# Patient Record
Sex: FEMALE | Race: WHITE | NOT HISPANIC OR LATINO | Employment: FULL TIME | URBAN - METROPOLITAN AREA
[De-identification: names, ages, dates, MRNs, and addresses within clinical notes are randomized per-mention and may not be internally consistent; named-entity substitution may affect disease eponyms.]

---

## 2017-01-18 ENCOUNTER — PATIENT MESSAGE (OUTPATIENT)
Dept: ADMINISTRATIVE | Facility: OTHER | Age: 57
End: 2017-01-18

## 2017-01-18 ENCOUNTER — LAB VISIT (OUTPATIENT)
Dept: LAB | Facility: HOSPITAL | Age: 57
End: 2017-01-18
Attending: INTERNAL MEDICINE
Payer: COMMERCIAL

## 2017-01-18 ENCOUNTER — OFFICE VISIT (OUTPATIENT)
Dept: INTERNAL MEDICINE | Facility: CLINIC | Age: 57
End: 2017-01-18
Payer: COMMERCIAL

## 2017-01-18 VITALS
BODY MASS INDEX: 31.71 KG/M2 | HEART RATE: 86 BPM | HEIGHT: 63 IN | DIASTOLIC BLOOD PRESSURE: 84 MMHG | SYSTOLIC BLOOD PRESSURE: 128 MMHG | WEIGHT: 179 LBS

## 2017-01-18 DIAGNOSIS — I83.11 VARICOSE VEINS OF RIGHT LOWER EXTREMITY WITH INFLAMMATION: ICD-10-CM

## 2017-01-18 DIAGNOSIS — Z98.84 GASTRIC BYPASS STATUS FOR OBESITY: ICD-10-CM

## 2017-01-18 DIAGNOSIS — I10 ESSENTIAL HYPERTENSION: ICD-10-CM

## 2017-01-18 DIAGNOSIS — F41.9 ANXIETY: ICD-10-CM

## 2017-01-18 DIAGNOSIS — Z00.00 ANNUAL PHYSICAL EXAM: Primary | ICD-10-CM

## 2017-01-18 DIAGNOSIS — K63.5 POLYP OF COLON, UNSPECIFIED PART OF COLON, UNSPECIFIED TYPE: ICD-10-CM

## 2017-01-18 DIAGNOSIS — Z00.00 ANNUAL PHYSICAL EXAM: ICD-10-CM

## 2017-01-18 DIAGNOSIS — G47.30 SLEEP APNEA, UNSPECIFIED TYPE: ICD-10-CM

## 2017-01-18 LAB
25(OH)D3+25(OH)D2 SERPL-MCNC: 22 NG/ML
ALBUMIN SERPL BCP-MCNC: 3.6 G/DL
ALP SERPL-CCNC: 139 U/L
ALT SERPL W/O P-5'-P-CCNC: 37 U/L
ANION GAP SERPL CALC-SCNC: 8 MMOL/L
AST SERPL-CCNC: 33 U/L
BASOPHILS # BLD AUTO: 0.04 K/UL
BASOPHILS NFR BLD: 0.8 %
BILIRUB SERPL-MCNC: 0.4 MG/DL
BUN SERPL-MCNC: 14 MG/DL
CALCIUM SERPL-MCNC: 9.2 MG/DL
CHLORIDE SERPL-SCNC: 108 MMOL/L
CHOLEST/HDLC SERPL: 3.9 {RATIO}
CO2 SERPL-SCNC: 24 MMOL/L
CREAT SERPL-MCNC: 0.8 MG/DL
DIFFERENTIAL METHOD: ABNORMAL
EOSINOPHIL # BLD AUTO: 0.2 K/UL
EOSINOPHIL NFR BLD: 3.3 %
ERYTHROCYTE [DISTWIDTH] IN BLOOD BY AUTOMATED COUNT: 14.9 %
EST. GFR  (AFRICAN AMERICAN): >60 ML/MIN/1.73 M^2
EST. GFR  (NON AFRICAN AMERICAN): >60 ML/MIN/1.73 M^2
FERRITIN SERPL-MCNC: 60 NG/ML
GLUCOSE SERPL-MCNC: 97 MG/DL
HCT VFR BLD AUTO: 41.8 %
HDL/CHOLESTEROL RATIO: 25.8 %
HDLC SERPL-MCNC: 225 MG/DL
HDLC SERPL-MCNC: 58 MG/DL
HGB BLD-MCNC: 13.7 G/DL
LDLC SERPL CALC-MCNC: 145.2 MG/DL
LYMPHOCYTES # BLD AUTO: 1.8 K/UL
LYMPHOCYTES NFR BLD: 35.3 %
MCH RBC QN AUTO: 28.8 PG
MCHC RBC AUTO-ENTMCNC: 32.8 %
MCV RBC AUTO: 88 FL
MONOCYTES # BLD AUTO: 0.5 K/UL
MONOCYTES NFR BLD: 10 %
NEUTROPHILS # BLD AUTO: 2.6 K/UL
NEUTROPHILS NFR BLD: 50.4 %
NONHDLC SERPL-MCNC: 167 MG/DL
PLATELET # BLD AUTO: 245 K/UL
PMV BLD AUTO: 10.2 FL
POTASSIUM SERPL-SCNC: 4.3 MMOL/L
PROT SERPL-MCNC: 7.1 G/DL
RBC # BLD AUTO: 4.75 M/UL
SODIUM SERPL-SCNC: 140 MMOL/L
TRIGL SERPL-MCNC: 109 MG/DL
TSH SERPL DL<=0.005 MIU/L-ACNC: 1.84 UIU/ML
URATE SERPL-MCNC: 4.4 MG/DL
VIT B12 SERPL-MCNC: 1600 PG/ML
WBC # BLD AUTO: 5.19 K/UL

## 2017-01-18 PROCEDURE — 36415 COLL VENOUS BLD VENIPUNCTURE: CPT

## 2017-01-18 PROCEDURE — 83036 HEMOGLOBIN GLYCOSYLATED A1C: CPT

## 2017-01-18 PROCEDURE — 84550 ASSAY OF BLOOD/URIC ACID: CPT

## 2017-01-18 PROCEDURE — 82607 VITAMIN B-12: CPT

## 2017-01-18 PROCEDURE — 99999 PR PBB SHADOW E&M-EST. PATIENT-LVL III: CPT | Mod: PBBFAC,,, | Performed by: INTERNAL MEDICINE

## 2017-01-18 PROCEDURE — 85025 COMPLETE CBC W/AUTO DIFF WBC: CPT

## 2017-01-18 PROCEDURE — 84443 ASSAY THYROID STIM HORMONE: CPT

## 2017-01-18 PROCEDURE — 80053 COMPREHEN METABOLIC PANEL: CPT

## 2017-01-18 PROCEDURE — 3079F DIAST BP 80-89 MM HG: CPT | Mod: S$GLB,,, | Performed by: INTERNAL MEDICINE

## 2017-01-18 PROCEDURE — 82728 ASSAY OF FERRITIN: CPT

## 2017-01-18 PROCEDURE — 84425 ASSAY OF VITAMIN B-1: CPT

## 2017-01-18 PROCEDURE — 80061 LIPID PANEL: CPT

## 2017-01-18 PROCEDURE — 3074F SYST BP LT 130 MM HG: CPT | Mod: S$GLB,,, | Performed by: INTERNAL MEDICINE

## 2017-01-18 PROCEDURE — 99396 PREV VISIT EST AGE 40-64: CPT | Mod: S$GLB,,, | Performed by: INTERNAL MEDICINE

## 2017-01-18 PROCEDURE — 82306 VITAMIN D 25 HYDROXY: CPT

## 2017-01-18 RX ORDER — ALPRAZOLAM 0.5 MG/1
0.5 TABLET ORAL NIGHTLY PRN
Qty: 30 TABLET | Refills: 2 | Status: SHIPPED | OUTPATIENT
Start: 2017-01-18 | End: 2018-11-20 | Stop reason: SDUPTHER

## 2017-01-18 RX ORDER — TRETINOIN 1 MG/G
CREAM TOPICAL
Qty: 45 G | Refills: 11 | Status: SHIPPED | OUTPATIENT
Start: 2017-01-18 | End: 2019-06-27 | Stop reason: SDUPTHER

## 2017-01-18 RX ORDER — BENZONATATE 100 MG/1
100 CAPSULE ORAL 3 TIMES DAILY PRN
Qty: 30 CAPSULE | Refills: 3 | Status: SHIPPED | OUTPATIENT
Start: 2017-01-18 | End: 2017-02-17

## 2017-01-18 RX ORDER — HYDROCHLOROTHIAZIDE 12.5 MG/1
12.5 CAPSULE ORAL DAILY
Qty: 90 CAPSULE | Refills: 3 | Status: SHIPPED | OUTPATIENT
Start: 2017-01-18 | End: 2017-11-12

## 2017-01-18 RX ORDER — ONDANSETRON 4 MG/1
4 TABLET, FILM COATED ORAL EVERY 8 HOURS PRN
Qty: 20 TABLET | Refills: 0 | Status: SHIPPED | OUTPATIENT
Start: 2017-01-18 | End: 2019-11-25 | Stop reason: SDUPTHER

## 2017-01-18 RX ORDER — LOSARTAN POTASSIUM 100 MG/1
100 TABLET ORAL EVERY MORNING
Qty: 90 TABLET | Refills: 3 | Status: SHIPPED | OUTPATIENT
Start: 2017-01-18 | End: 2018-06-26 | Stop reason: SDUPTHER

## 2017-01-18 NOTE — PATIENT INSTRUCTIONS
[email or web address] PRAVIN for your cell phone. [employer]    [place]reser[place]inder most recent, most hopeful information about dementia prevention to come along in years. It's a lot of things. Diet, supplements, exercise and preliminary data looks good.
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
## 2017-01-19 LAB
ESTIMATED AVG GLUCOSE: 111 MG/DL
HBA1C MFR BLD HPLC: 5.5 %

## 2017-01-20 ENCOUNTER — PATIENT MESSAGE (OUTPATIENT)
Dept: ADMINISTRATIVE | Facility: OTHER | Age: 57
End: 2017-01-20

## 2017-01-21 LAB — VIT B1 SERPL-MCNC: 48 UG/L (ref 38–122)

## 2017-01-23 ENCOUNTER — PATIENT OUTREACH (OUTPATIENT)
Dept: OTHER | Facility: OTHER | Age: 57
End: 2017-01-23
Payer: COMMERCIAL
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
## 2017-02-07 ENCOUNTER — PATIENT OUTREACH (OUTPATIENT)
Dept: OTHER | Facility: OTHER | Age: 57
End: 2017-02-07
Payer: COMMERCIAL

## 2017-02-07 NOTE — PROGRESS NOTES
Last 5 Patient Entered Readings                                                               Current 30 Day Average: 121/82     Recent Readings 2/7/2017 2/6/2017 2/6/2017 2/5/2017 2/5/2017    Systolic BP (mmHg) 142 135 124 104 96    Diastolic BP (mmHg) 96 83 87 78 81    Pulse 75 79 102 86 78        Hypertension Medications             hydrochlorothiazide (MICROZIDE) 12.5 mg capsule Take 1 capsule (12.5 mg total) by mouth once daily.- qam    losartan (COZAAR) 100 MG tablet Take 1 tablet (100 mg total) by mouth every morning.        Called patient to introduce her into the HDM (hypertension digital medicine) clinic.  Patient requests I call back tomorrow morning.     Called back, verified the following information with the patient:  Drug allergies  Medication adherence- Patient states she is adherent.      Screening questionnaires:  Depression- indicated      Sleep apnea- diagnosed, on CPAP- patient states she cannot wear a CPAP, claustrophobic. Patient states she was not able to complete the sleep study.       Explained that we expect her to obtain several blood pressures/week at random times of day.       Explained that our goal is to get her BP to consistently below 140/90mmHg.       Patient and I agreed that the patient will take her BP daily to every other day at varying times of the day.       I will plan to follow-up with the patient in 4 weeks.      Emailed patient link to [facility] HTN webpage as well as my direct phone number in case she has in questions.

## 2017-02-07 NOTE — LETTER
"   Mecca Vela, PharmD  [address] [place] [address]     Dear Cherie Richards,    Welcome to the [facility] Hypertension Digital Medicine Program!         My name is Mecca Vela and I am your dedicated clinical pharmacist.  As an expert in medication management, I will help ensure that the medications you are taking continue to provide you with the intended benefits.      This is Meche Collier and she will be your health  for the duration of the program.  Her job is to help you identify lifestyle changes to improve your blood pressure control.  You will talk about nutrition, exercise, and other ways that you may be able to adjust your current habits to better your health. Together, we will work to improve your overall health and encourage you to meet your goals for a healthier lifestyle.    What we expect from YOU:    You will need to take blood pressure readings multiple times a week and no less than one reading per week.   It is important that you take your measurements at different times during the day, when possible.     What you should expect from your Digital Medicine Care Team:   We will provide you with education about high blood pressure, including lifestyle changes that could help you to control your blood pressure.   We will review your weekly readings and provide you with monthly blood pressure progress reports after you have been in the program for more than 30 days.   We will send monthly progress reports on your blood pressure control to your physician so they can follow along with your progress as well.    You will be able to reach me by phone at [phone] or through your [facility] account by clicking "Send a message to your doctor's office" on the home screen then selecting my name in the "To the office of:" field.     I look forward to working with you to achieve your blood pressure goals!    Sincerely,    Mecca Vela, Kenn  Your personal Clinical " Pharmacist    Please visit [email or web address] to learn more about high blood pressure and what you can do lower your blood pressure.                                                                                         Cherie Richards  [address]  [place] [address]

## 2017-02-17 ENCOUNTER — PATIENT OUTREACH (OUTPATIENT)
Dept: OTHER | Facility: OTHER | Age: 57
End: 2017-02-17
Payer: COMMERCIAL

## 2017-02-17 NOTE — PROGRESS NOTES
Last 5 Patient Entered Readings                                                               Current 30 Day Average: 122/82     Recent Readings 2/15/2017 2/14/2017 2/14/2017 2/12/2017 2/11/2017    Systolic BP (mmHg) 126 132 147 125 109    Diastolic BP (mmHg) 76 81 99 81 81    Pulse 78 77 88 98 106        LVM to follow up with Mrs. Cherie Richards. Inquired about how her low sodium diet and exercise is going. Advised pt to call or message back if she has any questions or concerns.

## 2017-03-08 ENCOUNTER — PATIENT OUTREACH (OUTPATIENT)
Dept: OTHER | Facility: OTHER | Age: 57
End: 2017-03-08
Payer: COMMERCIAL

## 2017-03-08 NOTE — PROGRESS NOTES
Last 5 Patient Entered Readings                                                               Current 30 Day Average: 126/83     Recent Readings 3/4/2017 3/1/2017 3/1/2017 3/1/2017 3/1/2017    Systolic BP (mmHg) 124 120 122 127 132    Diastolic BP (mmHg) 82 79 81 82 84    Pulse 91 93 94 95 97        Hypertension Medications             hydrochlorothiazide (MICROZIDE) 12.5 mg capsule Take 1 capsule (12.5 mg total) by mouth once daily.    losartan (COZAAR) 100 MG tablet Take 1 tablet (100 mg total) by mouth every morning.        Plan:   Called patient to follow up. Per current 30 day average, BP is well controlled. LVM.   Will continue to monitor. WCB in 3 months, sooner if BP begins to trend up or down.

## 2017-03-20 ENCOUNTER — PATIENT OUTREACH (OUTPATIENT)
Dept: OTHER | Facility: OTHER | Age: 57
End: 2017-03-20
Payer: COMMERCIAL

## 2017-03-20 NOTE — PROGRESS NOTES
"Last 5 Patient Entered Readings                                                               Current 30 Day Average: 121/82     Recent Readings 3/18/2017 3/12/2017 3/12/2017 3/10/2017 3/4/2017    Systolic BP (mmHg) 104 111 138 108 124    Diastolic BP (mmHg) 81 69 92 74 82    Pulse 95 93 87 97 91        Follow up with Ms. Cherie Richards completed. Ms. Richards is doing well. Patient reports that "poor connection" message sometimes appears when she is trying to take a blood pressure reading. She will check Bluetooth and internet connection next time she takes a reading. Ms. Richards reports that she is staying active and maintaining her low sodium diet.Patient did not have any further questions or concerns. I will follow up in a few weeks to see how she is doing and progressing.        "

## 2017-04-10 ENCOUNTER — PATIENT OUTREACH (OUTPATIENT)
Dept: OTHER | Facility: OTHER | Age: 57
End: 2017-04-10
Payer: COMMERCIAL

## 2017-04-10 NOTE — PROGRESS NOTES
Last 5 Patient Entered Readings                                                               Current 30 Day Average: 121/84     Recent Readings 4/9/2017 4/7/2017 4/6/2017 3/27/2017 3/27/2017    Systolic BP (mmHg) 133 115 152 103 104    Diastolic BP (mmHg) 91 77 96 82 81    Pulse 80 99 87 87 95      LVM to follow up with Ms. Cherie BUCHANAN Maurice. Inquired about how her low sodium diet and exercise is going. Advised pt to call or message back if she has any questions or concerns.

## 2017-05-10 ENCOUNTER — PATIENT OUTREACH (OUTPATIENT)
Dept: OTHER | Facility: OTHER | Age: 57
End: 2017-05-10
Payer: COMMERCIAL

## 2017-06-07 ENCOUNTER — TELEPHONE (OUTPATIENT)
Dept: BARIATRICS | Facility: CLINIC | Age: 57
End: 2017-06-07
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
## 2017-06-08 ENCOUNTER — PATIENT OUTREACH (OUTPATIENT)
Dept: OTHER | Facility: OTHER | Age: 57
End: 2017-06-08
Payer: COMMERCIAL

## 2017-06-08 NOTE — PROGRESS NOTES
Last 5 Patient Entered Readings                                                               Current 30 Day Average: 120/82     Recent Readings 5/20/2017 5/19/2017 4/22/2017 4/22/2017 4/22/2017    Systolic BP (mmHg) 115 125 120 132 138    Diastolic BP (mmHg) 84 81 85 85 90    Pulse 86 78 103 89 91        Hypertension Medications             hydrochlorothiazide (MICROZIDE) 12.5 mg capsule Take 1 capsule (12.5 mg total) by mouth once daily.    losartan (COZAAR) 100 MG tablet Take 1 tablet (100 mg total) by mouth every morning.        Plan:   Called patient to follow up. Per current 30 day average, BP is well controlled. Patient states she will take a reading soon. Patient denies having questions or concerns. Will continue to monitor. WCB in 4 months, sooner if BP begins to trend up or down.

## 2017-06-21 ENCOUNTER — PATIENT OUTREACH (OUTPATIENT)
Dept: OTHER | Facility: OTHER | Age: 57
End: 2017-06-21

## 2017-06-21 NOTE — PROGRESS NOTES
Last 5 Patient Entered Readings                                                               Current 30 Day Average: 129/91     Recent Readings 6/13/2017 6/9/2017 5/20/2017 5/19/2017 4/22/2017    Systolic BP (mmHg) 119 139 115 125 120    Diastolic BP (mmHg) 92 90 84 81 85    Pulse 88 76 86 78 103        Follow up with Ms. Cherie Richards completed. Patient attributes lack of BP readings to being out of town and being busy at work. She will try to take a BP reading soon. Ms. Richards is doing well. Patient did not have any further questions or concerns. I will follow up in a few weeks to see how she is doing and progressing.

## 2017-08-02 ENCOUNTER — PATIENT OUTREACH (OUTPATIENT)
Dept: OTHER | Facility: OTHER | Age: 57
End: 2017-08-02

## 2017-08-02 NOTE — PROGRESS NOTES
"Last 5 Patient Entered Redings Current 30 Day Average: 136/91     Recent Readings 8/4/2017 7/18/2017 7/17/2017 7/6/2017 6/13/2017    Systolic BP (mmHg) 131 131 146 113 119    Diastolic BP (mmHg) 93 88 91 75 92    Pulse 94 83 81 93 88        Follow up with Ms. Cherie Richards completed. Patient attributes elevated BP readings to "nerves". Ms. Richards reports that she has elderly parents that she cares for which keeps her stress levels high. Patient reports that she monitors her sodium intake by not adding any salt to food. Patient did not have any further questions or concerns. I will follow up in a few weeks to see how she is doing and progressing.          "

## 2017-08-25 ENCOUNTER — PATIENT OUTREACH (OUTPATIENT)
Dept: OTHER | Facility: OTHER | Age: 57
End: 2017-08-25

## 2017-08-25 NOTE — PROGRESS NOTES
Last 5 Patient Entered Redings Current 30 Day Average: 131/93     Recent Readings 8/4/2017 7/18/2017 7/17/2017 7/6/2017 6/13/2017    Systolic BP (mmHg) 131 131 146 113 119    Diastolic BP (mmHg) 93 88 91 75 92    Pulse 94 83 81 93 88        Messaging patient regarding lack of readings.

## 2017-09-06 ENCOUNTER — PATIENT OUTREACH (OUTPATIENT)
Dept: OTHER | Facility: OTHER | Age: 57
End: 2017-09-06
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
## 2017-09-08 NOTE — PROGRESS NOTES
Last 5 Patient Entered Redings Current 30 Day Average: 124/82     Recent Readings 9/6/2017 9/6/2017 9/5/2017 9/4/2017 9/4/2017    Systolic BP (mmHg) 121 112 146 111 114    Diastolic BP (mmHg) 72 76 97 79 79    Pulse 78 69 76 90 73        Hypertension Medications             hydrochlorothiazide (MICROZIDE) 12.5 mg capsule Take 1 capsule (12.5 mg total) by mouth once daily.    losartan (COZAAR) 100 MG tablet Take 1 tablet (100 mg total) by mouth every morning.        Plan:   Called patient to follow up. Per current 30 day average, BP is well controlled. Requested patient continue to take at least weekly readings, patient confirms understanding. Patient denies having questions or concerns. Will continue to monitor. WCB in 4 months, sooner if BP begins to trend up or down.

## 2017-10-03 ENCOUNTER — PATIENT OUTREACH (OUTPATIENT)
Dept: OTHER | Facility: OTHER | Age: 57
End: 2017-10-03

## 2017-10-03 NOTE — PROGRESS NOTES
Last 5 Patient Entered Redings Current 30 Day Average: 129/83     Recent Readings 9/12/2017 9/11/2017 9/11/2017 9/11/2017 9/11/2017    Systolic BP (mmHg) 133 - 134 145 135    Diastolic BP (mmHg) 86 - 79 101 106    Pulse 76 89 80 71 74        10/3- Messaging patient regarding lack of readings.     10/17-  Called patient to follow up, still no readings since 9/12.  LVM, requested patient call back.   Will continue to monitor. WCB in 2 weeks.

## 2017-10-04 ENCOUNTER — PATIENT OUTREACH (OUTPATIENT)
Dept: OTHER | Facility: OTHER | Age: 57
End: 2017-10-04

## 2017-10-04 NOTE — PROGRESS NOTES
Last 5 Patient Entered Redings Current 30 Day Average: 129/83     Recent Readings 9/12/2017 9/11/2017 9/11/2017 9/11/2017 9/11/2017    Systolic BP (mmHg) 133 - 134 145 135    Diastolic BP (mmHg) 86 - 79 101 106    Pulse 76 89 80 71 74        Hypertension Digital Medicine Program (HDMP): Health  Follow Up    Lifestyle Modifications:    1.Low sodium diet: Deferred    2.Physical activity: Deferred    3.Hypotension/Hypertension symptoms: no   Frequency/Alleviating factors/Precipitating factors, etc.     4.Patient has been compliant with the medication regimen.     Follow up with Mrs. Cherie Richards completed. Mrs. Richards is doing okay. Patient denies any technical issues with her digital cuff. She will submit a BP reading today. No further questions or concerns. I will follow up in a few weeks to assess progress.

## 2017-10-30 NOTE — PROGRESS NOTES
Last 5 Patient Entered Readings Current 30 Day Average: 110/79     Recent Readings 10/28/2017 9/12/2017 9/11/2017 9/11/2017 9/11/2017    Systolic BP (mmHg) 110 133 - 134 145    Diastolic BP (mmHg) 79 86 - 79 101    Pulse 106 76 89 80 71        Hypertension Medications             hydrochlorothiazide (MICROZIDE) 12.5 mg capsule Take 1 capsule (12.5 mg total) by mouth once daily.    losartan (COZAAR) 100 MG tablet Take 1 tablet (100 mg total) by mouth every morning.        Assessment/ Plan:   Called patient to follow up. Per current 30 day average, BP is well controlled.  Patient denies having questions or concerns. Instructed patient to call if she has any questions or concerns, patient confirms understanding.   Will continue to monitor. WCB in 4 months, sooner if BP begins to trend up or down.

## 2017-11-12 ENCOUNTER — OFFICE VISIT (OUTPATIENT)
Dept: URGENT CARE | Facility: CLINIC | Age: 57
End: 2017-11-12
Payer: COMMERCIAL

## 2017-11-12 VITALS
SYSTOLIC BLOOD PRESSURE: 120 MMHG | BODY MASS INDEX: 31.89 KG/M2 | WEIGHT: 180 LBS | TEMPERATURE: 99 F | DIASTOLIC BLOOD PRESSURE: 77 MMHG | HEART RATE: 118 BPM | OXYGEN SATURATION: 96 %

## 2017-11-12 DIAGNOSIS — J00 ACUTE NASOPHARYNGITIS: Primary | ICD-10-CM

## 2017-11-12 DIAGNOSIS — J00 NASOPHARYNGITIS: ICD-10-CM

## 2017-11-12 DIAGNOSIS — I10 ESSENTIAL HYPERTENSION: ICD-10-CM

## 2017-11-12 PROCEDURE — 99212 OFFICE O/P EST SF 10 MIN: CPT | Mod: 25,S$GLB,, | Performed by: INTERNAL MEDICINE

## 2017-11-12 PROCEDURE — 96372 THER/PROPH/DIAG INJ SC/IM: CPT | Mod: S$GLB,,, | Performed by: INTERNAL MEDICINE

## 2017-11-12 RX ORDER — BETAMETHASONE SODIUM PHOSPHATE AND BETAMETHASONE ACETATE 3; 3 MG/ML; MG/ML
9 INJECTION, SUSPENSION INTRA-ARTICULAR; INTRALESIONAL; INTRAMUSCULAR; SOFT TISSUE
Status: COMPLETED | OUTPATIENT
Start: 2017-11-12 | End: 2017-11-12

## 2017-11-12 RX ORDER — GUAIFENESIN 600 MG/1
600 TABLET, EXTENDED RELEASE ORAL 2 TIMES DAILY
Qty: 60 TABLET | Refills: 2 | Status: SHIPPED | OUTPATIENT
Start: 2017-11-12 | End: 2018-06-01

## 2017-11-12 RX ORDER — PREDNISONE 20 MG/1
20 TABLET ORAL DAILY
Qty: 3 TABLET | Refills: 0 | Status: SHIPPED | OUTPATIENT
Start: 2017-11-12 | End: 2017-11-15

## 2017-11-12 RX ORDER — BENZONATATE 100 MG/1
100 CAPSULE ORAL EVERY 6 HOURS PRN
Qty: 30 CAPSULE | Refills: 1 | Status: SHIPPED | OUTPATIENT
Start: 2017-11-12 | End: 2018-11-12

## 2017-11-12 RX ADMIN — BETAMETHASONE SODIUM PHOSPHATE AND BETAMETHASONE ACETATE 9 MG: 3; 3 INJECTION, SUSPENSION INTRA-ARTICULAR; INTRALESIONAL; INTRAMUSCULAR; SOFT TISSUE at 09:11

## 2017-11-12 NOTE — PATIENT INSTRUCTIONS

## 2017-11-12 NOTE — PROGRESS NOTES
Subjective:       Patient ID: Cherie Richards is a 57 y.o. female.    Vitals:  weight is 81.6 kg (180 lb). Her temperature is 98.9 °F (37.2 °C). Her blood pressure is 120/77 and her pulse is 118 (abnormal). Her oxygen saturation is 96%.     Chief Complaint: URI    1 wk h/o rhinorea, sinus congestion, head pressure.  Denies cough.  Vague chills, occasional unmeasured fever .       URI    This is a new problem. The current episode started in the past 7 days. The problem has been gradually worsening. The maximum temperature recorded prior to her arrival was 100.4 - 100.9 F. The fever has been present for less than 1 day. Associated symptoms include chest pain, congestion, coughing, ear pain, headaches, a sore throat and wheezing. Pertinent negatives include no abdominal pain or nausea. She has tried acetaminophen, decongestant and increased fluids for the symptoms. The treatment provided no relief.     Review of Systems   Constitution: Positive for chills, fever and malaise/fatigue.   HENT: Positive for congestion, ear pain, hoarse voice and sore throat.    Eyes: Negative for discharge and redness.   Cardiovascular: Positive for chest pain and dyspnea on exertion. Negative for leg swelling.   Respiratory: Positive for cough, shortness of breath and wheezing. Negative for sputum production.    Musculoskeletal: Positive for myalgias.   Gastrointestinal: Negative for abdominal pain and nausea.   Neurological: Positive for headaches.       Objective:      Physical Exam   Constitutional: She is oriented to person, place, and time. She appears well-developed and well-nourished. She is cooperative.  Non-toxic appearance. She does not appear ill. No distress.   HENT:   Head: Normocephalic and atraumatic.   Right Ear: Hearing, external ear and ear canal normal. There is swelling.   Left Ear: Hearing, tympanic membrane, external ear and ear canal normal.   Nose: Nose normal. No mucosal edema, rhinorrhea or nasal deformity. No  epistaxis. Right sinus exhibits no maxillary sinus tenderness and no frontal sinus tenderness. Left sinus exhibits no maxillary sinus tenderness and no frontal sinus tenderness.   Mouth/Throat: Uvula is midline and mucous membranes are normal. No trismus in the jaw. Normal dentition. No uvula swelling. Posterior oropharyngeal erythema present.   Max sinus pressure   Eyes: Conjunctivae and lids are normal. Right eye exhibits no discharge. Left eye exhibits no discharge. No scleral icterus.   Sclera clear bilat   Neck: Trachea normal, normal range of motion, full passive range of motion without pain and phonation normal. Neck supple.   Cardiovascular: Normal rate, regular rhythm, normal heart sounds, intact distal pulses and normal pulses.    Pulmonary/Chest: Effort normal and breath sounds normal. No respiratory distress.   Abdominal: Soft. Normal appearance and bowel sounds are normal. She exhibits no distension, no pulsatile midline mass and no mass. There is no tenderness.   Musculoskeletal: Normal range of motion. She exhibits no edema or deformity.   Neurological: She is alert and oriented to person, place, and time. She exhibits normal muscle tone. Coordination normal.   Skin: Skin is warm, dry and intact. She is not diaphoretic. No pallor.   Psychiatric: She has a normal mood and affect. Her speech is normal and behavior is normal. Judgment and thought content normal. Cognition and memory are normal.   Nursing note and vitals reviewed.      Assessment:       1. Acute nasopharyngitis    2. Nasopharyngitis    3. Essential hypertension, started in her mid 30's        Plan:         Acute nasopharyngitis    Nasopharyngitis    Essential hypertension, started in her mid 30's    Other orders  -     betamethasone acetate-betamethasone sodium phosphate injection 9 mg; Inject 1.5 mLs (9 mg total) into the muscle one time.  -     predniSONE (DELTASONE) 20 MG tablet; Take 1 tablet (20 mg total) by mouth once daily.   Dispense: 3 tablet; Refill: 0  -     guaiFENesin (MUCINEX) 600 mg 12 hr tablet; Take 1 tablet (600 mg total) by mouth 2 (two) times daily.  Dispense: 60 tablet; Refill: 2  -     benzonatate (TESSALON PERLES) 100 MG capsule; Take 1 capsule (100 mg total) by mouth every 6 (six) hours as needed for Cough.  Dispense: 30 capsule; Refill: 1      Please drink plenty of fluids.  Please get plenty of rest.  Please return here or go to the Emergency Department for any concerns or worsening of condition.  If you were prescribed antibiotics, please take them to completion.  If you were given wait & see antibiotics, please wait 5-7 days before taking them, and only take them if your symptoms have worsened or not improved.  If you do begin taking the antibiotics, please take them to completion.  If you were prescribed a narcotic medication, do not drive or operate heavy equipment or machinery while taking these medications.  If you were given a steroid shot in the clinic and have also been given a prescription for a steroid such as Prednisone or a Medrol Dose Pack, please begin taking them tomorrow.  If you do not have Hypertension or any history of palpitations, it is ok to take over the counter Sudafed or Mucinex D or Allegra-D or Claritin-D or Zyrtec-D.  If you do take one of the above, it is ok to combine that with plain over the counter Mucinex or Allegra or Claritin or Zyrtec.  If for example you are taking Zyrtec -D, you can combine that with Mucinex, but not Mucinex-D.  If you are taking Mucinex-D, you can combine that with plain Allegra or Claritin or Zyrtec.   If you do have Hypertension or palpitations, it is safe to take Coricidin HBP for relief of sinus symptoms.  If not allergic, please take over the counter Tylenol (Acetaminophen) and/or Motrin (Ibuprofen) as directed for control of pain and/or fever.  Please follow up with your primary care doctor or specialist as needed.    If you  smoke, please stop smoking.

## 2017-11-14 ENCOUNTER — PATIENT OUTREACH (OUTPATIENT)
Dept: OTHER | Facility: OTHER | Age: 57
End: 2017-11-14

## 2017-11-14 NOTE — PROGRESS NOTES
"Last 5 Patient Entered Readings Current 30 Day Average: 128/84     Recent Readings 11/28/2017 11/28/2017 11/27/2017 11/27/2017 11/24/2017    Systolic BP (mmHg) 121 140 120 111 144    Diastolic BP (mmHg) 79 91 82 79 74    Pulse 89 86 90 96 108        Hypertension Digital Medicine Program (HDMP): Health  Follow Up    Lifestyle Modifications:    1.Low sodium diet: yes Ms. Richards does not add salt to her foods.    2.Physical activity: yes Patient reports that she is "always busy" and active throughout the day.    3.Hypotension/Hypertension symptoms: no   Frequency/Alleviating factors/Precipitating factors, etc.     4.Patient has been compliant with the medication regimen. Patient reports that she has been taking OTC claritin D. Advised patient to avoid use of claritin D, as it may be causing elevated BP readings.     Follow up with Ms. Cherie Richards completed. No further questions or concerns. I will follow up in a few weeks to assess progress.         "

## 2017-12-11 RX ORDER — CYANOCOBALAMIN 1000 UG/ML
INJECTION, SOLUTION INTRAMUSCULAR; SUBCUTANEOUS
Qty: 1 ML | Refills: 12 | Status: SHIPPED | OUTPATIENT
Start: 2017-12-11 | End: 2019-06-19 | Stop reason: SDUPTHER

## 2017-12-18 ENCOUNTER — PATIENT OUTREACH (OUTPATIENT)
Dept: OTHER | Facility: OTHER | Age: 57
End: 2017-12-18

## 2017-12-18 NOTE — PROGRESS NOTES
Last 5 Patient Entered Readings Current 30 Day Average: 121/80       Units 12/1/2017 12/1/2017 12/1/2017 11/30/2017 11/30/2017    Time -  5:45 PM  1:12 PM  7:06 AM  5:58 PM  9:18 AM    Systolic Blood Pressure - 120 124 135 99 109    Diastolic Blood Pressure - 85 90 83 81 69    Pulse bpm 92 99 87 101 93        Hypertension Medications             hydrochlorothiazide (MICROZIDE) 12.5 mg capsule Take 1 capsule (12.5 mg total) by mouth once daily.    losartan (COZAAR) 100 MG tablet Take 1 tablet (100 mg total) by mouth every morning.        Assessment/ Plan:   Called patient to follow up. Per newly released 2017 ACC/ AHA HTN guidelines (goal of BP < 130/80), current 30-day average is controlled.  Discussed new goals with patient.   Patient states she is currently out of town, will be back in several days.   Patient denies having questions or concerns. Instructed patient to call if she has any questions or concerns, patient confirms understanding.   Will continue to monitor. WCB in 3 months, sooner if BP begins to trend up or down.

## 2018-01-12 ENCOUNTER — PATIENT OUTREACH (OUTPATIENT)
Dept: OTHER | Facility: OTHER | Age: 58
End: 2018-01-12

## 2018-01-12 NOTE — PROGRESS NOTES
"Last 5 Patient Entered Readings                                      Current 30 Day Average: 113/77     Recent Readings 12/29/2017 12/1/2017 12/1/2017 12/1/2017 11/30/2017    SBP (mmHg) 113 120 124 135 99    DBP (mmHg) 77 85 90 83 81    Pulse 93 92 99 87 101        Hypertension Digital Medicine Program (HDMP): Health  Follow Up    Lifestyle Modifications:    1.Low sodium diet: Deferred    2.Physical activity: Deferred     3.Hypotension/Hypertension symptoms: no   Frequency/Alleviating factors/Precipitating factors, etc.     4.Patient has been compliant with the medication regimen.     Follow up with Ms. Cherie Richards completed. Ms. Richards is doing well. Patient denies any changes to diet and physical activity. Ms. Richards reports using a personal BP device because the [employer] cuff often gives her "trouble". Offered technical support or information to enter readings manually. Patient declined. She will submit a reading using her digital cuff soon. No further questions or concerns. I will follow up in a few weeks to assess progress.         "

## 2018-02-05 ENCOUNTER — PATIENT OUTREACH (OUTPATIENT)
Dept: OTHER | Facility: OTHER | Age: 58
End: 2018-02-05

## 2018-02-05 NOTE — PROGRESS NOTES
Last 5 Patient Entered Readings                                      Current 30 Day Average: 124/78     Recent Readings 1/14/2018 12/29/2017 12/1/2017 12/1/2017 12/1/2017    SBP (mmHg) 124 113 120 124 135    DBP (mmHg) 78 77 85 90 83    Pulse 101 93 92 99 87        Hypertension Medications             hydrochlorothiazide (MICROZIDE) 12.5 mg capsule Take 1 capsule (12.5 mg total) by mouth once daily.    losartan (COZAAR) 100 MG tablet Take 1 tablet (100 mg total) by mouth every morning.        Messaging patient regarding lack of readings. Will follow up in 2 weeks.

## 2018-02-16 ENCOUNTER — PATIENT OUTREACH (OUTPATIENT)
Dept: OTHER | Facility: OTHER | Age: 58
End: 2018-02-16

## 2018-02-16 NOTE — PROGRESS NOTES
Last 5 Patient Entered Readings                                      Current 30 Day Average: 120/76     Recent Readings 2/21/2018 1/14/2018 12/29/2017 12/1/2017 12/1/2017    SBP (mmHg) 120 124 113 120 124    DBP (mmHg) 76 78 77 85 90    Pulse 97 101 93 92 99        Digital Medicine: Health  Follow Up    Follow up with Ms. Cherie Richards completed. Ms. Richards is doing well. Encouraged patient to increase frequency of BP monitoring, as it is important for [facility] care team to be able to review readings. Patient verbalized understanding. No further questions or concerns regarding BP, diet, or physical activity. I will follow up in a few weeks to assess progress.

## 2018-02-19 NOTE — PROGRESS NOTES
Last 5 Patient Entered Readings                                      Current 30 Day Average:      Recent Readings 1/14/2018 12/29/2017 12/1/2017 12/1/2017 12/1/2017    SBP (mmHg) 124 113 120 124 135    DBP (mmHg) 78 77 85 90 83    Pulse 101 93 92 99 87        Hypertension Medications             hydrochlorothiazide (MICROZIDE) 12.5 mg capsule Take 1 capsule (12.5 mg total) by mouth once daily.    losartan (COZAAR) 100 MG tablet Take 1 tablet (100 mg total) by mouth every morning.        Assessment/ Plan:   Called patient to follow up; patient is still away from home, needs to call IT support once she returns home to troubleshoot.   Patient denies having questions or concerns. Instructed patient to call if she has any questions or concerns, patient confirms understanding.   Will continue to monitor. WCB in 1 month.

## 2018-03-19 ENCOUNTER — PATIENT OUTREACH (OUTPATIENT)
Dept: OTHER | Facility: OTHER | Age: 58
End: 2018-03-19

## 2018-03-19 NOTE — PROGRESS NOTES
Last 5 Patient Entered Readings                                      Current 30 Day Average: 114/75     Recent Readings 3/8/2018 3/3/2018 3/3/2018 3/3/2018 2/21/2018    SBP (mmHg) 110 112 111 106 120    DBP (mmHg) 75 75 71 75 76    Pulse 83 88 84 89 97        Hypertension Medications             hydrochlorothiazide (MICROZIDE) 12.5 mg capsule Take 1 capsule (12.5 mg total) by mouth once daily.    losartan (COZAAR) 100 MG tablet Take 1 tablet (100 mg total) by mouth every morning.        Assessment/ Plan:   Called patient to follow up.   Per newly released 2017 ACC/ AHA HTN guidelines (goal of BP < 130/80), current 30-day average is well controlled.    Patient denies hypotensive s/sx (lightheadedness, dizziness, nausea, fatigue) associated with low readings.  Instructed patient to inform me if she does develop hypotensive s/sx associated with low readings, patient confirms understanding.    Patient denies having questions or concerns. Instructed patient to call if she has any questions or concerns, patient confirms understanding.   Will continue to monitor. WCB in 3 months, sooner if BP begins to trend up or down.

## 2018-04-09 ENCOUNTER — PATIENT OUTREACH (OUTPATIENT)
Dept: OTHER | Facility: OTHER | Age: 58
End: 2018-04-09

## 2018-04-09 NOTE — PROGRESS NOTES
Last 5 Patient Entered Readings                                      Current 30 Day Average: 105/78     Recent Readings 3/26/2018 3/26/2018 3/8/2018 3/3/2018 3/3/2018    SBP (mmHg) 105 108 110 112 111    DBP (mmHg) 78 79 75 75 71    Pulse 87 90 83 88 84        Ms. Richards is doing okay. Patient reports that she has been able to check her BP since 3/26/18. She will restart her phone today. Will send contact information for IT assistance if needed. No questions or concerns.

## 2018-04-30 ENCOUNTER — PATIENT OUTREACH (OUTPATIENT)
Dept: OTHER | Facility: OTHER | Age: 58
End: 2018-04-30

## 2018-04-30 NOTE — PROGRESS NOTES
Last 5 Patient Entered Readings                                      Current 30 Day Average: 116/74     Recent Readings 4/13/2018 4/13/2018 4/10/2018 3/26/2018 3/26/2018    SBP (mmHg) 106 108 126 105 108    DBP (mmHg) 68 72 79 78 79    Pulse 84 88 82 87 90        Mrs. Richards attributes lack of participation to the loss of her father. Patient requests three weeks hiatus. Expressed deepest condolences. No questions or concerns.

## 2018-05-29 NOTE — PROGRESS NOTES
Last 5 Patient Entered Readings                                      Current 30 Day Average: 98/74     Recent Readings 5/14/2018 4/13/2018 4/13/2018 4/10/2018 3/26/2018    SBP (mmHg) 98 106 108 126 105    DBP (mmHg) 74 68 72 79 78    Pulse 90 84 88 82 87        Patient requests 3 more weeks hiatus. Will follow up then. No questions or concerns.

## 2018-06-01 ENCOUNTER — LAB VISIT (OUTPATIENT)
Dept: LAB | Facility: HOSPITAL | Age: 58
End: 2018-06-01
Attending: INTERNAL MEDICINE
Payer: COMMERCIAL

## 2018-06-01 ENCOUNTER — OFFICE VISIT (OUTPATIENT)
Dept: FAMILY MEDICINE | Facility: CLINIC | Age: 58
End: 2018-06-01
Payer: COMMERCIAL

## 2018-06-01 VITALS
SYSTOLIC BLOOD PRESSURE: 122 MMHG | OXYGEN SATURATION: 97 % | TEMPERATURE: 98 F | WEIGHT: 169.31 LBS | BODY MASS INDEX: 30 KG/M2 | HEART RATE: 84 BPM | HEIGHT: 63 IN | DIASTOLIC BLOOD PRESSURE: 70 MMHG

## 2018-06-01 DIAGNOSIS — Z80.0 FAMILY HISTORY OF MALIGNANT NEOPLASM OF PANCREAS: ICD-10-CM

## 2018-06-01 DIAGNOSIS — E78.5 HYPERLIPIDEMIA, UNSPECIFIED HYPERLIPIDEMIA TYPE: ICD-10-CM

## 2018-06-01 DIAGNOSIS — Z12.31 ENCOUNTER FOR SCREENING MAMMOGRAM FOR BREAST CANCER: ICD-10-CM

## 2018-06-01 DIAGNOSIS — Z86.010 HISTORY OF COLONIC POLYPS: ICD-10-CM

## 2018-06-01 DIAGNOSIS — Z98.84 GASTRIC BYPASS STATUS FOR OBESITY: ICD-10-CM

## 2018-06-01 DIAGNOSIS — Z00.00 ROUTINE MEDICAL EXAM: ICD-10-CM

## 2018-06-01 DIAGNOSIS — M54.31 RIGHT SIDED SCIATICA: ICD-10-CM

## 2018-06-01 DIAGNOSIS — F39 MOOD DISORDER: ICD-10-CM

## 2018-06-01 DIAGNOSIS — Z00.00 ROUTINE MEDICAL EXAM: Primary | ICD-10-CM

## 2018-06-01 DIAGNOSIS — I10 ESSENTIAL HYPERTENSION: ICD-10-CM

## 2018-06-01 DIAGNOSIS — E55.9 VITAMIN D DEFICIENCY DISEASE: ICD-10-CM

## 2018-06-01 LAB
25(OH)D3+25(OH)D2 SERPL-MCNC: 20 NG/ML
ALBUMIN SERPL BCP-MCNC: 4 G/DL
ALP SERPL-CCNC: 136 U/L
ALT SERPL W/O P-5'-P-CCNC: 45 U/L
AMYLASE SERPL-CCNC: 23 U/L
ANION GAP SERPL CALC-SCNC: 11 MMOL/L
AST SERPL-CCNC: 31 U/L
BASOPHILS # BLD AUTO: 0.05 K/UL
BASOPHILS NFR BLD: 0.9 %
BILIRUB SERPL-MCNC: 0.5 MG/DL
BUN SERPL-MCNC: 18 MG/DL
CALCIUM SERPL-MCNC: 9.8 MG/DL
CHLORIDE SERPL-SCNC: 100 MMOL/L
CHOLEST SERPL-MCNC: 229 MG/DL
CHOLEST/HDLC SERPL: 3.1 {RATIO}
CO2 SERPL-SCNC: 24 MMOL/L
CREAT SERPL-MCNC: 0.9 MG/DL
DIFFERENTIAL METHOD: NORMAL
EOSINOPHIL # BLD AUTO: 0.1 K/UL
EOSINOPHIL NFR BLD: 1.2 %
ERYTHROCYTE [DISTWIDTH] IN BLOOD BY AUTOMATED COUNT: 13.7 %
EST. GFR  (AFRICAN AMERICAN): >60 ML/MIN/1.73 M^2
EST. GFR  (NON AFRICAN AMERICAN): >60 ML/MIN/1.73 M^2
GLUCOSE SERPL-MCNC: 87 MG/DL
HCT VFR BLD AUTO: 41.9 %
HDLC SERPL-MCNC: 75 MG/DL
HDLC SERPL: 32.8 %
HGB BLD-MCNC: 13.8 G/DL
IMM GRANULOCYTES # BLD AUTO: 0.01 K/UL
IMM GRANULOCYTES NFR BLD AUTO: 0.2 %
LDLC SERPL CALC-MCNC: 139.6 MG/DL
LIPASE SERPL-CCNC: 13 U/L
LYMPHOCYTES # BLD AUTO: 2.1 K/UL
LYMPHOCYTES NFR BLD: 37.4 %
MCH RBC QN AUTO: 28.9 PG
MCHC RBC AUTO-ENTMCNC: 32.9 G/DL
MCV RBC AUTO: 88 FL
MONOCYTES # BLD AUTO: 0.5 K/UL
MONOCYTES NFR BLD: 9.3 %
NEUTROPHILS # BLD AUTO: 2.9 K/UL
NEUTROPHILS NFR BLD: 51 %
NONHDLC SERPL-MCNC: 154 MG/DL
NRBC BLD-RTO: 0 /100 WBC
PLATELET # BLD AUTO: 283 K/UL
PMV BLD AUTO: 10.7 FL
POTASSIUM SERPL-SCNC: 3.8 MMOL/L
PROT SERPL-MCNC: 7.2 G/DL
RBC # BLD AUTO: 4.78 M/UL
SODIUM SERPL-SCNC: 135 MMOL/L
TRIGL SERPL-MCNC: 72 MG/DL
TSH SERPL DL<=0.005 MIU/L-ACNC: 1.29 UIU/ML
WBC # BLD AUTO: 5.69 K/UL

## 2018-06-01 PROCEDURE — 82306 VITAMIN D 25 HYDROXY: CPT

## 2018-06-01 PROCEDURE — 85025 COMPLETE CBC W/AUTO DIFF WBC: CPT

## 2018-06-01 PROCEDURE — 80061 LIPID PANEL: CPT

## 2018-06-01 PROCEDURE — 3078F DIAST BP <80 MM HG: CPT | Mod: CPTII,S$GLB,, | Performed by: INTERNAL MEDICINE

## 2018-06-01 PROCEDURE — 84443 ASSAY THYROID STIM HORMONE: CPT

## 2018-06-01 PROCEDURE — 83690 ASSAY OF LIPASE: CPT

## 2018-06-01 PROCEDURE — 99999 PR PBB SHADOW E&M-EST. PATIENT-LVL III: CPT | Mod: PBBFAC,,, | Performed by: INTERNAL MEDICINE

## 2018-06-01 PROCEDURE — 36415 COLL VENOUS BLD VENIPUNCTURE: CPT | Mod: PO

## 2018-06-01 PROCEDURE — 99396 PREV VISIT EST AGE 40-64: CPT | Mod: S$GLB,,, | Performed by: INTERNAL MEDICINE

## 2018-06-01 PROCEDURE — 82150 ASSAY OF AMYLASE: CPT

## 2018-06-01 PROCEDURE — 3074F SYST BP LT 130 MM HG: CPT | Mod: CPTII,S$GLB,, | Performed by: INTERNAL MEDICINE

## 2018-06-01 PROCEDURE — 80053 COMPREHEN METABOLIC PANEL: CPT

## 2018-06-01 RX ORDER — NITROFURANTOIN 25; 75 MG/1; MG/1
CAPSULE ORAL
COMMUNITY
Start: 2018-04-30 | End: 2019-04-15

## 2018-06-01 RX ORDER — METOPROLOL SUCCINATE 25 MG/1
TABLET, EXTENDED RELEASE ORAL
COMMUNITY
Start: 2018-05-10 | End: 2019-06-27 | Stop reason: SDUPTHER

## 2018-06-01 RX ORDER — HYDROCHLOROTHIAZIDE 25 MG/1
TABLET ORAL
COMMUNITY
Start: 2018-04-09 | End: 2019-06-27 | Stop reason: SDUPTHER

## 2018-06-01 RX ORDER — ROSUVASTATIN CALCIUM 20 MG/1
TABLET, COATED ORAL
COMMUNITY
Start: 2018-03-05 | End: 2018-11-20

## 2018-06-01 RX ORDER — FLUCONAZOLE 150 MG/1
TABLET ORAL
COMMUNITY
Start: 2018-04-30 | End: 2019-04-04
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
## 2018-06-11 ENCOUNTER — HOSPITAL ENCOUNTER (OUTPATIENT)
Dept: RADIOLOGY | Facility: HOSPITAL | Age: 58
Discharge: HOME | End: 2018-06-11
Attending: INTERNAL MEDICINE
Payer: COMMERCIAL

## 2018-06-11 DIAGNOSIS — M54.31 RIGHT SIDED SCIATICA: ICD-10-CM

## 2018-06-11 PROCEDURE — 72148 MRI LUMBAR SPINE W/O DYE: CPT | Mod: TC

## 2018-06-11 PROCEDURE — 72148 MRI LUMBAR SPINE W/O DYE: CPT | Mod: 26,,, | Performed by: RADIOLOGY

## 2018-06-19 ENCOUNTER — PATIENT OUTREACH (OUTPATIENT)
Dept: OTHER | Facility: OTHER | Age: 58
End: 2018-06-19

## 2018-06-19 NOTE — PROGRESS NOTES
Last 5 Patient Entered Readings                                      Current 30 Day Average:      Recent Readings 5/14/2018 4/13/2018 4/13/2018 4/10/2018 3/26/2018    SBP (mmHg) 98 106 108 126 105    DBP (mmHg) 74 68 72 79 78    Pulse 90 84 88 82 87        Patient reports that she took a reading this week. However, last BP reading was 5/14. Patient will check proper applications to make sure readings can transmit. Will follow up in 1 week.

## 2018-06-19 NOTE — PROGRESS NOTES
Last 5 Patient Entered Readings                                      Current 30 Day Average:      Recent Readings 5/14/2018 4/13/2018 4/13/2018 4/10/2018 3/26/2018    SBP (mmHg) 98 106 108 126 105    DBP (mmHg) 74 68 72 79 78    Pulse 90 84 88 82 87        No readings since 5/14. Will request HC to reach out regarding lack of participation/ readings. Will continue to monitor, will follow up in 1 month.

## 2018-06-26 RX ORDER — LOSARTAN POTASSIUM 100 MG/1
100 TABLET ORAL EVERY MORNING
Qty: 90 TABLET | Refills: 3 | Status: SHIPPED | OUTPATIENT
Start: 2018-06-26 | End: 2019-06-27 | Stop reason: SDUPTHER

## 2018-06-26 NOTE — PROGRESS NOTES
Last 5 Patient Entered Readings                                      Current 30 Day Average:      Recent Readings 5/14/2018 4/13/2018 4/13/2018 4/10/2018 3/26/2018    SBP (mmHg) 98 106 108 126 105    DBP (mmHg) 74 68 72 79 78    Pulse 90 84 88 82 87        Patient reports that she is out of town, but will return tomorrow. She will submit a reading then.  No questions. Will follow up if IT assistance is necessary.

## 2018-07-11 NOTE — PROGRESS NOTES
Last 5 Patient Entered Readings                                      Current 30 Day Average: 106/72     Recent Readings 6/26/2018 6/26/2018 6/26/2018 6/26/2018 6/26/2018    SBP (mmHg) 106 120 119 120 133    DBP (mmHg) 72 85 87 89 96    Pulse 81 81 78 77 75        Patient submitted readings on 6/26. No readings since.  Sending [employer] message today.  Will follow up in 2 weeks.

## 2018-07-17 ENCOUNTER — PATIENT OUTREACH (OUTPATIENT)
Dept: OTHER | Facility: OTHER | Age: 58
End: 2018-07-17

## 2018-07-17 NOTE — PROGRESS NOTES
Last 5 Patient Entered Readings                                      Current 30 Day Average: 120/82     Recent Readings 7/14/2018 7/14/2018 7/13/2018 7/13/2018 7/13/2018    SBP (mmHg) 122 135 135 136 140    DBP (mmHg) 80 87 98 96 104    Pulse 84 85 88 86 92        Hypertension Medications             hydroCHLOROthiazide (HYDRODIURIL) 25 MG tablet     losartan (COZAAR) 100 MG tablet Take 1 tablet (100 mg total) by mouth every morning.    metoprolol succinate (TOPROL-XL) 25 MG 24 hr tablet         Assessment/ Plan:   Called patient to follow up.   Per newly released 2017 ACC/ AHA HTN guidelines (goal of BP < 130/80), current 30-day average is slightly uncontrolled. Patient attributes stress and lack of sleep to higher readings.   Requested patient take more frequent readings so I can better determine whether or not we need to adjust she HTN medication regimen, patient confirms understanding.   Patient denies having questions or concerns. Instructed patient to call if she has any questions or concerns, patient confirms understanding.   Will continue to monitor. WCB in 6 weeks.

## 2018-07-31 NOTE — PROGRESS NOTES
"Last 5 Patient Entered Readings                                      Current 30 Day Average: 125/85     Recent Readings 7/14/2018 7/14/2018 7/13/2018 7/13/2018 7/13/2018    SBP (mmHg) 122 135 135 136 140    DBP (mmHg) 80 87 98 96 104    Pulse 84 85 88 86 92        Patient continues to monitor BP at home, but readings are not transmitting. Patient reports receiving error message stating "lost connection".  Will request IT reach out for troubleshooting assistance on Monday per patient's request.   Will follow up in 3 weeks.      "

## 2018-08-24 ENCOUNTER — PATIENT MESSAGE (OUTPATIENT)
Dept: ADMINISTRATIVE | Facility: OTHER | Age: 58
End: 2018-08-24

## 2018-08-24 NOTE — PROGRESS NOTES
Last 5 Patient Entered Readings                                      Current 30 Day Average: 122/80     Recent Readings 8/23/2018 8/23/2018 8/19/2018 8/19/2018 8/19/2018    SBP (mmHg) 118 130 123 131 133    DBP (mmHg) 73 88 94 87 95    Pulse 91 96 75 79 80        Digital Medicine: Health  Follow Up    Unable to leave a voicemail to follow up with Ms. Cherie [employer].  Current BP average 122/80 mmHg is at goal, <130/80 mmHg.  Will continue to follow up.

## 2018-08-27 ENCOUNTER — PATIENT OUTREACH (OUTPATIENT)
Dept: OTHER | Facility: OTHER | Age: 58
End: 2018-08-27

## 2018-08-27 NOTE — PROGRESS NOTES
Last 5 Patient Entered Readings                                      Current 30 Day Average: 119/79     Recent Readings 8/25/2018 8/25/2018 8/25/2018 8/23/2018 8/23/2018    SBP (mmHg) 102 114 123 118 130    DBP (mmHg) 73 71 71 73 88    Pulse 77 79 79 91 96        Hypertension Medications             hydroCHLOROthiazide (HYDRODIURIL) 25 MG tablet     losartan (COZAAR) 100 MG tablet Take 1 tablet (100 mg total) by mouth every morning.    metoprolol succinate (TOPROL-XL) 25 MG 24 hr tablet         Assessment/ Plan:   Called patient to follow up, reviewed recent readings.   Per 2017 ACC/ AHA HTN guidelines (goal of BP < 130/80), current 30-day average is controlled.   Patient denies having questions or concerns. Instructed patient to call if she has any questions or concerns, patient confirms understanding.   Will continue to monitor. WCB in 3 months, sooner if BP begins to trend up or down.

## 2018-09-20 ENCOUNTER — PATIENT MESSAGE (OUTPATIENT)
Dept: FAMILY MEDICINE | Facility: CLINIC | Age: 58
End: 2018-09-20

## 2018-09-21 NOTE — PROGRESS NOTES
Last 5 Patient Entered Readings                                      Current 30 Day Average: 120/79     Recent Readings 9/20/2018 9/16/2018 9/16/2018 9/15/2018 9/15/2018    SBP (mmHg) 128 125 129 127 139    DBP (mmHg) 87 81 79 83 83    Pulse 79 94 74 87 88      Digital Medicine: Health  Follow Up    Lifestyle Modifications:    1.Dietary Modifications (Sodium intake <2,000mg/day, food labels, dining out): Patient continues to limit salt intake.     2.Physical Activity: Patient does not exercise regularly. However, she states that she is active throughout the day.    3.Medication Therapy: Patient has been compliant with the medication regimen.    4.Patient has the following medication side effects/concerns: None  (Frequency/Alleviating factors/Precipitating factors, etc.)     Follow up with Mrs. Cherie Richards completed. Mrs. Richards is doing okay. Her 30 day BP average 120/79 mmHg is at goal, <130/80 mmHg. She has no questions for improving dietary habits or physical activity currently. Will continue to follow up to achieve health goals.

## 2018-11-02 ENCOUNTER — PATIENT OUTREACH (OUTPATIENT)
Dept: OTHER | Facility: OTHER | Age: 58
End: 2018-11-02

## 2018-11-02 NOTE — PROGRESS NOTES
"Last 5 Patient Entered Readings                                      Current 30 Day Average: 127/90     Recent Readings 11/2/2018 11/2/2018 10/20/2018 10/13/2018 10/8/2018    SBP (mmHg) 146 151 114 120 126    DBP (mmHg) 105 79 78 78 92    Pulse 90 85 87 95 78        Digital Medicine: Health  Follow Up    Lifestyle Modifications:    1.Dietary Modifications (Sodium intake <2,000mg/day, food labels, dining out): Deferred    2.Physical Activity: Deferred    3.Medication Therapy: Patient has not been compliant with the medication regimen. Patient attributes elevated BP this morning to checking her BP before taking medications. Mrs. Richards reports taking two BP medications this morning, and well as "the extra one that was stopped" but states that she still has some "left over". She believes the extra medication was hydrochlorothiazide 12.5mg. She will submit a new BP reading later this morning.    4.Patient has the following medication side effects/concerns: None  (Frequency/Alleviating factors/Precipitating factors, etc.)     Follow up with Mrs. Cherie Richards completed. No further questions or concerns. Will continue to follow up to achieve health goals.      "

## 2018-11-19 ENCOUNTER — PATIENT OUTREACH (OUTPATIENT)
Dept: OTHER | Facility: OTHER | Age: 58
End: 2018-11-19

## 2018-11-19 NOTE — PROGRESS NOTES
HPI:  Called patient to follow up. Patient endorses adherence to medication regimen daily and denies missed doses. Patient denies hypotensive s/sx (lightheadedness, dizziness, nausea, fatigue) associated with low readings. Patient denies hypertensive s/sx (SOB, CP, severe headaches, changes in vision, dizziness, fatigue, confusion, anxiety, nosebleeds) associated with high readings. Patient attributes anxiety, depression, and lack of sleep to higher readings; patient states she plans to discuss with PCP.  (Father passed 5 months ago, mother is sick,  has suffered from lung cancer in the past.)    Last 5 Patient Entered Readings                                      Current 30 Day Average: 121/88     Recent Readings 11/12/2018 11/12/2018 11/11/2018 11/11/2018 11/11/2018    SBP (mmHg) 124 129 110 123 125    DBP (mmHg) 89 92 79 85 91    Pulse 86 86 99 96 102     Assessment:  Reviewed recent readings. Per 2017 ACC/ AHA HTN guidelines (goal of BP < 130/80), current 30-day average is uncontrolled.     Plan:   Offered to increase metoprolol to 50mg; however, patient declines this change at this time. Patient expresses she would like to see PCP first to discuss lack of sleep.  (Patient has had hyponatremia in the past, not an ideal candidate for increased doses of hctz or chlorthalidone.)   Continue current medication regimen. Patients health , Meche Collier, will be following up every 3-4 weeks. I will continue to monitor regularly and will follow-up in 6 weeks, sooner if blood pressure begins to trend upward or downward.     Current medication regimen:  Hypertension Medications             hydroCHLOROthiazide (HYDRODIURIL) 25 MG tablet     losartan (COZAAR) 100 MG tablet Take 1 tablet (100 mg total) by mouth every morning.    metoprolol succinate (TOPROL-XL) 25 MG 24 hr tablet         Patient denies having questions or concerns. Patient has my contact information and knows to call with any concerns or clinical  changes.

## 2018-11-20 ENCOUNTER — LAB VISIT (OUTPATIENT)
Dept: LAB | Facility: HOSPITAL | Age: 58
End: 2018-11-20
Attending: INTERNAL MEDICINE
Payer: COMMERCIAL

## 2018-11-20 ENCOUNTER — OFFICE VISIT (OUTPATIENT)
Dept: FAMILY MEDICINE | Facility: CLINIC | Age: 58
End: 2018-11-20
Payer: COMMERCIAL

## 2018-11-20 VITALS
HEIGHT: 63 IN | DIASTOLIC BLOOD PRESSURE: 76 MMHG | WEIGHT: 175.69 LBS | SYSTOLIC BLOOD PRESSURE: 120 MMHG | BODY MASS INDEX: 31.13 KG/M2 | TEMPERATURE: 98 F | OXYGEN SATURATION: 94 % | HEART RATE: 89 BPM

## 2018-11-20 DIAGNOSIS — I10 ESSENTIAL HYPERTENSION: Primary | ICD-10-CM

## 2018-11-20 DIAGNOSIS — Z98.84 GASTRIC BYPASS STATUS FOR OBESITY: ICD-10-CM

## 2018-11-20 DIAGNOSIS — F39 MOOD DISORDER: ICD-10-CM

## 2018-11-20 DIAGNOSIS — R20.2 NUMBNESS AND TINGLING OF BOTH FEET: ICD-10-CM

## 2018-11-20 DIAGNOSIS — R20.2 NUMBNESS AND TINGLING IN BOTH HANDS: ICD-10-CM

## 2018-11-20 DIAGNOSIS — R20.0 NUMBNESS AND TINGLING IN BOTH HANDS: ICD-10-CM

## 2018-11-20 DIAGNOSIS — E55.9 VITAMIN D DEFICIENCY DISEASE: ICD-10-CM

## 2018-11-20 DIAGNOSIS — R20.0 NUMBNESS AND TINGLING OF BOTH FEET: ICD-10-CM

## 2018-11-20 DIAGNOSIS — E78.5 HYPERLIPIDEMIA, UNSPECIFIED HYPERLIPIDEMIA TYPE: ICD-10-CM

## 2018-11-20 LAB
25(OH)D3+25(OH)D2 SERPL-MCNC: 23 NG/ML
ALBUMIN SERPL BCP-MCNC: 4.2 G/DL
ALP SERPL-CCNC: 114 U/L
ALT SERPL W/O P-5'-P-CCNC: 19 U/L
ANION GAP SERPL CALC-SCNC: 9 MMOL/L
AST SERPL-CCNC: 18 U/L
BILIRUB SERPL-MCNC: 0.4 MG/DL
BUN SERPL-MCNC: 23 MG/DL
CALCIUM SERPL-MCNC: 10 MG/DL
CHLORIDE SERPL-SCNC: 99 MMOL/L
CO2 SERPL-SCNC: 25 MMOL/L
CREAT SERPL-MCNC: 1.1 MG/DL
ERYTHROCYTE [SEDIMENTATION RATE] IN BLOOD BY WESTERGREN METHOD: 14 MM/HR
EST. GFR  (AFRICAN AMERICAN): >60 ML/MIN/1.73 M^2
EST. GFR  (NON AFRICAN AMERICAN): 54.7 ML/MIN/1.73 M^2
GLUCOSE SERPL-MCNC: 98 MG/DL
POTASSIUM SERPL-SCNC: 3.7 MMOL/L
PROT SERPL-MCNC: 7.7 G/DL
SODIUM SERPL-SCNC: 133 MMOL/L
TSH SERPL DL<=0.005 MIU/L-ACNC: 1.41 UIU/ML
VIT B12 SERPL-MCNC: 1537 PG/ML

## 2018-11-20 PROCEDURE — 82306 VITAMIN D 25 HYDROXY: CPT

## 2018-11-20 PROCEDURE — 99999 PR PBB SHADOW E&M-EST. PATIENT-LVL III: CPT | Mod: PBBFAC,,, | Performed by: INTERNAL MEDICINE

## 2018-11-20 PROCEDURE — 3078F DIAST BP <80 MM HG: CPT | Mod: CPTII,S$GLB,, | Performed by: INTERNAL MEDICINE

## 2018-11-20 PROCEDURE — 80053 COMPREHEN METABOLIC PANEL: CPT

## 2018-11-20 PROCEDURE — 3008F BODY MASS INDEX DOCD: CPT | Mod: CPTII,S$GLB,, | Performed by: INTERNAL MEDICINE

## 2018-11-20 PROCEDURE — 82607 VITAMIN B-12: CPT

## 2018-11-20 PROCEDURE — 3074F SYST BP LT 130 MM HG: CPT | Mod: CPTII,S$GLB,, | Performed by: INTERNAL MEDICINE

## 2018-11-20 PROCEDURE — 99215 OFFICE O/P EST HI 40 MIN: CPT | Mod: S$GLB,,, | Performed by: INTERNAL MEDICINE

## 2018-11-20 PROCEDURE — 85652 RBC SED RATE AUTOMATED: CPT

## 2018-11-20 PROCEDURE — 36415 COLL VENOUS BLD VENIPUNCTURE: CPT | Mod: PO

## 2018-11-20 PROCEDURE — 84443 ASSAY THYROID STIM HORMONE: CPT

## 2018-11-20 RX ORDER — ESCITALOPRAM OXALATE 5 MG/1
5 TABLET ORAL DAILY
Qty: 30 TABLET | Refills: 11 | Status: SHIPPED | OUTPATIENT
Start: 2018-11-20 | End: 2019-02-14

## 2018-11-20 RX ORDER — ALPRAZOLAM 0.5 MG/1
0.5 TABLET ORAL NIGHTLY PRN
Qty: 30 TABLET | Refills: 2 | Status: SHIPPED | OUTPATIENT
Start: 2018-11-20 | End: 2019-02-14 | Stop reason: SDUPTHER

## 2018-11-20 RX ORDER — CYCLOSPORINE 0.5 MG/ML
1 EMULSION OPHTHALMIC 2 TIMES DAILY
Qty: 2 EACH | Refills: 6 | Status: SHIPPED | OUTPATIENT
Start: 2018-11-20 | End: 2020-06-24

## 2018-11-20 NOTE — PROGRESS NOTES
Chief complaint:  Follow-up on hypertension, anxiety numbness in her fingers and toes    58-year-old white female essentially new to me .  She is on the digital hypertension program.  She says her blood pressures been up lately but when I review the digital hypertension recording sits fairly normal as it is today.  Sometimes her diastolics are upper normal.  Her pulse tends to be on the upper and.  I suggested to patient that we could increase the metoprolol from 25-50 and it appears that the digital hypertension recommended the same thing.  She wanted to wait until her evaluation with me to decide.  She has been occasionally taking an additional 12.5 HCTZ on top of her 25 mg HCTZ off and on when her blood pressure is up.  She has noted some numbness to the tips of her fingers and tips of her toes for the past 2 months.  It appears to involve all of her fingers and toes.    Apparently she had some chest pain and had an angiogram at [facility].  Apparently at some point she passed out and imaging apparently showed 2 small brain bleeds.  Her history and all these details is not quite clear and so I had her sign a release and will try to obtain those records from [facility].  Since that time she was put on Crestor and appears we had on record 20 mg but she has a bottle of 10 mg.  It did repeatedly cause significant pains in her legs that were somewhat incapacitating.  It resolved off the medicine.  We discussed reassessment and possibly trying a different statin in the future.  Since we are starting other medications like Lexapro I would hate to add and complicate things right now.    The other primary purpose of her visit is that she is more anxious and depressed.  She can't stop crying which is not normal for her.  She used to have some Xanax to take on occasion and she would like to get that again to help her sleep.  Her father  6 months ago when she becomes tearful about that.  She is now  caring for her mom.  She is running a company.  She is having anxiety attacks.  She has been on various medications in the past which we reviewed.  All probably avoid Wellbutrin given her anxiety.  All started on very low-dose Lexapro 5 mg discussing that in 4-6 weeks she may not notice any difference because of the low-dose and she should not quit and we should rather increase the dose.    Will send her for some lab work today to assess for electrolyte issues especially with the additional HCTZ uses a cannot provider any reason for the diffuse numbness.  She has had bariatric surgery but continues on her vitamins.  Her B12 level a year ago was normal but will reassess.  Patient counseled at great length today and reassured about all these issues we had a long conversation regarding her psychiatric issues.Total time over 45 minutes with over 50% counseling.    ROS:   CONST: weight stable. EYES: no vision change. ENT: no sore throat. CV: no chest pain w/ exertion. RESP: no shortness of breath. GI: no nausea, vomiting, diarrhea. No dysphagia. : no urinary issues. MUSCULOSKELETAL: no new myalgias or arthralgias. SKIN: no new changes. NEURO: no other focal deficits. PSYCH: no other new issues. ENDOCRINE: no polyuria. HEME: no lymph nodes. ALLERGY: no general pruritis.          Past Medical History:   Diagnosis Date    Abnormal Pap smear     Anemia     history    Anxiety     Cancer     uterine-cancer cells     Colon polyps, next C-scope 2019. 4/22/2014    Dry eyes     Essential hypertension, started in her mid 30's 1/18/2017    Family history of malignant neoplasm of pancreas 6/1/2018    Gastric bypass status for obesity 8/21/2012    GERD (gastroesophageal reflux disease)     Helicobacter pylori gastritis, 2008. 8/20/2014    Hemangioma of liver, stable 2010, 2012, right lobe 8/20/2014    Hematuria     History of cosmetic surgeries, tummy tuck 2011. 4/5/2013    HTN (hypertension) 8/21/2012     120s-130s/80s    Kidney stone     Right sided sciatica 2018    Sleep apnea     patient does not use the C-PAP mask-cannot tolerate    Urinary incontinence     Urinary retention     Varicose veins of lower extremity with inflammation 2015    Vertigo     mild    Vitamin D deficiency disease 2018       Past Surgical History:   Procedure Laterality Date    Abdominoplasty with Liposcuction      APPENDECTOMY      CERVICAL BIOPSY  W/ LOOP ELECTRODE EXCISION      Prior to hysterectomy    CHOLECYSTECTOMY      Laparoscopic    COLONOSCOPY N/A 2014    [facility]formed [facility] (4TH FLR)    CYSTOSCOPY N/A 2014    [facility] at [facility] OR 1ST FLR    EGD N/A 2014    Performed by Lincoln Schumacher [facility] at [facility] END[facility] (4TH FLR)    GASTRIC BYPASS      HERNIA REPAIR      HYSTERECTOMY       TVH  both ovaries remain    PYELOGRAM, RETROGRADE Bilateral 2014    [facility] OR 1ST FLR    URETEROSCOPY Left 2014    [facility] at [facility] OR 1ST FLR     Social History     Socioeconomic History    Marital status:      Spouse name: Not on file    Number of children: Not on file    Years of education: Not on file    Highest education level: Not on file   Social Needs    Financial resource strain: Not on file    Food insecurity - worry: Not on file    Food insecurity - inability: Not on file    Transportation needs - medical: Not on file    Transportation needs - non-medical: Not on file   Occupational History    Not on file   Tobacco Use    Smoking status: Never Smoker    Smokeless tobacco: Never Used    Tobacco comment: .  .   Occup:  .     Substance and Sexual Activity    Alcohol use: No    Drug use: No    Sexual activity: Yes     Partners: Male     Birth control/protection: Surgical   Other Topics Concern    Not on file   Social History Narrative    Not on file      family history includes Breast cancer in her maternal aunt; Colon cancer in her maternal uncle; Coronary artery disease in her mother; Diabetes in her father,  of pancreatic cancer; Hearing loss in her maternal grandmother; Heart disease in her mother; Heart failure in her mother; No Known Problems in her brother, maternal grandfather, paternal aunt, paternal grandfather, paternal grandmother, paternal uncle, and sister; Stroke in her mother.    Gen: no distress  Neurologic:  Subjective numbness in the tips of her fingers today.  No atrophy of the muscles.  Does not appear to involve the hands are suggestive of carpal tunnel syndrome.  Her gait is normal.          Cherie was seen today for blood pressure check.    Diagnoses and all orders for this visit:    Essential hypertension, started in her mid 30's, appears to be under good control although diastolics upper normal.  Consider increase in her metoprolol to 50 mg we will continue to monitor blood pressure and pulse especially with treatment of her anxiety.  Last thing we want to do is over treat especially given her anxiety regarding medications side effects    Mood disorder, uncontrolled, explained to patient her condition needs to be treated with a daily medication such as Lexapro.  Will give her the Xanax for acute symptom control    Vitamin D deficiency disease, reassess  -     Vitamin D; Future    Gastric bypass status for obesity, 2008., reassess for nutritional deficiencies    Hyperlipidemia, unspecified hyperlipidemia type, lipid profile not too bad and will obtain records regarding her prior cardiac testing and possible stroke issues.  If indeed she has some underlying history of stroke and or any coronary disease at all, statin will be indicated and likely try different 1    Numbness and tingling in both hands, appears to be constant and worsening so doubt this is anxiety related.  Assess for electrolyte issues especially with use of  "additional HCTZ, consider nerve compression but cannot relate any specific neurological pattern for this.  Check TSH  -     TSH; Future  -     Comprehensive metabolic panel; Future  -     Vitamin D; Future  -     Vitamin B12; Future  -     Sedimentation rate; Future    Numbness and tingling of both feet  -     TSH; Future  -     Comprehensive metabolic panel; Future  -     Vitamin D; Future  -     Vitamin B12; Future  -     Sedimentation rate; Future    Other orders  -     ALPRAZolam (XANAX) 0.5 MG tablet; Take 1 tablet (0.5 mg total) by mouth nightly as needed for Insomnia.  -     escitalopram oxalate (LEXAPRO) 5 MG Tab; Take 1 tablet (5 mg total) by mouth once daily.  -     cycloSPORINE (RESTASIS) 0.05 % ophthalmic emulsion; Place 0.4 mLs (1 drop total) into both eyes 2 (two) times daily.          Based on patient's anxiety Referable to the above symptoms and the end clear diagnosis at this point, access would not be therapeutic"This note will not be shared with the patient."  "

## 2018-11-23 ENCOUNTER — PATIENT MESSAGE (OUTPATIENT)
Dept: FAMILY MEDICINE | Facility: CLINIC | Age: 58
End: 2018-11-23

## 2018-12-04 ENCOUNTER — PATIENT OUTREACH (OUTPATIENT)
Dept: OTHER | Facility: OTHER | Age: 58
End: 2018-12-04

## 2018-12-04 NOTE — PROGRESS NOTES
Last 5 Patient Entered Readings                                      Current 30 Day Average: 109/86     Recent Readings 11/20/2018 11/12/2018 11/12/2018 11/11/2018 11/11/2018    SBP (mmHg) 94 124 129 110 123    DBP (mmHg) 78 89 92 79 85    Pulse 100 86 86 99 96        Sending patient message regarding lack of readings.

## 2018-12-11 NOTE — PROGRESS NOTES
"Last 5 Patient Entered Readings                                      Current 30 Day Average: 109/86     Recent Readings 11/20/2018 11/12/2018 11/12/2018 11/11/2018 11/11/2018    SBP (mmHg) 94 124 129 110 123    DBP (mmHg) 78 89 92 79 85    Pulse 100 86 86 99 96        Digital Medicine: Health  Follow Up    Lifestyle Modifications:    1.Dietary Modifications (Sodium intake <2,000mg/day, food labels, dining out): Deferred    2.Physical Activity: Deferred    3.Medication Therapy: Patient has been compliant with the medication regimen.    4.Patient has the following medication side effects/concerns: None   (Frequency/Alleviating factors/Precipitating factors, etc.)     Follow up with Mrs. Cherie Richards completed. Mrs. Richards is doing okay. Patient states that she has been [place] of town "at the camp", which she attributes to lack of BP monitoring. She is home today, and will resume. She thanks me for calling, and has no questions or concerns currently. Will continue to follow up to achieve health goals.        "

## 2018-12-17 ENCOUNTER — PATIENT MESSAGE (OUTPATIENT)
Dept: FAMILY MEDICINE | Facility: CLINIC | Age: 58
End: 2018-12-17

## 2018-12-17 RX ORDER — NITROFURANTOIN 25; 75 MG/1; MG/1
100 CAPSULE ORAL 2 TIMES DAILY
Qty: 10 CAPSULE | Refills: 0 | Status: SHIPPED | OUTPATIENT
Start: 2018-12-17 | End: 2018-12-22

## 2018-12-20 ENCOUNTER — TELEPHONE (OUTPATIENT)
Dept: INTERNAL MEDICINE | Facility: CLINIC | Age: 58
End: 2018-12-20

## 2018-12-20 NOTE — PROGRESS NOTES
Last 5 Patient Entered Readings                                      Current 30 Day Average: 94/78     Recent Readings 11/20/2018 11/12/2018 11/12/2018 11/11/2018 11/11/2018    SBP (mmHg) 94 124 129 110 123    DBP (mmHg) 78 89 92 79 85    Pulse 100 86 86 99 96        Hypertension Medications             hydroCHLOROthiazide (HYDRODIURIL) 25 MG tablet     losartan (COZAAR) 100 MG tablet Take 1 tablet (100 mg total) by mouth every morning.    metoprolol succinate (TOPROL-XL) 25 MG 24 hr tablet         No readings since 11/20. Will request for HC to discuss lack of readings during next encounter. Will continue to monitor, will follow up in 6 weeks.

## 2018-12-27 ENCOUNTER — PATIENT OUTREACH (OUTPATIENT)
Dept: OTHER | Facility: OTHER | Age: 58
End: 2018-12-27

## 2018-12-27 NOTE — PROGRESS NOTES
Last 5 Patient Entered Readings                                      Current 30 Day Average:      Recent Readings 11/20/2018 11/12/2018 11/12/2018 11/11/2018 11/11/2018    SBP (mmHg) 94 124 129 110 123    DBP (mmHg) 78 89 92 79 85    Pulse 100 86 86 99 96        Messaging patient regarding lack of readings.

## 2019-01-17 NOTE — PROGRESS NOTES
"Last 5 Patient Entered Readings                                      Current 30 Day Average: 128/85     Recent Readings 1/2/2019 11/20/2018 11/12/2018 11/12/2018 11/11/2018    SBP (mmHg) 128 94 124 129 110    DBP (mmHg) 85 78 89 92 79    Pulse 91 100 86 86 99        Patient request tech support via text.    "1/14/19 instructed pt to [employer] on phone. 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 Will call us back if that doesn't work. Those instructions came from a follow up with Germaine.   1/16 - Pt will not be in until noon.  1/18 - Patient will take cuff to the [facility]"    Await new readings.     "

## 2019-01-31 ENCOUNTER — PATIENT OUTREACH (OUTPATIENT)
Dept: OTHER | Facility: OTHER | Age: 59
End: 2019-01-31

## 2019-01-31 NOTE — PROGRESS NOTES
Last 5 Patient Entered Readings                                      Current 30 Day Average: 128/85     Recent Readings 1/2/2019 11/20/2018 11/12/2018 11/12/2018 11/11/2018    SBP (mmHg) 128 94 124 129 110    DBP (mmHg) 85 78 89 92 79    Pulse 91 100 86 86 99        Ms. Richards was able to go to the [facility], but was unsuccessful with pairing her device once she arrived home. She states that the device doesn't seem to be working anymore, and unsure of when she will be able to get back to replace it. Will place on hiatus X2 months. Will follow up then to determine participation status. She thanks me for calling.

## 2019-01-31 NOTE — PROGRESS NOTES
"HPI:  Called patient to follow up. Patient endorses adherence to medication regimen daily and denies missed doses. Patient denies hypotensive s/sx (lightheadedness, dizziness, nausea, fatigue); patient denies hypertensive s/sx (SOB, CP, severe headaches, changes in vision, dizziness, fatigue, confusion, anxiety, nosebleeds).     Patient reports- "Ms. Richards was able to go to the [facility], but was unsuccessful with pairing her device once she arrived home. She states that the device doesn't seem to be working anymore, and unsure of when she will be able to get back to replace it. Will place on hiatus X2 months."    Last 5 Patient Entered Readings                                      Current 30 Day Average: 128/85     Recent Readings 1/2/2019 11/20/2018 11/12/2018 11/12/2018 11/11/2018    SBP (mmHg) 128 94 124 129 110    DBP (mmHg) 85 78 89 92 79    Pulse 91 100 86 86 99        Assessment:  Reviewed recent readings. Per 2017 ACC/ AHA HTN guidelines (goal of BP < 130/80), current 30-day average is slightly uncontrolled; however, patient was only able to take 1 reading within the past 30 days.    Plan:  Continue current medication regimen.   Patients health , Meche Collier, will be following up as scheduled.   I will continue to monitor regularly and will follow-up in 12 weeks.    Current medication regimen:  Hypertension Medications             hydroCHLOROthiazide (HYDRODIURIL) 25 MG tablet     losartan (COZAAR) 100 MG tablet Take 1 tablet (100 mg total) by mouth every morning.    metoprolol succinate (TOPROL-XL) 25 MG 24 hr tablet          Patient denies having questions or concerns. Patient has my contact information and knows to call with any concerns or clinical changes.                       "

## 2019-02-14 ENCOUNTER — OFFICE VISIT (OUTPATIENT)
Dept: FAMILY MEDICINE | Facility: CLINIC | Age: 59
End: 2019-02-14
Payer: COMMERCIAL

## 2019-02-14 VITALS
DIASTOLIC BLOOD PRESSURE: 82 MMHG | WEIGHT: 176.56 LBS | HEIGHT: 63 IN | SYSTOLIC BLOOD PRESSURE: 100 MMHG | TEMPERATURE: 98 F | BODY MASS INDEX: 31.29 KG/M2 | HEART RATE: 93 BPM | OXYGEN SATURATION: 96 %

## 2019-02-14 DIAGNOSIS — F39 MOOD DISORDER: Primary | ICD-10-CM

## 2019-02-14 DIAGNOSIS — Z86.010 HISTORY OF COLONIC POLYPS: ICD-10-CM

## 2019-02-14 DIAGNOSIS — I10 ESSENTIAL HYPERTENSION: ICD-10-CM

## 2019-02-14 DIAGNOSIS — R68.82 DECREASED LIBIDO: ICD-10-CM

## 2019-02-14 DIAGNOSIS — R20.0 NUMBNESS OF FINGERS OF BOTH HANDS: ICD-10-CM

## 2019-02-14 DIAGNOSIS — E55.9 VITAMIN D DEFICIENCY DISEASE: ICD-10-CM

## 2019-02-14 DIAGNOSIS — Z98.84 GASTRIC BYPASS STATUS FOR OBESITY: ICD-10-CM

## 2019-02-14 DIAGNOSIS — E78.5 HYPERLIPIDEMIA, UNSPECIFIED HYPERLIPIDEMIA TYPE: ICD-10-CM

## 2019-02-14 DIAGNOSIS — N64.4 BREAST PAIN: ICD-10-CM

## 2019-02-14 DIAGNOSIS — Z12.31 ENCOUNTER FOR SCREENING MAMMOGRAM FOR BREAST CANCER: ICD-10-CM

## 2019-02-14 DIAGNOSIS — R29.898 BILATERAL ARM WEAKNESS: ICD-10-CM

## 2019-02-14 DIAGNOSIS — R20.0 NUMBNESS OF TOES: ICD-10-CM

## 2019-02-14 PROCEDURE — 3074F SYST BP LT 130 MM HG: CPT | Mod: CPTII,S$GLB,, | Performed by: INTERNAL MEDICINE

## 2019-02-14 PROCEDURE — 3079F DIAST BP 80-89 MM HG: CPT | Mod: CPTII,S$GLB,, | Performed by: INTERNAL MEDICINE

## 2019-02-14 PROCEDURE — 99999 PR PBB SHADOW E&M-EST. PATIENT-LVL V: ICD-10-PCS | Mod: PBBFAC,,, | Performed by: INTERNAL MEDICINE

## 2019-02-14 PROCEDURE — 99215 PR OFFICE/OUTPT VISIT, EST, LEVL V, 40-54 MIN: ICD-10-PCS | Mod: S$GLB,,, | Performed by: INTERNAL MEDICINE

## 2019-02-14 PROCEDURE — 3008F PR BODY MASS INDEX (BMI) DOCUMENTED: ICD-10-PCS | Mod: CPTII,S$GLB,, | Performed by: INTERNAL MEDICINE

## 2019-02-14 PROCEDURE — 3079F PR MOST RECENT DIASTOLIC BLOOD PRESSURE 80-89 MM HG: ICD-10-PCS | Mod: CPTII,S$GLB,, | Performed by: INTERNAL MEDICINE

## 2019-02-14 PROCEDURE — 99215 OFFICE O/P EST HI 40 MIN: CPT | Mod: S$GLB,,, | Performed by: INTERNAL MEDICINE

## 2019-02-14 PROCEDURE — 3008F BODY MASS INDEX DOCD: CPT | Mod: CPTII,S$GLB,, | Performed by: INTERNAL MEDICINE

## 2019-02-14 PROCEDURE — 3074F PR MOST RECENT SYSTOLIC BLOOD PRESSURE < 130 MM HG: ICD-10-PCS | Mod: CPTII,S$GLB,, | Performed by: INTERNAL MEDICINE

## 2019-02-14 PROCEDURE — 99999 PR PBB SHADOW E&M-EST. PATIENT-LVL V: CPT | Mod: PBBFAC,,, | Performed by: INTERNAL MEDICINE

## 2019-02-14 RX ORDER — BUPROPION HYDROCHLORIDE 150 MG/1
150 TABLET ORAL DAILY
Qty: 30 TABLET | Refills: 11 | Status: SHIPPED | OUTPATIENT
Start: 2019-02-14 | End: 2020-01-23 | Stop reason: SDUPTHER

## 2019-02-14 RX ORDER — ALPRAZOLAM 0.5 MG/1
0.5 TABLET ORAL NIGHTLY PRN
Qty: 30 TABLET | Refills: 2 | Status: SHIPPED | OUTPATIENT
Start: 2019-02-14 | End: 2019-08-23 | Stop reason: SDUPTHER

## 2019-02-14 NOTE — PROGRESS NOTES
Chief complaint:  Follow-up on hypertension, anxiety numbness in her fingers and toes, physical not due until May of 2019, patient checking in 23 min late as she thought her appointment was later today    58-year-old white female essentially new to me 6/18.  She did take the Lexapro 5 mg and did well and she was crying last but now she has absolutely no libido even though she says her libido is not great before and but definitely has no orgasm.  She would like to see a gyn.  She apparently had some vaginal dryness issue and was seeing a urologist most likely at [facility].  She was also having recurrent UTIs and I did call in some Macrobid for her thinking she had a UTI but actually she was told to take the Macrobid just once after she has sex.    Did well on the Lexapro but does not want to continue with the inability to have an orgasm.  We will switch her to Wellbutrin in the morning.  She will wean off the Lexapro for a couple of days 1st.  Discussed potential side effects    1/2 weeks of some burning underneath the left breast.  Fairly diffuse.  She notes no masses.  She is way overdue for mammogram.  Will set up her regular mammograms is is no obvious true breast abnormalities and she will continue to assess if this is a muscular issue were not    The other issue is she continues since around September with the tingling in all of her fingers and her toes.  It has not progressed but now she feels heaviness in the arm with lifting.  We did do blood work with sed rate and so forth back in November which was negative.  She has had no problems in the lower extremities as far as heaviness or weakness.  On exam today she does not appear to be weak.  We discussed a referral to Neurology considering imaging of the cervical spine with MRI.  I did review a x-ray of the cervical spine which did show some mid cervical arthritis as would be expected.    She has had some headaches but the most posterior suggestive  of tension-type headaches.  Occasionally frontal that come and go which could be sinus.  If indeed she has progressive headaches there could be consideration for brain MRI.  He does apparently have a history of an syncope where she hit her head and had some small bleeds in the brain but the history was not clear regarding all that    She does need a refill of her Xanax.  She takes once every 3 nights to help her sleep.  She has never been a big sleep for an hour since she was a kid she only slept about 3 hr per night    Labs reviewed and fairly unremarkable.  She is taking vitamin-D.    .  Patient counseled at great length today and reassured about all these issues we had a long conversation regarding her psychiatric issues.Total time over 45 minutes with over 50% counseling.    ROS:   CONST: weight stable. EYES: no vision change. ENT: no sore throat. CV: no chest pain w/ exertion. RESP: no shortness of breath. GI: no nausea, vomiting, diarrhea. No dysphagia. : no urinary issues. MUSCULOSKELETAL: no new myalgias or arthralgias. SKIN: no new changes. NEURO: no other focal deficits. PSYCH: no other new issues. ENDOCRINE: no polyuria. HEME: no lymph nodes. ALLERGY: no general pruritis.          Past Medical History:   Diagnosis Date    Abnormal Pap smear     Anemia     history    Anxiety     Cancer     uterine-cancer cells     Colon polyps, next C-scope 2019. 4/22/2014    Dry eyes     Essential hypertension, started in her mid 30's 1/18/2017    Family history of malignant neoplasm of pancreas 6/1/2018    Gastric bypass status for obesity 8/21/2012    GERD (gastroesophageal reflux disease)     Helicobacter pylori gastritis, 2008. 8/20/2014    Hemangioma of liver, stable 2010, 2012, right lobe 8/20/2014    Hematuria     History of cosmetic surgeries, tummy tuck 2011. 4/5/2013    HTN (hypertension) 8/21/2012    120s-130s/80s    Kidney stone     Right sided sciatica 6/1/2018    Sleep apnea     patient  does not use the C-PAP mask-cannot tolerate    Urinary incontinence     Urinary retention     Varicose veins of lower extremity with inflammation 2015    Vertigo     mild    Vitamin D deficiency disease 2018       Past Surgical History:   Procedure Laterality Date    Abdominoplasty with Liposcuction      APPENDECTOMY      CERVICAL BIOPSY  W/ LOOP ELECTRODE EXCISION      Prior to hysterectomy    CHOLECYSTECTOMY      Laparoscopic    COLONOSCOPY N/A 2014    Performed by [facility] (4TH FLR)    CYSTOSCOPY N/A 2014    Performed by [facility] at [facility] OR 1ST FLR    EGD N/A 2014    Performed by Lincoln Schumacher MD at [facility] ENDO (4TH FLR)    GASTRIC BYPASS      HERNIA REPAIR      HYSTERECTOMY       TVH  both ovaries remain    PYELOGRAM, RETROGRADE Bilateral 2014    [facility]ST FLR    URETEROSCOPY Left 2014    [facility]formed by [facility] at [facility]ST FLR     Social History     Socioeconomic History    Marital status:      Spouse name: Not on file    Number of children: Not on file    Years of education: Not on file    Highest education level: Not on file   Social Needs    Financial resource strain: Not on file    Food insecurity - worry: Not on file    Food insecurity - inability: Not on file    Transportation needs - medical: Not on file    Transportation needs - non-medical: Not on file   Occupational History    Not on file   Tobacco Use    Smoking status: Never Smoker    Smokeless tobacco: Never Used    Tobacco comment: .  .   Occup:  .     Substance and Sexual Activity    Alcohol use: No    Drug use: No    Sexual activity: Yes     Partners: Male     Birth control/protection: Surgical   Other Topics Concern    Not on file   Social History Narrative    Not on file     family history includes Breast cancer in her maternal aunt; Colon cancer in her maternal  uncle; Coronary artery disease in her mother; Diabetes in her father,  of pancreatic cancer; Hearing loss in her maternal grandmother; Heart disease in her mother; Heart failure in her mother; No Known Problems in her brother, maternal grandfather, paternal aunt, paternal grandfather, paternal grandmother, paternal uncle, and sister; Stroke in her mother.    Gen: no distress  EYES: conjunctiva clear, non-icteric, PERRL  ENT: nose clear, nasal mucosa normal, oropharynx clear and moist, teeth good  NECK:supple, thyroid non-palpable  RESP: effort is good, lungs clear  CV: heart RRR w/o murmur, gallops or rubs; no carotid bruits, no edema  GI: abdomen soft, non-distended, non-tender, no hepatosplenomegaly  MS: gait normal, no clubbing or cyanosis of the digits, strength in the upper lower extremities is 5/5.  Her deep tendon reflexes are +2 biceps triceps and patella.  No abnormal ankle reflexes or clonus.  Her gait is normal.  SKIN: no rashes, warm to touch she does have what is likely a sebaceous cyst on the right frontal scalp above the hairline, nontender and not infected    Cherie was seen today for medication refill.    Diagnoses and all orders for this visit:    Mood disorder, side effects with Lexapro although Lexapro was effective in treating her mood symptoms.  Will switch to Wellbutrin    Essential hypertension, started in her mid 30's, chronic and stable    Vitamin D deficiency disease, continue supplement    Gastric bypass status for obesity, 2008., no obvious nutritional deficiencies    Hyperlipidemia, unspecified hyperlipidemia type    History of colonic polyps    Decreased libido, multifactorial but definitely worsened by the lack of orgasm, probably has vaginal dryness issues as well and she requests referral to gyn, will remove the Lexapro  -     Ambulatory referral to Obstetrics / Gynecology    Breast pain, discussed that if it persists or progresses we might refer to the breast surgeon,  "mammogram obviously    Encounter for screening mammogram for breast cancer  -     Mammo Digital Screening Bilat; Future    Numbness of fingers of both hands , referred to neurology as it may be time to image the cervical spine, image the brain, nerve conduction studies and so forth.  We may need to follow this clinically over time to see if she is developing some neurological disorder  -     Ambulatory referral to Neurology    Numbness of toes  -     Ambulatory referral to Neurology    Bilateral arm weakness, consideration for other neurological disorders done.  We did sed rate earlier to assess for potential for polymyalgia but atypical for that at this point  -     Ambulatory referral to Neurology    Other orders  -     buPROPion (WELLBUTRIN XL) 150 MG TB24 tablet; Take 1 tablet (150 mg total) by mouth once daily.  -     ALPRAZolam (XANAX) 0.5 MG tablet; Take 1 tablet (0.5 mg total) by mouth nightly as needed for Insomnia.              Based on patient's anxiety Referable to the above symptoms and the end clear diagnosis at this point, access would not be therapeutic"This note will not be shared with the patient."  "

## 2019-02-15 ENCOUNTER — HOSPITAL ENCOUNTER (OUTPATIENT)
Dept: RADIOLOGY | Facility: HOSPITAL | Age: 59
Discharge: HOME | End: 2019-02-15
Attending: INTERNAL MEDICINE
Payer: COMMERCIAL

## 2019-02-15 ENCOUNTER — TELEPHONE (OUTPATIENT)
Dept: FAMILY MEDICINE | Facility: CLINIC | Age: 59
End: 2019-02-15

## 2019-02-15 DIAGNOSIS — N64.4 BREAST PAIN: Primary | ICD-10-CM

## 2019-02-15 DIAGNOSIS — Z12.31 ENCOUNTER FOR SCREENING MAMMOGRAM FOR BREAST CANCER: ICD-10-CM

## 2019-02-15 NOTE — TELEPHONE ENCOUNTER
Pt scheduled 2/15 for mammogram; tech called to let us know that patient needs diagnostic mammogram for burning in left breast. She is requesting it be done at [facility]. Please advise.

## 2019-02-21 ENCOUNTER — HOSPITAL ENCOUNTER (OUTPATIENT)
Dept: RADIOLOGY | Facility: HOSPITAL | Age: 59
Discharge: HOME | End: 2019-02-21
Attending: INTERNAL MEDICINE
Payer: COMMERCIAL

## 2019-02-21 DIAGNOSIS — N64.4 BREAST PAIN: ICD-10-CM

## 2019-02-21 PROCEDURE — 77062 BREAST TOMOSYNTHESIS BI: CPT | Mod: 26,,, | Performed by: RADIOLOGY

## 2019-02-21 PROCEDURE — 77066 DX MAMMO INCL CAD BI: CPT | Mod: 26,,, | Performed by: RADIOLOGY

## 2019-02-21 PROCEDURE — 77062 MAMMO DIGITAL DIAGNOSTIC BILAT WITH TOMOSYNTHESIS_CAD: ICD-10-PCS | Mod: 26,,, | Performed by: RADIOLOGY

## 2019-02-21 PROCEDURE — 77066 MAMMO DIGITAL DIAGNOSTIC BILAT WITH TOMOSYNTHESIS_CAD: ICD-10-PCS | Mod: 26,,, | Performed by: RADIOLOGY

## 2019-02-21 PROCEDURE — 77066 DX MAMMO INCL CAD BI: CPT | Mod: TC,PO

## 2019-02-25 ENCOUNTER — PATIENT MESSAGE (OUTPATIENT)
Dept: ADMINISTRATIVE | Facility: OTHER | Age: 59
End: 2019-02-25

## 2019-02-26 ENCOUNTER — OFFICE VISIT (OUTPATIENT)
Dept: OBSTETRICS AND GYNECOLOGY | Facility: CLINIC | Age: 59
End: 2019-02-26
Payer: COMMERCIAL

## 2019-02-26 VITALS
SYSTOLIC BLOOD PRESSURE: 110 MMHG | HEIGHT: 63 IN | DIASTOLIC BLOOD PRESSURE: 70 MMHG | RESPIRATION RATE: 15 BRPM | BODY MASS INDEX: 31.02 KG/M2 | WEIGHT: 175.06 LBS | HEART RATE: 88 BPM

## 2019-02-26 DIAGNOSIS — Z12.72 SCREENING FOR VAGINAL CANCER: ICD-10-CM

## 2019-02-26 DIAGNOSIS — R68.82 DECREASED LIBIDO: ICD-10-CM

## 2019-02-26 DIAGNOSIS — N95.2 VAGINAL ATROPHY: ICD-10-CM

## 2019-02-26 DIAGNOSIS — Z01.419 WELL WOMAN EXAM WITH ROUTINE GYNECOLOGICAL EXAM: Primary | ICD-10-CM

## 2019-02-26 PROCEDURE — 3074F SYST BP LT 130 MM HG: CPT | Mod: CPTII,S$GLB,, | Performed by: OBSTETRICS & GYNECOLOGY

## 2019-02-26 PROCEDURE — 3078F PR MOST RECENT DIASTOLIC BLOOD PRESSURE < 80 MM HG: ICD-10-PCS | Mod: CPTII,S$GLB,, | Performed by: OBSTETRICS & GYNECOLOGY

## 2019-02-26 PROCEDURE — 99999 PR PBB SHADOW E&M-EST. PATIENT-LVL IV: ICD-10-PCS | Mod: PBBFAC,,, | Performed by: OBSTETRICS & GYNECOLOGY

## 2019-02-26 PROCEDURE — 99386 PREV VISIT NEW AGE 40-64: CPT | Mod: S$GLB,,, | Performed by: OBSTETRICS & GYNECOLOGY

## 2019-02-26 PROCEDURE — 87624 HPV HI-RISK TYP POOLED RSLT: CPT

## 2019-02-26 PROCEDURE — 3078F DIAST BP <80 MM HG: CPT | Mod: CPTII,S$GLB,, | Performed by: OBSTETRICS & GYNECOLOGY

## 2019-02-26 PROCEDURE — 99386 PR PREVENTIVE VISIT,NEW,40-64: ICD-10-PCS | Mod: S$GLB,,, | Performed by: OBSTETRICS & GYNECOLOGY

## 2019-02-26 PROCEDURE — 88175 CYTOPATH C/V AUTO FLUID REDO: CPT

## 2019-02-26 PROCEDURE — 3074F PR MOST RECENT SYSTOLIC BLOOD PRESSURE < 130 MM HG: ICD-10-PCS | Mod: CPTII,S$GLB,, | Performed by: OBSTETRICS & GYNECOLOGY

## 2019-02-26 PROCEDURE — 99999 PR PBB SHADOW E&M-EST. PATIENT-LVL IV: CPT | Mod: PBBFAC,,, | Performed by: OBSTETRICS & GYNECOLOGY

## 2019-02-26 NOTE — PROGRESS NOTES
"SUBJECTIVE:   Cherie Richards is a 58 y.o. female   for annual well woman exam. No LMP recorded. Patient has had a hysterectomy..      She has a few concerns:  Bladder issue, was seeing urogyn at  before, pt c/o ALETHA - intermittent with cough or sneeze, improved compare to before  Frequent bladder infection, often get after sex. She is taking macrobid postcoital to prevent infection  C/o vaginal dryness with intercourse.  She tried lubrication with no relief.  She has tried prempro before but has swelling, also use estrogen cream before but has lot of itching  She is c/o of low sex drive 6 months.  Denies marital discord.  Stated she always had a low drive but lately worse      S/p TVH in  for "uterine cancer cells" denies chemoradiation    Past Medical History:   Diagnosis Date    Abnormal Pap smear     Anemia     history    Anxiety     Cancer     uterine-cancer cells     Colon polyps, next C-scope . 2014    Dry eyes     Essential hypertension, started in her mid 30's 2017    Family history of malignant neoplasm of pancreas 2018    Gastric bypass status for obesity 2012    GERD (gastroesophageal reflux disease)     Helicobacter pylori gastritis, . 2014    Hemangioma of liver, stable 2012, right lobe 2014    Hematuria     History of cosmetic surgeries, tummy tuck . 2013    HTN (hypertension) 2012    120s-130s/80s    Kidney stone     Right sided sciatica 2018    Sleep apnea     patient does not use the C-PAP mask-cannot tolerate    Urinary incontinence     Urinary retention     Varicose veins of lower extremity with inflammation 2015    Vertigo     mild    Vitamin D deficiency disease 2018     Past Surgical History:   Procedure Laterality Date    Abdominoplasty with Liposcuction      APPENDECTOMY      CERVICAL BIOPSY  W/ LOOP ELECTRODE EXCISION      Prior to hysterectomy    CHOLECYSTECTOMY      Laparoscopic    " COLONOSCOPY N/A 2014    [facility]formed by [facility] ENDO (4TH FLR)    CYSTOSCOPY N/A 2014    [facility]formed by [facility] at [facility] OR 1ST FLR    EGD N/A 2014    Performed by Lincoln Schumacher MD at [facility] ENDO (4TH FLR)    GASTRIC BYPASS      HERNIA REPAIR      HYSTERECTOMY       TVH  both ovaries remain    PYELOGRAM, RETROGRADE Bilateral 2014    [facility]ST FLR    URETEROSCOPY Left 2014    Performed by [facility] at [facility] 1ST FLR     Social History     Socioeconomic History    Marital status:      Spouse name: Not on file    Number of children: Not on file    Years of education: Not on file    Highest education level: Not on file   Social Needs    Financial resource strain: Not on file    Food insecurity - worry: Not on file    Food insecurity - inability: Not on file    Transportation needs - medical: Not on file    Transportation needs - non-medical: Not on file   Occupational History    Not on file   Tobacco Use    Smoking status: Never Smoker    Smokeless tobacco: Never Used    Tobacco comment: .  .   Occup:  .     Substance and Sexual Activity    Alcohol use: No    Drug use: No    Sexual activity: Yes     Partners: Male     Birth control/protection: Surgical   Other Topics Concern    Not on file   Social History Narrative    Not on file     Family History   Problem Relation Age of Onset    Heart disease Mother     Stroke Mother     Coronary artery disease Mother     Heart failure Mother     Diabetes Father     Breast cancer Maternal Aunt     Colon cancer Maternal Uncle     Hearing loss Maternal Grandmother     No Known Problems Sister     No Known Problems Brother     No Known Problems Paternal Aunt     No Known Problems Paternal Uncle     No Known Problems Maternal Grandfather     No Known Problems Paternal Grandmother     No Known Problems Paternal  "Grandfather     Ovarian cancer Neg Hx     Anesthesia problems Neg Hx     Amblyopia Neg Hx     Blindness Neg Hx     Cancer Neg Hx     Cataracts Neg Hx     Glaucoma Neg Hx     Hypertension Neg Hx     Macular degeneration Neg Hx     Retinal detachment Neg Hx     Strabismus Neg Hx     Thyroid disease Neg Hx      OB History    Para Term  AB Living   3 2 2   1 2   SAB TAB Ectopic Multiple Live Births   1       2      # Outcome Date GA Lbr Sunny/2nd Weight Sex Delivery Anes PTL Lv   3 SAB            2 Term      Vag-Spont   MAURIZIO   1 Term      Vag-Spont   MAURIZIO      Obstetric Comments   S/p TVH in  for uterine cancer ?   Denies abnl pap, pt was still getting pap smear after   c-scope in , repeat in 5 years   MMG in 2019         Current Outpatient Medications   Medication Sig Dispense Refill    ALPRAZolam (XANAX) 0.5 MG tablet Take 1 tablet (0.5 mg total) by mouth nightly as needed for Insomnia. 30 tablet 2    buPROPion (WELLBUTRIN XL) 150 MG TB24 tablet Take 1 tablet (150 mg total) by mouth once daily. 30 tablet 11    cholecalciferol, vitamin D3, 5,000 unit Tab Take by mouth. 1 Tablet Oral Every day      cyanocobalamin 1,000 mcg/mL injection INJECT 1 ML (CC) INTRAMUSCULARLY EVERY 2 WEEKS (14 DAYS) 1 mL 12    fluconazole (DIFLUCAN) 150 MG Tab       hydroCHLOROthiazide (HYDRODIURIL) 25 MG tablet       losartan (COZAAR) 100 MG tablet Take 1 tablet (100 mg total) by mouth every morning. 90 tablet 3    metoprolol succinate (TOPROL-XL) 25 MG 24 hr tablet       nitrofurantoin, macrocrystal-monohydrate, (MACROBID) 100 MG capsule       ondansetron (ZOFRAN) 4 MG tablet Take 1 tablet (4 mg total) by mouth every 8 (eight) hours as needed for Nausea. 20 tablet 0    syringe with needle, disp, (BD LUER-CLARIBEL SYRINGE) 3 mL 25 x 1 1/2 " Syrg Use as directed with Cyanocobalamin 10 Syringe 3    tretinoin (RETIN-A) 0.1 % cream 1 Cream Topical Every day 45 g 11    cycloSPORINE (RESTASIS) 0.05 % ophthalmic " "emulsion Place 0.4 mLs (1 drop total) into both eyes 2 (two) times daily. 2 each 6     No current facility-administered medications for this visit.      Allergies: Adhesive tape-silicones; Penicillins; Sulfamethoxazole-trimethoprim; Prosed ec [meth-hyos-atrp-m blue-ba-phsal]; Pyridium [phenazopyridine]; and Ciprofloxacin       ROS:  GENERAL: Denies weight gain or weight loss. Feeling well overall.   SKIN: Denies rash or lesions.   HEAD: Denies head injury or headache.   NODES: Denies enlarged lymph nodes.   CHEST: Denies chest pain or shortness of breath.   CARDIOVASCULAR: Denies palpitations or left sided chest pain.   ABDOMEN: No abdominal pain, constipation, diarrhea, nausea, vomiting or rectal bleeding.   URINARY: see hpi  REPRODUCTIVE: vaginal itching,dryness, decreased sex drive  BREASTS: The patient performs breast self-examination and denies pain, lumps, or nipple discharge.   HEMATOLOGIC: No easy bruisability or excessive bleeding.  MUSCULOSKELETAL: Denies joint pain or swelling.   NEUROLOGIC: Denies syncope or weakness.   PSYCHIATRIC: Denies depression, anxiety or mood swings.      OBJECTIVE:   /70   Pulse 88   Resp 15   Ht 5' 3" (1.6 m)   Wt 79.4 kg (175 lb 0.7 oz)   BMI 31.01 kg/m²   The patient appears well, alert, oriented x 3, in no distress.  PSYCH:  Normal, full range of affect  NECK: negative, no thyromegaly, trachea midline  SKIN: normal, good color, good turgor and no acne, striae, hirsutism  BREAST EXAM: breasts appear normal, no suspicious masses, no skin or nipple changes or axillary nodes  ABDOMEN: soft, non-tender; bowel sounds normal; no masses,  no organomegaly and no hernias, masses, or hepatosplenomegaly  GENITALIA: normal external genitalia, no erythema, no discharge  BLADDER: soft  URETHRA: normal appearing urethra with no masses, tenderness or lesions and normal urethra, normal urethral meatus  VAGINA: mucosal atrophy, vaginal cuff without lesion,  Pap done  CERVIX: " absent  UTERUS: uterus absent  ADNEXA: no mass, fullness, tenderness    ASSESSMENT:   1. Health maintenance  -pap done, if normal will discontinue pap   -screening MMG ordered  -colonoscopy per pcp  -counseled on exercise and healthy diet, weight loss  -bone health:  Discussed Vitamin D and Calcium supplementation, weight bearing exercises  2.  Discussed safety at home/school/work, healthy and balanced diet, sleep hygiene, as well as violence/weapons exposure.   3.  Vaginal atrophy:  Will try estring, lubrication prn with intercourse  4. Decreased libido: discussed more couple time, foreplay, declined testosterone

## 2019-02-26 NOTE — LETTER
February 26, 2019      Luan Cueva MD  [address] [facility] [place] [address]           [facility] OB[facility] GYN  [address] [facility] [address]  [facility] [address]  Phone: [phone]          Patient: Cherie Richards   MR Number: [id number]   YOB: 1960   Date of Visit: 2/26/2019       Dear Dr. Luan Cueva:    Thank you for referring Cherie Richards to me for evaluation. Attached you will find relevant portions of my assessment and plan of care.    If you have questions, please do not hesitate to call me. I look forward to following Cherie Richards along with you.    Sincerely,    David Mary Mai, MD    Enclosure  CC:  No Recipients    If you would like to receive this communication electronically, please contact [email or web address] or [phone] to request more information on [facility] Link access.    For providers and/or their staff who would like to refer a patient to [facility], please contact us through our one-stop-shop provider referral line, [facility], at [phone].    If you feel you have received this communication in error or would no longer like to receive these types of communications, please e-mail [email or web address]

## 2019-03-01 LAB
HPV HR 12 DNA CVX QL NAA+PROBE: NEGATIVE
HPV16 AG SPEC QL: NEGATIVE
HPV18 DNA SPEC QL NAA+PROBE: NEGATIVE

## 2019-04-04 ENCOUNTER — PATIENT OUTREACH (OUTPATIENT)
Dept: OTHER | Facility: OTHER | Age: 59
End: 2019-04-04

## 2019-04-04 ENCOUNTER — OFFICE VISIT (OUTPATIENT)
Dept: NEUROLOGY | Facility: CLINIC | Age: 59
End: 2019-04-04
Payer: COMMERCIAL

## 2019-04-04 VITALS
BODY MASS INDEX: 31.41 KG/M2 | DIASTOLIC BLOOD PRESSURE: 66 MMHG | HEART RATE: 84 BPM | HEIGHT: 63 IN | SYSTOLIC BLOOD PRESSURE: 131 MMHG | WEIGHT: 177.25 LBS

## 2019-04-04 DIAGNOSIS — G43.009 MIGRAINE WITHOUT AURA AND WITHOUT STATUS MIGRAINOSUS, NOT INTRACTABLE: Primary | ICD-10-CM

## 2019-04-04 DIAGNOSIS — R20.0 NUMBNESS: ICD-10-CM

## 2019-04-04 PROCEDURE — 3078F DIAST BP <80 MM HG: CPT | Mod: CPTII,S$GLB,, | Performed by: NEUROLOGICAL SURGERY

## 2019-04-04 PROCEDURE — 3008F BODY MASS INDEX DOCD: CPT | Mod: CPTII,S$GLB,, | Performed by: NEUROLOGICAL SURGERY

## 2019-04-04 PROCEDURE — 99204 PR OFFICE/OUTPT VISIT, NEW, LEVL IV, 45-59 MIN: ICD-10-PCS | Mod: S$GLB,,, | Performed by: NEUROLOGICAL SURGERY

## 2019-04-04 PROCEDURE — 3078F PR MOST RECENT DIASTOLIC BLOOD PRESSURE < 80 MM HG: ICD-10-PCS | Mod: CPTII,S$GLB,, | Performed by: NEUROLOGICAL SURGERY

## 2019-04-04 PROCEDURE — 3075F PR MOST RECENT SYSTOLIC BLOOD PRESS GE 130-139MM HG: ICD-10-PCS | Mod: CPTII,S$GLB,, | Performed by: NEUROLOGICAL SURGERY

## 2019-04-04 PROCEDURE — 99999 PR PBB SHADOW E&M-EST. PATIENT-LVL III: ICD-10-PCS | Mod: PBBFAC,,, | Performed by: NEUROLOGICAL SURGERY

## 2019-04-04 PROCEDURE — 99204 OFFICE O/P NEW MOD 45 MIN: CPT | Mod: S$GLB,,, | Performed by: NEUROLOGICAL SURGERY

## 2019-04-04 PROCEDURE — 99999 PR PBB SHADOW E&M-EST. PATIENT-LVL III: CPT | Mod: PBBFAC,,, | Performed by: NEUROLOGICAL SURGERY

## 2019-04-04 PROCEDURE — 3008F PR BODY MASS INDEX (BMI) DOCUMENTED: ICD-10-PCS | Mod: CPTII,S$GLB,, | Performed by: NEUROLOGICAL SURGERY

## 2019-04-04 PROCEDURE — 3075F SYST BP GE 130 - 139MM HG: CPT | Mod: CPTII,S$GLB,, | Performed by: NEUROLOGICAL SURGERY

## 2019-04-04 RX ORDER — TOPIRAMATE 50 MG/1
50 CAPSULE, EXTENDED RELEASE ORAL DAILY
Qty: 30 CAPSULE | Refills: 0 | Status: SHIPPED | OUTPATIENT
Start: 2019-04-04 | End: 2019-04-18

## 2019-04-04 RX ORDER — TOPIRAMATE 25 MG/1
25 CAPSULE, EXTENDED RELEASE ORAL DAILY
Qty: 30 CAPSULE | Refills: 0 | Status: SHIPPED | OUTPATIENT
Start: 2019-04-04 | End: 2020-01-23 | Stop reason: CLARIF

## 2019-04-04 NOTE — LETTER
April 9, 2019      Luan Cueva MD  [address] [facility]  [place] [address]           [facility] [address] [facility] [address]  [facility] [address]  Phone: [phone]  Fax: [phone]          Patient: Cherie Richards   MR Number: [id number]   YOB: 1960   Date of Visit: 4/4/2019       Dear Dr. Luan Cueva:    Thank you for referring Cheire Richards to me for evaluation. Attached you will find relevant portions of my assessment and plan of care.    If you have questions, please do not hesitate to call me. I look forward to following Cherie Richards along with you.    Sincerely,    Noelle Jaquez  CC:  No Recipients    If you would like to receive this communication electronically, please contact [email or web address] or [phone] to request more information on [facility] Link access.    For providers and/or their staff who would like to refer a patient to [facility], please contact us through our one-stop-shop provider referral line, [facility], at [phone].    If you feel you have received this communication in error or would no longer like to receive these types of communications, please e-mail [email or web address]

## 2019-04-04 NOTE — PROGRESS NOTES
Chief Complaint   Patient presents with    Numbness        Cherie Richards is a 58 y.o. female with a history of multiple medical diagnoses as listed below that presents for evaluation of numbness in the hands and also headaches.  The patient has been having pounding in pressure-like headaches that occur along 1 side of the head.  Pains have been worse with lights and noise and have been intense enough at times to cause her to feel nauseated although she is barely vomited.  She has had no response of her headaches on many occasions to over-the-counter medications and find headaches him last for hours at a time before they will go away.  Numbness has been present in both hands and feels like pins and needles are insert into her fingers.    PAST MEDICAL HISTORY:  Past Medical History:   Diagnosis Date    Abnormal Pap smear     Anemia     history    Anxiety     Cancer     uterine-cancer cells     Colon polyps, next C-scope 2019. 4/22/2014    Dry eyes     Essential hypertension, started in her mid 30's 1/18/2017    Family history of malignant neoplasm of pancreas 6/1/2018    Gastric bypass status for obesity 8/21/2012    GERD (gastroesophageal reflux disease)     Helicobacter pylori gastritis, 2008. 8/20/2014    Hemangioma of liver, stable 2010, 2012, right lobe 8/20/2014    Hematuria     History of cosmetic surgeries, tummy tuck 2011. 4/5/2013    HTN (hypertension) 8/21/2012    120s-130s/80s    Kidney stone     Right sided sciatica 6/1/2018    Sleep apnea     patient does not use the C-PAP mask-cannot tolerate    Urinary incontinence     Urinary retention     Varicose veins of lower extremity with inflammation 4/8/2015    Vertigo     mild    Vitamin D deficiency disease 6/1/2018       PAST SURGICAL HISTORY:  Past Surgical History:   Procedure Laterality Date    Abdominoplasty with Liposcuction      APPENDECTOMY      CERVICAL BIOPSY  W/ LOOP ELECTRODE EXCISION      Prior to hysterectomy     CHOLECYSTECTOMY      Laparoscopic    COLONOSCOPY N/A 2014    [facility] (4[facility]LR)    CYSTOSCOPY N/A 2014    [facility] at [facility] OR [facility]ST FLR    EGD N/A 2014    Performed by Lincoln Schumacher MD at [facility] ENDO (4TH FLR)    GASTRIC BYPASS      HERNIA REPAIR      HYSTERECTOMY       TVH  both ovaries remain    PYELOGRAM, RETROGRADE Bilateral 2014    [facility]LR    URETEROSCOPY Left 2014    [facility] at [facility]LR       SOCIAL HISTORY:  Social History     Socioeconomic History    Marital status:      Spouse name: Not on file    Number of children: Not on file    Years of education: Not on file    Highest education level: Not on file   Occupational History    Not on file   Social Needs    Financial resource strain: Not on file    Food insecurity:     Worry: Not on file     Inability: Not on file    Transportation needs:     Medical: Not on file     Non-medical: Not on file   Tobacco Use    Smoking status: Never Smoker    Smokeless tobacco: Never Used    Tobacco comment: .  .   Occup:  .     Substance and Sexual Activity    Alcohol use: No    Drug use: No    Sexual activity: Yes     Partners: Male     Birth control/protection: Surgical   Lifestyle    Physical activity:     Days per week: Not on file     Minutes per session: Not on file    Stress: Not on file   Relationships    Social connections:     Talks on phone: Not on file     Gets together: Not on file     Attends Jainism service: Not on file     Active member of club or organization: Not on file     Attends meetings of clubs or organizations: Not on file     Relationship status: Not on file   Other Topics Concern    Not on file   Social History Narrative    Not on file       FAMILY HISTORY:  Family History   Problem Relation Age of Onset    Heart disease Mother      Stroke Mother     Coronary artery disease Mother     Heart failure Mother     Diabetes Father     Breast cancer Maternal Aunt     Colon cancer Maternal Uncle     Hearing loss Maternal Grandmother     No Known Problems Sister     No Known Problems Brother     No Known Problems Paternal Aunt     No Known Problems Paternal Uncle     No Known Problems Maternal Grandfather     No Known Problems Paternal Grandmother     No Known Problems Paternal Grandfather     Ovarian cancer Neg Hx     Anesthesia problems Neg Hx     Amblyopia Neg Hx     Blindness Neg Hx     Cancer Neg Hx     Cataracts Neg Hx     Glaucoma Neg Hx     Hypertension Neg Hx     Macular degeneration Neg Hx     Retinal detachment Neg Hx     Strabismus Neg Hx     Thyroid disease Neg Hx        ALLERGIES AND MEDICATIONS: updated and reviewed.  Review of patient's allergies indicates:   Allergen Reactions    Adhesive tape-silicones Other (See Comments)     Other reaction(s): BLISTERS    Penicillins Hives and Itching    Sulfamethoxazole-trimethoprim Hives and Itching    Prosed ec [meth-hyos-atrp-m blue-ba-phsal] Hives, Itching and Swelling    Pyridium [phenazopyridine]      Current Outpatient Medications   Medication Sig Dispense Refill    ALPRAZolam (XANAX) 0.5 MG tablet Take 1 tablet (0.5 mg total) by mouth nightly as needed for Insomnia. 30 tablet 2    buPROPion (WELLBUTRIN XL) 150 MG TB24 tablet Take 1 tablet (150 mg total) by mouth once daily. 30 tablet 11    cholecalciferol, vitamin D3, 5,000 unit Tab Take by mouth. 1 Tablet Oral Every day      cyanocobalamin 1,000 mcg/mL injection INJECT 1 ML (CC) INTRAMUSCULARLY EVERY 2 WEEKS (14 DAYS) 1 mL 12    cycloSPORINE (RESTASIS) 0.05 % ophthalmic emulsion Place 0.4 mLs (1 drop total) into both eyes 2 (two) times daily. 2 each 6    estradiol (ESTRING) 2 mg (7.5 mcg /24 hour) vaginal ring Place one ring per vagina every 3 months 1 each 3    hydroCHLOROthiazide (HYDRODIURIL) 25 MG  "tablet       losartan (COZAAR) 100 MG tablet Take 1 tablet (100 mg total) by mouth every morning. 90 tablet 3    metoprolol succinate (TOPROL-XL) 25 MG 24 hr tablet       nitrofurantoin, macrocrystal-monohydrate, (MACROBID) 100 MG capsule       ondansetron (ZOFRAN) 4 MG tablet Take 1 tablet (4 mg total) by mouth every 8 (eight) hours as needed for Nausea. 20 tablet 0    syringe with needle, disp, (BD LUER-CLARIBEL SYRINGE) 3 mL 25 x 1 1/2 " Syrg Use as directed with Cyanocobalamin 10 Syringe 3    topiramate (TROKENDI XR) 25 mg Cp24 Take 25 mg by mouth once daily. 30 capsule 0    topiramate (TROKENDI XR) 50 mg Cp24 Take 50 mg by mouth once daily. for 14 days 30 capsule 0    tretinoin (RETIN-A) 0.1 % cream 1 Cream Topical Every day 45 g 11     No current facility-administered medications for this visit.        Review of Systems   Constitutional: Negative for activity change, appetite change, fever and unexpected weight change.   HENT: Negative for trouble swallowing and voice change.    Eyes: Positive for photophobia and visual disturbance.   Respiratory: Negative for apnea and shortness of breath.    Cardiovascular: Negative for chest pain and leg swelling.   Gastrointestinal: Positive for nausea and vomiting. Negative for constipation.   Genitourinary: Negative for difficulty urinating.   Musculoskeletal: Negative for back pain, gait problem and neck pain.   Skin: Negative for color change and pallor.   Neurological: Positive for headaches. Negative for dizziness, seizures, syncope, weakness and numbness.   Hematological: Negative for adenopathy.   Psychiatric/Behavioral: Negative for agitation, confusion and decreased concentration.       Neurologic Exam     Mental Status   Oriented to person, place, and time.   Registration: recalls 3 of 3 objects.   Attention: normal. Concentration: normal.   Speech: speech is normal   Level of consciousness: alert  Knowledge: good.     Cranial Nerves     CN II   Visual fields " full to confrontation.   Right visual field deficit: none  Left visual field deficit: none     CN III, IV, VI   Pupils are equal, round, and reactive to light.  Extraocular motions are normal.   Right pupil: Size: 3 mm. Shape: regular. Accommodation: intact.   Left pupil: Size: 3 mm. Shape: regular. Accommodation: intact.   CN III: no CN III palsy  CN VI: no CN VI palsy  Nystagmus: none   Diplopia: none  Ophthalmoparesis: none  Upgaze: normal  Downgaze: normal  Conjugate gaze: present    CN V   Facial sensation intact.   Right facial sensation deficit: none  Left facial sensation deficit: none    CN VII   Facial expression full, symmetric.   Right facial weakness: none  Left facial weakness: none    CN VIII   CN VIII normal.     CN IX, X   CN IX normal.   CN X normal.   Palate: symmetric    CN XI   CN XI normal.   Right sternocleidomastoid strength: normal  Left sternocleidomastoid strength: normal  Right trapezius strength: normal  Left trapezius strength: normal    CN XII   CN XII normal.   Tongue deviation: none    Motor Exam   Muscle bulk: normal  Overall muscle tone: normal  Right arm tone: normal  Left arm tone: normal  Right leg tone: normal  Left leg tone: normal    Strength   Strength 5/5 throughout.   Right interossei: 4/5  Left interossei: 4/5    Sensory Exam   Right arm light touch: decreased from fingers  Left arm light touch: decreased from fingers  Right leg light touch: normal  Left leg light touch: normal  Right arm vibration: decreased from fingers  Left arm vibration: decreased from fingers  Right leg vibration: normal  Left leg vibration: normal  Right arm proprioception: normal  Left arm proprioception: normal  Right leg proprioception: normal  Left leg proprioception: normal  Right arm pinprick: decreased from fingers  Left arm pinprick: decreased from fingers  Right leg pinprick: normal  Left leg pinprick: normal  Sensory deficit distribution on right: median  Sensory deficit distribution on  "left: median    Gait, Coordination, and Reflexes     Gait  Gait: normal    Coordination   Romberg: negative  Finger to nose coordination: normal  Heel to shin coordination: normal  Tandem walking coordination: normal    Tremor   Resting tremor: absent    Reflexes   Right brachioradialis: 2+  Left brachioradialis: 2+  Right biceps: 2+  Left biceps: 2+  Right triceps: 2+  Left triceps: 2+  Right patellar: 2+  Left patellar: 2+  Right achilles: 2+  Left achilles: 2+  Right plantar: normal  Left plantar: normal      Physical Exam   Constitutional: She is oriented to person, place, and time. She appears well-developed and well-nourished.   HENT:   Head: Normocephalic and atraumatic.   Eyes: Pupils are equal, round, and reactive to light. EOM are normal.   Neck: Normal range of motion.   Cardiovascular: Normal rate and intact distal pulses.   Pulmonary/Chest: Effort normal. No apnea. No respiratory distress.   Musculoskeletal: Normal range of motion.   Neurological: She is alert and oriented to person, place, and time. She has normal strength. She has a normal Finger-Nose-Finger Test, a normal Heel to Shin Test, a normal Romberg Test and a normal Tandem Gait Test. Gait normal.   Reflex Scores:       Tricep reflexes are 2+ on the right side and 2+ on the left side.       Bicep reflexes are 2+ on the right side and 2+ on the left side.       Brachioradialis reflexes are 2+ on the right side and 2+ on the left side.       Patellar reflexes are 2+ on the right side and 2+ on the left side.       Achilles reflexes are 2+ on the right side and 2+ on the left side.  Skin: Skin is warm and dry.   Psychiatric: She has a normal mood and affect. Her speech is normal and behavior is normal. Thought content normal.   Vitals reviewed.      Vitals:    04/04/19 1037   BP: 131/66   BP Location: Left arm   Patient Position: Sitting   BP Method: Large (Automatic)   Pulse: 84   Weight: 80.4 kg (177 lb 4 oz)   Height: 5' 3" (1.6 m) "       Assessment & Plan:    Problem List Items Addressed This Visit     None      Visit Diagnoses     Migraine without aura and without status migrainosus, not intractable    -  Primary    Relevant Medications    topiramate (TROKENDI XR) 25 mg Cp24    topiramate (TROKENDI XR) 50 mg Cp24    Numbness        Relevant Orders    EMG W/ ULTRASOUND AND NERVE CONDUCTION TEST 2 Extremities          Follow-up: Follow up for EMG/NCS.   More than 50% of this 45 minute encounter was spent in counseling and coordinating care of migraine and numbness.

## 2019-04-04 NOTE — PROGRESS NOTES
Last 5 Patient Entered Readings                                      Current 30 Day Average: 103/92     Recent Readings 3/30/2019 3/30/2019 3/30/2019 3/4/2019 2/24/2019    SBP (mmHg) 103 126 134 123 106    DBP (mmHg) 70 102 103 79 76    Pulse 103 76 79 74 93        Digital Medicine: Health  Follow Up    Lifestyle Modifications:    1.Dietary Modifications (Sodium intake <2,000mg/day, food labels, dining out): Deferred    2.Physical Activity: Deferred    3.Medication Therapy: Patient has been compliant with the medication regimen.    4.Patient has the following medication side effects/concerns: None  (Frequency/Alleviating factors/Precipitating factors, etc.)     Follow up with Ms. Cherie Richards completed. Mrs. Richards is doing okay. She has resumed BP monitoring, and will continue to submit frequent readings in order to establish an accurate BP average. No further questions or concerns. Will continue to follow up to achieve health goals.

## 2019-04-05 ENCOUNTER — TELEPHONE (OUTPATIENT)
Dept: NEUROLOGY | Facility: CLINIC | Age: 59
End: 2019-04-05

## 2019-04-15 ENCOUNTER — OFFICE VISIT (OUTPATIENT)
Dept: FAMILY MEDICINE | Facility: CLINIC | Age: 59
End: 2019-04-15
Payer: COMMERCIAL

## 2019-04-15 VITALS
OXYGEN SATURATION: 92 % | BODY MASS INDEX: 31.45 KG/M2 | HEIGHT: 63 IN | WEIGHT: 177.5 LBS | SYSTOLIC BLOOD PRESSURE: 128 MMHG | DIASTOLIC BLOOD PRESSURE: 80 MMHG | HEART RATE: 94 BPM | TEMPERATURE: 98 F

## 2019-04-15 DIAGNOSIS — J06.9 URI, ACUTE: Primary | ICD-10-CM

## 2019-04-15 DIAGNOSIS — F39 MOOD DISORDER: ICD-10-CM

## 2019-04-15 DIAGNOSIS — J30.2 SEASONAL ALLERGIC RHINITIS, UNSPECIFIED TRIGGER: ICD-10-CM

## 2019-04-15 DIAGNOSIS — I10 ESSENTIAL HYPERTENSION: ICD-10-CM

## 2019-04-15 PROCEDURE — 99999 PR PBB SHADOW E&M-EST. PATIENT-LVL III: ICD-10-PCS | Mod: PBBFAC,,, | Performed by: INTERNAL MEDICINE

## 2019-04-15 PROCEDURE — 3079F DIAST BP 80-89 MM HG: CPT | Mod: CPTII,S$GLB,, | Performed by: INTERNAL MEDICINE

## 2019-04-15 PROCEDURE — 3008F PR BODY MASS INDEX (BMI) DOCUMENTED: ICD-10-PCS | Mod: CPTII,S$GLB,, | Performed by: INTERNAL MEDICINE

## 2019-04-15 PROCEDURE — 99214 OFFICE O/P EST MOD 30 MIN: CPT | Mod: S$GLB,,, | Performed by: INTERNAL MEDICINE

## 2019-04-15 PROCEDURE — 3074F SYST BP LT 130 MM HG: CPT | Mod: CPTII,S$GLB,, | Performed by: INTERNAL MEDICINE

## 2019-04-15 PROCEDURE — 3079F PR MOST RECENT DIASTOLIC BLOOD PRESSURE 80-89 MM HG: ICD-10-PCS | Mod: CPTII,S$GLB,, | Performed by: INTERNAL MEDICINE

## 2019-04-15 PROCEDURE — 3074F PR MOST RECENT SYSTOLIC BLOOD PRESSURE < 130 MM HG: ICD-10-PCS | Mod: CPTII,S$GLB,, | Performed by: INTERNAL MEDICINE

## 2019-04-15 PROCEDURE — 3008F BODY MASS INDEX DOCD: CPT | Mod: CPTII,S$GLB,, | Performed by: INTERNAL MEDICINE

## 2019-04-15 PROCEDURE — 99214 PR OFFICE/OUTPT VISIT, EST, LEVL IV, 30-39 MIN: ICD-10-PCS | Mod: S$GLB,,, | Performed by: INTERNAL MEDICINE

## 2019-04-15 PROCEDURE — 99999 PR PBB SHADOW E&M-EST. PATIENT-LVL III: CPT | Mod: PBBFAC,,, | Performed by: INTERNAL MEDICINE

## 2019-04-15 RX ORDER — BENZONATATE 100 MG/1
100 CAPSULE ORAL 3 TIMES DAILY PRN
Qty: 45 CAPSULE | Refills: 1 | Status: SHIPPED | OUTPATIENT
Start: 2019-04-15 | End: 2019-04-25

## 2019-04-15 NOTE — PROGRESS NOTES
Chief complaint:  Not feeling good, fever    58-year-old white female essentially new to me 6/18.  Patient called and made her appointment today and her  did the same.  Patient reports that Saturday 2 days ago she awoke with URI symptoms of nasal congestion, cough, sneezing and a temperature up to 100 with some mild achiness.  She took Tylenol and felt somewhat better.  She does continue on her allergy meds all year round.  Her  similar illness and they are caring for 2 young children who previously have had some URI type symptoms recently.      ROS:   CONST: weight stable. EYES: no vision change. ENT: no sore throat. CV: no chest pain w/ exertion. RESP: no shortness of breath. GI: no nausea, vomiting, diarrhea. No dysphagia. : no urinary issues. MUSCULOSKELETAL: no new myalgias or arthralgias. SKIN: no new changes. NEURO: no other focal deficits. PSYCH: no other new issues. ENDOCRINE: no polyuria. HEME: no lymph nodes. ALLERGY: no general pruritis.          Past Medical History:   Diagnosis Date    Abnormal Pap smear     Anemia     history    Anxiety     Cancer     uterine-cancer cells     Colon polyps, next C-scope 2019. 4/22/2014    Dry eyes     Essential hypertension, started in her mid 30's 1/18/2017    Family history of malignant neoplasm of pancreas 6/1/2018    Gastric bypass status for obesity 8/21/2012    GERD (gastroesophageal reflux disease)     Helicobacter pylori gastritis, 2008. 8/20/2014    Hemangioma of liver, stable 2010, 2012, right lobe 8/20/2014    Hematuria     History of cosmetic surgeries, tummy tuck 2011. 4/5/2013    HTN (hypertension) 8/21/2012    120s-130s/80s    Kidney stone     Right sided sciatica 6/1/2018    Sleep apnea     patient does not use the C-PAP mask-cannot tolerate    Urinary incontinence     Urinary retention     Varicose veins of lower extremity with inflammation 4/8/2015    Vertigo     mild    Vitamin D deficiency disease  2018       Past Surgical History:   Procedure Laterality Date    Abdominoplasty with Liposcuction      APPENDECTOMY      CERVICAL BIOPSY  W/ LOOP ELECTRODE EXCISION      Prior to hysterectomy    CHOLECYSTECTOMY      Laparoscopic    COLONOSCOPY N/A 2014    [facility]formed [facility] END[facility] (4TH FLR)    CYSTOSCOPY N/A 2014    [facility]formed [facility] at [facility] OR 1ST FLR    EGD N/A 2014    Performed by Lincoln Schumacher MD at [facility] ENDO (4TH FLR)    GASTRIC BYPASS      HERNIA REPAIR      HYSTERECTOMY  1980     TVH  both ovaries remain    PYELOGRAM, RETROGRADE Bilateral 2014    [facility] OR 1ST FLR    URETEROSCOPY Left 2014    Performed by [facility] at [facility] 1ST FLR     Social History     Socioeconomic History    Marital status:      Spouse name: Not on file    Number of children: Not on file    Years of education: Not on file    Highest education level: Not on file   Occupational History    Not on file   Social Needs    Financial resource strain: Not on file    Food insecurity:     Worry: Not on file     Inability: Not on file    Transportation needs:     Medical: Not on file     Non-medical: Not on file   Tobacco Use    Smoking status: Never Smoker    Smokeless tobacco: Never Used    Tobacco comment: .  .   Occup:  .     Substance and Sexual Activity    Alcohol use: No    Drug use: No    Sexual activity: Yes     Partners: Male     Birth control/protection: Surgical   Lifestyle    Physical activity:     Days per week: Not on file     Minutes per session: Not on file    Stress: Not on file   Relationships    Social connections:     Talks on phone: Not on file     Gets together: Not on file     Attends [employer]an service: Not on file     Active member of club or organization: Not on file     Attends meetings of clubs or organizations: Not on file     Relationship status: Not on  file   Other Topics Concern    Not on file   Social History Narrative    Not on file     family history includes Breast cancer in her maternal aunt; Colon cancer in her maternal uncle; Coronary artery disease in her mother; Diabetes in her father,  of pancreatic cancer; Hearing loss in her maternal grandmother; Heart disease in her mother; Heart failure in her mother; No Known Problems in her brother, maternal grandfather, paternal aunt, paternal grandfather, paternal grandmother, paternal uncle, and sister; Stroke in her mother.    Gen: no distress  EYES: conjunctiva clear, non-icteric, PERRL  ENT: nose congested, nasal mucosa red, oropharynx slightly red and moist, teeth good  NECK:supple, thyroid non-palpable, no cervical lymph nodes  RESP: effort is good, lungs clear  CV: heart RRR w/o murmur, gallops or rubs; no carotid bruits, no edema  GI: abdomen soft, non-distended, non-tender  MS: gait normal, no clubbing or cyanosis of the digits  SKIN: no rashes, warm to touch, no sinus pain to touch        Cherie was seen today for sore throat, fever and mucus.    Diagnoses and all orders for this visit:    URI, acute, reassured patient that this could is likely a viral URI under the circumstances superimposed on her allergic rhinitis which is chronic.  Could also represent a flare of allergies but given that her  has a concomitant illnesses is likely infectious, viral.  Her chest is clear and she was reassured.  She did well with Tessalon Perles which we discussed may not be that effective for URI but it is okay to suppress with the Perles as well as Delsym and I gave him written instructions to use Claritin in the morning and Chlor-Trimeton at night.    Seasonal allergic rhinitis, unspecified trigger    Essential hypertension, started in her mid 30's, chronic and stable    Mood disorder, chronic and stable    Other orders  -     benzonatate (TESSALON) 100 MG capsule; Take 1 capsule (100 mg total) by  "mouth 3 (three) times daily as needed for Cough.           Based on patient's anxiety Referable to the above symptoms and the end clear diagnosis at this point, access would not be therapeutic"This note will not be shared with the patient."  "

## 2019-04-25 ENCOUNTER — PATIENT OUTREACH (OUTPATIENT)
Dept: OTHER | Facility: OTHER | Age: 59
End: 2019-04-25

## 2019-04-25 NOTE — PROGRESS NOTES
HPI:  Called patient to follow up. Patient reports adherence to medication regimen daily and denies missed doses. Patient denies hypotensive s/sx (lightheadedness, dizziness, nausea, fatigue); patient denies hypertensive s/sx (SOB, CP, severe headaches, changes in vision, dizziness, fatigue, confusion, anxiety, nosebleeds).     Last 5 Patient Entered Readings                                      Current 30 Day Average: 107/88     Recent Readings 4/12/2019 3/30/2019 3/30/2019 3/30/2019 3/4/2019    SBP (mmHg) 111 103 126 134 123    DBP (mmHg) 75 70 102 103 79    Pulse 94 103 76 79 74        Assessment:  Reviewed recent readings. Per 2017 ACC/ AHA HTN guidelines (goal of BP < 130/80), current 30-day average is slightly uncontrolled; however, average is likely skewed due to 2 readings on 3/30, 134/103 and 126/102. Patient quickly retook her BP a 3rd time and BP was 103/70.      Plan:  Continue current medication regimen.   Instructed patient to increase frequency of monitoring to 1-2x per week.   Patients health , Meche Collier, will be following up as scheduled.   I will continue to monitor regularly and will follow-up in 12 weeks, sooner if blood pressure begins to trend upward or downward.     Current medication regimen:  Hypertension Medications             hydroCHLOROthiazide (HYDRODIURIL) 25 MG tablet     losartan (COZAAR) 100 MG tablet Take 1 tablet (100 mg total) by mouth every morning.    metoprolol succinate (TOPROL-XL) 25 MG 24 hr tablet          Patient denies having questions or concerns. Patient has my contact information and knows to call with any concerns or clinical changes. Instructed patient to call if BP remains > 130/80.

## 2019-05-07 ENCOUNTER — PROCEDURE VISIT (OUTPATIENT)
Dept: NEUROLOGY | Facility: CLINIC | Age: 59
End: 2019-05-07
Payer: COMMERCIAL

## 2019-05-07 DIAGNOSIS — R20.0 NUMBNESS: ICD-10-CM

## 2019-05-07 DIAGNOSIS — R42 DIZZINESS: ICD-10-CM

## 2019-05-07 DIAGNOSIS — G44.89 CHRONIC MIXED HEADACHE SYNDROME: ICD-10-CM

## 2019-05-07 PROCEDURE — 95885 PR MUSC TST DONE W/NERV TST LIM: ICD-10-PCS | Mod: S$GLB,,, | Performed by: NEUROLOGICAL SURGERY

## 2019-05-07 PROCEDURE — 95913 NRV CNDJ TEST 13/> STUDIES: CPT | Mod: S$GLB,,, | Performed by: NEUROLOGICAL SURGERY

## 2019-05-07 PROCEDURE — 95885 MUSC TST DONE W/NERV TST LIM: CPT | Mod: S$GLB,,, | Performed by: NEUROLOGICAL SURGERY

## 2019-05-07 PROCEDURE — 99214 OFFICE O/P EST MOD 30 MIN: CPT | Mod: 25,S$GLB,, | Performed by: NEUROLOGICAL SURGERY

## 2019-05-07 PROCEDURE — 99214 PR OFFICE/OUTPT VISIT, EST, LEVL IV, 30-39 MIN: ICD-10-PCS | Mod: 25,S$GLB,, | Performed by: NEUROLOGICAL SURGERY

## 2019-05-07 PROCEDURE — 95913 PR NERVE CONDUCTION STUDY; 13 OR MORE STUDIES: ICD-10-PCS | Mod: S$GLB,,, | Performed by: NEUROLOGICAL SURGERY

## 2019-05-13 DIAGNOSIS — G43.009 MIGRAINE WITHOUT AURA AND WITHOUT STATUS MIGRAINOSUS, NOT INTRACTABLE: ICD-10-CM

## 2019-05-13 RX ORDER — DIAZEPAM 10 MG/1
10 TABLET ORAL DAILY PRN
Qty: 2 TABLET | Refills: 0 | Status: SHIPPED | OUTPATIENT
Start: 2019-05-13 | End: 2019-11-20

## 2019-05-16 RX ORDER — TOPIRAMATE 100 MG/1
100 CAPSULE, EXTENDED RELEASE ORAL DAILY
Qty: 30 CAPSULE | Refills: 5 | Status: SHIPPED | OUTPATIENT
Start: 2019-05-16 | End: 2019-11-12

## 2019-05-16 RX ORDER — TOPIRAMATE 25 MG/1
25 CAPSULE, EXTENDED RELEASE ORAL DAILY
Qty: 30 CAPSULE | Refills: 0 | OUTPATIENT
Start: 2019-05-16

## 2019-05-17 ENCOUNTER — HOSPITAL ENCOUNTER (OUTPATIENT)
Dept: RADIOLOGY | Facility: HOSPITAL | Age: 59
Discharge: HOME | End: 2019-05-17
Attending: NEUROLOGICAL SURGERY
Payer: COMMERCIAL

## 2019-05-17 DIAGNOSIS — G44.89 CHRONIC MIXED HEADACHE SYNDROME: ICD-10-CM

## 2019-05-17 DIAGNOSIS — R42 DIZZINESS: ICD-10-CM

## 2019-05-17 PROCEDURE — 70553 MRI BRAIN W WO CONTRAST: ICD-10-PCS | Mod: 26,,, | Performed by: RADIOLOGY

## 2019-05-17 PROCEDURE — 70544 MR ANGIOGRAPHY HEAD W/O DYE: CPT | Mod: 26,59,, | Performed by: RADIOLOGY

## 2019-05-17 PROCEDURE — 70544 MRA BRAIN WITHOUT CONTRAST: ICD-10-PCS | Mod: 26,59,, | Performed by: RADIOLOGY

## 2019-05-17 PROCEDURE — 70553 MRI BRAIN STEM W/O & W/DYE: CPT | Mod: TC

## 2019-05-17 PROCEDURE — 70544 MR ANGIOGRAPHY HEAD W/O DYE: CPT | Mod: TC

## 2019-05-17 PROCEDURE — 70553 MRI BRAIN STEM W/O & W/DYE: CPT | Mod: 26,,, | Performed by: RADIOLOGY

## 2019-05-17 PROCEDURE — A9585 GADOBUTROL INJECTION: HCPCS | Performed by: NEUROLOGICAL SURGERY

## 2019-05-17 PROCEDURE — 25500020 PHARM REV CODE 255: Performed by: NEUROLOGICAL SURGERY

## 2019-05-17 RX ORDER — GADOBUTROL 604.72 MG/ML
8 INJECTION INTRAVENOUS
Status: COMPLETED | OUTPATIENT
Start: 2019-05-17 | End: 2019-05-17

## 2019-05-17 RX ADMIN — GADOBUTROL 8 ML: 604.72 INJECTION INTRAVENOUS at 02:05

## 2019-05-20 ENCOUNTER — OFFICE VISIT (OUTPATIENT)
Dept: NEUROLOGY | Facility: CLINIC | Age: 59
End: 2019-05-20
Payer: COMMERCIAL

## 2019-05-20 ENCOUNTER — LAB VISIT (OUTPATIENT)
Dept: LAB | Facility: HOSPITAL | Age: 59
End: 2019-05-20
Attending: NEUROLOGICAL SURGERY
Payer: COMMERCIAL

## 2019-05-20 VITALS
DIASTOLIC BLOOD PRESSURE: 89 MMHG | BODY MASS INDEX: 31.45 KG/M2 | HEART RATE: 89 BPM | WEIGHT: 177.5 LBS | SYSTOLIC BLOOD PRESSURE: 132 MMHG | HEIGHT: 63 IN

## 2019-05-20 DIAGNOSIS — E55.9 VITAMIN D DEFICIENCY DISEASE: ICD-10-CM

## 2019-05-20 DIAGNOSIS — R42 DIZZINESS: ICD-10-CM

## 2019-05-20 DIAGNOSIS — R20.0 NUMBNESS: ICD-10-CM

## 2019-05-20 DIAGNOSIS — R20.0 NUMBNESS: Primary | ICD-10-CM

## 2019-05-20 LAB — 25(OH)D3+25(OH)D2 SERPL-MCNC: 24 NG/ML (ref 30–96)

## 2019-05-20 PROCEDURE — 3075F PR MOST RECENT SYSTOLIC BLOOD PRESS GE 130-139MM HG: ICD-10-PCS | Mod: CPTII,S$GLB,, | Performed by: NEUROLOGICAL SURGERY

## 2019-05-20 PROCEDURE — 3075F SYST BP GE 130 - 139MM HG: CPT | Mod: CPTII,S$GLB,, | Performed by: NEUROLOGICAL SURGERY

## 2019-05-20 PROCEDURE — 99214 OFFICE O/P EST MOD 30 MIN: CPT | Mod: S$GLB,,, | Performed by: NEUROLOGICAL SURGERY

## 2019-05-20 PROCEDURE — 3079F PR MOST RECENT DIASTOLIC BLOOD PRESSURE 80-89 MM HG: ICD-10-PCS | Mod: CPTII,S$GLB,, | Performed by: NEUROLOGICAL SURGERY

## 2019-05-20 PROCEDURE — 84207 ASSAY OF VITAMIN B-6: CPT

## 2019-05-20 PROCEDURE — 3008F BODY MASS INDEX DOCD: CPT | Mod: CPTII,S$GLB,, | Performed by: NEUROLOGICAL SURGERY

## 2019-05-20 PROCEDURE — 3008F PR BODY MASS INDEX (BMI) DOCUMENTED: ICD-10-PCS | Mod: CPTII,S$GLB,, | Performed by: NEUROLOGICAL SURGERY

## 2019-05-20 PROCEDURE — 3079F DIAST BP 80-89 MM HG: CPT | Mod: CPTII,S$GLB,, | Performed by: NEUROLOGICAL SURGERY

## 2019-05-20 PROCEDURE — 82306 VITAMIN D 25 HYDROXY: CPT

## 2019-05-20 PROCEDURE — 99999 PR PBB SHADOW E&M-EST. PATIENT-LVL II: CPT | Mod: PBBFAC,,, | Performed by: NEUROLOGICAL SURGERY

## 2019-05-20 PROCEDURE — 84425 ASSAY OF VITAMIN B-1: CPT

## 2019-05-20 PROCEDURE — 99999 PR PBB SHADOW E&M-EST. PATIENT-LVL II: ICD-10-PCS | Mod: PBBFAC,,, | Performed by: NEUROLOGICAL SURGERY

## 2019-05-20 PROCEDURE — 99214 PR OFFICE/OUTPT VISIT, EST, LEVL IV, 30-39 MIN: ICD-10-PCS | Mod: S$GLB,,, | Performed by: NEUROLOGICAL SURGERY

## 2019-05-21 NOTE — PROGRESS NOTES
Chief Complaint   Patient presents with    Follow-up     MRI results         Cherie Richards is a 59 y.o. female with a history of multiple medical diagnoses as listed below that presents for evaluation of numbness in the hands and also headaches.  The patient has been having pounding in pressure-like headaches that occur along 1 side of the head.  Pains have been worse with lights and noise and have been intense enough at times to cause her to feel nauseated although she is barely vomited.  She has had no response of her headaches on many occasions to over-the-counter medications and find headaches him last for hours at a time before they will go away.  Numbness has been present in both hands and feels like pins and needles are insert into her fingers.    Interval History  05/20/2019  She presents for follow-up.  She continues to have dizziness as well as numbness in hands and legs. She has not been able to find relief of her symptoms.  They have also been no identifiable triggers that have made her symptoms worse..    PAST MEDICAL HISTORY:  Past Medical History:   Diagnosis Date    Abnormal Pap smear     Anemia     history    Anxiety     Cancer     uterine-cancer cells     Colon polyps, next C-scope 2019. 4/22/2014    Dry eyes     Essential hypertension, started in her mid 30's 1/18/2017    Family history of malignant neoplasm of pancreas 6/1/2018    Gastric bypass status for obesity 8/21/2012    GERD (gastroesophageal reflux disease)     Helicobacter pylori gastritis, 2008. 8/20/2014    Hemangioma of liver, stable 2010, 2012, right lobe 8/20/2014    Hematuria     History of cosmetic surgeries, tummy tuck 2011. 4/5/2013    HTN (hypertension) 8/21/2012    120s-130s/80s    Kidney stone     Right sided sciatica 6/1/2018    Sleep apnea     patient does not use the C-PAP mask-cannot tolerate    Urinary incontinence     Urinary retention     Varicose veins of lower extremity with inflammation  2015    Vertigo     mild    Vitamin D deficiency disease 2018       PAST SURGICAL HISTORY:  Past Surgical History:   Procedure Laterality Date    Abdominoplasty with Liposcuction      APPENDECTOMY      CERVICAL BIOPSY  W/ LOOP ELECTRODE EXCISION      Prior to hysterectomy    CHOLECYSTECTOMY      Laparoscopic    COLONOSCOPY N/A 2014    Performed by [facility]urali Kerr [facility] at [facility] ([facility])    CYSTOSCOPY N/A 2014    [facility] at [facility]    EGD N/A 2014    Performed by Lincoln Schumacher MD at [facility] (4TH [facility]LR)    GASTRIC BYPASS      HERNIA REPAIR      HYSTERECTOMY       TVH  both ovaries remain    PYELOGRAM, RETROGRADE Bilateral 2014    [facility] at [facility]    URETEROSCOPY Left 2014    Per[facility] by [facility] at [facility]       SOCIAL HISTORY:  Social History     Socioeconomic History    Marital status:      Spouse name: Not on file    Number of children: Not on file    Years of education: Not on file    Highest education level: Not on file   Occupational History    Not on file   Social Needs    Financial resource strain: Not on file    Food insecurity:     Worry: Not on file     Inability: Not on file    Transportation needs:     Medical: Not on file     Non-medical: Not on file   Tobacco Use    Smoking status: Never Smoker    Smokeless tobacco: Never Used    Tobacco comment: .  .   Occup:  .     Substance and Sexual Activity    Alcohol use: No    Drug use: No    Sexual activity: Yes     Partners: Male     Birth control/protection: Surgical   Lifestyle    Physical activity:     Days per week: Not on file     Minutes per session: Not on file    Stress: Not on file   Relationships    Social connections:     Talks on phone: Not on file     Gets together: Not on file     Attends [employer] service: Not on file     Active member of club or organization:  Not on file     Attends meetings of clubs or organizations: Not on file     Relationship status: Not on file   Other Topics Concern    Not on file   Social History Narrative    Not on file       FAMILY HISTORY:  Family History   Problem Relation Age of Onset    Heart disease Mother     Stroke Mother     Coronary artery disease Mother     Heart failure Mother     Diabetes Father     Breast cancer Maternal Aunt     Colon cancer Maternal Uncle     Hearing loss Maternal Grandmother     No Known Problems Sister     No Known Problems Brother     No Known Problems Paternal Aunt     No Known Problems Paternal Uncle     No Known Problems Maternal Grandfather     No Known Problems Paternal Grandmother     No Known Problems Paternal Grandfather     Ovarian cancer Neg Hx     Anesthesia problems Neg Hx     Amblyopia Neg Hx     Blindness Neg Hx     Cancer Neg Hx     Cataracts Neg Hx     Glaucoma Neg Hx     Hypertension Neg Hx     Macular degeneration Neg Hx     Retinal detachment Neg Hx     Strabismus Neg Hx     Thyroid disease Neg Hx        ALLERGIES AND MEDICATIONS: updated and reviewed.  Review of patient's allergies indicates:   Allergen Reactions    Adhesive tape-silicones Other (See Comments)     Other reaction(s): BLISTERS    Penicillins Hives and Itching    Sulfamethoxazole-trimethoprim Hives and Itching    Prosed ec [meth-hyos-atrp-m blue-ba-phsal] Hives, Itching and Swelling    Pyridium [phenazopyridine]      Current Outpatient Medications   Medication Sig Dispense Refill    ALPRAZolam (XANAX) 0.5 MG tablet Take 1 tablet (0.5 mg total) by mouth nightly as needed for Insomnia. 30 tablet 2    buPROPion (WELLBUTRIN XL) 150 MG TB24 tablet Take 1 tablet (150 mg total) by mouth once daily. 30 tablet 11    cholecalciferol, vitamin D3, 5,000 unit Tab Take by mouth. 1 Tablet Oral Every day      cyanocobalamin 1,000 mcg/mL injection INJECT 1 ML (CC) INTRAMUSCULARLY EVERY 2 WEEKS (14 DAYS) 1  "mL 12    cycloSPORINE (RESTASIS) 0.05 % ophthalmic emulsion Place 0.4 mLs (1 drop total) into both eyes 2 (two) times daily. 2 each 6    diazePAM (VALIUM) 10 MG Tab Take 1 tablet (10 mg total) by mouth daily as needed (Take 1 tablet 30 mins before MRI and 1 right before going in machine If needed). 2 tablet 0    estradiol (ESTRING) 2 mg (7.5 mcg /24 hour) vaginal ring Place one ring per vagina every 3 months 1 each 3    hydroCHLOROthiazide (HYDRODIURIL) 25 MG tablet       losartan (COZAAR) 100 MG tablet Take 1 tablet (100 mg total) by mouth every morning. 90 tablet 3    metoprolol succinate (TOPROL-XL) 25 MG 24 hr tablet       ondansetron (ZOFRAN) 4 MG tablet Take 1 tablet (4 mg total) by mouth every 8 (eight) hours as needed for Nausea. 20 tablet 0    syringe with needle, disp, (BD LUER-CLARIBEL SYRINGE) 3 mL 25 x 1 1/2 " Syrg Use as directed with Cyanocobalamin 10 Syringe 3    topiramate (TROKENDI XR) 100 mg Cp24 Take 1 capsule (100 mg total) by mouth once daily. 30 capsule 5    topiramate (TROKENDI XR) 25 mg Cp24 Take 25 mg by mouth once daily. 30 capsule 0    tretinoin (RETIN-A) 0.1 % cream 1 Cream Topical Every day 45 g 11     No current facility-administered medications for this visit.        Review of Systems   Constitutional: Negative for activity change, appetite change, fever and unexpected weight change.   HENT: Negative for trouble swallowing and voice change.    Eyes: Positive for photophobia and visual disturbance.   Respiratory: Negative for apnea and shortness of breath.    Cardiovascular: Negative for chest pain and leg swelling.   Gastrointestinal: Positive for nausea and vomiting. Negative for constipation.   Genitourinary: Negative for difficulty urinating.   Musculoskeletal: Negative for back pain, gait problem and neck pain.   Skin: Negative for color change and pallor.   Neurological: Positive for headaches. Negative for dizziness, seizures, syncope, weakness and numbness. "   Hematological: Negative for adenopathy.   Psychiatric/Behavioral: Negative for agitation, confusion and decreased concentration.       Neurologic Exam     Mental Status   Oriented to person, place, and time.   Registration: recalls 3 of 3 objects.   Attention: normal. Concentration: normal.   Speech: speech is normal   Level of consciousness: alert  Knowledge: good.     Cranial Nerves     CN II   Visual fields full to confrontation.   Right visual field deficit: none  Left visual field deficit: none     CN III, IV, VI   Pupils are equal, round, and reactive to light.  Extraocular motions are normal.   Right pupil: Size: 3 mm. Shape: regular. Accommodation: intact.   Left pupil: Size: 3 mm. Shape: regular. Accommodation: intact.   CN III: no CN III palsy  CN VI: no CN VI palsy  Nystagmus: none   Diplopia: none  Ophthalmoparesis: none  Upgaze: normal  Downgaze: normal  Conjugate gaze: present    CN V   Facial sensation intact.   Right facial sensation deficit: none  Left facial sensation deficit: none    CN VII   Facial expression full, symmetric.   Right facial weakness: none  Left facial weakness: none    CN VIII   CN VIII normal.     CN IX, X   CN IX normal.   CN X normal.   Palate: symmetric    CN XI   CN XI normal.   Right sternocleidomastoid strength: normal  Left sternocleidomastoid strength: normal  Right trapezius strength: normal  Left trapezius strength: normal    CN XII   CN XII normal.   Tongue deviation: none    Motor Exam   Muscle bulk: normal  Overall muscle tone: normal  Right arm tone: normal  Left arm tone: normal  Right leg tone: normal  Left leg tone: normal    Strength   Strength 5/5 throughout.   Right interossei: 4/5  Left interossei: 4/5    Sensory Exam   Right arm light touch: decreased from fingers  Left arm light touch: decreased from fingers  Right leg light touch: normal  Left leg light touch: normal  Right arm vibration: decreased from fingers  Left arm vibration: decreased from  fingers  Right leg vibration: normal  Left leg vibration: normal  Right arm proprioception: normal  Left arm proprioception: normal  Right leg proprioception: normal  Left leg proprioception: normal  Right arm pinprick: decreased from fingers  Left arm pinprick: decreased from fingers  Right leg pinprick: normal  Left leg pinprick: normal  Sensory deficit distribution on right: median  Sensory deficit distribution on left: median    Gait, Coordination, and Reflexes     Gait  Gait: normal    Coordination   Romberg: negative  Finger to nose coordination: normal  Heel to shin coordination: normal  Tandem walking coordination: normal    Tremor   Resting tremor: absent    Reflexes   Right brachioradialis: 2+  Left brachioradialis: 2+  Right biceps: 2+  Left biceps: 2+  Right triceps: 2+  Left triceps: 2+  Right patellar: 2+  Left patellar: 2+  Right achilles: 2+  Left achilles: 2+  Right plantar: normal  Left plantar: normal      Physical Exam   Constitutional: She is oriented to person, place, and time. She appears well-developed and well-nourished.   HENT:   Head: Normocephalic and atraumatic.   Eyes: Pupils are equal, round, and reactive to light. EOM are normal.   Neck: Normal range of motion.   Cardiovascular: Normal rate and intact distal pulses.   Pulmonary/Chest: Effort normal. No apnea. No respiratory distress.   Musculoskeletal: Normal range of motion.   Neurological: She is alert and oriented to person, place, and time. She has normal strength. She has a normal Finger-Nose-Finger Test, a normal Heel to Shin Test, a normal Romberg Test and a normal Tandem Gait Test. Gait normal.   Reflex Scores:       Tricep reflexes are 2+ on the right side and 2+ on the left side.       Bicep reflexes are 2+ on the right side and 2+ on the left side.       Brachioradialis reflexes are 2+ on the right side and 2+ on the left side.       Patellar reflexes are 2+ on the right side and 2+ on the left side.       Achilles reflexes  "are 2+ on the right side and 2+ on the left side.  Skin: Skin is warm and dry.   Psychiatric: She has a normal mood and affect. Her speech is normal and behavior is normal. Thought content normal.   Vitals reviewed.      Vitals:    05/20/19 0858   BP: 132/89   BP Location: Right arm   Patient Position: Sitting   BP Method: Large (Automatic)   Pulse: 89   Weight: 80.5 kg (177 lb 7.5 oz)   Height: 5' 3" (1.6 m)       Assessment & Plan:    Problem List Items Addressed This Visit     Vitamin D deficiency disease    Relevant Orders    Vitamin D (Completed)    Dizziness    Numbness - Primary    Relevant Orders    Vitamin B1    Vitamin B6          Follow-up: Follow up in about 6 months (around 11/20/2019).   More than 50% of this 25 minute encounter was spent in counseling and coordinating care of migraine and numbness.      "

## 2019-05-22 LAB — PYRIDOXAL SERPL-MCNC: 6 UG/L (ref 5–50)

## 2019-05-23 ENCOUNTER — PATIENT OUTREACH (OUTPATIENT)
Dept: OTHER | Facility: OTHER | Age: 59
End: 2019-05-23

## 2019-05-23 NOTE — PROGRESS NOTES
Last 5 Patient Entered Readings                                      Current 30 Day Average: 112/78     Recent Readings 5/5/2019 5/5/2019 5/5/2019 4/28/2019 4/25/2019    SBP (mmHg) 102 108 120 127 108    DBP (mmHg) 68 78 71 87 71    Pulse 80 68 78 85 100        Digital Medicine: Health  Follow Up    Lifestyle Modifications:    1.Dietary Modifications (Sodium intake <2,000mg/day, food labels, dining out): Deferred    2.Physical Activity: Deferred    3.Medication Therapy: Patient has been compliant with the medication regimen.    4.Patient has the following medication side effects/concerns: None  (Frequency/Alleviating factors/Precipitating factors, etc.)     Follow up with Ms. Cherie Richards completed. Ms. Richards is doing okay. Patient states that she has been sending in more readings, but no recent readings have populated. Advised patient to check her [employer] serafin so readings can transmit. Currently, she is at her grandson's  graduation but will troubleshoot when she gets home. No further questions or concerns. Will continue to follow up to achieve health goals.

## 2019-05-25 LAB — VIT B1 BLD-MCNC: 54 UG/L (ref 38–122)

## 2019-06-04 VITALS — HEIGHT: 63 IN | TEMPERATURE: 91 F | WEIGHT: 177.5 LBS | BODY MASS INDEX: 31.45 KG/M2

## 2019-06-04 NOTE — PROCEDURES
Chief Complaint   Patient presents with    Other Misc     EMG        Cherie Richards is a 59 y.o. female with a history of multiple medical diagnoses as listed below that presents for evaluation of numbness in the hands and also headaches.  The patient has been having pounding in pressure-like headaches that occur along 1 side of the head.  Pains have been worse with lights and noise and have been intense enough at times to cause her to feel nauseated although she is barely vomited.  She has had no response of her headaches on many occasions to over-the-counter medications and find headaches him last for hours at a time before they will go away.  Numbness has been present in both hands and feels like pins and needles are insert into her fingers.    Interval History  05/07/2019  She presents to clinic today to discuss numbness in the hands and also in the legs.  The patient has been arranged to have EMG/nerve conduction studies to further evaluate her symptoms.  She has not been able to find anything that causes more anything that relieves her symptoms in the time since she was last seen in clinic.  Pain continues to be progressively worse in the hands and in the legs. She seems to be awakened from her sleep more frequently due to the numbness and tingling she has been experiencing in both hands..    PAST MEDICAL HISTORY:  Past Medical History:   Diagnosis Date    Abnormal Pap smear     Anemia     history    Anxiety     Cancer     uterine-cancer cells     Colon polyps, next C-scope 2019. 4/22/2014    Dry eyes     Essential hypertension, started in her mid 30's 1/18/2017    Family history of malignant neoplasm of pancreas 6/1/2018    Gastric bypass status for obesity 8/21/2012    GERD (gastroesophageal reflux disease)     Helicobacter pylori gastritis, 2008. 8/20/2014    Hemangioma of liver, stable 2010, 2012, right lobe 8/20/2014    Hematuria     History of cosmetic surgeries, tummy tuck 2011.  2013    HTN (hypertension) 2012    120s-130s/80s    Kidney stone     Right sided sciatica 2018    Sleep apnea     patient does not use the C-PAP mask-cannot tolerate    Urinary incontinence     Urinary retention     Varicose veins of lower extremity with inflammation 2015    Vertigo     mild    Vitamin D deficiency disease 2018       PAST SURGICAL HISTORY:  Past Surgical History:   Procedure Laterality Date    Abdominoplasty with Liposcuction      APPENDECTOMY      CERVICAL BIOPSY  W/ LOOP ELECTRODE EXCISION      Prior to hysterectomy    CHOLECYSTECTOMY      Laparoscopic    COLONOSCOPY N/A 2014    Performed [facility] ([facility])    CYSTOSCOPY N/A 2014    [facility]formed [facility] at [facility] OR [facility]ST FLR    EGD N/A 2014    Performed by Lincoln Schumacher MD at [facility] (4TH FLR)    GASTRIC BYPASS      HERNIA REPAIR      HYSTERECTOMY       TVH  both ovaries remain    PYELOGRAM, RETROGRADE Bilateral 2014    [facility] at [facility]LR    URETEROSCOPY Left 2014    Performed by [facility] at [facility]LR       SOCIAL HISTORY:  Social History     Socioeconomic History    Marital status:      Spouse name: Not on file    Number of children: Not on file    Years of education: Not on file    Highest education level: Not on file   Occupational History    Not on file   Social Needs    Financial resource strain: Not on file    Food insecurity:     Worry: Not on file     Inability: Not on file    Transportation needs:     Medical: Not on file     Non-medical: Not on file   Tobacco Use    Smoking status: Never Smoker    Smokeless tobacco: Never Used    Tobacco comment: .  .   Occup:  .     Substance and Sexual Activity    Alcohol use: No    Drug use: No    Sexual activity: Yes     Partners: Male     Birth control/protection: Surgical   Lifestyle    Physical  activity:     Days per week: Not on file     Minutes per session: Not on file    Stress: Not on file   Relationships    Social connections:     Talks on phone: Not on file     Gets together: Not on file     Attends L[employer]-day service: Not on file     Active member of club or organization: Not on file     Attends meetings of clubs or organizations: Not on file     Relationship status: Not on file   Other Topics Concern    Not on file   Social History Narrative    Not on file       FAMILY HISTORY:  Family History   Problem Relation Age of Onset    Heart disease Mother     Stroke Mother     Coronary artery disease Mother     Heart failure Mother     Diabetes Father     Breast cancer Maternal Aunt     Colon cancer Maternal Uncle     Hearing loss Maternal Grandmother     No Known Problems Sister     No Known Problems Brother     No Known Problems Paternal Aunt     No Known Problems Paternal Uncle     No Known Problems Maternal Grandfather     No Known Problems Paternal Grandmother     No Known Problems Paternal Grandfather     Ovarian cancer Neg Hx     Anesthesia problems Neg Hx     Amblyopia Neg Hx     Blindness Neg Hx     Cancer Neg Hx     Cataracts Neg Hx     Glaucoma Neg Hx     Hypertension Neg Hx     Macular degeneration Neg Hx     Retinal detachment Neg Hx     Strabismus Neg Hx     Thyroid disease Neg Hx        ALLERGIES AND MEDICATIONS: updated and reviewed.  Review of patient's allergies indicates:   Allergen Reactions    Adhesive tape-silicones Other (See Comments)     Other reaction(s): BLISTERS    Penicillins Hives and Itching    Sulfamethoxazole-trimethoprim Hives and Itching    Prosed ec [meth-hyos-atrp-m blue-ba-phsal] Hives, Itching and Swelling    Pyridium [phenazopyridine]      Current Outpatient Medications   Medication Sig Dispense Refill    ALPRAZolam (XANAX) 0.5 MG tablet Take 1 tablet (0.5 mg total) by mouth nightly as needed for Insomnia. 30 tablet 2     "buPROPion (WELLBUTRIN XL) 150 MG TB24 tablet Take 1 tablet (150 mg total) by mouth once daily. 30 tablet 11    cholecalciferol, vitamin D3, 5,000 unit Tab Take by mouth. 1 Tablet Oral Every day      cyanocobalamin 1,000 mcg/mL injection INJECT 1 ML (CC) INTRAMUSCULARLY EVERY 2 WEEKS (14 DAYS) 1 mL 12    cycloSPORINE (RESTASIS) 0.05 % ophthalmic emulsion Place 0.4 mLs (1 drop total) into both eyes 2 (two) times daily. 2 each 6    diazePAM (VALIUM) 10 MG Tab Take 1 tablet (10 mg total) by mouth daily as needed (Take 1 tablet 30 mins before MRI and 1 right before going in machine If needed). 2 tablet 0    estradiol (ESTRING) 2 mg (7.5 mcg /24 hour) vaginal ring Place one ring per vagina every 3 months 1 each 3    hydroCHLOROthiazide (HYDRODIURIL) 25 MG tablet       losartan (COZAAR) 100 MG tablet Take 1 tablet (100 mg total) by mouth every morning. 90 tablet 3    metoprolol succinate (TOPROL-XL) 25 MG 24 hr tablet       ondansetron (ZOFRAN) 4 MG tablet Take 1 tablet (4 mg total) by mouth every 8 (eight) hours as needed for Nausea. 20 tablet 0    syringe with needle, disp, (BD LUER-CLARIBEL SYRINGE) 3 mL 25 x 1 1/2 " Syrg Use as directed with Cyanocobalamin 10 Syringe 3    topiramate (TROKENDI XR) 100 mg Cp24 Take 1 capsule (100 mg total) by mouth once daily. 30 capsule 5    topiramate (TROKENDI XR) 25 mg Cp24 Take 25 mg by mouth once daily. 30 capsule 0    tretinoin (RETIN-A) 0.1 % cream 1 Cream Topical Every day 45 g 11     No current facility-administered medications for this visit.        Review of Systems   Constitutional: Negative for activity change, appetite change, fever and unexpected weight change.   HENT: Negative for trouble swallowing and voice change.    Eyes: Positive for photophobia and visual disturbance.   Respiratory: Negative for apnea and shortness of breath.    Cardiovascular: Negative for chest pain and leg swelling.   Gastrointestinal: Positive for nausea and vomiting. Negative for " constipation.   Genitourinary: Negative for difficulty urinating.   Musculoskeletal: Negative for back pain, gait problem and neck pain.   Skin: Negative for color change and pallor.   Neurological: Positive for headaches. Negative for dizziness, seizures, syncope, weakness and numbness.   Hematological: Negative for adenopathy.   Psychiatric/Behavioral: Negative for agitation, confusion and decreased concentration.       Neurologic Exam     Mental Status   Oriented to person, place, and time.   Registration: recalls 3 of 3 objects.   Attention: normal. Concentration: normal.   Speech: speech is normal   Level of consciousness: alert  Knowledge: good.     Cranial Nerves     CN II   Visual fields full to confrontation.   Right visual field deficit: none  Left visual field deficit: none     CN III, IV, VI   Pupils are equal, round, and reactive to light.  Extraocular motions are normal.   Right pupil: Size: 3 mm. Shape: regular. Accommodation: intact.   Left pupil: Size: 3 mm. Shape: regular. Accommodation: intact.   CN III: no CN III palsy  CN VI: no CN VI palsy  Nystagmus: none   Diplopia: none  Ophthalmoparesis: none  Upgaze: normal  Downgaze: normal  Conjugate gaze: present    CN V   Facial sensation intact.   Right facial sensation deficit: none  Left facial sensation deficit: none    CN VII   Facial expression full, symmetric.   Right facial weakness: none  Left facial weakness: none    CN VIII   CN VIII normal.     CN IX, X   CN IX normal.   CN X normal.   Palate: symmetric    CN XI   CN XI normal.   Right sternocleidomastoid strength: normal  Left sternocleidomastoid strength: normal  Right trapezius strength: normal  Left trapezius strength: normal    CN XII   CN XII normal.   Tongue deviation: none    Motor Exam   Muscle bulk: normal  Overall muscle tone: normal  Right arm tone: normal  Left arm tone: normal  Right leg tone: normal  Left leg tone: normal    Strength   Strength 5/5 throughout.   Right  interossei: 4/5  Left interossei: 4/5    Sensory Exam   Right arm light touch: decreased from fingers  Left arm light touch: decreased from fingers  Right leg light touch: normal  Left leg light touch: normal  Right arm vibration: decreased from fingers  Left arm vibration: decreased from fingers  Right leg vibration: normal  Left leg vibration: normal  Right arm proprioception: normal  Left arm proprioception: normal  Right leg proprioception: normal  Left leg proprioception: normal  Right arm pinprick: decreased from fingers  Left arm pinprick: decreased from fingers  Right leg pinprick: normal  Left leg pinprick: normal  Sensory deficit distribution on right: median  Sensory deficit distribution on left: median    Gait, Coordination, and Reflexes     Gait  Gait: normal    Coordination   Romberg: negative  Finger to nose coordination: normal  Heel to shin coordination: normal  Tandem walking coordination: normal    Tremor   Resting tremor: absent    Reflexes   Right brachioradialis: 2+  Left brachioradialis: 2+  Right biceps: 2+  Left biceps: 2+  Right triceps: 2+  Left triceps: 2+  Right patellar: 2+  Left patellar: 2+  Right achilles: 2+  Left achilles: 2+  Right plantar: normal  Left plantar: normal      Physical Exam   Constitutional: She is oriented to person, place, and time. She appears well-developed and well-nourished.   HENT:   Head: Normocephalic and atraumatic.   Eyes: Pupils are equal, round, and reactive to light. EOM are normal.   Neck: Normal range of motion.   Cardiovascular: Normal rate and intact distal pulses.   Pulmonary/Chest: Effort normal. No apnea. No respiratory distress.   Musculoskeletal: Normal range of motion.   Neurological: She is alert and oriented to person, place, and time. She has normal strength. She has a normal Finger-Nose-Finger Test, a normal Heel to Shin Test, a normal Romberg Test and a normal Tandem Gait Test. Gait normal.   Reflex Scores:       Tricep reflexes are 2+ on  "the right side and 2+ on the left side.       Bicep reflexes are 2+ on the right side and 2+ on the left side.       Brachioradialis reflexes are 2+ on the right side and 2+ on the left side.       Patellar reflexes are 2+ on the right side and 2+ on the left side.       Achilles reflexes are 2+ on the right side and 2+ on the left side.  Skin: Skin is warm and dry.   Psychiatric: She has a normal mood and affect. Her speech is normal and behavior is normal. Thought content normal.   Vitals reviewed.      Vitals:    05/07/19 1044   Temp: (!) 91.4 °F (33 °C)   Weight: 80.5 kg (177 lb 7.5 oz)   Height: 5' 3" (1.6 m)       Assessment & Plan:    Problem List Items Addressed This Visit     Dizziness    Relevant Orders    MRA Brain without contrast (Completed)    Creatinine, serum    Numbness      Other Visit Diagnoses     Chronic mixed headache syndrome        Relevant Orders    MRI Brain W WO Contrast (Completed)          Follow-up: Follow up in about 2 months (around 7/7/2019).       "

## 2019-06-17 ENCOUNTER — PATIENT MESSAGE (OUTPATIENT)
Dept: FAMILY MEDICINE | Facility: CLINIC | Age: 59
End: 2019-06-17

## 2019-06-17 RX ORDER — CYANOCOBALAMIN 1000 UG/ML
INJECTION, SOLUTION INTRAMUSCULAR; SUBCUTANEOUS
Qty: 1 ML | Refills: 12 | Status: CANCELLED | OUTPATIENT
Start: 2019-06-17

## 2019-06-18 NOTE — TELEPHONE ENCOUNTER
Looks like we just need to process a refill of her B12 injections?  Review allergies and double check pharmacy    12 refills

## 2019-06-19 RX ORDER — CYANOCOBALAMIN 1000 UG/ML
1000 INJECTION, SOLUTION INTRAMUSCULAR; SUBCUTANEOUS
Qty: 1 ML | Refills: 12 | Status: SHIPPED | OUTPATIENT
Start: 2019-06-19 | End: 2020-08-18 | Stop reason: SDUPTHER

## 2019-06-20 RX ORDER — CYANOCOBALAMIN 1000 UG/ML
INJECTION, SOLUTION INTRAMUSCULAR; SUBCUTANEOUS
Qty: 1 ML | Refills: 12 | Status: SHIPPED | OUTPATIENT
Start: 2019-06-20 | End: 2020-08-18 | Stop reason: SDUPTHER

## 2019-06-27 ENCOUNTER — PATIENT MESSAGE (OUTPATIENT)
Dept: FAMILY MEDICINE | Facility: CLINIC | Age: 59
End: 2019-06-27

## 2019-06-27 RX ORDER — METOPROLOL SUCCINATE 25 MG/1
25 TABLET, EXTENDED RELEASE ORAL DAILY
Qty: 90 TABLET | Refills: 3 | Status: SHIPPED | OUTPATIENT
Start: 2019-06-27 | End: 2019-11-05 | Stop reason: SDUPTHER

## 2019-06-27 RX ORDER — LOSARTAN POTASSIUM 100 MG/1
100 TABLET ORAL EVERY MORNING
Qty: 90 TABLET | Refills: 3 | Status: SHIPPED | OUTPATIENT
Start: 2019-06-27 | End: 2019-11-05 | Stop reason: SDUPTHER

## 2019-06-27 RX ORDER — HYDROCHLOROTHIAZIDE 25 MG/1
25 TABLET ORAL DAILY
Qty: 90 TABLET | Refills: 3 | Status: SHIPPED | OUTPATIENT
Start: 2019-06-27 | End: 2019-11-05 | Stop reason: SDUPTHER

## 2019-06-27 RX ORDER — TRETINOIN 1 MG/G
CREAM TOPICAL
Qty: 45 G | Refills: 11 | Status: SHIPPED | OUTPATIENT
Start: 2019-06-27 | End: 2021-07-09 | Stop reason: SDUPTHER

## 2019-07-02 ENCOUNTER — PATIENT OUTREACH (OUTPATIENT)
Dept: OTHER | Facility: OTHER | Age: 59
End: 2019-07-02

## 2019-07-02 NOTE — PROGRESS NOTES
Last 5 Patient Entered Readings                                      Current 30 Day Average:      Recent Readings 5/5/2019 5/5/2019 5/5/2019 4/28/2019 4/25/2019    SBP (mmHg) 102 108 120 127 108    DBP (mmHg) 68 78 71 87 71    Pulse 80 68 78 85 100        Ms. Richards is doing okay. She attributes lack of monitoring to a busy schedule. Encouraged patient to resume monitoring, and to submit at least one BP reading per week. Patient will submit a new reading tonight. She has no questions or concerns today. Await new readings.

## 2019-07-16 ENCOUNTER — TELEPHONE (OUTPATIENT)
Dept: FAMILY MEDICINE | Facility: CLINIC | Age: 59
End: 2019-07-16

## 2019-07-16 NOTE — TELEPHONE ENCOUNTER
Received a prior auth form for pt tretinoin 0.1% cream, per Dr. SALGADO pt will to have her dermatologist handle form.

## 2019-07-18 ENCOUNTER — PATIENT OUTREACH (OUTPATIENT)
Dept: OTHER | Facility: OTHER | Age: 59
End: 2019-07-18

## 2019-07-18 NOTE — PROGRESS NOTES
HPI:  Called patient to follow up, patient states she is out of town, requests I call back next week.  Patient admits to occasional hypotensive s/sx (lightheadedness, dizziness, nausea, fatigue).    Last 5 Patient Entered Readings                                      Current 30 Day Average: 109/74     Recent Readings 7/15/2019 7/15/2019 7/15/2019 5/5/2019 5/5/2019    SBP (mmHg) 109 107 110 102 108    DBP (mmHg) 80 67 75 68 78    Pulse 94 95 98 80 68        Assessment:  Reviewed recent readings. Per 2017 ACC/ AHA HTN guidelines (goal of BP < 130/80), current 30-day average is well controlled.     Plan:  Continue current medication regimen.   Patients health , Meche Collier, will be following up as scheduled.   I will continue to monitor regularly and will follow-up in 1 week. Will discuss decreasing losartan to 50mg or hctz to 12.5mg.     Current medication regimen:  Hypertension Medications             hydroCHLOROthiazide (HYDRODIURIL) 25 MG tablet Take 1 tablet (25 mg total) by mouth once daily.    losartan (COZAAR) 100 MG tablet Take 1 tablet (100 mg total) by mouth every morning.    metoprolol succinate (TOPROL-XL) 25 MG 24 hr tablet Take 1 tablet (25 mg total) by mouth once daily.         Patient denies having questions or concerns. Patient has my contact information and knows to call with any concerns or clinical changes.

## 2019-07-25 NOTE — PROGRESS NOTES
HPI:  Called patient to follow up. Patient reports adherence to medication regimen daily and denies missed doses. Today, patient denies hypotensive s/sx (lightheadedness, dizziness, nausea, fatigue); patient denies hypertensive s/sx (SOB, CP, severe headaches, changes in vision, dizziness, fatigue, confusion, anxiety, nosebleeds).     Last 5 Patient Entered Readings                                      Current 30 Day Average: 109/74     Recent Readings 7/15/2019 7/15/2019 7/15/2019 5/5/2019 5/5/2019    SBP (mmHg) 109 107 110 102 108    DBP (mmHg) 80 67 75 68 78    Pulse 94 95 98 80 68        Assessment:  Reviewed recent readings. Per 2017 ACC/ AHA HTN guidelines (goal of BP < 130/80), current 30-day average is well controlled; however, patient has only taken readings on 1 day within the past month.     Plan:  Offered to decrease medication dosages due to low BP/ occasional lightheadedness, patient declines changes today, states she would prefer to discuss with PCP and Cardiologist first; therefore, continue current HTN medication regimen.   Instructed patient to increase frequency of monitoring to 1x per week.   Patients health , Meche Collier, will be following up as scheduled.   I will continue to monitor regularly and will follow-up in 6 months, sooner if blood pressure begins to trend upward or downward.     Current medication regimen:  Hypertension Medications             hydroCHLOROthiazide (HYDRODIURIL) 25 MG tablet Take 1 tablet (25 mg total) by mouth once daily.    losartan (COZAAR) 100 MG tablet Take 1 tablet (100 mg total) by mouth every morning.    metoprolol succinate (TOPROL-XL) 25 MG 24 hr tablet Take 1 tablet (25 mg total) by mouth once daily.         Patient denies having questions or concerns. Patient has my contact information and knows to call with any concerns or clinical changes. Instructed patient to call if BP trends greater than 130/80.

## 2019-08-15 ENCOUNTER — PATIENT OUTREACH (OUTPATIENT)
Dept: OTHER | Facility: OTHER | Age: 59
End: 2019-08-15

## 2019-08-15 NOTE — PROGRESS NOTES
Last 5 Patient Entered Readings                                      Current 30 Day Average:      Recent Readings 7/15/2019 7/15/2019 7/15/2019 5/5/2019 5/5/2019    SBP (mmHg) 109 107 110 102 108    DBP (mmHg) 80 67 75 68 78    Pulse 94 95 98 80 68        Messaging patient regarding lack of readings.

## 2019-08-24 RX ORDER — ALPRAZOLAM 0.5 MG/1
TABLET ORAL
Qty: 20 TABLET | Refills: 0 | Status: SHIPPED | OUTPATIENT
Start: 2019-08-24 | End: 2019-11-05 | Stop reason: SDUPTHER

## 2019-09-03 NOTE — PROGRESS NOTES
Last 5 Patient Entered Readings                                      Current 30 Day Average:      Recent Readings 7/15/2019 7/15/2019 7/15/2019 5/5/2019 5/5/2019    SBP (mmHg) 109 107 110 102 108    DBP (mmHg) 80 67 75 68 78    Pulse 94 95 98 80 68        Patient states that her BP device is not working. Asked patient if she'd like to visit an [facility] location or have tech support reach out for assistance, but patient declines assistance. She states that she will try to figure it out on her own and requests a call back in a few weeks. Will follow up in 3 weeks for an update.

## 2019-10-14 ENCOUNTER — IMMUNIZATION (OUTPATIENT)
Dept: FAMILY MEDICINE | Facility: CLINIC | Age: 59
End: 2019-10-14
Payer: COMMERCIAL

## 2019-10-14 DIAGNOSIS — Z23 NEED FOR INFLUENZA VACCINATION: Primary | ICD-10-CM

## 2019-10-14 PROCEDURE — 90686 FLU VACCINE (QUAD) GREATER THAN OR EQUAL TO 3YO PRESERVATIVE FREE IM: ICD-10-PCS | Mod: S$GLB,,, | Performed by: INTERNAL MEDICINE

## 2019-10-14 PROCEDURE — 90471 IMMUNIZATION ADMIN: CPT | Mod: S$GLB,,, | Performed by: INTERNAL MEDICINE

## 2019-10-14 PROCEDURE — 90471 PR IMMUNIZ ADMIN,1 SINGLE/COMB VAC/TOXOID: ICD-10-PCS | Mod: S$GLB,,, | Performed by: INTERNAL MEDICINE

## 2019-10-14 PROCEDURE — 99499 NO LOS: ICD-10-PCS | Mod: S$GLB,,, | Performed by: INTERNAL MEDICINE

## 2019-10-14 PROCEDURE — 90686 IIV4 VACC NO PRSV 0.5 ML IM: CPT | Mod: S$GLB,,, | Performed by: INTERNAL MEDICINE

## 2019-10-14 PROCEDURE — 99499 UNLISTED E&M SERVICE: CPT | Mod: S$GLB,,, | Performed by: INTERNAL MEDICINE

## 2019-10-16 NOTE — PROGRESS NOTES
Ms. Richards has not been able to get her BP monitoring device to work. She does not want to purchase a new device at this time, and is requesting discharge. Will notify enrolling provider and [facility] care team of patient's decision to drop.

## 2019-11-05 RX ORDER — ALPRAZOLAM 0.5 MG/1
0.5 TABLET ORAL NIGHTLY
Qty: 20 TABLET | Refills: 0 | Status: SHIPPED | OUTPATIENT
Start: 2019-11-05 | End: 2019-11-20 | Stop reason: SDUPTHER

## 2019-11-05 RX ORDER — HYDROCHLOROTHIAZIDE 25 MG/1
25 TABLET ORAL DAILY
Qty: 90 TABLET | Refills: 3 | Status: SHIPPED | OUTPATIENT
Start: 2019-11-05 | End: 2020-01-23 | Stop reason: SDUPTHER

## 2019-11-05 RX ORDER — METOPROLOL SUCCINATE 25 MG/1
25 TABLET, EXTENDED RELEASE ORAL DAILY
Qty: 90 TABLET | Refills: 3 | Status: SHIPPED | OUTPATIENT
Start: 2019-11-05 | End: 2020-01-23 | Stop reason: SDUPTHER

## 2019-11-05 RX ORDER — LOSARTAN POTASSIUM 100 MG/1
100 TABLET ORAL EVERY MORNING
Qty: 90 TABLET | Refills: 3 | Status: SHIPPED | OUTPATIENT
Start: 2019-11-05 | End: 2020-01-23 | Stop reason: SDUPTHER

## 2019-11-20 ENCOUNTER — LAB VISIT (OUTPATIENT)
Dept: LAB | Facility: HOSPITAL | Age: 59
End: 2019-11-20
Attending: INTERNAL MEDICINE
Payer: COMMERCIAL

## 2019-11-20 ENCOUNTER — OFFICE VISIT (OUTPATIENT)
Dept: FAMILY MEDICINE | Facility: CLINIC | Age: 59
End: 2019-11-20
Payer: COMMERCIAL

## 2019-11-20 VITALS
HEART RATE: 90 BPM | TEMPERATURE: 99 F | OXYGEN SATURATION: 96 % | SYSTOLIC BLOOD PRESSURE: 130 MMHG | BODY MASS INDEX: 31.29 KG/M2 | WEIGHT: 176.56 LBS | HEIGHT: 63 IN | DIASTOLIC BLOOD PRESSURE: 74 MMHG

## 2019-11-20 DIAGNOSIS — F39 MOOD DISORDER: ICD-10-CM

## 2019-11-20 DIAGNOSIS — N39.0 URINARY TRACT INFECTION WITHOUT HEMATURIA, SITE UNSPECIFIED: ICD-10-CM

## 2019-11-20 DIAGNOSIS — I10 ESSENTIAL HYPERTENSION: ICD-10-CM

## 2019-11-20 DIAGNOSIS — Z98.84 GASTRIC BYPASS STATUS FOR OBESITY: ICD-10-CM

## 2019-11-20 DIAGNOSIS — Z86.010 HISTORY OF COLONIC POLYPS: ICD-10-CM

## 2019-11-20 DIAGNOSIS — N39.0 URINARY TRACT INFECTION WITHOUT HEMATURIA, SITE UNSPECIFIED: Primary | ICD-10-CM

## 2019-11-20 LAB
BACTERIA #/AREA URNS AUTO: ABNORMAL /HPF
BILIRUB UR QL STRIP: NEGATIVE
CLARITY UR REFRACT.AUTO: ABNORMAL
COLOR UR AUTO: YELLOW
GLUCOSE UR QL STRIP: NEGATIVE
HGB UR QL STRIP: NEGATIVE
KETONES UR QL STRIP: NEGATIVE
LEUKOCYTE ESTERASE UR QL STRIP: ABNORMAL
MICROSCOPIC COMMENT: ABNORMAL
NITRITE UR QL STRIP: NEGATIVE
PH UR STRIP: 5 [PH] (ref 5–8)
PROT UR QL STRIP: NEGATIVE
RBC #/AREA URNS AUTO: 0 /HPF (ref 0–4)
SP GR UR STRIP: 1.02 (ref 1–1.03)
SQUAMOUS #/AREA URNS AUTO: 8 /HPF
URN SPEC COLLECT METH UR: ABNORMAL
WBC #/AREA URNS AUTO: 61 /HPF (ref 0–5)

## 2019-11-20 PROCEDURE — 81001 URINALYSIS AUTO W/SCOPE: CPT

## 2019-11-20 PROCEDURE — 87086 URINE CULTURE/COLONY COUNT: CPT

## 2019-11-20 PROCEDURE — 3075F SYST BP GE 130 - 139MM HG: CPT | Mod: CPTII,S$GLB,, | Performed by: INTERNAL MEDICINE

## 2019-11-20 PROCEDURE — 99999 PR PBB SHADOW E&M-EST. PATIENT-LVL III: CPT | Mod: PBBFAC,,, | Performed by: INTERNAL MEDICINE

## 2019-11-20 PROCEDURE — 3008F PR BODY MASS INDEX (BMI) DOCUMENTED: ICD-10-PCS | Mod: CPTII,S$GLB,, | Performed by: INTERNAL MEDICINE

## 2019-11-20 PROCEDURE — 3078F DIAST BP <80 MM HG: CPT | Mod: CPTII,S$GLB,, | Performed by: INTERNAL MEDICINE

## 2019-11-20 PROCEDURE — 3078F PR MOST RECENT DIASTOLIC BLOOD PRESSURE < 80 MM HG: ICD-10-PCS | Mod: CPTII,S$GLB,, | Performed by: INTERNAL MEDICINE

## 2019-11-20 PROCEDURE — 87077 CULTURE AEROBIC IDENTIFY: CPT

## 2019-11-20 PROCEDURE — 3008F BODY MASS INDEX DOCD: CPT | Mod: CPTII,S$GLB,, | Performed by: INTERNAL MEDICINE

## 2019-11-20 PROCEDURE — 99999 PR PBB SHADOW E&M-EST. PATIENT-LVL III: ICD-10-PCS | Mod: PBBFAC,,, | Performed by: INTERNAL MEDICINE

## 2019-11-20 PROCEDURE — 87088 URINE BACTERIA CULTURE: CPT

## 2019-11-20 PROCEDURE — 93010 EKG 12-LEAD: ICD-10-PCS | Mod: S$GLB,,, | Performed by: INTERNAL MEDICINE

## 2019-11-20 PROCEDURE — 93005 EKG 12-LEAD: ICD-10-PCS | Mod: S$GLB,,, | Performed by: INTERNAL MEDICINE

## 2019-11-20 PROCEDURE — 3075F PR MOST RECENT SYSTOLIC BLOOD PRESS GE 130-139MM HG: ICD-10-PCS | Mod: CPTII,S$GLB,, | Performed by: INTERNAL MEDICINE

## 2019-11-20 PROCEDURE — 93010 ELECTROCARDIOGRAM REPORT: CPT | Mod: S$GLB,,, | Performed by: INTERNAL MEDICINE

## 2019-11-20 PROCEDURE — 99215 OFFICE O/P EST HI 40 MIN: CPT | Mod: S$GLB,,, | Performed by: INTERNAL MEDICINE

## 2019-11-20 PROCEDURE — 99215 PR OFFICE/OUTPT VISIT, EST, LEVL V, 40-54 MIN: ICD-10-PCS | Mod: S$GLB,,, | Performed by: INTERNAL MEDICINE

## 2019-11-20 PROCEDURE — 93005 ELECTROCARDIOGRAM TRACING: CPT | Mod: S$GLB,,, | Performed by: INTERNAL MEDICINE

## 2019-11-20 PROCEDURE — 87186 SC STD MICRODIL/AGAR DIL: CPT

## 2019-11-20 RX ORDER — ALPRAZOLAM 0.5 MG/1
0.5 TABLET ORAL NIGHTLY
Qty: 30 TABLET | Refills: 3 | Status: SHIPPED | OUTPATIENT
Start: 2019-11-20 | End: 2020-08-18 | Stop reason: SDUPTHER

## 2019-11-20 RX ORDER — NITROFURANTOIN 25; 75 MG/1; MG/1
100 CAPSULE ORAL 2 TIMES DAILY
Qty: 10 CAPSULE | Refills: 0 | Status: SHIPPED | OUTPATIENT
Start: 2019-11-20 | End: 2019-11-25

## 2019-11-20 NOTE — PROGRESS NOTES
Chief complaint:  Possible urinary infection and discuss EKG, refills    59-year-old white female essentially new to me 6/18.  Patient apparently has taken a Macrobid after sex.  She has not taken any in quite sometime.  Apparently we do not have that medication on the medication list and she did see a urologist at [facility] we discussed that is likely who prescribed it.  She would sometimes take a five day course when she would get a UTI.  For about one month she has had off and on symptoms occurring every couple of days or so.  She gets a burning in the lower abdomen with urination as well as dysuria.  She has an increased urine odor and cloudiness.  She does have some flank pain bilaterally but today it is reproducible over the lower paraspinals up to the CVA area and I do not think this represents any pyelonephritis.  She has had no nausea vomiting or any other systemic symptoms.    She is going to a weight lost surgeon who apparently is giving her some form of stimuli appetite suppressant.  Apparently used to do EKGs but now she was told she needs another EKG to get further prescriptions and that she was told it needs to be done by primary care.  Patient does have hypertension and monitors at home and is typically under excellent control.  She has had prior EKGs and so forth will update that for her.  We did discuss all the negative issues with stimulant therapy and the fact that if she does not make lifestyle modifications when she stops the appetite will return.  EKG reviewed by me today was normal    At the in the visit she also requests a refill of her Xanax which she takes to help her sleep.  Other sleeping pills unspecified she said gave her sedation the next day as would be expected.  We discussed the issues related to her Xanax.  We only gave her 20 last time and explained that was probably due to the fact that it is recommended that people have more frequent follow-up to get this refilled.   I will change it back to 30 pills with three refills with notation on there that she will need to follow up for an appointment after these refills.  Patient counseled at length regarding the multitude of these various and sundry issues.  I gave her a urine cup to go downstairs myself and reviewed her EKG and gave her a copy and then after the visit was closed then to the visit again to refill her Xanax since that was requested essentially after the interview was over.Total time over 45 minutes with over 50% counseling.    ROS:   CONST: weight stable. EYES: no vision change. ENT: no sore throat. CV: no chest pain w/ exertion. RESP: no shortness of breath. GI: no nausea, vomiting, diarrhea. No dysphagia. : no other urinary issues. MUSCULOSKELETAL: no new myalgias or arthralgias. SKIN: no new changes. NEURO: no other focal deficits. PSYCH: no other new issues. ENDOCRINE: no polyuria. HEME: no lymph nodes. ALLERGY: no general pruritis.          Past Medical History:   Diagnosis Date    Abnormal Pap smear     Anemia     history    Anxiety     Cancer     uterine-cancer cells     Colon polyps, next C-scope 2019. 4/22/2014    Dry eyes     Essential hypertension, started in her mid 30's 1/18/2017    Family history of malignant neoplasm of pancreas 6/1/2018    Gastric bypass status for obesity 8/21/2012    GERD (gastroesophageal reflux disease)     Helicobacter pylori gastritis, 2008. 8/20/2014    Hemangioma of liver, stable 2010, 2012, right lobe 8/20/2014    Hematuria     History of cosmetic surgeries, tummy tuck 2011. 4/5/2013    HTN (hypertension) 8/21/2012    120s-130s/80s    Kidney stone     Right sided sciatica 6/1/2018    Sleep apnea     patient does not use the C-PAP mask-cannot tolerate    Urinary incontinence     Urinary retention     Varicose veins of lower extremity with inflammation 4/8/2015    Vertigo     mild    Vitamin D deficiency disease 6/1/2018       Past Surgical History:    Procedure Laterality Date    Abdominoplasty with Liposcuction      APPENDECTOMY      CERVICAL BIOPSY  W/ LOOP ELECTRODE EXCISION      Prior to hysterectomy    CHOLECYSTECTOMY      Laparoscopic    GASTRIC BYPASS      HERNIA REPAIR      HYSTERECTOMY       TVH  both ovaries remain     Social History     Socioeconomic History    Marital status:      Spouse name: Not on file    Number of children: Not on file    Years of education: Not on file    Highest education level: Not on file   Occupational History    Not on file   Social Needs    Financial resource strain: Not on file    Food insecurity:     Worry: Not on file     Inability: Not on file    Transportation needs:     Medical: Not on file     Non-medical: Not on file   Tobacco Use    Smoking status: Never Smoker    Smokeless tobacco: Never Used    Tobacco comment: .  .   Occup:  .     Substance and Sexual Activity    Alcohol use: No     Frequency: Never     Binge frequency: Never    Drug use: No    Sexual activity: Yes     Partners: Male     Birth control/protection: Surgical   Lifestyle    Physical activity:     Days per week: 4 days     Minutes per session: 40 min    Stress: Very much   Relationships    Social connections:     Talks on phone: More than three times a week     Gets together: More than three times a week     Attends [employer] service: Not on file     Active member of club or organization: Yes     Attends meetings of clubs or organizations: 1 to 4 times per year     Relationship status:    Other Topics Concern    Not on file   Social History Narrative    Not on file     family history includes Breast cancer in her maternal aunt; Colon cancer in her maternal uncle; Coronary artery disease in her mother; Diabetes in her father,  of pancreatic cancer; Hearing loss in her maternal grandmother; Heart disease in her mother; Heart failure in her mother; No Known Problems in her  brother, maternal grandfather, paternal aunt, paternal grandfather, paternal grandmother, paternal uncle, and sister; Stroke in her mother.    Gen: no distress  BACK: spine with no defect, good ROM near to floor w/ discomfort, good extension w/ min discomfort, stable. Both legs full ROM, no defects, joints stable, strength 5/5. Negative straight leg testing bilaterally. Skin no rashes. Paraspinal muscles tender lower bilaterally up to the lower ribs    Prior labs an EKG reviewed          Cherie was seen today for urinary tract infection.    Diagnoses and all orders for this visit:    Urinary tract infection without hematuria, site unspecified, send urinalysis culture and treat with five days of Macrobid with the discussion that if indeed she worsens with nausea vomiting and fever she could have pyelonephritis and to go to the emergency room and if she fails to respond with her symptoms that could indicate resistance and she will call or message and I will switch antibiotics.  -     Urinalysis; Future  -     Urine culture; Future    Essential hypertension, started in her mid 30's, continue to monitor at home    Mood disorder, using Xanax to assist with sleep related to anxiety    Gastric bypass status for obesity, 08-.  Apparently seeing weight loss surgeon and doing some form of weight loss therapy    History of colonic polyps, she is overdue and we reviewed the reports  -     Case request GI: COLONOSCOPY    Other orders  -     Cancel: Pneumococcal Polysaccharide Vaccine (23 Valent) (SQ/IM) no clinical indication for pneumonia vaccine and that was deferred until she turned 63  -     nitrofurantoin, macrocrystal-monohydrate, (MACROBID) 100 MG capsule; Take 1 capsule (100 mg total) by mouth 2 (two) times daily. for 5 days              Based on patient's anxiety Referable to the above symptoms and the end clear diagnosis at this point, access would not be therapeutic"This note will not be shared with the  "patient."  Controlled substance management done as well      Answers for HPI/ROS submitted by the patient on 11/14/2019   Dysuria  Chronicity: new  Onset: more than 1 month ago  Frequency: intermittently  Progression since onset: gradually worsening  Pain quality: aching, burning, shooting, stabbing  Pain - numeric: 6/10  Fever: no fever  Sexually active?: Yes  History of pyelonephritis?: Yes  chills: Yes  discharge: Yes  flank pain: Yes  frequency: Yes  hematuria: No  hesitancy: No  possible pregnancy: No  sweats: No  urgency: Yes  vomiting: No  constipation: Yes  rash: No  weight loss: No  withholding: No  behavior changes: No  Treatments tried: acetaminophen, antibiotics, home medications, increased fluids, sitz baths  Improvement on treatment: mild  Pain severity: moderate  catheterization: Yes  diabetes insipidus: No  diabetes mellitus: No  genitourinary reflux: No  hypertension: Yes  recurrent UTIs: Yes  single kidney: No  STD: No  urinary stasis: Yes  urological procedure: Yes  kidney stones: Yes    "

## 2019-11-20 NOTE — ADDENDUM NOTE
Addended by: TOSHIA FIELD on: 11/20/2019 03:24 PM     Modules accepted: Orders, Level of Service

## 2019-11-22 LAB — BACTERIA UR CULT: ABNORMAL

## 2019-11-25 ENCOUNTER — LAB VISIT (OUTPATIENT)
Dept: LAB | Facility: HOSPITAL | Age: 59
End: 2019-11-25
Attending: INTERNAL MEDICINE
Payer: COMMERCIAL

## 2019-11-25 ENCOUNTER — PATIENT MESSAGE (OUTPATIENT)
Dept: FAMILY MEDICINE | Facility: CLINIC | Age: 59
End: 2019-11-25

## 2019-11-25 DIAGNOSIS — N39.0 URINARY TRACT INFECTION WITHOUT HEMATURIA, SITE UNSPECIFIED: Primary | ICD-10-CM

## 2019-11-25 DIAGNOSIS — N39.0 URINARY TRACT INFECTION WITHOUT HEMATURIA, SITE UNSPECIFIED: ICD-10-CM

## 2019-11-25 LAB
BACTERIA #/AREA URNS AUTO: NORMAL /HPF
BILIRUB UR QL STRIP: NEGATIVE
CLARITY UR REFRACT.AUTO: ABNORMAL
COLOR UR AUTO: YELLOW
GLUCOSE UR QL STRIP: NEGATIVE
HGB UR QL STRIP: NEGATIVE
KETONES UR QL STRIP: NEGATIVE
LEUKOCYTE ESTERASE UR QL STRIP: ABNORMAL
MICROSCOPIC COMMENT: NORMAL
NITRITE UR QL STRIP: NEGATIVE
PH UR STRIP: 6 [PH] (ref 5–8)
PROT UR QL STRIP: NEGATIVE
SP GR UR STRIP: 1.02 (ref 1–1.03)
SQUAMOUS #/AREA URNS AUTO: 8 /HPF
URN SPEC COLLECT METH UR: ABNORMAL
WBC #/AREA URNS AUTO: 4 /HPF (ref 0–5)

## 2019-11-25 PROCEDURE — 81001 URINALYSIS AUTO W/SCOPE: CPT

## 2019-11-25 PROCEDURE — 87086 URINE CULTURE/COLONY COUNT: CPT

## 2019-11-25 RX ORDER — CIPROFLOXACIN 500 MG/1
500 TABLET ORAL EVERY 12 HOURS
Qty: 6 TABLET | Refills: 0 | Status: SHIPPED | OUTPATIENT
Start: 2019-11-25 | End: 2020-09-16

## 2019-11-25 RX ORDER — ONDANSETRON 4 MG/1
4 TABLET, FILM COATED ORAL EVERY 8 HOURS PRN
Qty: 20 TABLET | Refills: 0 | Status: SHIPPED | OUTPATIENT
Start: 2019-11-25 | End: 2020-08-18

## 2019-11-26 LAB — BACTERIA UR CULT: NO GROWTH

## 2019-11-27 ENCOUNTER — PATIENT MESSAGE (OUTPATIENT)
Dept: FAMILY MEDICINE | Facility: CLINIC | Age: 59
End: 2019-11-27

## 2019-12-02 DIAGNOSIS — Z12.11 SPECIAL SCREENING FOR MALIGNANT NEOPLASMS, COLON: Primary | ICD-10-CM

## 2019-12-02 RX ORDER — SODIUM, POTASSIUM,MAG SULFATES 17.5-3.13G
1 SOLUTION, RECONSTITUTED, ORAL ORAL DAILY
Qty: 1 KIT | Refills: 0 | Status: SHIPPED | OUTPATIENT
Start: 2019-12-02 | End: 2019-12-04

## 2020-01-23 ENCOUNTER — IMMUNIZATION (OUTPATIENT)
Dept: PHARMACY | Facility: CLINIC | Age: 60
End: 2020-01-23
Payer: COMMERCIAL

## 2020-01-23 ENCOUNTER — OFFICE VISIT (OUTPATIENT)
Dept: INTERNAL MEDICINE | Facility: CLINIC | Age: 60
End: 2020-01-23
Payer: COMMERCIAL

## 2020-01-23 VITALS
HEART RATE: 107 BPM | OXYGEN SATURATION: 95 % | SYSTOLIC BLOOD PRESSURE: 122 MMHG | BODY MASS INDEX: 31.92 KG/M2 | DIASTOLIC BLOOD PRESSURE: 84 MMHG | WEIGHT: 180.13 LBS | HEIGHT: 63 IN

## 2020-01-23 DIAGNOSIS — R32 URINARY INCONTINENCE, UNSPECIFIED TYPE: ICD-10-CM

## 2020-01-23 DIAGNOSIS — K21.9 GASTROESOPHAGEAL REFLUX DISEASE WITHOUT ESOPHAGITIS: ICD-10-CM

## 2020-01-23 DIAGNOSIS — I10 ESSENTIAL HYPERTENSION: ICD-10-CM

## 2020-01-23 DIAGNOSIS — B96.81 HELICOBACTER PYLORI GASTRITIS: ICD-10-CM

## 2020-01-23 DIAGNOSIS — Z98.84 GASTRIC BYPASS STATUS FOR OBESITY: ICD-10-CM

## 2020-01-23 DIAGNOSIS — D18.03 HEMANGIOMA OF LIVER: ICD-10-CM

## 2020-01-23 DIAGNOSIS — H16.223 KERATOCONJUNCTIVITIS SICCA OF BOTH EYES NOT SPECIFIED AS SJOGREN'S: ICD-10-CM

## 2020-01-23 DIAGNOSIS — R35.0 INCREASED FREQUENCY OF URINATION: ICD-10-CM

## 2020-01-23 DIAGNOSIS — G47.30 SLEEP APNEA, UNSPECIFIED TYPE: ICD-10-CM

## 2020-01-23 DIAGNOSIS — F41.9 ANXIETY: ICD-10-CM

## 2020-01-23 DIAGNOSIS — Z00.00 ANNUAL PHYSICAL EXAM: Primary | ICD-10-CM

## 2020-01-23 DIAGNOSIS — R10.31 RLQ ABDOMINAL PAIN: ICD-10-CM

## 2020-01-23 DIAGNOSIS — R10.84 GENERALIZED ABDOMINAL PAIN: ICD-10-CM

## 2020-01-23 DIAGNOSIS — K29.70 HELICOBACTER PYLORI GASTRITIS: ICD-10-CM

## 2020-01-23 DIAGNOSIS — I83.11 VARICOSE VEINS OF RIGHT LOWER EXTREMITY WITH INFLAMMATION: ICD-10-CM

## 2020-01-23 PROCEDURE — 99999 PR PBB SHADOW E&M-EST. PATIENT-LVL IV: ICD-10-PCS | Mod: PBBFAC,,, | Performed by: INTERNAL MEDICINE

## 2020-01-23 PROCEDURE — 99999 PR PBB SHADOW E&M-EST. PATIENT-LVL IV: CPT | Mod: PBBFAC,,, | Performed by: INTERNAL MEDICINE

## 2020-01-23 PROCEDURE — 99396 PR PREVENTIVE VISIT,EST,40-64: ICD-10-PCS | Mod: S$GLB,,, | Performed by: INTERNAL MEDICINE

## 2020-01-23 PROCEDURE — 3079F PR MOST RECENT DIASTOLIC BLOOD PRESSURE 80-89 MM HG: ICD-10-PCS | Mod: CPTII,S$GLB,, | Performed by: INTERNAL MEDICINE

## 2020-01-23 PROCEDURE — 3074F SYST BP LT 130 MM HG: CPT | Mod: CPTII,S$GLB,, | Performed by: INTERNAL MEDICINE

## 2020-01-23 PROCEDURE — 3079F DIAST BP 80-89 MM HG: CPT | Mod: CPTII,S$GLB,, | Performed by: INTERNAL MEDICINE

## 2020-01-23 PROCEDURE — 3074F PR MOST RECENT SYSTOLIC BLOOD PRESSURE < 130 MM HG: ICD-10-PCS | Mod: CPTII,S$GLB,, | Performed by: INTERNAL MEDICINE

## 2020-01-23 PROCEDURE — 99396 PREV VISIT EST AGE 40-64: CPT | Mod: S$GLB,,, | Performed by: INTERNAL MEDICINE

## 2020-01-23 RX ORDER — BUPROPION HYDROCHLORIDE 150 MG/1
150 TABLET ORAL DAILY
Qty: 90 TABLET | Refills: 3 | Status: SHIPPED | OUTPATIENT
Start: 2020-01-23 | End: 2020-05-13

## 2020-01-23 RX ORDER — HYDROCHLOROTHIAZIDE 25 MG/1
25 TABLET ORAL DAILY
Qty: 90 TABLET | Refills: 3 | Status: SHIPPED | OUTPATIENT
Start: 2020-01-23 | End: 2020-12-16 | Stop reason: SDUPTHER

## 2020-01-23 RX ORDER — LOSARTAN POTASSIUM 100 MG/1
100 TABLET ORAL EVERY MORNING
Qty: 90 TABLET | Refills: 3 | Status: SHIPPED | OUTPATIENT
Start: 2020-01-23 | End: 2020-12-16 | Stop reason: SDUPTHER

## 2020-01-23 RX ORDER — METOPROLOL SUCCINATE 25 MG/1
25 TABLET, EXTENDED RELEASE ORAL DAILY
Qty: 90 TABLET | Refills: 3 | Status: SHIPPED | OUTPATIENT
Start: 2020-01-23 | End: 2020-12-16 | Stop reason: SDUPTHER

## 2020-01-23 RX ORDER — NITROFURANTOIN (MACROCRYSTALS) 100 MG/1
100 CAPSULE ORAL EVERY 6 HOURS
Qty: 30 CAPSULE | Refills: 2 | Status: SHIPPED | OUTPATIENT
Start: 2020-01-23 | End: 2020-08-18 | Stop reason: ALTCHOICE

## 2020-01-23 NOTE — PROGRESS NOTES
Subjective:       Patient ID: Cherie Richards is a 59 y.o. female.    Chief Complaint: Annual Exam (Would like to discuss liver and abdominal pain)   She presents for routine annual physical in to reestablish  This dictation was performed using using MModal.    She is grieving the death of her father it will be 1 year in April 2, 2020 from pancreatic cancer  Her mother and father were  for 67 years.  Her mother never drives.  Her mother is depressed and she is her primary caregiver.  She has multiple medical problems in addition to depression heart disease. She took an antidepressant for awhile but tapered off of that and now she is thinking would be better for her to go back onto it antidepressant medication.  She is having more anxiety since stopping this medication.  She is extremely concerned about gaining weight she had gastric bypass surgery in 2008.  She is compliant with a once monthly B12 injection but she has not been compliant with other vitamin therapy.    The main thing bothering her has been right lower quadrant abdominal pain. It is not associated with meals.  It is constantly present.  It is been there for approximately 2 months.  There has been no constipation, diarrhea, nausea or vomiting.  She has acid reflux and dyspepsia frequently.  But this is a new pain in the right lower quadrant of her abdomen.  She has had a hysterectomy but both ovaries remain.     has a past medical history of Abnormal Pap smear, Anemia, Anxiety, Cancer, Colon polyps, next C-scope 2019. (4/22/2014), Dry eyes, Essential hypertension, started in her mid 30's (1/18/2017), Family history of malignant neoplasm of pancreas (6/1/2018), Gastric bypass status for obesity (8/21/2012), GERD (gastroesophageal reflux disease), Helicobacter pylori gastritis, 2008. (8/20/2014), Hemangioma of liver, stable 2010, 2012, right lobe (8/20/2014), Hematuria, History of cosmetic surgeries, tummy baylee 2011. (4/5/2013), HTN  (hypertension) (8/21/2012), Kidney stone, Right sided sciatica (6/1/2018), Sleep apnea, Urinary incontinence, Urinary retention, Varicose veins of lower extremity with inflammation (4/8/2015), Vertigo, and Vitamin D deficiency disease (6/1/2018).  Past Surgical History:   Procedure Laterality Date    Abdominoplasty with Liposcuction      APPENDECTOMY      CERVICAL BIOPSY  W/ LOOP ELECTRODE EXCISION      Prior to hysterectomy    CHOLECYSTECTOMY      Laparoscopic    GASTRIC BYPASS      HERNIA REPAIR      HYSTERECTOMY  1980     TVH  both ovaries remain       Dysuria    This is a recurrent problem. The current episode started gradual onset. The problem occurs intermittently. The problem has been waxing and waning. The quality of the pain is described as burning, shooting and stabbing. The pain is at a severity of 6/10. The pain is mild. There has been no fever. She is sexually active. There is a history of pyelonephritis. Associated symptoms include a discharge, flank pain, frequency, urgency and constipation. Pertinent negatives include no behavior changes, chills, hematuria, hesitancy, nausea, possible pregnancy, sweats, vomiting, weight loss, rash or withholding. The treatment provided mild relief. Her past medical history is significant for catheterization, hypertension, kidney stones and recurrent UTIs. There is no history of diabetes insipidus, diabetes mellitus, a single kidney or STD.     Review of Systems   Constitutional: Negative for chills and weight loss.   Gastrointestinal: Positive for abdominal pain and constipation. Negative for nausea and vomiting.   Genitourinary: Positive for dysuria, flank pain, frequency and urgency. Negative for hematuria and hesitancy.   Skin: Negative for rash.       Objective:      Physical Exam   Constitutional: She is oriented to person, place, and time. She appears well-developed and well-nourished. No distress.   HENT:   Head: Normocephalic and atraumatic.   Right  Ear: External ear normal.   Left Ear: External ear normal.   Nose: Nose normal.   Mouth/Throat: Oropharynx is clear and moist. No oropharyngeal exudate.   Eyes: Pupils are equal, round, and reactive to light. Conjunctivae and EOM are normal. Right eye exhibits no discharge. No scleral icterus.   Neck: Normal range of motion and full passive range of motion without pain. Neck supple. No spinous process tenderness and no muscular tenderness present. Carotid bruit is not present. No thyromegaly present.   Cardiovascular: Normal rate, regular rhythm, S1 normal, S2 normal, normal heart sounds and intact distal pulses. Exam reveals no gallop and no friction rub.   No murmur heard.  Pulmonary/Chest: Effort normal and breath sounds normal. No respiratory distress. She has no wheezes. She has no rales. She exhibits no tenderness.   Abdominal: Soft. Bowel sounds are normal. She exhibits no distension and no mass. There is no tenderness. There is no rebound and no guarding.   Genitourinary: Pelvic exam was performed with patient supine. Uterus is not deviated, not enlarged, not fixed and not tender. Cervix exhibits no motion tenderness, no discharge and no friability. Right adnexum displays no mass, no tenderness and no fullness. Left adnexum displays no mass, no tenderness and no fullness.   Genitourinary Comments: Breasts: there are no masses, tenderness, nipple discharge, suspicious skin changes or axillary lymph nodes felt.   Musculoskeletal: Normal range of motion. She exhibits no edema or tenderness.   Lymphadenopathy:        Head (right side): No submental and no submandibular adenopathy present.        Head (left side): No submental and no submandibular adenopathy present.     She has no cervical adenopathy.        Right cervical: No superficial cervical, no deep cervical and no posterior cervical adenopathy present.       Left cervical: No superficial cervical, no deep cervical and no posterior cervical adenopathy  present.        Right axillary: No pectoral and no lateral adenopathy present.        Left axillary: No pectoral and no lateral adenopathy present.       Right: No supraclavicular adenopathy present.        Left: No supraclavicular adenopathy present.   Neurological: She is alert and oriented to person, place, and time. She has normal reflexes. No cranial nerve deficit. She exhibits normal muscle tone. Coordination normal.   Skin: Skin is warm and dry. No rash noted.   Psychiatric: She has a normal mood and affect. Her behavior is normal. Her mood appears not anxious. Her speech is not rapid and/or pressured. She is not agitated. She does not exhibit a depressed mood.   Normal behavior, thought content, insight and judgement.   Nursing note and vitals reviewed.      Assessment:       1. Annual physical exam    2. Anxiety    3. Essential hypertension, started in her mid 30's    4. Gastric bypass status for obesity, 08-.    5. Gastroesophageal reflux disease without esophagitis    6. Helicobacter pylori gastritis, 2008.    7. Hemangioma of liver, stable 2010, 2012, right lobe    8. Keratoconjunctivitis sicca of both eyes not specified as Sjogren's    9. Sleep apnea, unspecified type    10. Varicose veins of right lower extremity with inflammation    11. Increased frequency of urination    12. Urinary incontinence, unspecified type    13. RLQ abdominal pain    14. Generalized abdominal pain        Plan:   Cherie was seen today for annual exam.    Diagnoses and all orders for this visit:    Annual physical exam  -     CBC auto differential; Future  -     Comprehensive metabolic panel; Future  -     Ferritin; Future  -     Vitamin B1; Future  -     Vitamin B12; Future  -     Vitamin D; Future  -     Lipid panel; Future  -     Hemoglobin A1c; Future  -     TSH; Future  -     VITAMIN B6; Future    Anxiety  -     TSH; Future    Essential hypertension, started in her mid 30's  -     [employer]  ([facility]) Enrollment Order  -     Hypertension [facility] ([facility]): Assign Onboarding Questionnaires  -     Comprehensive metabolic panel; Future  -     Lipid panel; Future    Gastric bypass status for obesity, 08-.  -     Ferritin; Future  -     Vitamin B1; Future  -     Vitamin B12; Future  -     Vitamin D; Future  -     Hemoglobin A1c; Future  -     VITAMIN B6; Future  -     Creatinine, serum; Future    Gastroesophageal reflux disease without esophagitis  -     Creatinine, serum; Future    Helicobacter pylori gastritis, 2008.  -     Creatinine, serum; Future    Hemangioma of liver, stable 2010, 2012, right lobe    Keratoconjunctivitis sicca of both eyes not specified as Sjogren's    Sleep apnea, unspecified type    Varicose veins of right lower extremity with inflammation    Increased frequency of urination  -     Ambulatory Referral to Urogynecology    Urinary incontinence, unspecified type  -     Ambulatory Referral to Urogynecology    RLQ abdominal pain  -     Creatinine, serum; Future    Generalized abdominal pain  -     CT Abdomen Pelvis W Wo Contrast; Future    Other orders  -     buPROPion (WELLBUTRIN XL) 150 MG TB24 tablet; Take 1 tablet (150 mg total) by mouth once daily.  -     losartan (COZAAR) 100 MG tablet; Take 1 tablet (100 mg total) by mouth every morning.  -     metoprolol succinate (TOPROL-XL) 25 MG 24 hr tablet; Take 1 tablet (25 mg total) by mouth once daily.  -     hydroCHLOROthiazide (HYDRODIURIL) 25 MG tablet; Take 1 tablet (25 mg total) by mouth once daily.  -     nitrofurantoin (MACRODANTIN) 100 MG capsule; Take 1 capsule (100 mg total) by mouth every 6 (six) hours.      Medication List with Changes/Refills   New Medications    NITROFURANTOIN (MACRODANTIN) 100 MG CAPSULE    Take 1 capsule (100 mg total) by mouth every 6 (six) hours.    VARICELLA-ZOSTER GE-AS01B, PF, (SHINGRIX, PF,) 50 MCG/0.5 ML INJECTION    Inject 0.5 mLs into the muscle once. For one dose. for 1 dose   Current  "Medications    ALPRAZOLAM (XANAX) 0.5 MG TABLET    Take 1 tablet (0.5 mg total) by mouth nightly.    CHOLECALCIFEROL, VITAMIN D3, 5,000 UNIT TAB    Take by mouth. 1 Tablet Oral Every day    CIPROFLOXACIN HCL (CIPRO) 500 MG TABLET    Take 1 tablet (500 mg total) by mouth every 12 (twelve) hours.    CYANOCOBALAMIN 1,000 MCG/ML INJECTION    INJECT 1 ML (CC) INTRAMUSCULARLY EVERY 2 WEEKS (14 DAYS)    CYANOCOBALAMIN 1,000 MCG/ML INJECTION    Inject 1 mL (1,000 mcg total) into the muscle every 30 days.    CYCLOSPORINE (RESTASIS) 0.05 % OPHTHALMIC EMULSION    Place 0.4 mLs (1 drop total) into both eyes 2 (two) times daily.    ONDANSETRON (ZOFRAN) 4 MG TABLET    Take 1 tablet (4 mg total) by mouth every 8 (eight) hours as needed for Nausea.    SYRINGE WITH NEEDLE (BD LUER-CLARIBEL SYRINGE) 3 ML 25 X 1 1/2 " SYRG    Use as directed with Cyanocobalamin    TRETINOIN (RETIN-A) 0.1 % CREAM    1 Cream Topical Every day   Changed and/or Refilled Medications    Modified Medication Previous Medication    BUPROPION (WELLBUTRIN XL) 150 MG TB24 TABLET buPROPion (WELLBUTRIN XL) 150 MG TB24 tablet       Take 1 tablet (150 mg total) by mouth once daily.    Take 1 tablet (150 mg total) by mouth once daily.    HYDROCHLOROTHIAZIDE (HYDRODIURIL) 25 MG TABLET hydroCHLOROthiazide (HYDRODIURIL) 25 MG tablet       Take 1 tablet (25 mg total) by mouth once daily.    Take 1 tablet (25 mg total) by mouth once daily.    LOSARTAN (COZAAR) 100 MG TABLET losartan (COZAAR) 100 MG tablet       Take 1 tablet (100 mg total) by mouth every morning.    Take 1 tablet (100 mg total) by mouth every morning.    METOPROLOL SUCCINATE (TOPROL-XL) 25 MG 24 HR TABLET metoprolol succinate (TOPROL-XL) 25 MG 24 hr tablet       Take 1 tablet (25 mg total) by mouth once daily.    Take 1 tablet (25 mg total) by mouth once daily.   Discontinued Medications    ESTRADIOL (ESTRING) 2 MG (7.5 MCG /24 HOUR) VAGINAL RING    Place one ring per vagina every 3 months    TOPIRAMATE " (TROKENDI XR) 25 MG CP24    Take 25 mg by mouth once daily.       Follow up in about 3 months (around 4/23/2020).

## 2020-01-24 ENCOUNTER — PATIENT MESSAGE (OUTPATIENT)
Dept: PHARMACY | Facility: CLINIC | Age: 60
End: 2020-01-24

## 2020-01-24 ENCOUNTER — LAB VISIT (OUTPATIENT)
Dept: LAB | Facility: HOSPITAL | Age: 60
End: 2020-01-24
Attending: INTERNAL MEDICINE
Payer: COMMERCIAL

## 2020-01-24 DIAGNOSIS — B96.81 HELICOBACTER PYLORI GASTRITIS: ICD-10-CM

## 2020-01-24 DIAGNOSIS — Z98.84 GASTRIC BYPASS STATUS FOR OBESITY: ICD-10-CM

## 2020-01-24 DIAGNOSIS — Z00.00 ANNUAL PHYSICAL EXAM: ICD-10-CM

## 2020-01-24 DIAGNOSIS — F41.9 ANXIETY: ICD-10-CM

## 2020-01-24 DIAGNOSIS — I10 ESSENTIAL HYPERTENSION: ICD-10-CM

## 2020-01-24 DIAGNOSIS — K29.70 HELICOBACTER PYLORI GASTRITIS: ICD-10-CM

## 2020-01-24 DIAGNOSIS — R10.31 RLQ ABDOMINAL PAIN: ICD-10-CM

## 2020-01-24 DIAGNOSIS — K21.9 GASTROESOPHAGEAL REFLUX DISEASE WITHOUT ESOPHAGITIS: ICD-10-CM

## 2020-01-24 LAB
25(OH)D3+25(OH)D2 SERPL-MCNC: 57 NG/ML (ref 30–96)
ALBUMIN SERPL BCP-MCNC: 4.1 G/DL (ref 3.5–5.2)
ALP SERPL-CCNC: 142 U/L (ref 55–135)
ALT SERPL W/O P-5'-P-CCNC: 44 U/L (ref 10–44)
ANION GAP SERPL CALC-SCNC: 12 MMOL/L (ref 8–16)
AST SERPL-CCNC: 33 U/L (ref 10–40)
BASOPHILS # BLD AUTO: 0.07 K/UL (ref 0–0.2)
BASOPHILS NFR BLD: 0.9 % (ref 0–1.9)
BILIRUB SERPL-MCNC: 0.5 MG/DL (ref 0.1–1)
BUN SERPL-MCNC: 17 MG/DL (ref 8–23)
CALCIUM SERPL-MCNC: 10 MG/DL (ref 8.7–10.5)
CHLORIDE SERPL-SCNC: 102 MMOL/L (ref 95–110)
CHOLEST SERPL-MCNC: 238 MG/DL (ref 120–199)
CHOLEST/HDLC SERPL: 3.1 {RATIO} (ref 2–5)
CO2 SERPL-SCNC: 24 MMOL/L (ref 23–29)
CREAT SERPL-MCNC: 0.8 MG/DL (ref 0.5–1.4)
CREAT SERPL-MCNC: 0.8 MG/DL (ref 0.5–1.4)
DIFFERENTIAL METHOD: ABNORMAL
EOSINOPHIL # BLD AUTO: 0.1 K/UL (ref 0–0.5)
EOSINOPHIL NFR BLD: 0.9 % (ref 0–8)
ERYTHROCYTE [DISTWIDTH] IN BLOOD BY AUTOMATED COUNT: 14.2 % (ref 11.5–14.5)
EST. GFR  (AFRICAN AMERICAN): >60 ML/MIN/1.73 M^2
EST. GFR  (AFRICAN AMERICAN): >60 ML/MIN/1.73 M^2
EST. GFR  (NON AFRICAN AMERICAN): >60 ML/MIN/1.73 M^2
EST. GFR  (NON AFRICAN AMERICAN): >60 ML/MIN/1.73 M^2
ESTIMATED AVG GLUCOSE: 114 MG/DL (ref 68–131)
FERRITIN SERPL-MCNC: 44 NG/ML (ref 20–300)
GLUCOSE SERPL-MCNC: 111 MG/DL (ref 70–110)
HBA1C MFR BLD HPLC: 5.6 % (ref 4–5.6)
HCT VFR BLD AUTO: 47.2 % (ref 37–48.5)
HDLC SERPL-MCNC: 76 MG/DL (ref 40–75)
HDLC SERPL: 31.9 % (ref 20–50)
HGB BLD-MCNC: 14.6 G/DL (ref 12–16)
IMM GRANULOCYTES # BLD AUTO: 0.02 K/UL (ref 0–0.04)
IMM GRANULOCYTES NFR BLD AUTO: 0.3 % (ref 0–0.5)
LDLC SERPL CALC-MCNC: 138.6 MG/DL (ref 63–159)
LYMPHOCYTES # BLD AUTO: 1.6 K/UL (ref 1–4.8)
LYMPHOCYTES NFR BLD: 21.2 % (ref 18–48)
MCH RBC QN AUTO: 28.9 PG (ref 27–31)
MCHC RBC AUTO-ENTMCNC: 30.9 G/DL (ref 32–36)
MCV RBC AUTO: 94 FL (ref 82–98)
MONOCYTES # BLD AUTO: 0.5 K/UL (ref 0.3–1)
MONOCYTES NFR BLD: 7 % (ref 4–15)
NEUTROPHILS # BLD AUTO: 5.3 K/UL (ref 1.8–7.7)
NEUTROPHILS NFR BLD: 69.7 % (ref 38–73)
NONHDLC SERPL-MCNC: 162 MG/DL
NRBC BLD-RTO: 0 /100 WBC
PLATELET # BLD AUTO: 235 K/UL (ref 150–350)
PMV BLD AUTO: 10.3 FL (ref 9.2–12.9)
POTASSIUM SERPL-SCNC: 4.1 MMOL/L (ref 3.5–5.1)
PROT SERPL-MCNC: 7.7 G/DL (ref 6–8.4)
RBC # BLD AUTO: 5.05 M/UL (ref 4–5.4)
SODIUM SERPL-SCNC: 138 MMOL/L (ref 136–145)
TRIGL SERPL-MCNC: 117 MG/DL (ref 30–150)
TSH SERPL DL<=0.005 MIU/L-ACNC: 1.65 UIU/ML (ref 0.4–4)
VIT B12 SERPL-MCNC: 753 PG/ML (ref 210–950)
WBC # BLD AUTO: 7.55 K/UL (ref 3.9–12.7)

## 2020-01-24 PROCEDURE — 84443 ASSAY THYROID STIM HORMONE: CPT

## 2020-01-24 PROCEDURE — 85025 COMPLETE CBC W/AUTO DIFF WBC: CPT

## 2020-01-24 PROCEDURE — 80061 LIPID PANEL: CPT

## 2020-01-24 PROCEDURE — 82728 ASSAY OF FERRITIN: CPT

## 2020-01-24 PROCEDURE — 82306 VITAMIN D 25 HYDROXY: CPT

## 2020-01-24 PROCEDURE — 84207 ASSAY OF VITAMIN B-6: CPT

## 2020-01-24 PROCEDURE — 82607 VITAMIN B-12: CPT

## 2020-01-24 PROCEDURE — 83036 HEMOGLOBIN GLYCOSYLATED A1C: CPT

## 2020-01-24 PROCEDURE — 84425 ASSAY OF VITAMIN B-1: CPT

## 2020-01-24 PROCEDURE — 80053 COMPREHEN METABOLIC PANEL: CPT

## 2020-01-28 ENCOUNTER — TELEPHONE (OUTPATIENT)
Dept: INTERNAL MEDICINE | Facility: CLINIC | Age: 60
End: 2020-01-28

## 2020-01-28 LAB — PYRIDOXAL SERPL-MCNC: 20 UG/L (ref 5–50)

## 2020-01-28 NOTE — TELEPHONE ENCOUNTER
"I called Ct. They state that the contrast has iodine. I called the pt. And she was informed. She wanted to know the consistency. I gave her the number to call. She wants to know if it's "chalk like"?  "

## 2020-01-28 NOTE — TELEPHONE ENCOUNTER
Type:  Needs Medical Advice    Who Called: patient     Reason:  She is scheduled for a CT on 2/1/20; she is wondering what type of contrast will she be drinking and what's in it.  Would the patient rather a call back or a response via [facility]? call  Best Call Back Number: [phone]  Additional Information: none

## 2020-01-29 LAB — VIT B1 BLD-MCNC: 34 UG/L (ref 38–122)

## 2020-02-01 ENCOUNTER — HOSPITAL ENCOUNTER (OUTPATIENT)
Dept: RADIOLOGY | Facility: HOSPITAL | Age: 60
Discharge: HOME | End: 2020-02-01
Attending: INTERNAL MEDICINE
Payer: COMMERCIAL

## 2020-02-01 DIAGNOSIS — R10.84 GENERALIZED ABDOMINAL PAIN: ICD-10-CM

## 2020-02-01 PROCEDURE — 74177 CT ABD & PELVIS W/CONTRAST: CPT | Mod: 26,,, | Performed by: RADIOLOGY

## 2020-02-01 PROCEDURE — 74177 CT ABDOMEN PELVIS WITH CONTRAST: ICD-10-PCS | Mod: 26,,, | Performed by: RADIOLOGY

## 2020-02-01 PROCEDURE — 74177 CT ABD & PELVIS W/CONTRAST: CPT | Mod: TC

## 2020-02-01 PROCEDURE — 25500020 PHARM REV CODE 255: Performed by: INTERNAL MEDICINE

## 2020-02-01 RX ADMIN — IOHEXOL 15 ML: 350 INJECTION, SOLUTION INTRAVENOUS at 08:02

## 2020-02-01 RX ADMIN — IOHEXOL 100 ML: 350 INJECTION, SOLUTION INTRAVENOUS at 09:02

## 2020-02-13 ENCOUNTER — ANESTHESIA (OUTPATIENT)
Dept: ENDOSCOPY | Facility: HOSPITAL | Age: 60
End: 2020-02-13
Payer: COMMERCIAL

## 2020-02-13 ENCOUNTER — ANESTHESIA EVENT (OUTPATIENT)
Dept: ENDOSCOPY | Facility: HOSPITAL | Age: 60
End: 2020-02-13
Payer: COMMERCIAL

## 2020-02-13 ENCOUNTER — HOSPITAL ENCOUNTER (OUTPATIENT)
Facility: HOSPITAL | Age: 60
Discharge: HOME | End: 2020-02-13
Attending: COLON & RECTAL SURGERY | Admitting: COLON & RECTAL SURGERY
Payer: COMMERCIAL

## 2020-02-13 VITALS
HEIGHT: 63 IN | TEMPERATURE: 98 F | BODY MASS INDEX: 30.65 KG/M2 | RESPIRATION RATE: 16 BRPM | DIASTOLIC BLOOD PRESSURE: 87 MMHG | OXYGEN SATURATION: 95 % | WEIGHT: 173 LBS | SYSTOLIC BLOOD PRESSURE: 129 MMHG | HEART RATE: 65 BPM

## 2020-02-13 DIAGNOSIS — Z86.010 HISTORY OF COLON POLYPS: Primary | ICD-10-CM

## 2020-02-13 PROBLEM — Z86.0100 HISTORY OF COLON POLYPS: Status: ACTIVE | Noted: 2020-02-13

## 2020-02-13 PROCEDURE — 88305 TISSUE EXAM BY PATHOLOGIST: CPT | Mod: 26,,, | Performed by: PATHOLOGY

## 2020-02-13 PROCEDURE — 37000009 HC ANESTHESIA EA ADD 15 MINS: Performed by: COLON & RECTAL SURGERY

## 2020-02-13 PROCEDURE — E9220 PRA ENDO ANESTHESIA: ICD-10-PCS | Mod: 33,,, | Performed by: NURSE ANESTHETIST, CERTIFIED REGISTERED

## 2020-02-13 PROCEDURE — 63600175 PHARM REV CODE 636 W HCPCS: Performed by: NURSE ANESTHETIST, CERTIFIED REGISTERED

## 2020-02-13 PROCEDURE — 45380 PR COLONOSCOPY,BIOPSY: ICD-10-PCS | Mod: 33,,, | Performed by: COLON & RECTAL SURGERY

## 2020-02-13 PROCEDURE — 45380 COLONOSCOPY AND BIOPSY: CPT | Performed by: COLON & RECTAL SURGERY

## 2020-02-13 PROCEDURE — 88305 TISSUE EXAM BY PATHOLOGIST: CPT | Performed by: PATHOLOGY

## 2020-02-13 PROCEDURE — 37000008 HC ANESTHESIA 1ST 15 MINUTES: Performed by: COLON & RECTAL SURGERY

## 2020-02-13 PROCEDURE — 27201012 HC FORCEPS, HOT/COLD, DISP: Performed by: COLON & RECTAL SURGERY

## 2020-02-13 PROCEDURE — 88305 TISSUE EXAM BY PATHOLOGIST: ICD-10-PCS | Mod: 26,,, | Performed by: PATHOLOGY

## 2020-02-13 PROCEDURE — 45380 COLONOSCOPY AND BIOPSY: CPT | Mod: 33,,, | Performed by: COLON & RECTAL SURGERY

## 2020-02-13 PROCEDURE — 63600175 PHARM REV CODE 636 W HCPCS: Performed by: COLON & RECTAL SURGERY

## 2020-02-13 PROCEDURE — E9220 PRA ENDO ANESTHESIA: HCPCS | Mod: 33,,, | Performed by: NURSE ANESTHETIST, CERTIFIED REGISTERED

## 2020-02-13 RX ORDER — PROPOFOL 10 MG/ML
VIAL (ML) INTRAVENOUS
Status: DISCONTINUED | OUTPATIENT
Start: 2020-02-13 | End: 2020-02-13

## 2020-02-13 RX ORDER — SODIUM CHLORIDE 9 MG/ML
INJECTION, SOLUTION INTRAVENOUS CONTINUOUS
Status: DISCONTINUED | OUTPATIENT
Start: 2020-02-13 | End: 2020-02-13 | Stop reason: HOSPADM

## 2020-02-13 RX ORDER — LIDOCAINE HYDROCHLORIDE 20 MG/ML
INJECTION INTRAVENOUS
Status: DISCONTINUED | OUTPATIENT
Start: 2020-02-13 | End: 2020-02-13

## 2020-02-13 RX ORDER — PROPOFOL 10 MG/ML
VIAL (ML) INTRAVENOUS CONTINUOUS PRN
Status: DISCONTINUED | OUTPATIENT
Start: 2020-02-13 | End: 2020-02-13

## 2020-02-13 RX ADMIN — PROPOFOL 150 MCG/KG/MIN: 10 INJECTION, EMULSION INTRAVENOUS at 06:02

## 2020-02-13 RX ADMIN — PROPOFOL 70 MG: 10 INJECTION, EMULSION INTRAVENOUS at 06:02

## 2020-02-13 RX ADMIN — LIDOCAINE HYDROCHLORIDE 60 MG: 20 INJECTION, SOLUTION INTRAVENOUS at 06:02

## 2020-02-13 RX ADMIN — SODIUM CHLORIDE: 0.9 INJECTION, SOLUTION INTRAVENOUS at 06:02

## 2020-02-13 NOTE — H&P
COLONOSCOPY HISTORY AND PHYSICAL EXAM    Procedure : Colonoscopy      INDICATIONS: personal history of colon polyps    Family Hx of CRC: no    Last Colonoscopy:  2014  Findings: normal       Past Medical History:   Diagnosis Date    Abnormal Pap smear     Anemia     history    Anxiety     Cancer     uterine-cancer cells     Colon polyps, next C-scope 2019. 4/22/2014    Dry eyes     Essential hypertension, started in her mid 30's 1/18/2017    Family history of malignant neoplasm of pancreas 6/1/2018    Gastric bypass status for obesity 8/21/2012    GERD (gastroesophageal reflux disease)     Helicobacter pylori gastritis, 2008. 8/20/2014    Hemangioma of liver, stable 2010, 2012, right lobe 8/20/2014    Hematuria     History of cosmetic surgeries, tummy tuck 2011. 4/5/2013    HTN (hypertension) 8/21/2012    120s-130s/80s    Kidney stone     Right sided sciatica 6/1/2018    Sleep apnea     patient does not use the C-PAP mask-cannot tolerate    Urinary incontinence     Urinary retention     Varicose veins of lower extremity with inflammation 4/8/2015    Vertigo     mild    Vitamin D deficiency disease 6/1/2018     Sedation Problems: NO  Family History   Problem Relation Age of Onset    Heart disease Mother     Stroke Mother     Coronary artery disease Mother     Heart failure Mother     Diabetes Father     Breast cancer Maternal Aunt     Colon cancer Maternal Uncle     Hearing loss Maternal Grandmother     No Known Problems Sister     No Known Problems Brother     No Known Problems Paternal Aunt     No Known Problems Paternal Uncle     No Known Problems Maternal Grandfather     No Known Problems Paternal Grandmother     No Known Problems Paternal Grandfather     Ovarian cancer Neg Hx     Anesthesia problems Neg Hx     Amblyopia Neg Hx     Blindness Neg Hx     Cancer Neg Hx     Cataracts Neg Hx     Glaucoma Neg Hx     Hypertension Neg Hx     Macular degeneration Neg Hx      "Retinal detachment Neg Hx     Strabismus Neg Hx     Thyroid disease Neg Hx      Fam Hx of Sedation Problems: NO  Social History     Socioeconomic History    Marital status:      Spouse name: Not on file    Number of children: Not on file    Years of education: Not on file    Highest education level: Not on file   Occupational History    Not on file   Social Needs    Financial resource strain: Not on file    Food insecurity:     Worry: Not on file     Inability: Not on file    Transportation needs:     Medical: Not on file     Non-medical: Not on file   Tobacco Use    Smoking status: Never Smoker    Smokeless tobacco: Never Used    Tobacco comment: .  .   Occup:  .     Substance and Sexual Activity    Alcohol use: No     Frequency: Never     Binge frequency: Never    Drug use: No    Sexual activity: Yes     Partners: Male     Birth control/protection: Surgical   Lifestyle    Physical activity:     Days per week: 4 days     Minutes per session: 40 min    Stress: Very much   Relationships    Social connections:     Talks on phone: More than three times a week     Gets together: More than three times a week     Attends [employer] service: Not on file     Active member of club or organization: Yes     Attends meetings of clubs or organizations: 1 to 4 times per year     Relationship status:    Other Topics Concern    Not on file   Social History Narrative    Not on file       Review of Systems - Negative except   Respiratory ROS: no dyspnea  Cardiovascular ROS: no exertional chest pain  Gastrointestinal ROS: NO abdominal discomfort,  NO rectal bleeding  Musculoskeletal ROS: no muscular pain  Neurological ROS: no recent stroke    Physical Exam:  BP (!) 142/73 (BP Location: Left arm, Patient Position: Lying)   Pulse 75   Temp 97.3 °F (36.3 °C) (Temporal)   Resp 17   Ht 5' 3" (1.6 m)   Wt 78.5 kg (173 lb)   SpO2 97%   Breastfeeding? No   BMI 30.65 kg/m² "   General: no distress  Head: normocephalic  Mallampati Score   Neck: supple, symmetrical, trachea midline  Lungs:  clear to auscultation bilaterally and normal respiratory effort  Heart: regular rate and rhythm and no murmur  Abdomen: soft, non-tender non-distented; bowel sounds normal; no masses,  no organomegaly  Extremities: no cyanosis or edema, or clubbing    ASA:  II    PLAN  COLONOSCOPY.    SedationPlan :MAC    The details of the procedure, the possible need for biopsy or polypectomy and the potential risks including bleeding, perforation, missed polyps were discussed in detail.

## 2020-02-13 NOTE — TRANSFER OF CARE
"Anesthesia Transfer of Care Note    Patient: Cherie Richards    Procedure(s) Performed: Procedure(s) (LRB):  COLONOSCOPY (N/A)    Patient location: GI    Anesthesia Type: general    Transport from OR: Transported from OR on room air with adequate spontaneous ventilation    Post pain: adequate analgesia    Post assessment: tolerated procedure well and no apparent anesthetic complications    Post vital signs: stable    Level of consciousness: awake    Nausea/Vomiting: no nausea/vomiting    Complications: none    Transfer of care protocol was followed      Last vitals:   Visit Vitals  BP 99/61 (BP Location: Left arm, Patient Position: Lying)   Pulse 75   Temp 36.3 °C (97.3 °F) (Temporal)   Resp 17   Ht 5' 3" (1.6 m)   Wt 78.5 kg (173 lb)   SpO2 97%   Breastfeeding? No   BMI 30.65 kg/m²     "

## 2020-02-13 NOTE — ANESTHESIA PREPROCEDURE EVALUATION
02/13/2020  Cherie Richards is a 59 y.o., female presenting for a screening colonoscopy.    Past Medical History:   Diagnosis Date    Abnormal Pap smear     Anemia     history    Anxiety     Cancer     uterine-cancer cells     Colon polyps, next C-scope 2019. 4/22/2014    Dry eyes     Essential hypertension, started in her mid 30's 1/18/2017    Family history of malignant neoplasm of pancreas 6/1/2018    Gastric bypass status for obesity 8/21/2012    GERD (gastroesophageal reflux disease)     Helicobacter pylori gastritis, 2008. 8/20/2014    Hemangioma of liver, stable 2010, 2012, right lobe 8/20/2014    Hematuria     History of cosmetic surgeries, tummy tuck 2011. 4/5/2013    HTN (hypertension) 8/21/2012    120s-130s/80s    Kidney stone     Right sided sciatica 6/1/2018    Sleep apnea     patient does not use the C-PAP mask-cannot tolerate    Urinary incontinence     Urinary retention     Varicose veins of lower extremity with inflammation 4/8/2015    Vertigo     mild    Vitamin D deficiency disease 6/1/2018     Past Surgical History:   Procedure Laterality Date    Abdominoplasty with Liposcuction      APPENDECTOMY      CERVICAL BIOPSY  W/ LOOP ELECTRODE EXCISION      Prior to hysterectomy    CHOLECYSTECTOMY      Laparoscopic    GASTRIC BYPASS      HERNIA REPAIR      HYSTERECTOMY  1980     TVH  both ovaries remain         Anesthesia Evaluation    I have reviewed the Patient Summary Reports.     I have reviewed the Medications.     Review of Systems  Anesthesia Hx:  No problems with previous Anesthesia  Denies Family Hx of Anesthesia complications.   Denies Personal Hx of Anesthesia complications.   Hematology/Oncology:         -- Cancer in past history: Oncology Comments: Uterine CA s/p hysterectomy     Cardiovascular:   Hypertension    Pulmonary:  Pulmonary Normal  Denies  Sleep Apnea.    Renal/:  Renal/ Normal     Hepatic/GI:   Bowel Prep. GERD, well controlled        Physical Exam  General:  Well nourished    Airway/Jaw/Neck:  Airway Findings: Mouth Opening: Normal Tongue: Normal  General Airway Assessment: Adult  Mallampati: III  TM Distance: Normal, at least 6 cm  Jaw/Neck Findings:  Neck ROM: Normal ROM     Eyes/Ears/Nose:  EYES/EARS/NOSE FINDINGS: Normal   Dental:  Dental Findings: In tact   Chest/Lungs:  Chest/Lungs Findings: Clear to auscultation, Normal Respiratory Rate     Heart/Vascular:  Heart Findings: Rate: Normal  Rhythm: Regular Rhythm  Sounds: Normal        Mental Status:  Mental Status Findings:  Cooperative, Alert and Oriented         Anesthesia Plan  Type of Anesthesia, risks & benefits discussed:  Anesthesia Type:  general  Patient's Preference: general  Intra-op Monitoring Plan: standard ASA monitors  Intra-op Monitoring Plan Comments:   Post Op Pain Control Plan:   Post Op Pain Control Plan Comments:   Induction:   IV  Beta Blocker:  Patient is not currently on a Beta-Blocker (No further documentation required).       Informed Consent: Patient understands risks and agrees with Anesthesia plan.  Questions answered. Anesthesia consent signed with patient.  ASA Score: 2     Day of Surgery Review of History & Physical: I have interviewed and examined the patient. I have reviewed the patient's H&P dated: 2/13/20.   H&P update referred to the provider.         Ready For Surgery From Anesthesia Perspective.

## 2020-02-13 NOTE — [employer] PATIENT INSTRUCTIONS
Discharge Summary/Instructions after an Endoscopic Procedure  Patient Name: Cherie Richards  Patient MRN: [id number]  Patient YOB: 1960 Thursday, February 13, 2020  Pb Smith MD  RESTRICTIONS:  During your procedure today, you received medications for sedation.  These   medications may affect your judgment, balance and coordination.  Therefore,   for 24 hours, you have the following restrictions:   - DO NOT drive a car, operate machinery, make legal/financial decisions,   sign important papers or drink alcohol.    ACTIVITY:  Today: no heavy lifting, straining or running due to procedural   sedation/anesthesia.  The following day: return to full activity including work.  DIET:  Eat and drink normally unless instructed otherwise.     TREATMENT FOR COMMON SIDE EFFECTS:  - Mild abdominal pain, nausea, belching, bloating or excessive gas:  rest,   eat lightly and use a heating pad.  - Sore Throat: treat with throat lozenges and/or gargle with warm salt   water.  - Because air was used during the procedure, expelling large amounts of air   from your rectum or belching is normal.  - If a bowel prep was taken, you may not have a bowel movement for 1-3 days.    This is normal.  SYMPTOMS TO WATCH FOR AND REPORT TO YOUR PHYSICIAN:  1. Abdominal pain or bloating, other than gas cramps.  2. Chest pain.  3. Back pain.  4. Signs of infection such as: chills or fever occurring within 24 hours   after the procedure.  5. Rectal bleeding, which would show as bright red, maroon, or black stools.   (A tablespoon of blood from the rectum is not serious, especially if   hemorrhoids are present.)  6. Vomiting.  7. Weakness or dizziness.  GO DIRECTLY TO THE NEAREST EMERGENCY ROOM IF YOU HAVE ANY OF THE FOLLOWING:      Difficulty breathing              Chills and/or fever over 101 F   Persistent vomiting and/or vomiting blood   Severe abdominal pain   Severe chest pain   Black, tarry stools   Bleeding- more than one  tablespoon   Any other symptom or condition that you feel may need urgent attention  Your doctor recommends these additional instructions:  If any biopsies were taken, your doctors clinic will contact you in 1 to 2   weeks with any results.  - Discharge patient to home (ambulatory).   - Resume previous diet.   - Continue present medications.   - Await pathology results.   - Repeat colonoscopy in 5 years for surveillance.  For questions, problems or results please call your physician - Pb Smith MD at Work:  [phone].  [facility], EMERGENCY ROOM PHONE NUMBER: [phone]  IF A COMPLICATION OR EMERGENCY SITUATION ARISES AND YOU ARE UNABLE TO REACH   YOUR PHYSICIAN - GO DIRECTLY TO THE EMERGENCY ROOM.  Pb Smith MD  2/13/2020 7:16:37 AM  This report has been verified and signed electronically.  [employer]

## 2020-02-13 NOTE — ANESTHESIA POSTPROCEDURE EVALUATION
Anesthesia Post Evaluation    Patient: Cherie Richards    Procedure(s) Performed: Procedure(s) (LRB):  COLONOSCOPY (N/A)    Final Anesthesia Type: general    Patient location during evaluation: GI PACU  Patient participation: Yes- Able to Participate  Level of consciousness: awake and alert and oriented  Post-procedure vital signs: reviewed and stable  Pain management: adequate  Airway patency: patent    PONV status at discharge: No PONV  Anesthetic complications: no      Cardiovascular status: blood pressure returned to baseline and hemodynamically stable  Respiratory status: unassisted  Hydration status: euvolemic  Follow-up not needed.          Vitals Value Taken Time   /61 2/13/2020  7:22 AM   Temp 36.7 °C (98 °F) 2/13/2020  7:22 AM   Pulse 80 2/13/2020  7:22 AM   Resp 16 2/13/2020  7:22 AM   SpO2 97 % 2/13/2020  7:22 AM         No case tracking events are documented in the log.      Pain/Jane Score: Jane Score: 9 (2/13/2020  7:23 AM)

## 2020-02-14 ENCOUNTER — TELEPHONE (OUTPATIENT)
Dept: UROGYNECOLOGY | Facility: CLINIC | Age: 60
End: 2020-02-14

## 2020-02-14 NOTE — TELEPHONE ENCOUNTER
----- Message from Damian Pérez sent at 2/14/2020  9:31 AM CST -----  Contact: EMMA FLORES   Type:  Patient Returning Call    Who Called: EMMA FLORES     Who Left Message for Patient: XIOMY JAIMES    Does the patient know what this is regarding?: reschedule her appt (NEXTAVAILABLE 03/25)    Best Call Back Number:[phone]    Additional Information:

## 2020-02-17 ENCOUNTER — TELEPHONE (OUTPATIENT)
Dept: UROGYNECOLOGY | Facility: CLINIC | Age: 60
End: 2020-02-17

## 2020-02-17 NOTE — TELEPHONE ENCOUNTER
----- Message from Eyal Vale MD sent at 2/15/2020 12:45 PM CST -----  Doyle Rodríguez:  I think Monty meant to send this to you?  Could you please call patient and help her make appt with me? Thanks!  ----- Message -----  From: Monty Lopez MA  Sent: 2/14/2020  10:19 AM CST  To: Eyal Vale MD    Hi     Can you please contact pt and help her reschedule consult appointment with Dr. Vale.      Thanks,  Monty

## 2020-02-20 ENCOUNTER — TELEPHONE (OUTPATIENT)
Dept: ENDOSCOPY | Facility: HOSPITAL | Age: 60
End: 2020-02-20

## 2020-03-10 LAB — FINAL PATHOLOGIC DIAGNOSIS: NORMAL

## 2020-04-14 ENCOUNTER — TELEPHONE (OUTPATIENT)
Dept: UROLOGY | Facility: CLINIC | Age: 60
End: 2020-04-14

## 2020-04-30 ENCOUNTER — TELEPHONE (OUTPATIENT)
Dept: UROGYNECOLOGY | Facility: CLINIC | Age: 60
End: 2020-04-30

## 2020-04-30 NOTE — TELEPHONE ENCOUNTER
Attempted to contact pt regarding her appointment on 5/13/2020. Pt did not answer. Lm to call office

## 2020-05-13 RX ORDER — BUPROPION HYDROCHLORIDE 150 MG/1
TABLET ORAL
Qty: 30 TABLET | Refills: 12 | Status: SHIPPED | OUTPATIENT
Start: 2020-05-13 | End: 2020-08-18 | Stop reason: SDUPTHER

## 2020-06-24 ENCOUNTER — INITIAL CONSULT (OUTPATIENT)
Dept: UROGYNECOLOGY | Facility: CLINIC | Age: 60
End: 2020-06-24
Payer: COMMERCIAL

## 2020-06-24 VITALS — BODY MASS INDEX: 32.77 KG/M2 | WEIGHT: 184.94 LBS | HEIGHT: 63 IN

## 2020-06-24 DIAGNOSIS — Z12.31 ENCOUNTER FOR SCREENING MAMMOGRAM FOR BREAST CANCER: ICD-10-CM

## 2020-06-24 DIAGNOSIS — N89.8 VAGINAL DISCHARGE: ICD-10-CM

## 2020-06-24 DIAGNOSIS — R19.8 IRREGULAR BOWEL HABITS: ICD-10-CM

## 2020-06-24 DIAGNOSIS — N39.46 URINARY INCONTINENCE, MIXED: ICD-10-CM

## 2020-06-24 DIAGNOSIS — N95.2 VAGINAL ATROPHY: ICD-10-CM

## 2020-06-24 DIAGNOSIS — K59.09 CHRONIC CONSTIPATION: ICD-10-CM

## 2020-06-24 DIAGNOSIS — N94.10 DYSPAREUNIA, FEMALE: ICD-10-CM

## 2020-06-24 DIAGNOSIS — Z87.440 HISTORY OF UTI: ICD-10-CM

## 2020-06-24 DIAGNOSIS — R35.0 INCREASED FREQUENCY OF URINATION: Primary | ICD-10-CM

## 2020-06-24 PROCEDURE — 87077 CULTURE AEROBIC IDENTIFY: CPT

## 2020-06-24 PROCEDURE — 87186 SC STD MICRODIL/AGAR DIL: CPT

## 2020-06-24 PROCEDURE — 99999 PR PBB SHADOW E&M-EST. PATIENT-LVL IV: CPT | Mod: PBBFAC,,, | Performed by: OBSTETRICS & GYNECOLOGY

## 2020-06-24 PROCEDURE — 87086 URINE CULTURE/COLONY COUNT: CPT

## 2020-06-24 PROCEDURE — 99999 PR PBB SHADOW E&M-EST. PATIENT-LVL IV: ICD-10-PCS | Mod: PBBFAC,,, | Performed by: OBSTETRICS & GYNECOLOGY

## 2020-06-24 PROCEDURE — 51701 PR INSERTION OF NON-INDWELLING BLADDER CATHETERIZATION FOR RESIDUAL UR: ICD-10-PCS | Mod: S$GLB,,, | Performed by: OBSTETRICS & GYNECOLOGY

## 2020-06-24 PROCEDURE — 87088 URINE BACTERIA CULTURE: CPT

## 2020-06-24 PROCEDURE — 51701 INSERT BLADDER CATHETER: CPT | Mod: S$GLB,,, | Performed by: OBSTETRICS & GYNECOLOGY

## 2020-06-24 PROCEDURE — 99245 PR OFFICE CONSULTATION,LEVEL V: ICD-10-PCS | Mod: 25,S$GLB,, | Performed by: OBSTETRICS & GYNECOLOGY

## 2020-06-24 PROCEDURE — 99245 OFF/OP CONSLTJ NEW/EST HI 55: CPT | Mod: 25,S$GLB,, | Performed by: OBSTETRICS & GYNECOLOGY

## 2020-06-24 PROCEDURE — 87801 DETECT AGNT MULT DNA AMPLI: CPT

## 2020-06-24 PROCEDURE — 87481 CANDIDA DNA AMP PROBE: CPT | Mod: 59

## 2020-06-24 RX ORDER — OXYBUTYNIN CHLORIDE 10 MG/1
10 TABLET, EXTENDED RELEASE ORAL DAILY
Qty: 30 TABLET | Refills: 12 | Status: SHIPPED | OUTPATIENT
Start: 2020-06-24 | End: 2020-08-18

## 2020-06-24 RX ORDER — ESTRADIOL 0.1 MG/G
CREAM VAGINAL
Qty: 42.5 G | Refills: 11 | Status: SHIPPED | OUTPATIENT
Start: 2020-06-24 | End: 2020-12-16

## 2020-06-24 NOTE — PROGRESS NOTES
[facility] [id number]   [address] [address]    Cherie Richards  [id number]  1960 June 24, 2020    Consulting Physician: Iliana Caruso MD   GYN: Dr. Ying MELVIND.: Iliana Caruso MD    Chief Complaint   Patient presents with    Consult     urinary leakage       HPI:     1)  UI:  (+) ALETHA < (+) UUI (kamar if riding in car for long periods)  X years, worse x 8 mos.  (+) pads (thick):3/day, usually severe wetness.  Daytime frequency: Q 2 hours.  Nocturia: Yes: 4/night.   (--) dysuria,  (--) hematuria,  (+) reported frequent UTIs.  Last UTI 12/2019 per report. Was not always Dx per C&S.  Takes macrobid pericoitally. Typical symptoms: lower pelvic pain, vaginal burning/itching.   (--) complete bladder emptying. DV sometimes with moderate 2nd volume. States urine has odor currently.    2)  POP:  Absent.(--) vaginal bleeding. (+) vaginal discharge. (+) sexually active.  (+) chronic dyspareunia.  With entrance and deep penetration. No h/o sexual abuse/isues. Has not seen anyone about this.  (+)  Vaginal dryness. Not using anything.  (--) vaginal estrogen use. Saw MD at  for vaginal dryness but had allergy to creams per report.     3)  BM:  (+) constipation/straining. Takes ducolax PRN with relief.   (--) chronic diarrhea. (--) hematochezia.  (--) fecal incontinence.  (+) fecal smearing/urgency.  (+) complete evacuation.     Past Medical History  Past Medical History:   Diagnosis Date    Abnormal Pap smear     Anemia     history    Anxiety     Cancer     uterine-cancer cells     Colon polyps, next C-scope 2019. 4/22/2014    Dry eyes     Essential hypertension, started in her mid 30's 1/18/2017    Family history of malignant neoplasm of pancreas 6/1/2018    Gastric bypass status for obesity 8/21/2012    GERD (gastroesophageal reflux disease)     Helicobacter pylori gastritis, 2008. 8/20/2014    Hemangioma of liver, stable 2010, 2012, right lobe  "2014    Hematuria     History of cosmetic surgeries, tummy tuck . 2013    HTN (hypertension) 2012    120s-130s/80s    Kidney stone     Right sided sciatica 2018    Sleep apnea     patient does not use the C-PAP mask-cannot tolerate    Urinary incontinence     Urinary retention     Varicose veins of lower extremity with inflammation 2015    Vertigo     mild    Vitamin D deficiency disease 2018   Kidney stones: last 1 year ago; 3 total; spontaneous passage; familial  Anxiety/depression: mostly with father's death; but still persistent    Past Surgical History  Past Surgical History:   Procedure Laterality Date    Abdominoplasty with Liposcuction      APPENDECTOMY      CERVICAL BIOPSY  W/ LOOP ELECTRODE EXCISION      Prior to hysterectomy    CHOLECYSTECTOMY      Laparoscopic    COLONOSCOPY N/A 2020    Procedure: COLONOSCOPY;  Surgeon: Pb Smith MD;  Location: [facility] ([address] [place] [address]);  Service: Endoscopy;  Laterality: N/A;    GASTRIC BYPASS      HERNIA REPAIR      HYSTERECTOMY       TVH  both ovaries remain   RLQ appy  LSC GBS  Hiatal hernia repair    Hysterectomy: Yes   Date: .  Indication: "cervical pre CA"; no post Tx needed; had some pre-Tx (sounds like cervical dysplasia)  Type: vaginal  Cervix present: No  Ovaries present: Yes   Other procedures at time of hysterectomy:  none    Past Ob History     x 2.  C/s x 0.    Largest infant weight: 8#  yes FAVD. no episiotomy. +ob lac.     Gynecologic History  LMP: No LMP recorded. Patient has had a hysterectomy.  Age of menarche: 15 yo  Age of menopause: with TVH  Menstrual history: h/o normal  Pap test: post TVH; cuff pap 2019 neg/HPV neg.  History of abnormal paps: Yes - post TVH.  History of STIs:  No  Mammogram: Date of last: 2019.  Result: Normal  Colonoscopy: Date of last: .  Result:  Polyps, benign.  Repeat due:  .    DEXA:  Date of last: years ago, normal per report. "     Family History  Family History   Problem Relation Age of Onset    Heart disease Mother     Stroke Mother     Coronary artery disease Mother     Heart failure Mother     Diabetes Father     Breast cancer Maternal Aunt     Colon cancer Maternal Uncle     Hearing loss Maternal Grandmother     No Known Problems Sister     No Known Problems Brother     No Known Problems Paternal Aunt     No Known Problems Paternal Uncle     No Known Problems Maternal Grandfather     No Known Problems Paternal Grandmother     No Known Problems Paternal Grandfather     Ovarian cancer Neg Hx     Anesthesia problems Neg Hx     Amblyopia Neg Hx     Blindness Neg Hx     Cancer Neg Hx     Cataracts Neg Hx     Glaucoma Neg Hx     Hypertension Neg Hx     Macular degeneration Neg Hx     Retinal detachment Neg Hx     Strabismus Neg Hx     Thyroid disease Neg Hx       Colon CA: Yes - PU  Breast CA: Yes - MA  GYN CA: No   CA: No    Social History  Social History     Tobacco Use   Smoking Status Never Smoker   Smokeless Tobacco Never Used   Tobacco Comment    .  .   Occup:  .       Social History     Substance and Sexual Activity   Alcohol Use No    Frequency: Never    Binge frequency: Never   .    Social History     Substance and Sexual Activity   Drug Use No     The patient is .  Resides in [place] [address].  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     Allergies  Review of patient's allergies indicates:   Allergen Reactions    Adhesive tape-silicones Other (See Comments)     Other reaction(s): BLISTERS    Penicillins Hives and Itching    Sulfamethoxazole-trimethoprim Hives and Itching    Prosed ec [meth-hyos-atrp-m blue-ba-phsal] Hives, Itching and Swelling    Pyridium [phenazopyridine]        Medications  Current Outpatient Medications on File Prior to Visit   Medication Sig Dispense Refill    ALPRAZolam (XANAX) 0.5 MG tablet Take 1 tablet (0.5 mg total) by  "mouth nightly. 30 tablet 3    buPROPion (WELLBUTRIN XL) 150 MG TB24 tablet TAKE 1 TABLET(150 MG) BY MOUTH EVERY DAY 30 tablet 12    cholecalciferol, vitamin D3, 5,000 unit Tab Take by mouth. 1 Tablet Oral Every day      cyanocobalamin 1,000 mcg/mL injection INJECT 1 ML (CC) INTRAMUSCULARLY EVERY 2 WEEKS (14 DAYS) 1 mL 12    cyanocobalamin 1,000 mcg/mL injection Inject 1 mL (1,000 mcg total) into the muscle every 30 days. 1 mL 12    cycloSPORINE (RESTASIS) 0.05 % ophthalmic emulsion Place 0.4 mLs (1 drop total) into both eyes 2 (two) times daily. 2 each 6    hydroCHLOROthiazide (HYDRODIURIL) 25 MG tablet Take 1 tablet (25 mg total) by mouth once daily. 90 tablet 3    losartan (COZAAR) 100 MG tablet Take 1 tablet (100 mg total) by mouth every morning. 90 tablet 3    metoprolol succinate (TOPROL-XL) 25 MG 24 hr tablet Take 1 tablet (25 mg total) by mouth once daily. 90 tablet 3    nitrofurantoin (MACRODANTIN) 100 MG capsule Take 1 capsule (100 mg total) by mouth every 6 (six) hours. 30 capsule 2    syringe with needle (BD LUER-CLARIBEL SYRINGE) 3 mL 25 x 1 1/2 " Syrg Use as directed with Cyanocobalamin 10 Syringe 3    tretinoin (RETIN-A) 0.1 % cream 1 Cream Topical Every day 45 g 11    ciprofloxacin HCl (CIPRO) 500 MG tablet Take 1 tablet (500 mg total) by mouth every 12 (twelve) hours. (Patient not taking: Reported on 6/24/2020) 6 tablet 0    ondansetron (ZOFRAN) 4 MG tablet Take 1 tablet (4 mg total) by mouth every 8 (eight) hours as needed for Nausea. (Patient not taking: Reported on 6/24/2020) 20 tablet 0     No current facility-administered medications on file prior to visit.        Review of Systems A 14 point ROS was reviewed with pertinent positives as noted above in the history of present illness.      Constitutional: negative  Eyes: negative  Endocrine: negative  Gastrointestinal: negative  Cardiovascular: negative  Respiratory: negative  Allergic/Immunologic: negative  Integumentary: " "negative  Psychiatric: negative  Musculoskeletal: negative   Ear/Nose/Throat: negative  Neurologic: negative  Genitourinary: SEE HPI  Hematologic/Lymphatic: negative   Breast: negative    Urogynecologic Exam  Ht 5' 3" (1.6 m)   Wt 83.9 kg (184 lb 15.5 oz)   BMI 32.77 kg/m²     GENERAL APPEARANCE:  The patient is well-developed, well-nourished.  Neck:  Supple with no thyromegaly, no carotid bruits.  Heart:  Regular rate and rhythm, no murmurs, rubs or gallops.  Lungs:  Clear.  No CVA tenderness.  Abdomen:  Soft, nontender, nondistended, no hepatosplenomegaly.  Incisions:  LSC + abd plasty well-healed    PELVIC:    External genitalia:  Normal Bartholins, Skenes and labia bilaterally.    Urethra:  No caruncle, diverticulum or masses.  (+) hypermobility.    Vagina:  Atrophy + mild , no bladder masses or tender, + white/yeast-like discharge--affirm sent.  +LV TTP R>L--feels like dyspareunia.  Cervix:  absent  Uterus: uterus absent  Adnexa: Not palpable.    POP-Q:  Aa -1; Ba -1; C -8; Ap -1; Bp -1.  Genital hiatus 3, perineal body 2, total vaginal length 9.    NEUROLOGIC:  Cranial nerves 2 through 12 intact.  Strength 5/5.  DTRs 2+ lower extremities.  S2 through 4 normal.  Sacral reflexes intact.    EXT: OCAMPO, 2+ pulses bilaterally, no C/C/E    COUGH STRESS TEST:  negative  KEGEL: 1 /5    RECTAL:    External:  Normal, (--) hemorrhoids, (--) dovetailing.   Internal: deferred    PVR: 20 mL    Impression    1. Increased frequency of urination    2. Encounter for screening mammogram for breast cancer    3. Vaginal discharge    4. Vaginal atrophy    5. Urinary incontinence, mixed    6. Chronic constipation    7. Irregular bowel habits    8. History of UTI    9. Dyspareunia, female        Initial Plan  The patient was counseled regarding these issues. The patient was given a summary sheet containing each of these issues with possible options for evaluation and management. When appropriate, we also reviewed computer-generated " diagrams specific to their diagnoses..  All questions were addressed to the patient's satisfaction.    1)  Well-woman:  Pap test: post TVH; cuff pap 2/2019 neg/HPV neg.  History of abnormal paps: Yes - post TVH. Still need annual GYN exam.   Mammogram: Date of last: 2/2019.  Result: Normal.  Ordered, please schedule.   Colonoscopy: Date of last: 2020.  Result:  Polyps, benign.  Repeat due:  2025.    DEXA:  Date of last: years ago, normal per report. Due at 62 yo.     2)  Mixed urinary incontinence, urge > stress:    --urine C&S  --Empty bladder every 3 hours.  Empty well: wait a minute, lean forward on toilet.    --Avoid dietary irritants (see sheet).  Keep diary x 3-5 days to determine your irritants.  --start pelvic floor PT.  GOAL: 1st relax muscles to help pain (kamar with intercourse), then strengthen to help leakage.  Call to make appt:  [facility] (all take Medicaid):  1)  [facility] (Dulce Huber): (p) [phone]. (f) [phone]. Established patients call:  [phone].  2)  STELLA Smith/Roslyn Spicer: (p) [phone]  . (f) [phone].    3)  STELLA [facility]ros (Iliana Irene or Christine Liu): (p) [phone].  Or [phone].   4)  STELLA Penaloza. (p) [phone].    --URGE: Start oxybutynin 10 xl daily.  Takes 2-4 weeks to see if will have effect.  For dry mouth: get sour, sugar free lozenge or gum.    --STRESS:  Pessary vs. Sling.     3) Frequent UTIs (bladder infections):  --urine C&S sent today  --If you feel like you have a UTI, please call our office so that we can place an order for you to drop off a urine specimen at the closest [facility] lab (we will arrange).     --We will call in antibiotics for you to start right after you drop off specimen.     --In this way, we can determine:     1)  Do you have a UTI?  Or is is more overactive bladder?    2) If you have a UTI, is it sensitive to the antibiotics we prescribed?   --follow UTI prevention tips (see  attached)  --continue nitrofurantoin before or after intercourse for now; will try to wean off in future  --control bowel movements/fecal cross-contamination  --treat vaginal atrophy (dryness); discussed started vaginal estrogen, mainly to reduce UTIs  --empty bladder before and after intercourse  --consider need for further evaluation (pelvic imaging and cystoscopy) if issue persists    4)  Vaginal atrophy (dryness):  Use 0.5 gram of estrogen cream in vagina nightly x 2 weeks, then twice a week thereafter.    --may burn initially as skin starts to heal from dryness--let us know if intolerable after using x several weeks  --using can reduce frequency of UTIs and bladder urgency/frequency    5)  Constipation with intermittent loose stools:  --hydrate well  --avoid dietary triggers, kamar after gall bladder surgery  Controlling constipation may help bladder urgency/leakage and fiber may better control cholesterol and blood glucose.  Start daily fiber.  Take 1 tsp of fiber powder (psyllium or other sugar-free powder).  Mix in 8 oz of water.  Take x 3-5 days.  Then, increase fiber by 1 tsp every 3-5 days until stool is easy to pass, not to hard or soft.  Stop and continue at that dose.   Do not exceed 6 tsps/day.  May also use over the counter stool softener 1-2 x/day.  AVOID laxatives.    6)  Pain with intercourse:  --levator muscle tenderness on exam: start PT  --treat vaginal dryness    7)  Vaginal discharge:  --affirm sent    8)  RTC 3-4 months.     Approximately 60 min were spent in consult, 90 % in discussion.     Thank you for requesting consultation of your patient.  I look forward to participating in their care.    Eyal Vale  Female Pelvic Medicine and Reconstructive Surgery  [facility] [place], [place]

## 2020-06-24 NOTE — PATIENT INSTRUCTIONS
Bladder Irritants  Certain foods and drinks have been associated with worsening symptoms of urinary frequency, urgency, urge incontinence, or bladder pain. If you suffer from any of these conditions, you may wish to try eliminating one or more of these foods from your diet and see if your symptoms improve. If bladder symptoms are related to dietary factors, strict adherence to a diet thateliminates the food should bring marked relief in 10 days. Once you are feeling better, you can begin to add foods back into your diet, one at a time. If symptoms return, you will be able to identify the irritant. As you add foods back to your diet it is very important that you drink significant amounts of water.    -----------------------------------------------------------------------------------------------  List of Common Bladder Irritants*  Alcoholic beverages  Apples and apple juice  Cantaloupe  Carbonated beverages  Chili and spicy foods  Chocolate  Citrus fruit  Coffee (including decaffeinated)  Cranberries and cranberry juice  Grapes  Guava  Milk Products: milk, cheese, cottage cheese, yogurt, ice cream  Peaches  Pineapple  Plums  Strawberries  Sugar especially artificial sweeteners, saccharin, aspartame, corn sweeteners, honey, fructose, sucrose, lactose  Tea  Tomatoes and tomato juice  Vitamin B complex  Vinegar  *Most people are not sensitive to ALL of these products; your goal is to find the foods that make YOUR symptoms worse.  ---------------------------------------------------------------------------------------------------    Low-acid fruit substitutions include apricots, papaya, pears and watermelon. Coffee drinkers can drink Kava or other lowacid instant drinks. Tea drinkers can substitute non-citrus herbal and sun brewed teas. Calcium carbonate co-buffered with calcium ascorbate can be substituted for Vitamin C. Prelief is a dietary supplement that works as an acid blocker for the bladder.    Where to get more  information:        Overcoming Bladder Disorders by Ember Goldberg and Bobby Marques, 1990        You Dont Have to Live with Cystitis! By Sandra Garsia, 1988  · http://www.urologymanagement.org/oab    ----------------------------------------------    UTI PREVENTION: (from National Association for Continence)  Urinary Tract Infection (UTI)  More than 4 million doctor office visits per year in the [place] are for urinary tract infections (UTI). About 12% of men and 50% of women will have a UTI during his or her lifetime. Most UTIs arise from bacteria that normally live in the colon and rectum and are present in bowel movements. These bacteria cling to the opening of the urethra, begin to multiply, & travel up to the bladder. Urine flow from the bladder usually washes bacteria out of the body. However, because women have a shorter urethra than men, bacteria can reach the bladder more easily and settle into the bladder wall. This is why women are more likely to develop UTIs than men. This risk factor is exacerbated by the greater likelihood that women introduce bacteria from fecal matter, following a bowel movement, into the urinary tract. Less often, bacteria can spread to the kidney from the bloodstream. A recurrent UTI is classified as three or more a year.  Risk Factors for UTI  Several factors can contribute to the risk of UTI. Sexual intercourse, use of contraceptive spermicide, low levels of estrogen, catheterization, diabetes, pregnancy, and immune suppression increase susceptibility to UTI.  Naturally occurring estrogen helps prevent recurrent UTI in women. After menopause, estrogen levels drop along with the number of vaginal lactobacilli, the good bacteria which prevent growth of intestinal bacteria in the vagina. This makes postmenopausal women especially susceptible to UTI.  Catheters also present a risk of recurring UTI. Catheters are associated with  colonization of bacteria and increased risks of clinical infection. Using the techniques described previously can help keep catheters clean and prevent recurrent UTI. While single-use of sterile catheters reduces the risks, it does not prevent UTI from occurring. It is therefore important to maintain proper care and use of catheters at all times while remaining alert to symptoms of UTI.  Common Symptoms of UTI   Painful urination   Frequency   Urgency   Lower abdominal or pelvic pain or pressure   Blood in the urine   Milky, cloudy, or pink/red urine   Fever   Strong smelling urine  In the elderly populations, symptoms of a urinary tract infection, can be easily overlooked, causing a delay in diagnosis. Elderly people with a UTI are more likely than younger people not to be diagnosed until the complication of sepsis occurs. The elderly often do not experience or report obvious symptoms that younger people have, such as difficult urination, or frequent urination. Instead they may exhibit far more vague symptoms that are similiar to many disease or may be assumed to be due to the aging process. These symptoms include: confusion, feelings of general discomfort, disorientation, fatigue, weakness, behavior changes, falling, and/or a new, acute incontinence. In addition, because incontinence is common in the elderly, they may not be aware of the symptom of frequency. If you experience any of these symptoms, see your healthcare professional.  Types of UTIs   Cystitis - an inflammation of the bladder and the most common UTI. Almost always, it occurs in women. The infection typically affects only the surface of the bladder and is brief & acute.   Pyelonephritis - more commonly known as a kidney infection and usually occurs when a lower UTI spreads to the kidneys. Occasionally, bacteria will spread to the kidney from the bloodstresam. Kidney infections are more serious and less common than bladder infections.  Symptoms include a fever, pain in the back or side below the ribs, nausea, or vomiting.   Urethritis - is an inflammation of the urethra. Men commonly contract urethritis through sexual intercourse with partners infected with sexually transmitted infections. Urethritis may also result from trauma to the area or from the catheterization process.  Prevention of UTI  There are several ways to prevent bladder infections.  Bathroom Behavior:Periodically emptying the bladder by trying to urinate every two to three hours.  Diet:The use of cranberry products seems to decrease the ability of bacteria to adhere to the lining of the urethra and bladder. As cranberry juice can have a high amount of sugar, cranberry extract can be taken in capsule or pill form instead. Increasing water intake by one or two glasses per day may help limit the length of time that you have symptoms and reduce the infections.  Hygiene: Proper hygiene in caring for the urethral area is another way to limit the amount or type of bacteria that can be drawn into the urethra. This is especially true in people who have decreased sensation in the perineal region such as those with MS or who experience any amount of fecal incontinence. Using soap & water or commercially available cleansing wipes several times per day & frequent changing of incontinence pads as they become wet can minimize the amount of bacteria in the urethral area. Women should always wipe from the front of the vagina to the back of the anus after urination or a bowel movement and wear cotton underwear. It is also reported that wearing thong undergarments may increase one's risk of developing an infection.  Sexual Activity: Can be the source of UTIs in women. Insuring proper lubrication to the vagina and voiding before and after intercourse are tactics to help prevent infection. Using diaphragms and spermicidal jelly and/or foam may increase the risk of infection, so it is important to  evaluate what type of birth control you use. Some physicians encourage women who have a history of recurrent infections to take an prophylactic antibiotic after intercourse, as it reduces risk of recurrent UTI by about 85%. At a minimum, restrict your number of sexual partners and urinate immediately after sexual intimacy to lower the risk of recurrent UTIs.  Vaginal Estrogen: reduces risk of recurrent UTI by repopulating the normal vaginal lactobacilli that keep bacteria from the rectum from multiplying and causing a bladder infection. Forms of vaginal estrogen are available at very low dosages that have minimal systemic absorption.  Catheter Use: proper cleansing and storage of catheters is important in one who intermittently catheterizes, as the catheter can be a vehicle to introduce infection if the technique is incorrect or if the catheters are not properly cleaned between each use. A closed system catheter provides a reliable means of sterile IC because the introducer tip is surrounded by a urine collection bag and never exposed to bacteria typically found at the urethral opening. This greatly reduces the risk of infection.  NOTE: Vaginal estrogens and antibiotics are medications that need to be prescribed by your healthcare provider.  Treatment of UTI  Depending on the severity of infection, UTI can be treated with oral antibiotics. A three-day course of antibiotics can treat a simple UTI. However, some infections may need to be treated for several days or weeks. Length of antibiotic treatment also depends on the type of antibiotic prescribed.  Even if a few doses of medication relieve some symptoms, you should still complete the full course of medication prescribed by your doctor. Taking aspirin, Tylenol, or non-steroidal, anti-inflammatory medications may reduce symptoms during this episode, as they may be a result of increased body temperature.  For more information regarding UTIs please visit:  http://www.ncbi.nlm.nih.gov/pubmedhealth/WNS5125101/    ----------------------------------------------    1)  Well-woman:  Pap test: post TVH; cuff pap 2/2019 neg/HPV neg.  History of abnormal paps: Yes - post TVH. Still need annual GYN exam.   Mammogram: Date of last: 2/2019.  Result: Normal.  Ordered, please schedule.   Colonoscopy: Date of last: 2020.  Result:  Polyps, benign.  Repeat due:  2025.    DEXA:  Date of last: years ago, normal per report. Due at 62 yo.     2)  Mixed urinary incontinence, urge > stress:    --urine C&S  --Empty bladder every 3 hours.  Empty well: wait a minute, lean forward on toilet.    --Avoid dietary irritants (see sheet).  Keep diary x 3-5 days to determine your irritants.  --start pelvic floor PT.  GOAL: 1st relax muscles to help pain (kamar with intercourse), then strengthen to help leakage.  Call to make appt:  [facility] (all take Medicaid):  1)  [facility] (Dulce Huber): (p) [phone]. (f) [phone]. Established patients call:  [phone].  2)  [facility] Smith/Roslyn Spicer: (p) [phone]  . (f) [phone].    3)  [facility] (Iliana Irene or Christine Liu): (p) [phone].  Or [phone].   4)  [facility] Penaloza. (p) [phone].    --URGE: Start oxybutynin 10 xl daily.  Takes 2-4 weeks to see if will have effect.  For dry mouth: get sour, sugar free lozenge or gum.    --STRESS:  Pessary vs. Sling.     3) Frequent UTIs (bladder infections):  --urine C&S sent today  --If you feel like you have a UTI, please call our office so that we can place an order for you to drop off a urine specimen at the closest [facility] lab (we will arrange).     --We will call in antibiotics for you to start right after you drop off specimen.     --In this way, we can determine:     1)  Do you have a UTI?  Or is is more overactive bladder?    2) If you have a UTI, is it sensitive to the antibiotics we prescribed?   --follow UTI prevention tips (see  attached)  --continue nitrofurantoin before or after intercourse for now; will try to wean off in future  --control bowel movements/fecal cross-contamination  --treat vaginal atrophy (dryness); discussed started vaginal estrogen, mainly to reduce UTIs  --empty bladder before and after intercourse  --consider need for further evaluation (pelvic imaging and cystoscopy) if issue persists    4)  Vaginal atrophy (dryness):  Use 0.5 gram of estrogen cream in vagina nightly x 2 weeks, then twice a week thereafter.    --may burn initially as skin starts to heal from dryness--let us know if intolerable after using x several weeks  --using can reduce frequency of UTIs and bladder urgency/frequency    5)  Constipation with intermittent loose stools:  --hydrate well  --avoid dietary triggers, kamar after gall bladder surgery  Controlling constipation may help bladder urgency/leakage and fiber may better control cholesterol and blood glucose.  Start daily fiber.  Take 1 tsp of fiber powder (psyllium or other sugar-free powder).  Mix in 8 oz of water.  Take x 3-5 days.  Then, increase fiber by 1 tsp every 3-5 days until stool is easy to pass, not to hard or soft.  Stop and continue at that dose.   Do not exceed 6 tsps/day.  May also use over the counter stool softener 1-2 x/day.  AVOID laxatives.    6)  Pain with intercourse:  --levator muscle tenderness on exam: start PT  --treat vaginal dryness    7)  Vaginal discharge:  --affirm sent    8)  RTC 3-4 months.

## 2020-06-24 NOTE — LETTER
June 24, 2020      Iliana Caruso MD  [address]  [facility] [address]           [id number]   [address] [place] [id number]  Phone: [phone]          Patient: Cherie Richards   MR Number: [id number]   YOB: 1960   Date of Visit: 6/24/2020       Dear Dr. Iliana Caruso:    Thank you for referring Cherie Richards to me for evaluation. Attached you will find relevant portions of my assessment and plan of care.    If you have questions, please do not hesitate to call me. I look forward to following Cherie Richards along with you.    Sincerely,    Eyal Vale MD    Enclosure  CC:  No Recipients    If you would like to receive this communication electronically, please contact [email or web address] or [phone] to request more information on [employer] [facility] access.    For providers and/or their staff who would like to refer a patient to [facility], please contact us through our one-stop-shop provider referral line, [facility], at [phone].    If you feel you have received this communication in error or would no longer like to receive these types of communications, please e-mail [email or web address]

## 2020-06-27 ENCOUNTER — TELEPHONE (OUTPATIENT)
Dept: UROGYNECOLOGY | Facility: CLINIC | Age: 60
End: 2020-06-27

## 2020-06-27 DIAGNOSIS — N39.0 ACUTE UTI: Primary | ICD-10-CM

## 2020-06-27 LAB
BACTERIA UR CULT: ABNORMAL
BACTERIAL VAGINOSIS DNA: NEGATIVE
CANDIDA GLABRATA DNA: POSITIVE
CANDIDA KRUSEI DNA: NEGATIVE
CANDIDA RRNA VAG QL PROBE: NEGATIVE
T VAGINALIS RRNA GENITAL QL PROBE: NEGATIVE

## 2020-06-27 RX ORDER — NITROFURANTOIN (MACROCRYSTALS) 100 MG/1
100 CAPSULE ORAL 2 TIMES DAILY
Qty: 14 CAPSULE | Refills: 0 | Status: SHIPPED | OUTPATIENT
Start: 2020-06-27 | End: 2020-07-04

## 2020-06-28 ENCOUNTER — PATIENT MESSAGE (OUTPATIENT)
Dept: UROGYNECOLOGY | Facility: CLINIC | Age: 60
End: 2020-06-28

## 2020-06-29 ENCOUNTER — TELEPHONE (OUTPATIENT)
Dept: UROGYNECOLOGY | Facility: CLINIC | Age: 60
End: 2020-06-29

## 2020-06-29 DIAGNOSIS — N95.2 VAGINAL ATROPHY: Primary | ICD-10-CM

## 2020-06-29 RX ORDER — CONJUGATED ESTROGENS 0.62 MG/G
CREAM VAGINAL
Qty: 30 G | Refills: 12 | Status: SHIPPED | OUTPATIENT
Start: 2020-06-29 | End: 2020-12-16

## 2020-06-29 RX ORDER — ESTRADIOL 10 UG/1
10 INSERT VAGINAL
Qty: 8 EACH | Refills: 11 | Status: SHIPPED | OUTPATIENT
Start: 2020-06-29 | End: 2020-12-16 | Stop reason: ALTCHOICE

## 2020-06-29 NOTE — TELEPHONE ENCOUNTER
Spoke to pt, she was informed that her vaginal culture was positive for yeast and she get OTC monostat to treat. She was informed to stop the vaginal estrogen while using the monostat, and once finished restart the vaginal estrogen.    Pt states she did not fill the vaginal estrogen because of cost. She states the RX was $235. Pt was informed she should contact her insurance provider and discuss an alternative medication and cost. Once discussed, call the office with the name of the new medication and we call that medication in. Pt verbalizes understanding.    Pt also states she was contacted and informed she had a UTI and a medication was called into her pharmacy to  and start, pt states her pharmacy did not have the medication.   Macrodantin 100mg #14 sig: take 1 capsule by mouth 2 times daily x7 days was called in to the pharmacy per Dr. Vale's medication note in epic on 6/27/2020.

## 2020-06-29 NOTE — TELEPHONE ENCOUNTER
----- Message from Eyal Vale MD sent at 6/29/2020 11:05 AM CDT -----  Regarding: please call patient with other estrogen options  I sent in Rx for premarin: can she see if this is less expensive.     If it's not, I sent in imvexxy estrogen tablet to insert vaginally 2x/week.  She has to call the following pharmacy to get through mail-order, though.  It should be be 35-45/month.       Imvexxy (vaginal estradiol tablet)  [facility] [address]  [address]  [facility]# [id number]  (p) [phone]  (f)  [phone], [phone]  [email or web address]      The estrogen is very important because it can reduce UTIs.   Thanks!   ----- Message -----  From: Monty Lopez MA  Sent: 6/29/2020  10:28 AM CDT  To: Eyal Vale MD    Spoke to pt, she was informed that her vaginal culture was positive for yeast and she get OTC monostat to treat. She was informed to stop the vaginal estrogen while using the monostat, and once finished restart the vaginal estrogen.     Pt states she did not fill the vaginal estrogen because of cost. She states the RX was $235. Pt was informed she should contact her insurance provider and discuss an alternative medication and cost. Once discussed, call the office with the name of the new medication and we call that medication in. Pt verbalizes understanding.     Pt also states she was contacted and informed she had a UTI and a medication was called into her pharmacy to  and start, pt states her pharmacy did not have the medication.   Macrodantin 100mg #14 sig: take 1 capsule by mouth 2 times daily x7 days was called in to the pharmacy per Dr. Vale's medication note in epic on 6/27/2020.  ----- Message -----  From: Eyal Vale MD  Sent: 6/27/2020   7:56 PM CDT  To: Monty Lopez MA, Akanksha Villa    Please let patient know she has a yeast infection per the swab I did at her visit. She can get some monistat over the counter and  use at night before bed for 3-7 days to help treat this. She will want to stop her vaginal estrogen while she uses the monistat, then restart the vaginal estrogen when done taking the monistat. Thanks!

## 2020-06-29 NOTE — TELEPHONE ENCOUNTER
----- Message from Monty Lopez MA sent at 6/29/2020 10:28 AM CDT -----  Spoke to pt, she was informed that her vaginal culture was positive for yeast and she get OTC monostat to treat. She was informed to stop the vaginal estrogen while using the monostat, and once finished restart the vaginal estrogen.     Pt states she did not fill the vaginal estrogen because of cost. She states the RX was $235. Pt was informed she should contact her insurance provider and discuss an alternative medication and cost. Once discussed, call the office with the name of the new medication and we call that medication in. Pt verbalizes understanding.     Pt also states she was contacted and informed she had a UTI and a medication was called into her pharmacy to  and start, pt states her pharmacy did not have the medication.   Macrodantin 100mg #14 sig: take 1 capsule by mouth 2 times daily x7 days was called in to the pharmacy per Dr. Vale's medication note in epic on 6/27/2020.  ----- Message -----  From: Eyal Vale MD  Sent: 6/27/2020   7:56 PM CDT  To: Monty Lopez MA, Akanksha TRUJILLO Staff    Please let patient know she has a yeast infection per the swab I did at her visit. She can get some monistat over the counter and use at night before bed for 3-7 days to help treat this. She will want to stop her vaginal estrogen while she uses the monistat, then restart the vaginal estrogen when done taking the monistat. Thanks!

## 2020-06-29 NOTE — TELEPHONE ENCOUNTER
Spoke to pt, she was informed that an RX for premarin: can she see if this is less expensive.     If it's not, I sent in imvexxy estrogen tablet to insert vaginally 2x/week.  She has to call the following pharmacy to get through mail-order, though.  It should be be 35-45/month.       Imvexxy (vaginal estradiol tablet)   [employer] [address]# [id number]   (p) [phone]   (f)  [phone], [phone]   [email or web address]       The estrogen is very important because it can reduce UTIs.   Pt verbalized understanding and states if the premarin is too expensive she will let us know and try imvexxy.

## 2020-06-29 NOTE — TELEPHONE ENCOUNTER
----- Message from Eyal Vale MD sent at 6/27/2020  7:56 PM CDT -----  Please let patient know she has a yeast infection per the swab I did at her visit. She can get some monistat over the counter and use at night before bed for 3-7 days to help treat this. She will want to stop her vaginal estrogen while she uses the monistat, then restart the vaginal estrogen when done taking the monistat. Thanks!

## 2020-07-02 DIAGNOSIS — N95.2 VAGINAL ATROPHY: Primary | ICD-10-CM

## 2020-07-02 RX ORDER — ESTRADIOL 0.1 MG/G
1 CREAM VAGINAL DAILY
Qty: 42.5 G | Refills: 3 | Status: SHIPPED | OUTPATIENT
Start: 2020-07-02 | End: 2020-12-16 | Stop reason: ALTCHOICE

## 2020-07-20 ENCOUNTER — HOSPITAL ENCOUNTER (EMERGENCY)
Facility: HOSPITAL | Age: 60
Discharge: HOME | End: 2020-07-20
Attending: EMERGENCY MEDICINE
Payer: COMMERCIAL

## 2020-07-20 VITALS
WEIGHT: 175 LBS | OXYGEN SATURATION: 98 % | HEIGHT: 63 IN | SYSTOLIC BLOOD PRESSURE: 166 MMHG | DIASTOLIC BLOOD PRESSURE: 83 MMHG | RESPIRATION RATE: 18 BRPM | HEART RATE: 75 BPM | BODY MASS INDEX: 31.01 KG/M2 | TEMPERATURE: 98 F

## 2020-07-20 DIAGNOSIS — R42 DIZZINESS: Primary | ICD-10-CM

## 2020-07-20 LAB
ALBUMIN SERPL BCP-MCNC: 3.9 G/DL (ref 3.5–5.2)
ALP SERPL-CCNC: 139 U/L (ref 55–135)
ALT SERPL W/O P-5'-P-CCNC: 29 U/L (ref 10–44)
ANION GAP SERPL CALC-SCNC: 9 MMOL/L (ref 8–16)
AST SERPL-CCNC: 22 U/L (ref 10–40)
BASOPHILS # BLD AUTO: 0.04 K/UL (ref 0–0.2)
BASOPHILS NFR BLD: 0.6 % (ref 0–1.9)
BILIRUB SERPL-MCNC: 0.4 MG/DL (ref 0.1–1)
BUN SERPL-MCNC: 14 MG/DL (ref 8–23)
BUN SERPL-MCNC: 16 MG/DL (ref 6–30)
CALCIUM SERPL-MCNC: 9.2 MG/DL (ref 8.7–10.5)
CHLORIDE SERPL-SCNC: 102 MMOL/L (ref 95–110)
CHLORIDE SERPL-SCNC: 103 MMOL/L (ref 95–110)
CHOLEST SERPL-MCNC: 224 MG/DL (ref 120–199)
CHOLEST/HDLC SERPL: 3.6 {RATIO} (ref 2–5)
CO2 SERPL-SCNC: 22 MMOL/L (ref 23–29)
CREAT SERPL-MCNC: 0.9 MG/DL (ref 0.5–1.4)
CREAT SERPL-MCNC: 1 MG/DL (ref 0.5–1.4)
DIFFERENTIAL METHOD: NORMAL
EOSINOPHIL # BLD AUTO: 0.1 K/UL (ref 0–0.5)
EOSINOPHIL NFR BLD: 1.1 % (ref 0–8)
ERYTHROCYTE [DISTWIDTH] IN BLOOD BY AUTOMATED COUNT: 13.6 % (ref 11.5–14.5)
EST. GFR  (AFRICAN AMERICAN): >60 ML/MIN/1.73 M^2
EST. GFR  (NON AFRICAN AMERICAN): >60 ML/MIN/1.73 M^2
GLUCOSE SERPL-MCNC: 102 MG/DL (ref 70–110)
GLUCOSE SERPL-MCNC: 107 MG/DL (ref 70–110)
HCT VFR BLD AUTO: 42.7 % (ref 37–48.5)
HCT VFR BLD CALC: 42 %PCV (ref 36–54)
HDLC SERPL-MCNC: 62 MG/DL (ref 40–75)
HDLC SERPL: 27.7 % (ref 20–50)
HGB BLD-MCNC: 13.7 G/DL (ref 12–16)
IMM GRANULOCYTES # BLD AUTO: 0.03 K/UL (ref 0–0.04)
IMM GRANULOCYTES NFR BLD AUTO: 0.5 % (ref 0–0.5)
INR PPP: 0.9 (ref 0.8–1.2)
LDLC SERPL CALC-MCNC: 144.6 MG/DL (ref 63–159)
LYMPHOCYTES # BLD AUTO: 1.3 K/UL (ref 1–4.8)
LYMPHOCYTES NFR BLD: 20.9 % (ref 18–48)
MCH RBC QN AUTO: 29 PG (ref 27–31)
MCHC RBC AUTO-ENTMCNC: 32.1 G/DL (ref 32–36)
MCV RBC AUTO: 90 FL (ref 82–98)
MONOCYTES # BLD AUTO: 0.5 K/UL (ref 0.3–1)
MONOCYTES NFR BLD: 7.8 % (ref 4–15)
NEUTROPHILS # BLD AUTO: 4.4 K/UL (ref 1.8–7.7)
NEUTROPHILS NFR BLD: 69.1 % (ref 38–73)
NONHDLC SERPL-MCNC: 162 MG/DL
NRBC BLD-RTO: 0 /100 WBC
PLATELET # BLD AUTO: 264 K/UL (ref 150–350)
PMV BLD AUTO: 9.9 FL (ref 9.2–12.9)
POC IONIZED CALCIUM: 1.12 MMOL/L (ref 1.06–1.42)
POC TCO2 (MEASURED): 22 MMOL/L (ref 23–29)
POTASSIUM BLD-SCNC: 4.1 MMOL/L (ref 3.5–5.1)
POTASSIUM SERPL-SCNC: 4.1 MMOL/L (ref 3.5–5.1)
PROT SERPL-MCNC: 7.2 G/DL (ref 6–8.4)
PROTHROMBIN TIME: 10.5 SEC (ref 9–12.5)
RBC # BLD AUTO: 4.73 M/UL (ref 4–5.4)
SAMPLE: ABNORMAL
SODIUM BLD-SCNC: 135 MMOL/L (ref 136–145)
SODIUM SERPL-SCNC: 134 MMOL/L (ref 136–145)
TRIGL SERPL-MCNC: 87 MG/DL (ref 30–150)
TSH SERPL DL<=0.005 MIU/L-ACNC: 0.99 UIU/ML (ref 0.4–4)
WBC # BLD AUTO: 6.4 K/UL (ref 3.9–12.7)

## 2020-07-20 PROCEDURE — 25000003 PHARM REV CODE 250: Performed by: PHYSICIAN ASSISTANT

## 2020-07-20 PROCEDURE — 80053 COMPREHEN METABOLIC PANEL: CPT

## 2020-07-20 PROCEDURE — 84443 ASSAY THYROID STIM HORMONE: CPT

## 2020-07-20 PROCEDURE — 93010 EKG 12-LEAD: ICD-10-PCS | Mod: ,,, | Performed by: INTERNAL MEDICINE

## 2020-07-20 PROCEDURE — 99285 EMERGENCY DEPT VISIT HI MDM: CPT | Mod: ,,, | Performed by: EMERGENCY MEDICINE

## 2020-07-20 PROCEDURE — 85610 PROTHROMBIN TIME: CPT

## 2020-07-20 PROCEDURE — 99285 EMERGENCY DEPT VISIT HI MDM: CPT | Mod: 25

## 2020-07-20 PROCEDURE — 96360 HYDRATION IV INFUSION INIT: CPT

## 2020-07-20 PROCEDURE — 96361 HYDRATE IV INFUSION ADD-ON: CPT

## 2020-07-20 PROCEDURE — 80061 LIPID PANEL: CPT

## 2020-07-20 PROCEDURE — 80047 BASIC METABLC PNL IONIZED CA: CPT

## 2020-07-20 PROCEDURE — 93005 ELECTROCARDIOGRAM TRACING: CPT

## 2020-07-20 PROCEDURE — 85025 COMPLETE CBC W/AUTO DIFF WBC: CPT

## 2020-07-20 PROCEDURE — 93010 ELECTROCARDIOGRAM REPORT: CPT | Mod: ,,, | Performed by: INTERNAL MEDICINE

## 2020-07-20 PROCEDURE — 99285 PR EMERGENCY DEPT VISIT,LEVEL V: ICD-10-PCS | Mod: ,,, | Performed by: EMERGENCY MEDICINE

## 2020-07-20 RX ORDER — ONDANSETRON 4 MG/1
4 TABLET, FILM COATED ORAL EVERY 6 HOURS
Qty: 12 TABLET | Refills: 0 | Status: SHIPPED | OUTPATIENT
Start: 2020-07-20 | End: 2020-08-18

## 2020-07-20 RX ORDER — MECLIZINE HCL 12.5 MG 12.5 MG/1
25 TABLET ORAL
Status: COMPLETED | OUTPATIENT
Start: 2020-07-20 | End: 2020-07-20

## 2020-07-20 RX ORDER — MECLIZINE HCL 12.5 MG 12.5 MG/1
12.5 TABLET ORAL 3 TIMES DAILY PRN
Qty: 12 TABLET | Refills: 0 | Status: SHIPPED | OUTPATIENT
Start: 2020-07-20 | End: 2020-07-24 | Stop reason: SDUPTHER

## 2020-07-20 RX ADMIN — SODIUM CHLORIDE 1000 ML: 0.9 INJECTION, SOLUTION INTRAVENOUS at 02:07

## 2020-07-20 RX ADMIN — MECLIZINE 25 MG: 12.5 TABLET ORAL at 02:07

## 2020-07-20 NOTE — ED TRIAGE NOTES
"Pt. Is a 60 y.o. female with c/o "feeling like the room is spinning around" and tingling in the left side of her face beginning last night. Pt. Also reports a headache.   "

## 2020-07-20 NOTE — ED NOTES
"Cherie Richards, a 60 y.o. female presents to the ED via personal transport with CC dizziness,left sided facial numbness and tingling, and feeling "shakey."      Patient identifiers verified verbally with patient and correct for Cherie Richards.    LOC/ APPEARANCE: The patient is AAOx4. Pt is speaking appropriately, no slurred speech. Pt changed into hospital gown. Continuous cardiac monitor, cont pulse ox, and auto BP cuff applied to patient. Pt is clean and well groomed. No JVD visible. Pt reports pain level of 0. Pt updated on POC. Bed low and locked with side rails up x2, call bell in pt reach.  SKIN: Skin is warm dry and intact, and color is consistent with ethnicity. Capillary refill <3 seconds. No breakdown or brusing visible. Mucus membranes moist, acyanotic.  RESPIRATORY: Airway is open and patent. Respirations-spontaneous, unlabored, regular rate, equal bilaterally on inspiration and expiration. No accessory muscle use noted. Lungs clear to auscultation in all fields bilaterally anterior and posterior.   CARDIAC: Patient has regular heart rate.  No peripheral edema noted, and patient has no c/o chest pain. Peripheral pulses present equal and strong throughout.  ABDOMEN: Soft and non-tender to palpation with no distention noted. Normoactive bowel sounds x4 quadrants. Pt has no complaints of abnormal bowel movements, denies blood in stool. Pt reports normal appetite.   NEUROLOGIC: Eyes open spontaneously and facial expression symmetrical. Pt behavior appropriate to situation, and pt follows commands. Pt reports sensation present in all extremities when touched with a finger. Pt. repors feeling dizzy and left sided numbness and tingling since 10:00 this morning. She stated "I feel like the room is spinning."PERRLA  MUSCULOSKELETAL: Spontaneous movement noted to all extremities. Hand  equal and leg strength strong +5 bilaterally.   : No complaints of frequency, burning, urgency or blood in the urine. " No complaints of incontinence.

## 2020-07-20 NOTE — PROVIDER PROGRESS NOTES - EMERGENCY DEPT.
Emergency Department TeleTRIAGE Encounter Note      CHIEF COMPLAINT    Chief Complaint   Patient presents with    Dizziness     room was spinning, L side of face numbness last night midnight, went to sleep face was still numb, trouble remembering things, both hand shaking       VITAL SIGNS   Initial Vitals [07/20/20 1326]   BP Pulse Resp Temp SpO2   (!) 206/92 84 18 97.9 °F (36.6 °C) 97 %      MAP       --            ALLERGIES    Review of patient's allergies indicates:   Allergen Reactions    Adhesive tape-silicones Other (See Comments)     Other reaction(s): BLISTERS    Penicillins Hives and Itching    Sulfamethoxazole-trimethoprim Hives and Itching    Prosed ec [meth-hyos-atrp-m blue-ba-phsal] Hives, Itching and Swelling    Pyridium [phenazopyridine]        PROVIDER TRIAGE NOTE  Patient complaining about some face numbness as well as vertigo.  Her dizziness is exactly explained as the room is spinning..  She states she has had a mini stroke in the past and this is similar to those signs and symptoms.  He is hypertensive today and she states she has taken her medicines for blood pressure this morning.  She denies any other focal lateralizing neurologic deficit.  I will discuss it is patient in ER bed and for any treatment or further evaluation to my partner that sees the patient in the emergency department.      ORDERS  Labs Reviewed - No data to display    ED Orders (720h ago, onward)    None            Virtual Visit Note: The provider triage portion of this emergency department evaluation and documentation was performed via [email or web address], a HIPAA-compliant telemedicine application, in concert with a tele-presenter in the room. A face to face patient evaluation with one of my colleagues will occur once the patient is placed in an emergency department room.      DISCLAIMER: This note was prepared with [employer] voice recognition transcription software. Garbled syntax, mangled pronouns, and other bizarre  constructions may be attributed to that software system.

## 2020-07-20 NOTE — ED PROVIDER NOTES
"Encounter Date: 7/20/2020       History     Chief Complaint   Patient presents with    Dizziness     room was spinning, L side of face numbness last night midnight, went to sleep face was still numb, trouble remembering things, both hand shaking     Patient is a 60 year old female with PMHX of HTN, GERD, hx of gastric bypass 08/2012, hx of hpylori gastritis 2008, anemia, and hx of uterine cancer. She presents to the ED for dizziness. She reports having dizziness onset last night. Describes dizziness as "feeling like the room is spinning around her." reports dizziness worse with head movement. Hx of vertigo. Reports associated left facial numbness, blurred vision, left sided headache, slurred speech, and gait abnormality onset last night. Denies active chemo or radiation. Denies hx of anticoagulation. She denies motor weakness. She denies fever,chills, nausea, vomiting, sob, chest pain, abd pain, dysuria, diarrhea, or constipation. She is a non smoker and denies alcohol use.    The history is provided by the patient and medical records. No  was used.     Review of patient's allergies indicates:   Allergen Reactions    Adhesive tape-silicones Other (See Comments)     Other reaction(s): BLISTERS    Penicillins Hives and Itching    Sulfamethoxazole-trimethoprim Hives and Itching    Prosed ec [meth-hyos-atrp-m blue-ba-phsal] Hives, Itching and Swelling    Pyridium [phenazopyridine]      Past Medical History:   Diagnosis Date    Abnormal Pap smear     Anemia     history    Anxiety     Cancer     uterine-cancer cells     Colon polyps, next C-scope 2019. 4/22/2014    Dry eyes     Essential hypertension, started in her mid 30's 1/18/2017    Family history of malignant neoplasm of pancreas 6/1/2018    Gastric bypass status for obesity 8/21/2012    GERD (gastroesophageal reflux disease)     Helicobacter pylori gastritis, 2008. 8/20/2014    Hemangioma of liver, stable 2010, 2012, right lobe " 2014    Hematuria     History of cosmetic surgeries, tummy tuck . 2013    HTN (hypertension) 2012    120s-130s/80s    Kidney stone     Right sided sciatica 2018    Sleep apnea     patient does not use the C-PAP mask-cannot tolerate    Urinary incontinence     Urinary retention     Varicose veins of lower extremity with inflammation 2015    Vertigo     mild    Vitamin D deficiency disease 2018     Past Surgical History:   Procedure Laterality Date    Abdominoplasty with Liposcuction      APPENDECTOMY      CERVICAL BIOPSY  W/ LOOP ELECTRODE EXCISION      Prior to hysterectomy    CHOLECYSTECTOMY      Laparoscopic    COLONOSCOPY N/A 2020    Procedure: COLONOSCOPY;  Surgeon: Pb Smith MD;  Location: [facility] ([address] [place] [address]);  Service: Endoscopy;  Laterality: N/A;    GASTRIC BYPASS      HERNIA REPAIR      HYSTERECTOMY       TVH  both ovaries remain     Family History   Problem Relation Age of Onset    Heart disease Mother     Stroke Mother     Coronary artery disease Mother     Heart failure Mother     Diabetes Father     Breast cancer Maternal Aunt     Colon cancer Maternal Uncle     Hearing loss Maternal Grandmother     No Known Problems Sister     No Known Problems Brother     No Known Problems Paternal Aunt     No Known Problems Paternal Uncle     No Known Problems Maternal Grandfather     No Known Problems Paternal Grandmother     No Known Problems Paternal Grandfather     Ovarian cancer Neg Hx     Anesthesia problems Neg Hx     Amblyopia Neg Hx     Blindness Neg Hx     Cancer Neg Hx     Cataracts Neg Hx     Glaucoma Neg Hx     Hypertension Neg Hx     Macular degeneration Neg Hx     Retinal detachment Neg Hx     Strabismus Neg Hx     Thyroid disease Neg Hx      Social History     Tobacco Use    Smoking status: Never Smoker    Smokeless tobacco: Never Used    Tobacco comment: .  .   Occup:  .      Substance Use Topics    Alcohol use: No     Frequency: Never     Binge frequency: Never    Drug use: No     Review of Systems   Constitutional: Negative for fever.   HENT: Negative for sore throat.    Eyes: Positive for visual disturbance.   Respiratory: Negative for shortness of breath.    Cardiovascular: Negative for chest pain.   Gastrointestinal: Negative for abdominal pain, nausea and vomiting.   Genitourinary: Negative for dysuria.   Musculoskeletal: Positive for gait problem. Negative for back pain.   Skin: Negative for rash.   Neurological: Positive for dizziness, speech difficulty, numbness and headaches. Negative for weakness.   Hematological: Does not bruise/bleed easily.       Physical Exam     Initial Vitals [07/20/20 1326]   BP Pulse Resp Temp SpO2   (!) 206/92 84 18 97.9 °F (36.6 °C) 97 %      MAP       --         Physical Exam    Vitals reviewed.  Constitutional: She appears well-developed and well-nourished. No distress.   HENT:   Head: Normocephalic.   Eyes: Conjunctivae and EOM are normal. Pupils are equal, round, and reactive to light.   Neck: Normal range of motion.   Cardiovascular: Normal rate and regular rhythm.   No murmur heard.  Pulmonary/Chest: Breath sounds normal. No respiratory distress. She has no wheezes. She has no rales.   Abdominal: Soft. Bowel sounds are normal. She exhibits no distension. There is no abdominal tenderness.   Musculoskeletal: Normal range of motion.   Neurological: She is alert and oriented to person, place, and time. She has normal strength. No sensory deficit. Coordination and gait normal.   Motor strength of b/l UE and LE 5/5. Finger to nose normal. Heel to shin normal. No Pronator drift. No facial droop or asymmetry. Speech is clear. Tongue protrudes midline with no fasciculations.   Skin: Skin is warm and dry. No erythema.         ED Course   Procedures  Labs Reviewed   COMPREHENSIVE METABOLIC PANEL - Abnormal; Notable for the following components:        Result Value    Sodium 134 (*)     CO2 22 (*)     Alkaline Phosphatase 139 (*)     All other components within normal limits   LIPID PANEL - Abnormal; Notable for the following components:    Cholesterol 224 (*)     All other components within normal limits   ISTAT PROCEDURE - Abnormal; Notable for the following components:    POC Sodium 135 (*)     POC TCO2 (MEASURED) 22 (*)     All other components within normal limits   CBC W/ AUTO DIFFERENTIAL   PROTIME-INR   TSH        ECG Results          EKG 12-lead (Final result)  Result time 07/21/20 06:44:38    Final result by Interface, Lab In [facility] (07/21/20 06:44:38)                 Narrative:    Test Reason : R42,    Vent. Rate : 080 BPM     Atrial Rate : 080 BPM     P-R Int : 160 ms          QRS Dur : 062 ms      QT Int : 394 ms       P-R-T Axes : 037 019 015 degrees     QTc Int : 454 ms    Normal sinus rhythm  Nonspecific ST abnormality  Abnormal ECG  When compared with ECG of 20-NOV-2019 15:08,  No significant change was found  Confirmed by MELANIE GOMEZ MD ([id number]) on 7/21/2020 6:44:10 AM    Referred By: [email or web address]   SELF           Confirmed By:MELANIE GOMEZ MD                            Imaging Results          MRI Brain Without Contrast (Final result)  Result time 07/20/20 17:01:58    Final result by Arnie Patterson MD (07/20/20 17:01:58)                 Impression:      No acute intracranial abnormality.      Electronically signed by: Arnie Patterson MD  Date:    07/20/2020  Time:    17:01             Narrative:    EXAMINATION:  MRI BRAIN WITHOUT CONTRAST    CLINICAL HISTORY:  Dizziness, non-specific;.    TECHNIQUE:  Multiplanar multisequence MR imaging of the brain was performed without contrast.    COMPARISON:  MRI brain with MRA brain 05/17/2019    FINDINGS:  Intracranial compartment:    Ventricles and sulci are normal in size for age without evidence of hydrocephalus. No extra-axial blood or fluid collections.    Brain parenchyma appears unchanged noting few  scattered punctate T2/FLAIR hyperintense signal foci in the supratentorial periventricular and subcortical white matter, likely sequela of chronic microvascular ischemic disease.  No mass lesion, acute hemorrhage, edema or acute infarct.    Normal vascular flow voids are preserved.  The sella and parasellar regions are within normal limits.    Skull/extracranial contents (limited evaluation): Bone marrow signal intensity is normal.  Bilateral orbits are within normal limits.  Craniocervical junction is within normal limits.                                 Medical Decision Making:   History:   Old Medical Records: I decided to obtain old medical records.  Clinical Tests:   Lab Tests: Ordered and Reviewed  Radiological Study: Ordered and Reviewed  Medical Tests: Ordered and Reviewed       APC / Resident Notes:   Patient is a 60 year old female presents to the ED for emergent evaluation of dizziness.     Will order labs and imaging. Will order IVF and meclizine for symptomatic relief. Will continue to monitor.     Differential diagnoses include, but are not limited to: vertigo, CVA, cardiac arrhythmia, or electrolyte imbalance.     No leukocytosis. Hemodynamically stable. MRI brain found to have no acute intracranial abnormality.    Patient reassessed, reports symptoms improved with ED management. Serial gait assessment WNL. I have discussed emergency department findings, and plan with the patient. Will discharge home with antivert and antiemetic and F/U with PCP in approximately one week. Additional verbal discharge instructions were given and discussed with the patient. Patient verbalizes understanding of plan and agrees. Return precautions given.    I have discussed and reviewed with my supervising physician.         Attending Attestation:             Attending ED Notes:   STAFF ATTENDING PHYSICIAN NOTE:  I evaluated patient, discussed with the PRAVIN and agree with their management of care. I have also reviewed their  notes, assessments, and/or procedures documented on Cherie Richards. I individually repeated key portions of the PRAVIN's history and physical exam, discussed Cherie Richards's care with the PRAVIN, reviewed their documentation and agree with their assessment and management of care provided. I was also present for any critical portion of any procedure(s) performed.  ____________________  Renny Granados MD, [facility]  Emergency Medicine Staff      Clinical Impression:       ICD-10-CM ICD-9-CM   1. Dizziness  R42 780.4         Disposition:   Disposition: Discharged  Condition: Stable     ED Disposition Condition    Discharge Stable        ED Prescriptions     Medication Sig Dispense Start Date End Date Auth. Provider    meclizine (ANTIVERT) 12.5 mg tablet Take 1 tablet (12.5 mg total) by mouth 3 (three) times daily as needed for Dizziness. 12 tablet 7/20/2020  Marisol Hart PA-C    ondansetron (ZOFRAN) 4 MG tablet Take 1 tablet (4 mg total) by mouth every 6 (six) hours. 12 tablet 7/20/2020  Marisol Hart PA-C        Follow-up Information     Follow up With Specialties Details Why Contact Info    Iliana Caruso MD Internal Medicine Schedule an appointment as soon as possible for a visit in 1 week  [address]  [place] [address]  [phone]                                       Jesus Granados MD  07/22/20 2014

## 2020-07-21 ENCOUNTER — PATIENT MESSAGE (OUTPATIENT)
Dept: INTERNAL MEDICINE | Facility: CLINIC | Age: 60
End: 2020-07-21

## 2020-07-22 ENCOUNTER — PATIENT OUTREACH (OUTPATIENT)
Dept: ADMINISTRATIVE | Facility: HOSPITAL | Age: 60
End: 2020-07-22

## 2020-07-22 NOTE — PROGRESS NOTES
Health Maintenance Due   Topic Date Due    HIV Screening  04/17/1975    Shingles Vaccine (2 of 2) 03/19/2020     Chart review completed.

## 2020-08-17 NOTE — PROGRESS NOTES
Subjective:       Patient ID: Cherie Richards is a 60 y.o. female.    Chief Complaint: Follow-up  see  ER note    No residual neurologic sxs  HPI  Review of Systems   Constitutional: Negative for activity change, appetite change, chills, fatigue, fever and unexpected weight change.   HENT: Negative for hearing loss.    Eyes: Negative for visual disturbance.   Respiratory: Negative for cough, chest tightness, shortness of breath and wheezing.    Cardiovascular: Negative for chest pain, palpitations and leg swelling.   Gastrointestinal: Negative for abdominal pain, constipation, nausea and vomiting.   Genitourinary: Negative for dysuria, frequency and urgency.   Musculoskeletal: Negative for arthralgias, back pain, gait problem, joint swelling and myalgias.   Skin: Negative for rash.   Neurological: Negative for light-headedness and headaches.   Psychiatric/Behavioral: Negative for dysphoric mood and sleep disturbance. The patient is not nervous/anxious.        Objective:      Physical Exam  HENT:      Head: Atraumatic.   Eyes:      General: No scleral icterus.     Conjunctiva/sclera: Conjunctivae normal.   Neck:      Musculoskeletal: Neck supple.   Cardiovascular:      Rate and Rhythm: Normal rate and regular rhythm.   Pulmonary:      Effort: Pulmonary effort is normal.      Breath sounds: Normal breath sounds.   Abdominal:      Palpations: Abdomen is soft.      Tenderness: There is no abdominal tenderness.   Lymphadenopathy:      Cervical: No cervical adenopathy.   Skin:     General: Skin is warm and dry.   Neurological:      General: No focal deficit present.      Mental Status: She is alert and oriented to person, place, and time. Mental status is at baseline.      Cranial Nerves: No cranial nerve deficit.      Motor: No weakness.      Coordination: Coordination normal.      Gait: Gait normal.      Deep Tendon Reflexes: Reflexes normal.   Psychiatric:         Behavior: Behavior normal.         Assessment:        1. Hospital discharge follow-up, 7/22/2020    2. Acute onset of severe vertigo    3. Neurological complaints left facial numbness and cognition off    4. Elevated /92 in ER    5. Hyponatremia    6. Essential hypertension, started in her mid 30's    7. Sleep apnea, unspecified type    8. Gastric bypass status for obesity, 08-.    9. Gastroesophageal reflux disease without esophagitis    10. Anxiety    11. Vitamin D deficiency        Plan:   Cherie was seen today for follow-up.    Diagnoses and all orders for this visit:    Hospital discharge follow-up, 7/22/2020    Acute onset of severe vertigo and   Neurological complaints left facial numbness and cognition off, resolved  Elevated /92 in ER    Hyponatremia, recheck    Essential hypertension, started in her mid 30's, barriers to compliance discussed  -     Comprehensive metabolic panel; Future  -     Lipid Panel; Future  -     Uric acid; Future    Sleep apnea, unspecified typee, reviewed    Gastric bypass status for obesity, 08-.  -     Vitamin B12; Future  -     Vitamin B1; Future  -     CBC auto differential; Future    Gastroesophageal reflux disease without esophagitis  -     CBC auto differential; Future    Anxiety    Vitamin D deficiency  -     Vitamin D; Future    Other orders  -     hydrALAZINE (APRESOLINE) 25 MG tablet; Take 1 tablet (25 mg total) by mouth 2 (two) times daily. Better blood pressure control  -     escitalopram oxalate (LEXAPRO) 5 MG Tab; Take 1 tablet (5 mg total) by mouth nightly.  -     ALPRAZolam (XANAX) 0.5 MG tablet; Take 1 tablet (0.5 mg total) by mouth nightly as needed for Insomnia or Anxiety.  -     buPROPion (WELLBUTRIN XL) 150 MG TB24 tablet; Take 1 tablet (150 mg total) by mouth every morning.  -     cyanocobalamin 1,000 mcg/mL injection; Inject 1 mL (1,000 mcg total) into the muscle every 30 days.    Medication List with Changes/Refills   New Medications    ESCITALOPRAM OXALATE (LEXAPRO) 5 MG TAB     "Take 1 tablet (5 mg total) by mouth nightly.    HYDRALAZINE (APRESOLINE) 25 MG TABLET    Take 1 tablet (25 mg total) by mouth 2 (two) times daily. Better blood pressure control   Current Medications    CHOLECALCIFEROL, VITAMIN D3, 5,000 UNIT TAB    Take by mouth. 1 Tablet Oral Every day    CIPROFLOXACIN HCL (CIPRO) 500 MG TABLET    Take 1 tablet (500 mg total) by mouth every 12 (twelve) hours.    CONJUGATED ESTROGENS (PREMARIN) VAGINAL CREAM    0.5 g with applicator or dime-sized amt with finger in vagina twice a week.    CYCLOSPORINE (RESTASIS) 0.05 % OPHTHALMIC EMULSION    Place 0.4 mLs (1 drop total) into both eyes 2 (two) times daily.    ESTRADIOL (ESTRACE) 0.01 % (0.1 MG/GRAM) VAGINAL CREAM    Use 0.5 gram of estrogen cream in vagina nightly x 2 weeks, then twice a week thereafter.    ESTRADIOL (ESTRACE) 0.01 % (0.1 MG/GRAM) VAGINAL CREAM    Place 1 g vaginally once daily.    ESTRADIOL (IMVEXXY MAINTENANCE PACK) 10 MCG INST    Place 10 mcg vaginally twice a week.    HYDROCHLOROTHIAZIDE (HYDRODIURIL) 25 MG TABLET    Take 1 tablet (25 mg total) by mouth once daily.    LOSARTAN (COZAAR) 100 MG TABLET    Take 1 tablet (100 mg total) by mouth every morning.    MECLIZINE (ANTIVERT) 12.5 MG TABLET    Take 1 tablet (12.5 mg total) by mouth 3 (three) times daily as needed for Dizziness.    METOPROLOL SUCCINATE (TOPROL-XL) 25 MG 24 HR TABLET    Take 1 tablet (25 mg total) by mouth once daily.    SYRINGE WITH NEEDLE (BD LUER-CLARIBEL SYRINGE) 3 ML 25 X 1 1/2 " SYRG    Use as directed with Cyanocobalamin    TRETINOIN (RETIN-A) 0.1 % CREAM    1 Cream Topical Every day   Changed and/or Refilled Medications    Modified Medication Previous Medication    ALPRAZOLAM (XANAX) 0.5 MG TABLET ALPRAZolam (XANAX) 0.5 MG tablet       Take 1 tablet (0.5 mg total) by mouth nightly as needed for Insomnia or Anxiety.    Take 1 tablet (0.5 mg total) by mouth nightly.    BUPROPION (WELLBUTRIN XL) 150 MG TB24 TABLET buPROPion (WELLBUTRIN XL) 150 MG " TB24 tablet       Take 1 tablet (150 mg total) by mouth every morning.    TAKE 1 TABLET(150 MG) BY MOUTH EVERY DAY    CYANOCOBALAMIN 1,000 MCG/ML INJECTION cyanocobalamin 1,000 mcg/mL injection       Inject 1 mL (1,000 mcg total) into the muscle every 30 days.    Inject 1 mL (1,000 mcg total) into the muscle every 30 days.   Discontinued Medications    CYANOCOBALAMIN 1,000 MCG/ML INJECTION    INJECT 1 ML (CC) INTRAMUSCULARLY EVERY 2 WEEKS (14 DAYS)    NITROFURANTOIN (MACRODANTIN) 100 MG CAPSULE    Take 1 capsule (100 mg total) by mouth every 6 (six) hours.    ONDANSETRON (ZOFRAN) 4 MG TABLET    Take 1 tablet (4 mg total) by mouth every 8 (eight) hours as needed for Nausea.    ONDANSETRON (ZOFRAN) 4 MG TABLET    Take 1 tablet (4 mg total) by mouth every 6 (six) hours.    OXYBUTYNIN (DITROPAN-XL) 10 MG 24 HR TABLET    Take 1 tablet (10 mg total) by mouth once daily.

## 2020-08-18 ENCOUNTER — OFFICE VISIT (OUTPATIENT)
Dept: INTERNAL MEDICINE | Facility: CLINIC | Age: 60
End: 2020-08-18
Payer: COMMERCIAL

## 2020-08-18 ENCOUNTER — IMMUNIZATION (OUTPATIENT)
Dept: PHARMACY | Facility: CLINIC | Age: 60
End: 2020-08-18
Payer: COMMERCIAL

## 2020-08-18 VITALS
HEART RATE: 68 BPM | DIASTOLIC BLOOD PRESSURE: 80 MMHG | BODY MASS INDEX: 32.66 KG/M2 | HEIGHT: 63 IN | SYSTOLIC BLOOD PRESSURE: 160 MMHG | WEIGHT: 184.31 LBS

## 2020-08-18 DIAGNOSIS — R03.0 ELEVATED BP WITHOUT DIAGNOSIS OF HYPERTENSION: ICD-10-CM

## 2020-08-18 DIAGNOSIS — E87.1 HYPONATREMIA: ICD-10-CM

## 2020-08-18 DIAGNOSIS — K21.9 GASTROESOPHAGEAL REFLUX DISEASE WITHOUT ESOPHAGITIS: ICD-10-CM

## 2020-08-18 DIAGNOSIS — Z09 HOSPITAL DISCHARGE FOLLOW-UP: Primary | ICD-10-CM

## 2020-08-18 DIAGNOSIS — Z98.84 GASTRIC BYPASS STATUS FOR OBESITY: ICD-10-CM

## 2020-08-18 DIAGNOSIS — R29.90 NEUROLOGICAL COMPLAINT: ICD-10-CM

## 2020-08-18 DIAGNOSIS — G47.30 SLEEP APNEA, UNSPECIFIED TYPE: ICD-10-CM

## 2020-08-18 DIAGNOSIS — F41.9 ANXIETY: ICD-10-CM

## 2020-08-18 DIAGNOSIS — E55.9 VITAMIN D DEFICIENCY: ICD-10-CM

## 2020-08-18 DIAGNOSIS — I10 ESSENTIAL HYPERTENSION: ICD-10-CM

## 2020-08-18 DIAGNOSIS — R42 ACUTE ONSET OF SEVERE VERTIGO: ICD-10-CM

## 2020-08-18 PROCEDURE — 99214 OFFICE O/P EST MOD 30 MIN: CPT | Mod: S$GLB,,, | Performed by: INTERNAL MEDICINE

## 2020-08-18 PROCEDURE — 99214 PR OFFICE/OUTPT VISIT, EST, LEVL IV, 30-39 MIN: ICD-10-PCS | Mod: S$GLB,,, | Performed by: INTERNAL MEDICINE

## 2020-08-18 PROCEDURE — 99999 PR PBB SHADOW E&M-EST. PATIENT-LVL III: CPT | Mod: PBBFAC,,, | Performed by: INTERNAL MEDICINE

## 2020-08-18 PROCEDURE — 3008F PR BODY MASS INDEX (BMI) DOCUMENTED: ICD-10-PCS | Mod: CPTII,S$GLB,, | Performed by: INTERNAL MEDICINE

## 2020-08-18 PROCEDURE — 99999 PR PBB SHADOW E&M-EST. PATIENT-LVL III: ICD-10-PCS | Mod: PBBFAC,,, | Performed by: INTERNAL MEDICINE

## 2020-08-18 PROCEDURE — 3079F DIAST BP 80-89 MM HG: CPT | Mod: CPTII,S$GLB,, | Performed by: INTERNAL MEDICINE

## 2020-08-18 PROCEDURE — 3077F PR MOST RECENT SYSTOLIC BLOOD PRESSURE >= 140 MM HG: ICD-10-PCS | Mod: CPTII,S$GLB,, | Performed by: INTERNAL MEDICINE

## 2020-08-18 PROCEDURE — 3008F BODY MASS INDEX DOCD: CPT | Mod: CPTII,S$GLB,, | Performed by: INTERNAL MEDICINE

## 2020-08-18 PROCEDURE — 3077F SYST BP >= 140 MM HG: CPT | Mod: CPTII,S$GLB,, | Performed by: INTERNAL MEDICINE

## 2020-08-18 PROCEDURE — 3079F PR MOST RECENT DIASTOLIC BLOOD PRESSURE 80-89 MM HG: ICD-10-PCS | Mod: CPTII,S$GLB,, | Performed by: INTERNAL MEDICINE

## 2020-08-18 RX ORDER — ESCITALOPRAM OXALATE 5 MG/1
5 TABLET ORAL NIGHTLY
Qty: 90 TABLET | Refills: 3 | Status: SHIPPED | OUTPATIENT
Start: 2020-08-18 | End: 2020-09-16

## 2020-08-18 RX ORDER — BUPROPION HYDROCHLORIDE 150 MG/1
150 TABLET ORAL EVERY MORNING
Qty: 90 TABLET | Refills: 1 | Status: SHIPPED | OUTPATIENT
Start: 2020-08-18 | End: 2020-12-16 | Stop reason: SDUPTHER

## 2020-08-18 RX ORDER — HYDRALAZINE HYDROCHLORIDE 25 MG/1
25 TABLET, FILM COATED ORAL 2 TIMES DAILY
Qty: 60 TABLET | Refills: 11 | Status: SHIPPED | OUTPATIENT
Start: 2020-08-18 | End: 2020-12-16 | Stop reason: SDUPTHER

## 2020-08-18 RX ORDER — CYANOCOBALAMIN 1000 UG/ML
1000 INJECTION, SOLUTION INTRAMUSCULAR; SUBCUTANEOUS
Qty: 1 ML | Refills: 12 | Status: SHIPPED | OUTPATIENT
Start: 2020-08-18 | End: 2021-04-29 | Stop reason: SDUPTHER

## 2020-08-18 RX ORDER — ALPRAZOLAM 0.5 MG/1
0.5 TABLET ORAL NIGHTLY PRN
Qty: 30 TABLET | Refills: 0 | Status: SHIPPED | OUTPATIENT
Start: 2020-08-18 | End: 2020-11-12

## 2020-08-18 NOTE — PATIENT INSTRUCTIONS
In 4 weeks follow up Alethea Najera, RITESH for blood pressure on hydralazine and escitalopram (Lexapro) to reduce anxiety and boost mood can be virtual   In January schedule to see me in person after labs  Dr. Pb Lind did your gastric bypass  The most important thing is to have a blood pressure that is consistently 130/80 or less.   Anxiety and depression are a factor

## 2020-09-09 ENCOUNTER — IMMUNIZATION (OUTPATIENT)
Dept: PHARMACY | Facility: CLINIC | Age: 60
End: 2020-09-09
Payer: COMMERCIAL

## 2020-09-16 ENCOUNTER — OFFICE VISIT (OUTPATIENT)
Dept: INTERNAL MEDICINE | Facility: CLINIC | Age: 60
End: 2020-09-16
Payer: COMMERCIAL

## 2020-09-16 DIAGNOSIS — F41.9 ANXIETY: Primary | ICD-10-CM

## 2020-09-16 DIAGNOSIS — I10 ESSENTIAL HYPERTENSION: ICD-10-CM

## 2020-09-16 PROCEDURE — 99214 PR OFFICE/OUTPT VISIT, EST, LEVL IV, 30-39 MIN: ICD-10-PCS | Mod: 95,,, | Performed by: PHYSICIAN ASSISTANT

## 2020-09-16 PROCEDURE — 99214 OFFICE O/P EST MOD 30 MIN: CPT | Mod: 95,,, | Performed by: PHYSICIAN ASSISTANT

## 2020-09-16 RX ORDER — ESCITALOPRAM OXALATE 10 MG/1
10 TABLET ORAL DAILY
Qty: 30 TABLET | Refills: 11 | Status: SHIPPED | OUTPATIENT
Start: 2020-09-16 | End: 2020-12-16 | Stop reason: SINTOL

## 2020-09-16 NOTE — PROGRESS NOTES
Subjective:       Patient ID: Cherie Richards is a 60 y.o. female.        Chief Complaint: Follow-up    Cherie Richards is an established patient of Iliana Caruso MD here today for follow up visit.    The patient location is: [place]  The chief complaint leading to consultation is: follow up    Visit type: audiovisual    Face to Face time with patient: 20 minutes  30 minutes of total time spent on the encounter, which includes face to face time and non-face to face time preparing to see the patient (eg, review of tests), Obtaining and/or reviewing separately obtained history, Documenting clinical information in the electronic or other health record, Independently interpreting results (not separately reported) and communicating results to the patient/family/caregiver, or Care coordination (not separately reported).         Each patient to whom he or she provides medical services by telemedicine is:  (1) informed of the relationship between the physician and patient and the respective role of any other health care provider with respect to management of the patient; and (2) notified that he or she may decline to receive medical services by telemedicine and may withdraw from such care at any time.    Notes:   Seen by Dr. Caruso 4 weeks ago    HTN - hydralazine added for better BP control, BP overall improved with range 105-120's/60-70's, occasionally 130's, once 154/89 but majority are controlled    Anxiety/depression - taking wellbutrin 150 mg daily, xanax 2-3 times/week for sleep, lexapro 5 mg added 4 weeks ago, hasn't noticed much of a change but tolerating the medication fine    H/o headaches - saw neurology last year, s/p MRI, rx for topamax which she did not like taking, needs to f/u with neurology but unsure she wants to secondary to COVID 19 concerns    Note she did patient entered ROS and put chest pain/shortness of breath/blurred vision all as positive - discussed - she has had chest pain and  shortness of breath occasionally over the years but nothing recent or concerning to her, clarifies that vision issue is that she needs reading glasses            Hypertension  This is a recurrent problem. The current episode started more than 1 year ago. The problem has been waxing and waning since onset. The problem is resistant. Associated symptoms include blurred vision and sweats. Pertinent negatives include no anxiety, chest pain, headaches, malaise/fatigue, neck pain, orthopnea, palpitations, peripheral edema, PND or shortness of breath. Agents associated with hypertension include no associated agents, decongestants and NSAIDs. Risk factors for coronary artery disease include family history, obesity, sedentary lifestyle and stress. Past treatments include angiotensin blockers, beta blockers and diuretics. The current treatment provides mild improvement. Compliance problems include diet, exercise, medication cost and medication side effects.         Review of Systems   Constitutional: Negative for chills, diaphoresis, fatigue, fever and malaise/fatigue.   HENT: Negative for congestion and sore throat.    Eyes: Positive for blurred vision. Negative for visual disturbance.   Respiratory: Negative for cough, chest tightness and shortness of breath.    Cardiovascular: Negative for chest pain, palpitations, orthopnea, leg swelling and PND.   Gastrointestinal: Negative for abdominal pain, blood in stool, constipation, diarrhea, nausea and vomiting.   Genitourinary: Negative for dysuria, frequency, hematuria and urgency.   Musculoskeletal: Negative for arthralgias, back pain and neck pain.   Skin: Negative for rash.   Neurological: Negative for dizziness, syncope, weakness and headaches.   Psychiatric/Behavioral: Negative for dysphoric mood and sleep disturbance. The patient is not nervous/anxious.        Objective:      Physical Exam  Constitutional:       Appearance: Normal appearance.   HENT:      Head:  "Normocephalic.   Pulmonary:      Effort: Pulmonary effort is normal.   Neurological:      Mental Status: She is alert.   Psychiatric:         Mood and Affect: Mood normal.         Assessment:       1. Anxiety    2. Essential hypertension, started in her mid 30's        Plan:       Cherie was seen today for follow-up.    Diagnoses and all orders for this visit:    Anxiety - she hasn't noticed much of a change with addition of lexapro, will increase dose up to 10 mg and f/u with Dr. Caruso in 6-8 weeks  -     escitalopram oxalate (LEXAPRO) 10 MG tablet; Take 1 tablet (10 mg total) by mouth once daily.    Essential hypertension, started in her mid 30's - better control noted with home readings    F/u 6-8 weeks    Pt has been given instructions populated from [employer] database and has verbalized understanding of the after visit summary and information contained wherein.    Follow up with a primary care provider. May go to ER for acute shortness of breath, lightheadedness, fever, or any other emergent complaints or changes in condition.    "This note will be shared with the patient"    Future Appointments   Date Time Provider Department Center   10/7/2020  9:00 AM Carmen Sibley NP [facility] UROGYN [facility]   12/16/2020  8:00 AM Iliana Caruso MD [facility] IM [facility]   1/4/2021  7:00 AM LAB, APPOINTMENT [facility] IM [facility]   1/7/2021  8:00 AM Iliana Caruso MD [facility] IM [facility]                 "

## 2020-10-07 ENCOUNTER — TELEPHONE (OUTPATIENT)
Dept: INTERNAL MEDICINE | Facility: CLINIC | Age: 60
End: 2020-10-07

## 2020-10-20 ENCOUNTER — TELEPHONE (OUTPATIENT)
Dept: INTERNAL MEDICINE | Facility: CLINIC | Age: 60
End: 2020-10-20

## 2020-10-20 ENCOUNTER — HOSPITAL ENCOUNTER (EMERGENCY)
Facility: HOSPITAL | Age: 60
Discharge: HOME | End: 2020-10-20
Attending: EMERGENCY MEDICINE
Payer: COMMERCIAL

## 2020-10-20 VITALS
SYSTOLIC BLOOD PRESSURE: 168 MMHG | HEART RATE: 74 BPM | RESPIRATION RATE: 18 BRPM | TEMPERATURE: 98 F | HEIGHT: 63 IN | OXYGEN SATURATION: 99 % | WEIGHT: 180 LBS | BODY MASS INDEX: 31.89 KG/M2 | DIASTOLIC BLOOD PRESSURE: 89 MMHG

## 2020-10-20 DIAGNOSIS — S62.631A DISPLACED FRACTURE OF DISTAL PHALANX OF LEFT INDEX FINGER, INITIAL ENCOUNTER FOR CLOSED FRACTURE: Primary | ICD-10-CM

## 2020-10-20 DIAGNOSIS — S62.639A CLOSED DISPLACED FRACTURE OF DISTAL PHALANX OF FINGER OF LEFT HAND: ICD-10-CM

## 2020-10-20 DIAGNOSIS — M20.012 MALLET FINGER OF LEFT FINGER(S): Primary | ICD-10-CM

## 2020-10-20 PROBLEM — Z80.0 FAMILY HISTORY OF MALIGNANT NEOPLASM OF PANCREAS: Status: RESOLVED | Noted: 2018-06-01 | Resolved: 2020-10-20

## 2020-10-20 PROCEDURE — 29130 APPL FINGER SPLINT STATIC: CPT

## 2020-10-20 PROCEDURE — 99283 EMERGENCY DEPT VISIT LOW MDM: CPT | Mod: 25,ER

## 2020-10-20 PROCEDURE — 25000003 PHARM REV CODE 250: Mod: ER | Performed by: EMERGENCY MEDICINE

## 2020-10-20 RX ORDER — IBUPROFEN 600 MG/1
600 TABLET ORAL EVERY 6 HOURS PRN
Qty: 20 TABLET | Refills: 0 | Status: SHIPPED | OUTPATIENT
Start: 2020-10-20 | End: 2020-10-20 | Stop reason: ALTCHOICE

## 2020-10-20 RX ORDER — KETOROLAC TROMETHAMINE 10 MG/1
10 TABLET, FILM COATED ORAL
Status: COMPLETED | OUTPATIENT
Start: 2020-10-20 | End: 2020-10-20

## 2020-10-20 RX ADMIN — KETOROLAC TROMETHAMINE 10 MG: 10 TABLET, FILM COATED ORAL at 03:10

## 2020-10-20 NOTE — ED PROVIDER NOTES
Encounter Date: 10/20/2020    SCRIBE #1 NOTE: I, Brittany Gates, am scribing for, and in the presence of,  Dr. Munoz. I have scribed the following portions of the note - Other sections scribed: HPI, ROS, PE.       History     Chief Complaint   Patient presents with    Hand Pain     got L index finger stuck in sewage drain on sunday     Cherie Richards is a 60 y.o. female with a Hx of HTN who presents to the ED for evaluation of left hand pain and left index finger pain and swelling for 3 days. Patient states she was cleaning out a sewage drainage when her gloved got pinched and her left index finger twisted. Pain is exacerbated with bending of the hand. Attempted treatment with Tylenol with mild relief. Denies wrist pain, fever, or chills.    The history is provided by the patient. No  was used.     Review of patient's allergies indicates:   Allergen Reactions    Adhesive tape-silicones Other (See Comments)     Other reaction(s): BLISTERS    Penicillins Hives and Itching    Sulfamethoxazole-trimethoprim Hives and Itching    Prosed ec [meth-hyos-atrp-m blue-ba-phsal] Hives, Itching and Swelling    Pyridium [phenazopyridine]      Past Medical History:   Diagnosis Date    Abnormal Pap smear     Anemia     history    Anxiety     Cancer     uterine-cancer cells     Colon polyps, next C-scope 2019. 4/22/2014    Dry eyes     Essential hypertension, started in her mid 30's 1/18/2017    Family history of malignant neoplasm of pancreas 6/1/2018    Gastric bypass status for obesity 8/21/2012    GERD (gastroesophageal reflux disease)     Helicobacter pylori gastritis, 2008. 8/20/2014    Hemangioma of liver, stable 2010, 2012, right lobe 8/20/2014    Hematuria     History of cosmetic surgeries, tummy tuck 2011. 4/5/2013    HTN (hypertension) 8/21/2012    120s-130s/80s    Kidney stone     Right sided sciatica 6/1/2018    Sleep apnea     patient does not use the C-PAP mask-cannot  tolerate    Urinary incontinence     Urinary retention     Varicose veins of lower extremity with inflammation 2015    Vertigo     mild    Vitamin D deficiency disease 2018     Past Surgical History:   Procedure Laterality Date    Abdominoplasty with Liposcuction      APPENDECTOMY      CERVICAL BIOPSY  W/ LOOP ELECTRODE EXCISION      Prior to hysterectomy    CHOLECYSTECTOMY      Laparoscopic    COLONOSCOPY N/A 2020    Procedure: COLONOSCOPY;  Surgeon: Pb Smith MD;  Location: [address]);  Service: Endoscopy;  Laterality: N/A;    GASTRIC BYPASS      HERNIA REPAIR      HYSTERECTOMY       TVH  both ovaries remain     Family History   Problem Relation Age of Onset    Heart disease Mother     Stroke Mother     Coronary artery disease Mother     Heart failure Mother     Diabetes Father     Breast cancer Maternal Aunt     Colon cancer Maternal Uncle     Hearing loss Maternal Grandmother     No Known Problems Sister     No Known Problems Brother     No Known Problems Paternal Aunt     No Known Problems Paternal Uncle     No Known Problems Maternal Grandfather     No Known Problems Paternal Grandmother     No Known Problems Paternal Grandfather     Ovarian cancer Neg Hx     Anesthesia problems Neg Hx     Amblyopia Neg Hx     Blindness Neg Hx     Cancer Neg Hx     Cataracts Neg Hx     Glaucoma Neg Hx     Hypertension Neg Hx     Macular degeneration Neg Hx     Retinal detachment Neg Hx     Strabismus Neg Hx     Thyroid disease Neg Hx      Social History     Tobacco Use    Smoking status: Never Smoker    Smokeless tobacco: Never Used    Tobacco comment: .  .   Occup:  .     Substance Use Topics    Alcohol use: No     Frequency: Never     Binge frequency: Never    Drug use: No     Review of Systems   Constitutional: Negative.  Negative for chills and fever.   HENT: Negative.    Eyes: Negative.    Respiratory: Negative.     Cardiovascular: Negative.    Gastrointestinal: Negative.    Endocrine: Negative.    Genitourinary: Negative.    Musculoskeletal: Positive for arthralgias (left hand and left index finger pain) and joint swelling (left index finger).   Skin: Negative.    Allergic/Immunologic: Negative.    Neurological: Negative.    Hematological: Negative.    Psychiatric/Behavioral: Negative.        Physical Exam     Initial Vitals [10/20/20 1438]   BP Pulse Resp Temp SpO2   (!) 177/96 74 18 97.9 °F (36.6 °C) 97 %      MAP       --         Physical Exam    Nursing note and vitals reviewed.  Constitutional: She is not diaphoretic. No distress.   HENT:   Head: Normocephalic and atraumatic.   Mouth/Throat: Oropharynx is clear and moist.   Eyes: EOM are normal. Pupils are equal, round, and reactive to light. No scleral icterus.   Neck: Normal range of motion. Neck supple. No JVD present.   Cardiovascular: Normal rate, regular rhythm and intact distal pulses.   Pulmonary/Chest: Breath sounds normal. No stridor. No respiratory distress.   Abdominal: Soft. Bowel sounds are normal. She exhibits no distension. There is no abdominal tenderness.   Musculoskeletal: Normal range of motion. Tenderness (Noted tenderness and small amount of edema over left 2nd digit, distal to PIP joint, distal cap refill less than 3 seconds.) present. No edema.   Neurological: She is alert. She has normal strength. GCS score is 15. GCS eye subscore is 4. GCS verbal subscore is 5. GCS motor subscore is 6.   Skin: Skin is warm and dry.   Psychiatric: She has a normal mood and affect.         ED Course   Procedures  Labs Reviewed - No data to display       Imaging Results          X-Ray Hand 3 view Left (Final result)  Result time 10/20/20 15:49:23    Final result by Yonathan Torres MD (10/20/20 15:49:23)                 Impression:      1. Minimally displaced intra-articular fracture of the base of the distal phalanx of the index finger as above.      Electronically  signed by: Yonathan Torres MD  Date:    10/20/2020  Time:    15:49             Narrative:    EXAMINATION:  XR HAND COMPLETE 3 VIEW LEFT    CLINICAL HISTORY:  left index finger;.    TECHNIQUE:  PA, lateral, and oblique views of the left hand were performed.    COMPARISON:  None    FINDINGS:  There are no prior studies for comparison at this time.  There is a minimally displaced intra-articular fracture along the base of the distal phalanx of the index finger dorsally.  There is minimal soft tissue swelling.    Rest of the examination demonstrates no fractures or dislocations.                                 Medical Decision Making:   History:   Old Medical Records: I decided to obtain old medical records.  Independently Interpreted Test(s):   I have ordered and independently interpreted X-rays - see prior notes.  Clinical Tests:   Radiological Study: Ordered and Reviewed            Scribe Attestation:   Scribe #1: I performed the above scribed service and the documentation accurately describes the services I performed. I attest to the accuracy of the note.      60-year-old female presenting with closed injury to left 2nd digit, consistent with mallet finger, with intra-articular fractures noted on x-ray.  Patient placed in finger splint in extension, advised on function and continued extension splint in place until with pubic follow-up.  Distal cap refill intact, wound closed, no evidence for active infection or any further surgical medical emergency.  Discussed further ED return precautions with patient including plan for follow-up with orthopedics.  All questions answered and patient discharged home improved and stable.                      Clinical Impression:     ICD-10-CM ICD-9-CM   1. Mallet finger of left finger(s)  M20.012 736.1   2. Closed displaced fracture of distal phalanx of finger of left hand  S62.639A 816.02                    Scribe attestation: INito M.D., personally performed the  services described in this documentation.  All medical record entries made by the scribe were at my direction and in my presence.  I have reviewed the chart and agree that the record reflects my personal performance and is accurate and complete.        ED Disposition Condition    Discharge Stable        ED Prescriptions     Medication Sig Dispense Start Date End Date Auth. Provider    ibuprofen (ADVIL,MOTRIN) 600 MG tablet  (Status: Discontinued) Take 1 tablet (600 mg total) by mouth every 6 (six) hours as needed. 20 tablet 10/20/2020 10/20/2020 Nito Munoz MD        Follow-up Information     Follow up With Specialties Details Why Contact Info    [facility] Emergency Department Emergency Medicine Go to  If symptoms worsen [address] [facility] [address] [phone]    [facility] Orthopedics Schedule an appointment as soon as possible for a visit   [address] [facility] [address]  [phone]                                       Nito Munoz MD  10/20/20 2122

## 2020-11-12 RX ORDER — ALPRAZOLAM 0.5 MG/1
TABLET ORAL
Qty: 30 TABLET | Refills: 5 | Status: SHIPPED | OUTPATIENT
Start: 2020-11-12 | End: 2020-12-16 | Stop reason: SDUPTHER

## 2020-11-12 NOTE — PROGRESS NOTES
Refill Routing Note   Medication(s) are not appropriate for processing by [facility] for the following reason(s):     - Outside of protocol    [facility] actions taken in this encounter: Route          Medication reconciliation completed: No   Automatic Epic Generated Protocol Data:        Requested Prescriptions   Pending Prescriptions Disp Refills    ALPRAZolam (XANAX) 0.5 MG tablet [Pharmacy Med Name: ALPRAZOLAM 0.5MG TABLETS] 30 tablet      Sig: TAKE 1 TABLET(0.5 MG) BY MOUTH EVERY NIGHT AS NEEDED FOR INSOMNIA OR ANXIETY       There is no refill protocol information for this order           Appointments  past 12m or future 3m with PCP    Date Provider   Last Visit   8/18/2020 Iliana Caruso MD   Next Visit   12/16/2020 Iliana Caruso MD   ED visits in past 90 days: 1        Note composed:12:09 PM 11/12/2020

## 2020-12-11 ENCOUNTER — LAB VISIT (OUTPATIENT)
Dept: LAB | Facility: HOSPITAL | Age: 60
End: 2020-12-11
Attending: INTERNAL MEDICINE
Payer: COMMERCIAL

## 2020-12-11 DIAGNOSIS — E55.9 VITAMIN D DEFICIENCY: ICD-10-CM

## 2020-12-11 DIAGNOSIS — Z98.84 GASTRIC BYPASS STATUS FOR OBESITY: ICD-10-CM

## 2020-12-11 DIAGNOSIS — I10 ESSENTIAL HYPERTENSION: ICD-10-CM

## 2020-12-11 DIAGNOSIS — K21.9 GASTROESOPHAGEAL REFLUX DISEASE WITHOUT ESOPHAGITIS: ICD-10-CM

## 2020-12-11 LAB
25(OH)D3+25(OH)D2 SERPL-MCNC: 35 NG/ML (ref 30–96)
ALBUMIN SERPL BCP-MCNC: 3.9 G/DL (ref 3.5–5.2)
ALP SERPL-CCNC: 153 U/L (ref 55–135)
ALT SERPL W/O P-5'-P-CCNC: 40 U/L (ref 10–44)
ANION GAP SERPL CALC-SCNC: 10 MMOL/L (ref 8–16)
AST SERPL-CCNC: 32 U/L (ref 10–40)
BASOPHILS # BLD AUTO: 0.04 K/UL (ref 0–0.2)
BASOPHILS NFR BLD: 0.7 % (ref 0–1.9)
BILIRUB SERPL-MCNC: 0.4 MG/DL (ref 0.1–1)
BUN SERPL-MCNC: 18 MG/DL (ref 8–23)
CALCIUM SERPL-MCNC: 9.4 MG/DL (ref 8.7–10.5)
CHLORIDE SERPL-SCNC: 99 MMOL/L (ref 95–110)
CHOLEST SERPL-MCNC: 226 MG/DL (ref 120–199)
CHOLEST/HDLC SERPL: 3.1 {RATIO} (ref 2–5)
CO2 SERPL-SCNC: 23 MMOL/L (ref 23–29)
CREAT SERPL-MCNC: 0.8 MG/DL (ref 0.5–1.4)
DIFFERENTIAL METHOD: NORMAL
EOSINOPHIL # BLD AUTO: 0.1 K/UL (ref 0–0.5)
EOSINOPHIL NFR BLD: 2 % (ref 0–8)
ERYTHROCYTE [DISTWIDTH] IN BLOOD BY AUTOMATED COUNT: 14 % (ref 11.5–14.5)
EST. GFR  (AFRICAN AMERICAN): >60 ML/MIN/1.73 M^2
EST. GFR  (NON AFRICAN AMERICAN): >60 ML/MIN/1.73 M^2
GLUCOSE SERPL-MCNC: 106 MG/DL (ref 70–110)
HCT VFR BLD AUTO: 42.2 % (ref 37–48.5)
HDLC SERPL-MCNC: 72 MG/DL (ref 40–75)
HDLC SERPL: 31.9 % (ref 20–50)
HGB BLD-MCNC: 13.6 G/DL (ref 12–16)
IMM GRANULOCYTES # BLD AUTO: 0.01 K/UL (ref 0–0.04)
IMM GRANULOCYTES NFR BLD AUTO: 0.2 % (ref 0–0.5)
LDLC SERPL CALC-MCNC: 133.6 MG/DL (ref 63–159)
LYMPHOCYTES # BLD AUTO: 1.4 K/UL (ref 1–4.8)
LYMPHOCYTES NFR BLD: 26.6 % (ref 18–48)
MCH RBC QN AUTO: 28.8 PG (ref 27–31)
MCHC RBC AUTO-ENTMCNC: 32.2 G/DL (ref 32–36)
MCV RBC AUTO: 89 FL (ref 82–98)
MONOCYTES # BLD AUTO: 0.4 K/UL (ref 0.3–1)
MONOCYTES NFR BLD: 7.7 % (ref 4–15)
NEUTROPHILS # BLD AUTO: 3.4 K/UL (ref 1.8–7.7)
NEUTROPHILS NFR BLD: 62.8 % (ref 38–73)
NONHDLC SERPL-MCNC: 154 MG/DL
NRBC BLD-RTO: 0 /100 WBC
PLATELET # BLD AUTO: 288 K/UL (ref 150–350)
PMV BLD AUTO: 10 FL (ref 9.2–12.9)
POTASSIUM SERPL-SCNC: 4.4 MMOL/L (ref 3.5–5.1)
PROT SERPL-MCNC: 7.4 G/DL (ref 6–8.4)
RBC # BLD AUTO: 4.72 M/UL (ref 4–5.4)
SODIUM SERPL-SCNC: 132 MMOL/L (ref 136–145)
TRIGL SERPL-MCNC: 102 MG/DL (ref 30–150)
URATE SERPL-MCNC: 4.8 MG/DL (ref 2.4–5.7)
VIT B12 SERPL-MCNC: 639 PG/ML (ref 210–950)
WBC # BLD AUTO: 5.42 K/UL (ref 3.9–12.7)

## 2020-12-11 PROCEDURE — 85025 COMPLETE CBC W/AUTO DIFF WBC: CPT

## 2020-12-11 PROCEDURE — 82306 VITAMIN D 25 HYDROXY: CPT

## 2020-12-11 PROCEDURE — 84425 ASSAY OF VITAMIN B-1: CPT

## 2020-12-11 PROCEDURE — 84550 ASSAY OF BLOOD/URIC ACID: CPT

## 2020-12-11 PROCEDURE — 80061 LIPID PANEL: CPT

## 2020-12-11 PROCEDURE — 82607 VITAMIN B-12: CPT

## 2020-12-11 PROCEDURE — 80053 COMPREHEN METABOLIC PANEL: CPT

## 2020-12-11 PROCEDURE — 36415 COLL VENOUS BLD VENIPUNCTURE: CPT

## 2020-12-15 PROBLEM — R19.8 IRREGULAR BOWEL HABITS: Status: RESOLVED | Noted: 2020-06-24 | Resolved: 2020-12-15

## 2020-12-15 PROBLEM — J00 NASOPHARYNGITIS: Status: RESOLVED | Noted: 2017-11-12 | Resolved: 2020-12-15

## 2020-12-15 PROBLEM — F32.9 MAJOR DEPRESSIVE DISORDER: Status: ACTIVE | Noted: 2020-12-15

## 2020-12-15 PROBLEM — Z86.010 HISTORY OF COLON POLYPS: Status: RESOLVED | Noted: 2020-02-13 | Resolved: 2020-12-15

## 2020-12-15 PROBLEM — Z86.0100 HISTORY OF COLON POLYPS: Status: RESOLVED | Noted: 2020-02-13 | Resolved: 2020-12-15

## 2020-12-16 ENCOUNTER — OFFICE VISIT (OUTPATIENT)
Dept: INTERNAL MEDICINE | Facility: CLINIC | Age: 60
End: 2020-12-16
Payer: COMMERCIAL

## 2020-12-16 ENCOUNTER — HOSPITAL ENCOUNTER (OUTPATIENT)
Dept: RADIOLOGY | Facility: HOSPITAL | Age: 60
Discharge: HOME | End: 2020-12-16
Attending: INTERNAL MEDICINE
Payer: COMMERCIAL

## 2020-12-16 VITALS
DIASTOLIC BLOOD PRESSURE: 68 MMHG | HEIGHT: 63 IN | BODY MASS INDEX: 32.58 KG/M2 | OXYGEN SATURATION: 96 % | SYSTOLIC BLOOD PRESSURE: 130 MMHG | WEIGHT: 183.88 LBS | HEART RATE: 76 BPM

## 2020-12-16 DIAGNOSIS — Z11.4 SCREENING FOR HIV (HUMAN IMMUNODEFICIENCY VIRUS): ICD-10-CM

## 2020-12-16 DIAGNOSIS — F33.1 MODERATE EPISODE OF RECURRENT MAJOR DEPRESSIVE DISORDER: ICD-10-CM

## 2020-12-16 DIAGNOSIS — G89.29 CHRONIC LEFT SHOULDER PAIN: ICD-10-CM

## 2020-12-16 DIAGNOSIS — E87.1 HYPONATREMIA: ICD-10-CM

## 2020-12-16 DIAGNOSIS — M20.019 MALLET DEFORMITY OF INDEX FINGER: ICD-10-CM

## 2020-12-16 DIAGNOSIS — F41.1 GAD (GENERALIZED ANXIETY DISORDER): ICD-10-CM

## 2020-12-16 DIAGNOSIS — G47.00 INSOMNIA, UNSPECIFIED TYPE: ICD-10-CM

## 2020-12-16 DIAGNOSIS — Z00.00 ANNUAL PHYSICAL EXAM: Primary | ICD-10-CM

## 2020-12-16 DIAGNOSIS — I10 ESSENTIAL HYPERTENSION: ICD-10-CM

## 2020-12-16 DIAGNOSIS — R30.0 DYSURIA: ICD-10-CM

## 2020-12-16 DIAGNOSIS — F41.8 ANXIETY WITH LIMITED-SYMPTOM ATTACKS: ICD-10-CM

## 2020-12-16 DIAGNOSIS — M25.512 CHRONIC LEFT SHOULDER PAIN: ICD-10-CM

## 2020-12-16 DIAGNOSIS — H81.10 BENIGN PAROXYSMAL POSITIONAL VERTIGO, UNSPECIFIED LATERALITY: ICD-10-CM

## 2020-12-16 DIAGNOSIS — N64.4 BREAST PAIN, LEFT: ICD-10-CM

## 2020-12-16 LAB
BACTERIA #/AREA URNS AUTO: ABNORMAL /HPF
BILIRUB UR QL STRIP: NEGATIVE
CLARITY UR REFRACT.AUTO: ABNORMAL
COLOR UR AUTO: YELLOW
GLUCOSE UR QL STRIP: NEGATIVE
HGB UR QL STRIP: NEGATIVE
KETONES UR QL STRIP: NEGATIVE
LEUKOCYTE ESTERASE UR QL STRIP: ABNORMAL
MICROSCOPIC COMMENT: ABNORMAL
NITRITE UR QL STRIP: NEGATIVE
PH UR STRIP: 6 [PH] (ref 5–8)
PROT UR QL STRIP: NEGATIVE
RBC #/AREA URNS AUTO: 1 /HPF (ref 0–4)
SP GR UR STRIP: 1.01 (ref 1–1.03)
SQUAMOUS #/AREA URNS AUTO: 6 /HPF
URN SPEC COLLECT METH UR: ABNORMAL
VIT B1 BLD-MCNC: 39 UG/L (ref 38–122)
WBC #/AREA URNS AUTO: 19 /HPF (ref 0–5)
WBC CLUMPS UR QL AUTO: ABNORMAL
YEAST UR QL AUTO: ABNORMAL

## 2020-12-16 PROCEDURE — 3075F PR MOST RECENT SYSTOLIC BLOOD PRESS GE 130-139MM HG: ICD-10-PCS | Mod: CPTII,S$GLB,, | Performed by: INTERNAL MEDICINE

## 2020-12-16 PROCEDURE — 3008F PR BODY MASS INDEX (BMI) DOCUMENTED: ICD-10-PCS | Mod: CPTII,S$GLB,, | Performed by: INTERNAL MEDICINE

## 2020-12-16 PROCEDURE — 73030 XR SHOULDER TRAUMA 3 VIEW LEFT: ICD-10-PCS | Mod: 26,LT,, | Performed by: RADIOLOGY

## 2020-12-16 PROCEDURE — 3008F BODY MASS INDEX DOCD: CPT | Mod: CPTII,S$GLB,, | Performed by: INTERNAL MEDICINE

## 2020-12-16 PROCEDURE — 1125F AMNT PAIN NOTED PAIN PRSNT: CPT | Mod: S$GLB,,, | Performed by: INTERNAL MEDICINE

## 2020-12-16 PROCEDURE — 73030 X-RAY EXAM OF SHOULDER: CPT | Mod: TC,LT

## 2020-12-16 PROCEDURE — 3078F DIAST BP <80 MM HG: CPT | Mod: CPTII,S$GLB,, | Performed by: INTERNAL MEDICINE

## 2020-12-16 PROCEDURE — 99999 PR PBB SHADOW E&M-EST. PATIENT-LVL V: ICD-10-PCS | Mod: PBBFAC,,, | Performed by: INTERNAL MEDICINE

## 2020-12-16 PROCEDURE — 3078F PR MOST RECENT DIASTOLIC BLOOD PRESSURE < 80 MM HG: ICD-10-PCS | Mod: CPTII,S$GLB,, | Performed by: INTERNAL MEDICINE

## 2020-12-16 PROCEDURE — 73030 X-RAY EXAM OF SHOULDER: CPT | Mod: 26,LT,, | Performed by: RADIOLOGY

## 2020-12-16 PROCEDURE — 87077 CULTURE AEROBIC IDENTIFY: CPT

## 2020-12-16 PROCEDURE — 3075F SYST BP GE 130 - 139MM HG: CPT | Mod: CPTII,S$GLB,, | Performed by: INTERNAL MEDICINE

## 2020-12-16 PROCEDURE — 87186 SC STD MICRODIL/AGAR DIL: CPT

## 2020-12-16 PROCEDURE — 99396 PR PREVENTIVE VISIT,EST,40-64: ICD-10-PCS | Mod: S$GLB,,, | Performed by: INTERNAL MEDICINE

## 2020-12-16 PROCEDURE — 99999 PR PBB SHADOW E&M-EST. PATIENT-LVL V: CPT | Mod: PBBFAC,,, | Performed by: INTERNAL MEDICINE

## 2020-12-16 PROCEDURE — 81001 URINALYSIS AUTO W/SCOPE: CPT

## 2020-12-16 PROCEDURE — 87088 URINE BACTERIA CULTURE: CPT

## 2020-12-16 PROCEDURE — 87086 URINE CULTURE/COLONY COUNT: CPT

## 2020-12-16 PROCEDURE — 99396 PREV VISIT EST AGE 40-64: CPT | Mod: S$GLB,,, | Performed by: INTERNAL MEDICINE

## 2020-12-16 PROCEDURE — 1125F PR PAIN SEVERITY QUANTIFIED, PAIN PRESENT: ICD-10-PCS | Mod: S$GLB,,, | Performed by: INTERNAL MEDICINE

## 2020-12-16 RX ORDER — HYDROCHLOROTHIAZIDE 25 MG/1
25 TABLET ORAL DAILY
Qty: 90 TABLET | Refills: 3 | Status: SHIPPED | OUTPATIENT
Start: 2020-12-16 | End: 2021-04-29 | Stop reason: SINTOL

## 2020-12-16 RX ORDER — BUPROPION HYDROCHLORIDE 150 MG/1
150 TABLET ORAL EVERY MORNING
Qty: 90 TABLET | Refills: 3 | Status: SHIPPED | OUTPATIENT
Start: 2020-12-16 | End: 2021-02-05 | Stop reason: SDUPTHER

## 2020-12-16 RX ORDER — BUPROPION HYDROCHLORIDE 150 MG/1
150 TABLET ORAL EVERY MORNING
Qty: 90 TABLET | Refills: 1 | Status: SHIPPED | OUTPATIENT
Start: 2020-12-16 | End: 2020-12-16 | Stop reason: SDUPTHER

## 2020-12-16 RX ORDER — HYDRALAZINE HYDROCHLORIDE 25 MG/1
25 TABLET, FILM COATED ORAL 2 TIMES DAILY
Qty: 180 TABLET | Refills: 3 | Status: SHIPPED | OUTPATIENT
Start: 2020-12-16 | End: 2021-05-06 | Stop reason: SDUPTHER

## 2020-12-16 RX ORDER — METOPROLOL SUCCINATE 25 MG/1
25 TABLET, EXTENDED RELEASE ORAL DAILY
Qty: 90 TABLET | Refills: 3 | Status: SHIPPED | OUTPATIENT
Start: 2020-12-16 | End: 2021-10-01 | Stop reason: SDUPTHER

## 2020-12-16 RX ORDER — LOSARTAN POTASSIUM 100 MG/1
100 TABLET ORAL EVERY MORNING
Qty: 90 TABLET | Refills: 3 | Status: SHIPPED | OUTPATIENT
Start: 2020-12-16 | End: 2022-02-14 | Stop reason: SDUPTHER

## 2020-12-16 RX ORDER — ALPRAZOLAM 0.5 MG/1
TABLET ORAL
Qty: 30 TABLET | Refills: 5 | Status: SHIPPED | OUTPATIENT
Start: 2020-12-16 | End: 2021-07-05 | Stop reason: SDUPTHER

## 2020-12-16 NOTE — PATIENT INSTRUCTIONS
"Free PRAVIN "insight Timer" [facility], sleep help  Mallet Finger  You have an injury to your finger causing the tip of your finger to droop down. This makes your finger look like a small hammer or mallet. This is why its given this name. It is also called baseball finger. This injury happens when the tendon that holds the finger straight at the last joint tears. Sometimes a small break happens where this tendon attaches to the bone.  This causes local pain, swelling, and bruising. This injury usually takes about 4 to 6 weeks to heal. This injury is often treated with a special finger splint called a stack splint. It holds the tendon in the correct position. But even with right treatment, it may not be possible to fully straighten that joint after the injury heals. After healing, the joint will be stiff but usually recovers flexibility over time.  Home care  · Keep your arm elevated to reduce pain and swelling. When sitting or lying down elevate your arm above the level of your heart. You can do this by placing your arm on a pillow that rests on your chest or on a pillow at your side. This is most important during the first 48 hours after the injury.  · Put an ice pack over the injured area for 15 to 20 minutes every 3 to 6 hours. You should do this for the first 24 to 48 hours. You can make an ice pack by filling a plastic bag that seals at the top with ice cubes and then wrapping it with a thin towel. Be careful not to injure your skin with the ice treatments. Ice should never be applied directly to skin. Continue the use of ice packs for relief of pain and swelling as needed. After 48 hours, apply heat (warm shower or warm bath) for 15 to 20 minutes several times a day, or alternate ice and heat.  · If a splint was applied, keep it in place for the time advised. If you remove it too soon, it will cause the position of the tendon to change and the finger joint will heal in a bent position.  · You " may use over-the-counter pain medicine to control pain, unless another pain medicine was prescribed.Talk with your healthcare provider before using these medicines if you have chronic liver or kidney disease or ever had a stomach ulcer or GI bleeding.  · Most patients can return to normal activity while wearing the splint. Discuss any limitations with your healthcare provider.  Follow-up care  Follow up with your healthcare provider, or as advised.  If X-rays were taken, you will be told of any new findings that may affect your care.   When to seek medical advice  Call your healthcare provider right away if any of the following occur:  · Pain or swelling in the injured finger increases  · The finger becomes red or warm   · Injured finger becomes cold, blue, numb, or tingly  Date Last Reviewed: 11/23/2015 © 2000-2017 [employer]. [address] [place] [address]. All rights reserved. This information is not intended as a substitute for professional medical care. Always follow your healthcare professional's instructions.      Results for orders placed or performed in visit on 12/11/20   Vitamin D   Result Value Ref Range    Vit D, 25-Hydroxy 35 30 - 96 ng/mL   Vitamin B12   Result Value Ref Range    Vitamin B-12 639 210 - 950 pg/mL   CBC auto differential   Result Value Ref Range    WBC 5.42 3.90 - 12.70 K/uL    RBC 4.72 4.00 - 5.40 M/uL    Hemoglobin 13.6 12.0 - 16.0 g/dL    Hematocrit 42.2 37.0 - 48.5 %    MCV 89 82 - 98 fL    MCH 28.8 27.0 - 31.0 pg    MCHC 32.2 32.0 - 36.0 g/dL    RDW 14.0 11.5 - 14.5 %    Platelets 288 150 - 350 K/uL    MPV 10.0 9.2 - 12.9 fL    Immature Granulocytes 0.2 0.0 - 0.5 %    Gran # (ANC) 3.4 1.8 - 7.7 K/uL    Immature Grans (Abs) 0.01 0.00 - 0.04 K/uL    Lymph # 1.4 1.0 - 4.8 K/uL    Mono # 0.4 0.3 - 1.0 K/uL    Eos # 0.1 0.0 - 0.5 K/uL    Baso # 0.04 0.00 - 0.20 K/uL    nRBC 0 0 /100 WBC    Gran % 62.8 38.0 - 73.0 %    Lymph % 26.6 18.0 - 48.0 %    Mono % 7.7  4.0 - 15.0 %    Eosinophil % 2.0 0.0 - 8.0 %    Basophil % 0.7 0.0 - 1.9 %    Differential Method Automated    Comprehensive metabolic panel   Result Value Ref Range    Sodium 132 (L) 136 - 145 mmol/L    Potassium 4.4 3.5 - 5.1 mmol/L    Chloride 99 95 - 110 mmol/L    CO2 23 23 - 29 mmol/L    Glucose 106 70 - 110 mg/dL    BUN 18 8 - 23 mg/dL    Creatinine 0.8 0.5 - 1.4 mg/dL    Calcium 9.4 8.7 - 10.5 mg/dL    Total Protein 7.4 6.0 - 8.4 g/dL    Albumin 3.9 3.5 - 5.2 g/dL    Total Bilirubin 0.4 0.1 - 1.0 mg/dL    Alkaline Phosphatase 153 (H) 55 - 135 U/L    AST 32 10 - 40 U/L    ALT 40 10 - 44 U/L    Anion Gap 10 8 - 16 mmol/L    eGFR if African American >60.0 >60 mL/min/1.73 m^2    eGFR if non African American >60.0 >60 mL/min/1.73 m^2   Lipid Panel   Result Value Ref Range    Cholesterol 226 (H) 120 - 199 mg/dL    Triglycerides 102 30 - 150 mg/dL    HDL 72 40 - 75 mg/dL    LDL Cholesterol 133.6 63.0 - 159.0 mg/dL    HDL/Cholesterol Ratio 31.9 20.0 - 50.0 %    Total Cholesterol/HDL Ratio 3.1 2.0 - 5.0    Non-HDL Cholesterol 154 mg/dL   Uric acid   Result Value Ref Range    Uric Acid 4.8 2.4 - 5.7 mg/dL     Your Vitamin D level is too low. Please buy over the counter Vitamin D 3 and take 5,000 international units daily. Low Vitamin D levels have been associated with numerous health problems including depression.

## 2020-12-17 ENCOUNTER — PATIENT MESSAGE (OUTPATIENT)
Dept: INTERNAL MEDICINE | Facility: CLINIC | Age: 60
End: 2020-12-17

## 2020-12-17 NOTE — PROGRESS NOTES
Subjective:       Patient ID: Cherie Richards is a 60 y.o. female.    Chief Complaint: Annual Exam    This dictation was performed using using [employer].   She presents the office today for an annual exam  All of her concerns were addressed   has a past medical history of Abnormal Pap smear, Anemia, Anxiety, Cancer, Colon polyps,  Dry eyes, Essential hypertension, started in her mid 30's (1/18/2017), Family history of malignant neoplasm of pancreas (6/1/2018), Gastric bypass status for obesity (8/21/2012), GERD (gastroesophageal reflux disease), Helicobacter pylori gastritis, 2008. (8/20/2014), Hemangioma of liver, stable 2010, 2012, right lobe (8/20/2014), Hematuria, History of cosmetic surgeries, tummy tuck 2011. (4/5/2013), HTN (hypertension) (8/21/2012), Kidney stone, Right sided sciatica (6/1/2018), Sleep apnea, Urinary incontinence, Urinary retention, Varicose veins of lower extremity with inflammation (4/8/2015), Vertigo, and Vitamin D deficiency disease (6/1/2018).  Past Surgical History:   Procedure Laterality Date    Abdominoplasty with Liposcuction      APPENDECTOMY      CERVICAL BIOPSY  W/ LOOP ELECTRODE EXCISION      Prior to hysterectomy    CHOLECYSTECTOMY      Laparoscopic    COLONOSCOPY N/A 2/13/2020    Procedure: COLONOSCOPY;  Surgeon: Pb Smith MD;  Location: [address]);  Service: Endoscopy;  Laterality: N/A;    GASTRIC BYPASS      HERNIA REPAIR      HYSTERECTOMY  1980     TVH  both ovaries remain       HPI  Review of Systems  Review of systems is negative unless noted.  Objective:      Physical Exam  Vitals signs reviewed.   HENT:      Head: Atraumatic.   Eyes:      General: No scleral icterus.     Conjunctiva/sclera: Conjunctivae normal.   Neck:      Musculoskeletal: Neck supple.   Cardiovascular:      Rate and Rhythm: Normal rate and regular rhythm.   Pulmonary:      Effort: Pulmonary effort is normal.      Breath sounds: Normal breath sounds.   Abdominal:       Palpations: Abdomen is soft.      Tenderness: There is no abdominal tenderness.   Lymphadenopathy:      Cervical: No cervical adenopathy.   Skin:     General: Skin is warm and dry.   Neurological:      Mental Status: She is alert.   Psychiatric:         Behavior: Behavior normal.         Assessment:       1. Annual physical exam    2. Essential hypertension, started in her mid 30's    3. Benign paroxysmal positional vertigo, unspecified laterality    4. KAILEE (generalized anxiety disorder)    5. Moderate episode of recurrent major depressive disorder    6. Insomnia, unspecified type    7. Anxiety with limited-symptom attacks    8. Chronic left shoulder pain    9. Breast pain, left, burning     10. Mallet deformity of index finger    11. Screening for HIV (human immunodeficiency virus)    12. Hyponatremia    13. Dysuria        Plan:   Cherie was seen today for annual exam.    Diagnoses and all orders for this visit:    Annual physical exam    Essential hypertension, started in her mid 30's, encouraged to monitor her blood pressure  -     Comprehensive Metabolic Panel; Future    Benign paroxysmal positional vertigo, unspecified laterality, not active currently    KAILEE (generalized anxiety disorder), did not improve with Lexapro in fact she had side effects    Moderate episode of recurrent major depressive disorder, finds benefit from bupropion    Insomnia, unspecified type, try insight timer    Anxiety with limited-symptom attacks, try inside time    Chronic left shoulder pain, this seems to be a tendinitis but will check an x-ray  -     X-Ray Shoulder Trauma 3 view Left; Future  -     Ambulatory referral/consult to Physical/Occupational Therapy; Future    Breast pain, left, burning, normal breast exam     Mallet deformity of index finger, likely it is her left index finger and she is right handed.  -     Ambulatory referral/consult to Hand Surgery; Future    Screening for HIV (human immunodeficiency virus),  screening.  -     HIV 1/2 Ag/Ab (4th Gen); Future    Hyponatremia, discontinue escitalopram and monitor  -     Comprehensive Metabolic Panel; Future    Dysuria  -     Urinalysis  -     Urine culture    Other orders  -     buPROPion (WELLBUTRIN XL) 150 MG TB24 tablet; Take 1 tablet (150 mg total) by mouth every morning.  -     losartan (COZAAR) 100 MG tablet; Take 1 tablet (100 mg total) by mouth every morning.  -     metoprolol succinate (TOPROL-XL) 25 MG 24 hr tablet; Take 1 tablet (25 mg total) by mouth once daily.  -     hydrALAZINE (APRESOLINE) 25 MG tablet; Take 1 tablet (25 mg total) by mouth 2 (two) times daily. Better blood pressure control  -     ALPRAZolam (XANAX) 0.5 MG tablet; TAKE 1 TABLET(0.5 MG) BY MOUTH EVERY NIGHT AS NEEDED FOR INSOMNIA OR ANXIETY  -     buPROPion (WELLBUTRIN XL) 150 MG TB24 tablet; Take 1 tablet (150 mg total) by mouth every morning.  -     hydroCHLOROthiazide (HYDRODIURIL) 25 MG tablet; Take 1 tablet (25 mg total) by mouth once daily.  -     Urinalysis Microscopic    Follow up in about 3 months (around 3/16/2021), or Labs before next visit.

## 2020-12-18 ENCOUNTER — PATIENT MESSAGE (OUTPATIENT)
Dept: INTERNAL MEDICINE | Facility: CLINIC | Age: 60
End: 2020-12-18

## 2020-12-18 LAB — BACTERIA UR CULT: ABNORMAL

## 2020-12-18 RX ORDER — CIPROFLOXACIN 500 MG/1
500 TABLET ORAL 2 TIMES DAILY
Qty: 14 TABLET | Refills: 0 | Status: SHIPPED | OUTPATIENT
Start: 2020-12-18 | End: 2020-12-25

## 2020-12-18 NOTE — TELEPHONE ENCOUNTER
Dear Cherie,   your urine culture has returned and you have a UTI, Because you are allergic to penicillin and sulfa and the organism is resistant to nitrofurantoin   I sent Cipro to your pharmacy.  Sincerely, Dr. Iliana Caruso    Could you let her know? I sent this as [facility] e-mail

## 2020-12-26 ENCOUNTER — PATIENT MESSAGE (OUTPATIENT)
Dept: INTERNAL MEDICINE | Facility: CLINIC | Age: 60
End: 2020-12-26

## 2020-12-31 ENCOUNTER — PATIENT MESSAGE (OUTPATIENT)
Dept: INTERNAL MEDICINE | Facility: CLINIC | Age: 60
End: 2020-12-31

## 2020-12-31 DIAGNOSIS — N39.0 URINARY TRACT INFECTION WITHOUT HEMATURIA, SITE UNSPECIFIED: Primary | ICD-10-CM

## 2021-01-04 ENCOUNTER — PATIENT MESSAGE (OUTPATIENT)
Dept: ADMINISTRATIVE | Facility: HOSPITAL | Age: 61
End: 2021-01-04

## 2021-01-21 ENCOUNTER — PATIENT OUTREACH (OUTPATIENT)
Dept: ADMINISTRATIVE | Facility: HOSPITAL | Age: 61
End: 2021-01-21

## 2021-01-21 ENCOUNTER — PATIENT MESSAGE (OUTPATIENT)
Dept: ADMINISTRATIVE | Facility: HOSPITAL | Age: 61
End: 2021-01-21

## 2021-02-08 RX ORDER — BUPROPION HYDROCHLORIDE 150 MG/1
150 TABLET ORAL EVERY MORNING
Qty: 90 TABLET | Refills: 3 | Status: SHIPPED | OUTPATIENT
Start: 2021-02-08 | End: 2021-04-29 | Stop reason: ALTCHOICE

## 2021-02-09 ENCOUNTER — PATIENT MESSAGE (OUTPATIENT)
Dept: ADMINISTRATIVE | Facility: OTHER | Age: 61
End: 2021-02-09

## 2021-02-11 ENCOUNTER — PATIENT MESSAGE (OUTPATIENT)
Dept: INTERNAL MEDICINE | Facility: CLINIC | Age: 61
End: 2021-02-11

## 2021-02-11 ENCOUNTER — LAB VISIT (OUTPATIENT)
Dept: LAB | Facility: HOSPITAL | Age: 61
End: 2021-02-11
Attending: INTERNAL MEDICINE
Payer: COMMERCIAL

## 2021-02-11 DIAGNOSIS — N39.0 URINARY TRACT INFECTION WITHOUT HEMATURIA, SITE UNSPECIFIED: ICD-10-CM

## 2021-02-11 LAB
BILIRUB UR QL STRIP: NEGATIVE
CLARITY UR REFRACT.AUTO: CLEAR
COLOR UR AUTO: YELLOW
GLUCOSE UR QL STRIP: NEGATIVE
HGB UR QL STRIP: NEGATIVE
KETONES UR QL STRIP: NEGATIVE
LEUKOCYTE ESTERASE UR QL STRIP: NEGATIVE
NITRITE UR QL STRIP: NEGATIVE
PH UR STRIP: 5 [PH] (ref 5–8)
PROT UR QL STRIP: NEGATIVE
SP GR UR STRIP: 1.02 (ref 1–1.03)
URN SPEC COLLECT METH UR: NORMAL

## 2021-02-11 PROCEDURE — 87086 URINE CULTURE/COLONY COUNT: CPT

## 2021-02-11 PROCEDURE — 81003 URINALYSIS AUTO W/O SCOPE: CPT

## 2021-02-13 LAB — BACTERIA UR CULT: NO GROWTH

## 2021-04-05 ENCOUNTER — PATIENT MESSAGE (OUTPATIENT)
Dept: ADMINISTRATIVE | Facility: HOSPITAL | Age: 61
End: 2021-04-05

## 2021-04-05 ENCOUNTER — PATIENT MESSAGE (OUTPATIENT)
Dept: RESEARCH | Facility: HOSPITAL | Age: 61
End: 2021-04-05

## 2021-04-17 ENCOUNTER — LAB VISIT (OUTPATIENT)
Dept: LAB | Facility: HOSPITAL | Age: 61
End: 2021-04-17
Attending: INTERNAL MEDICINE
Payer: COMMERCIAL

## 2021-04-17 DIAGNOSIS — E87.1 HYPONATREMIA: ICD-10-CM

## 2021-04-17 DIAGNOSIS — Z11.4 SCREENING FOR HIV (HUMAN IMMUNODEFICIENCY VIRUS): ICD-10-CM

## 2021-04-17 DIAGNOSIS — I10 ESSENTIAL HYPERTENSION: ICD-10-CM

## 2021-04-17 LAB
ALBUMIN SERPL BCP-MCNC: 3.7 G/DL (ref 3.5–5.2)
ALP SERPL-CCNC: 110 U/L (ref 55–135)
ALT SERPL W/O P-5'-P-CCNC: 13 U/L (ref 10–44)
ANION GAP SERPL CALC-SCNC: 8 MMOL/L (ref 8–16)
AST SERPL-CCNC: 12 U/L (ref 10–40)
BILIRUB SERPL-MCNC: 0.5 MG/DL (ref 0.1–1)
BUN SERPL-MCNC: 13 MG/DL (ref 8–23)
CALCIUM SERPL-MCNC: 9.6 MG/DL (ref 8.7–10.5)
CHLORIDE SERPL-SCNC: 94 MMOL/L (ref 95–110)
CO2 SERPL-SCNC: 26 MMOL/L (ref 23–29)
CREAT SERPL-MCNC: 0.8 MG/DL (ref 0.5–1.4)
EST. GFR  (AFRICAN AMERICAN): >60 ML/MIN/1.73 M^2
EST. GFR  (NON AFRICAN AMERICAN): >60 ML/MIN/1.73 M^2
GLUCOSE SERPL-MCNC: 106 MG/DL (ref 70–110)
POTASSIUM SERPL-SCNC: 4.4 MMOL/L (ref 3.5–5.1)
PROT SERPL-MCNC: 7.2 G/DL (ref 6–8.4)
SODIUM SERPL-SCNC: 128 MMOL/L (ref 136–145)

## 2021-04-17 PROCEDURE — 80053 COMPREHEN METABOLIC PANEL: CPT | Performed by: INTERNAL MEDICINE

## 2021-04-17 PROCEDURE — 86703 HIV-1/HIV-2 1 RESULT ANTBDY: CPT | Performed by: INTERNAL MEDICINE

## 2021-04-17 PROCEDURE — 36415 COLL VENOUS BLD VENIPUNCTURE: CPT | Performed by: INTERNAL MEDICINE

## 2021-04-19 LAB — HIV 1+2 AB+HIV1 P24 AG SERPL QL IA: NEGATIVE

## 2021-04-29 ENCOUNTER — LAB VISIT (OUTPATIENT)
Dept: LAB | Facility: HOSPITAL | Age: 61
End: 2021-04-29
Attending: INTERNAL MEDICINE
Payer: COMMERCIAL

## 2021-04-29 ENCOUNTER — OFFICE VISIT (OUTPATIENT)
Dept: INTERNAL MEDICINE | Facility: CLINIC | Age: 61
End: 2021-04-29
Payer: COMMERCIAL

## 2021-04-29 VITALS
HEART RATE: 79 BPM | SYSTOLIC BLOOD PRESSURE: 134 MMHG | OXYGEN SATURATION: 99 % | HEIGHT: 63 IN | BODY MASS INDEX: 32.61 KG/M2 | WEIGHT: 184.06 LBS | DIASTOLIC BLOOD PRESSURE: 80 MMHG

## 2021-04-29 DIAGNOSIS — R25.2 MUSCLE CRAMPS: ICD-10-CM

## 2021-04-29 DIAGNOSIS — E87.1 HYPONATREMIA: ICD-10-CM

## 2021-04-29 DIAGNOSIS — R10.11 CHRONIC RUQ PAIN: ICD-10-CM

## 2021-04-29 DIAGNOSIS — G47.00 INSOMNIA, UNSPECIFIED TYPE: ICD-10-CM

## 2021-04-29 DIAGNOSIS — R10.11 RUQ ABDOMINAL PAIN: ICD-10-CM

## 2021-04-29 DIAGNOSIS — G89.29 CHRONIC PELVIC PAIN IN FEMALE: ICD-10-CM

## 2021-04-29 DIAGNOSIS — R30.0 DYSURIA: ICD-10-CM

## 2021-04-29 DIAGNOSIS — R07.81 PLEURITIC CHEST PAIN: ICD-10-CM

## 2021-04-29 DIAGNOSIS — R10.2 CHRONIC PELVIC PAIN IN FEMALE: ICD-10-CM

## 2021-04-29 DIAGNOSIS — R05.9 COUGH: ICD-10-CM

## 2021-04-29 DIAGNOSIS — G89.29 CHRONIC RUQ PAIN: ICD-10-CM

## 2021-04-29 DIAGNOSIS — R10.11 RIGHT UPPER QUADRANT ABDOMINAL PAIN: ICD-10-CM

## 2021-04-29 DIAGNOSIS — F41.9 ANXIETY: Primary | ICD-10-CM

## 2021-04-29 LAB
ANION GAP SERPL CALC-SCNC: 8 MMOL/L (ref 8–16)
BILIRUB UR QL STRIP: NEGATIVE
BUN SERPL-MCNC: 16 MG/DL (ref 8–23)
CALCIUM SERPL-MCNC: 9.4 MG/DL (ref 8.7–10.5)
CHLORIDE SERPL-SCNC: 100 MMOL/L (ref 95–110)
CLARITY UR REFRACT.AUTO: ABNORMAL
CO2 SERPL-SCNC: 25 MMOL/L (ref 23–29)
COLOR UR AUTO: YELLOW
CREAT SERPL-MCNC: 0.8 MG/DL (ref 0.5–1.4)
EST. GFR  (AFRICAN AMERICAN): >60 ML/MIN/1.73 M^2
EST. GFR  (NON AFRICAN AMERICAN): >60 ML/MIN/1.73 M^2
GLUCOSE SERPL-MCNC: 99 MG/DL (ref 70–110)
GLUCOSE UR QL STRIP: NEGATIVE
HGB UR QL STRIP: NEGATIVE
KETONES UR QL STRIP: NEGATIVE
LEUKOCYTE ESTERASE UR QL STRIP: NEGATIVE
NITRITE UR QL STRIP: NEGATIVE
PH UR STRIP: 6 [PH] (ref 5–8)
POTASSIUM SERPL-SCNC: 4.7 MMOL/L (ref 3.5–5.1)
PROT UR QL STRIP: NEGATIVE
SODIUM SERPL-SCNC: 133 MMOL/L (ref 136–145)
SP GR UR STRIP: 1.01 (ref 1–1.03)
URN SPEC COLLECT METH UR: ABNORMAL

## 2021-04-29 PROCEDURE — 1126F AMNT PAIN NOTED NONE PRSNT: CPT | Mod: S$GLB,,, | Performed by: INTERNAL MEDICINE

## 2021-04-29 PROCEDURE — 99214 PR OFFICE/OUTPT VISIT, EST, LEVL IV, 30-39 MIN: ICD-10-PCS | Mod: S$GLB,,, | Performed by: INTERNAL MEDICINE

## 2021-04-29 PROCEDURE — 3008F PR BODY MASS INDEX (BMI) DOCUMENTED: ICD-10-PCS | Mod: CPTII,S$GLB,, | Performed by: INTERNAL MEDICINE

## 2021-04-29 PROCEDURE — 81003 URINALYSIS AUTO W/O SCOPE: CPT | Performed by: INTERNAL MEDICINE

## 2021-04-29 PROCEDURE — 80048 BASIC METABOLIC PNL TOTAL CA: CPT | Performed by: INTERNAL MEDICINE

## 2021-04-29 PROCEDURE — 99214 OFFICE O/P EST MOD 30 MIN: CPT | Mod: S$GLB,,, | Performed by: INTERNAL MEDICINE

## 2021-04-29 PROCEDURE — 3008F BODY MASS INDEX DOCD: CPT | Mod: CPTII,S$GLB,, | Performed by: INTERNAL MEDICINE

## 2021-04-29 PROCEDURE — 99999 PR PBB SHADOW E&M-EST. PATIENT-LVL IV: CPT | Mod: PBBFAC,,, | Performed by: INTERNAL MEDICINE

## 2021-04-29 PROCEDURE — 99999 PR PBB SHADOW E&M-EST. PATIENT-LVL IV: ICD-10-PCS | Mod: PBBFAC,,, | Performed by: INTERNAL MEDICINE

## 2021-04-29 PROCEDURE — 1126F PR PAIN SEVERITY QUANTIFIED, NO PAIN PRESENT: ICD-10-PCS | Mod: S$GLB,,, | Performed by: INTERNAL MEDICINE

## 2021-04-29 PROCEDURE — 36415 COLL VENOUS BLD VENIPUNCTURE: CPT | Performed by: INTERNAL MEDICINE

## 2021-04-29 PROCEDURE — 87086 URINE CULTURE/COLONY COUNT: CPT | Performed by: INTERNAL MEDICINE

## 2021-04-29 RX ORDER — CYANOCOBALAMIN 1000 UG/ML
1000 INJECTION, SOLUTION INTRAMUSCULAR; SUBCUTANEOUS
Qty: 1 ML | Refills: 12 | Status: SHIPPED | OUTPATIENT
Start: 2021-04-29 | End: 2022-03-15

## 2021-04-29 RX ORDER — BUPROPION HYDROCHLORIDE 100 MG/1
100 TABLET, EXTENDED RELEASE ORAL 2 TIMES DAILY
Qty: 60 TABLET | Refills: 11 | Status: SHIPPED | OUTPATIENT
Start: 2021-04-29 | End: 2021-07-09 | Stop reason: SDUPTHER

## 2021-04-29 RX ORDER — CYANOCOBALAMIN 1000 UG/ML
1000 INJECTION, SOLUTION INTRAMUSCULAR; SUBCUTANEOUS
Qty: 1 ML | Refills: 12 | Status: SHIPPED | OUTPATIENT
Start: 2021-04-29 | End: 2021-04-29 | Stop reason: SDUPTHER

## 2021-04-30 LAB
BACTERIA UR CULT: NORMAL
BACTERIA UR CULT: NORMAL

## 2021-05-04 ENCOUNTER — HOSPITAL ENCOUNTER (OUTPATIENT)
Dept: RADIOLOGY | Facility: HOSPITAL | Age: 61
Discharge: HOME | End: 2021-05-04
Attending: INTERNAL MEDICINE
Payer: COMMERCIAL

## 2021-05-04 ENCOUNTER — TELEPHONE (OUTPATIENT)
Dept: INTERNAL MEDICINE | Facility: CLINIC | Age: 61
End: 2021-05-04

## 2021-05-04 ENCOUNTER — PATIENT MESSAGE (OUTPATIENT)
Dept: INTERNAL MEDICINE | Facility: CLINIC | Age: 61
End: 2021-05-04

## 2021-05-04 DIAGNOSIS — R10.2 CHRONIC PELVIC PAIN IN FEMALE: ICD-10-CM

## 2021-05-04 DIAGNOSIS — G89.29 CHRONIC PELVIC PAIN IN FEMALE: ICD-10-CM

## 2021-05-04 DIAGNOSIS — R10.11 CHRONIC RUQ PAIN: ICD-10-CM

## 2021-05-04 DIAGNOSIS — I70.0 AORTIC CALCIFICATION: ICD-10-CM

## 2021-05-04 DIAGNOSIS — G89.29 CHRONIC RUQ PAIN: ICD-10-CM

## 2021-05-04 DIAGNOSIS — R05.9 COUGH: ICD-10-CM

## 2021-05-04 DIAGNOSIS — K63.9 COLON WALL THICKENING: ICD-10-CM

## 2021-05-04 DIAGNOSIS — I25.10 ATHEROSCLEROSIS OF NATIVE CORONARY ARTERY OF NATIVE HEART WITHOUT ANGINA PECTORIS: ICD-10-CM

## 2021-05-04 DIAGNOSIS — R91.1 NODULE OF LOWER LOBE OF RIGHT LUNG: ICD-10-CM

## 2021-05-04 DIAGNOSIS — R10.11 RIGHT UPPER QUADRANT ABDOMINAL PAIN: ICD-10-CM

## 2021-05-04 DIAGNOSIS — R06.09 DOE (DYSPNEA ON EXERTION): Primary | ICD-10-CM

## 2021-05-04 PROBLEM — N95.2 VAGINAL ATROPHY: Status: RESOLVED | Noted: 2020-06-24 | Resolved: 2021-05-04

## 2021-05-04 PROBLEM — N89.8 VAGINAL DISCHARGE: Status: RESOLVED | Noted: 2020-06-24 | Resolved: 2021-05-04

## 2021-05-04 PROBLEM — Z87.440 HISTORY OF UTI: Status: RESOLVED | Noted: 2020-06-24 | Resolved: 2021-05-04

## 2021-05-04 PROBLEM — N94.10 DYSPAREUNIA, FEMALE: Status: RESOLVED | Noted: 2020-06-24 | Resolved: 2021-05-04

## 2021-05-04 PROCEDURE — 25500020 PHARM REV CODE 255: Performed by: INTERNAL MEDICINE

## 2021-05-04 PROCEDURE — 74177 CT ABDOMEN PELVIS WITH CONTRAST: ICD-10-PCS | Mod: 26,,, | Performed by: RADIOLOGY

## 2021-05-04 PROCEDURE — 74177 CT ABD & PELVIS W/CONTRAST: CPT | Mod: TC

## 2021-05-04 PROCEDURE — 71250 CT THORAX DX C-: CPT | Mod: 26,,, | Performed by: RADIOLOGY

## 2021-05-04 PROCEDURE — 71250 CT CHEST WITHOUT CONTRAST: ICD-10-PCS | Mod: 26,,, | Performed by: RADIOLOGY

## 2021-05-04 PROCEDURE — 74177 CT ABD & PELVIS W/CONTRAST: CPT | Mod: 26,,, | Performed by: RADIOLOGY

## 2021-05-04 PROCEDURE — 71250 CT THORAX DX C-: CPT | Mod: TC

## 2021-05-04 RX ORDER — ATORVASTATIN CALCIUM 40 MG/1
40 TABLET, FILM COATED ORAL NIGHTLY
Qty: 90 TABLET | Refills: 3 | Status: SHIPPED | OUTPATIENT
Start: 2021-05-04 | End: 2021-07-09 | Stop reason: SDUPTHER

## 2021-05-04 RX ADMIN — IOHEXOL 100 ML: 350 INJECTION, SOLUTION INTRAVENOUS at 01:05

## 2021-05-05 ENCOUNTER — PATIENT MESSAGE (OUTPATIENT)
Dept: INTERNAL MEDICINE | Facility: CLINIC | Age: 61
End: 2021-05-05

## 2021-05-05 ENCOUNTER — HOSPITAL ENCOUNTER (OUTPATIENT)
Dept: CARDIOLOGY | Facility: HOSPITAL | Age: 61
Discharge: HOME | End: 2021-05-05
Attending: INTERNAL MEDICINE
Payer: COMMERCIAL

## 2021-05-05 VITALS — HEIGHT: 63 IN | WEIGHT: 183 LBS | BODY MASS INDEX: 32.43 KG/M2

## 2021-05-05 DIAGNOSIS — I25.10 ATHEROSCLEROSIS OF NATIVE CORONARY ARTERY OF NATIVE HEART WITHOUT ANGINA PECTORIS: ICD-10-CM

## 2021-05-05 DIAGNOSIS — R06.09 DOE (DYSPNEA ON EXERTION): ICD-10-CM

## 2021-05-05 LAB
ASCENDING AORTA: 2.51 CM
BSA FOR ECHO PROCEDURE: 1.92 M2
CV ECHO LV RWT: 0.39 CM
CV STRESS BASE HR: 80 BPM
DIASTOLIC BLOOD PRESSURE: 73 MMHG
DOP CALC LVOT AREA: 3 CM2
DOP CALC LVOT DIAMETER: 1.96 CM
DOP CALC LVOT PEAK VEL: 0.84 M/S
DOP CALC LVOT STROKE VOLUME: 51.27 CM3
DOP CALCLVOT PEAK VEL VTI: 17 CM
E WAVE DECELERATION TIME: 261.57 MSEC
E/A RATIO: 0.76
E/E' RATIO: 12 M/S
ECHO LV POSTERIOR WALL: 0.71 CM (ref 0.6–1.1)
EJECTION FRACTION: 60 %
FRACTIONAL SHORTENING: 38 % (ref 28–44)
INTERVENTRICULAR SEPTUM: 0.9 CM (ref 0.6–1.1)
IVRT: 91.34 MSEC
LA MAJOR: 5 CM
LA MINOR: 4.81 CM
LA WIDTH: 3.03 CM
LEFT ATRIUM SIZE: 3.73 CM
LEFT ATRIUM VOLUME INDEX: 25.3 ML/M2
LEFT ATRIUM VOLUME: 47.1 CM3
LEFT INTERNAL DIMENSION IN SYSTOLE: 2.3 CM (ref 2.1–4)
LEFT VENTRICLE DIASTOLIC VOLUME INDEX: 30.87 ML/M2
LEFT VENTRICLE DIASTOLIC VOLUME: 57.42 ML
LEFT VENTRICLE MASS INDEX: 44 G/M2
LEFT VENTRICLE SYSTOLIC VOLUME INDEX: 9.8 ML/M2
LEFT VENTRICLE SYSTOLIC VOLUME: 18.18 ML
LEFT VENTRICULAR INTERNAL DIMENSION IN DIASTOLE: 3.68 CM (ref 3.5–6)
LEFT VENTRICULAR MASS: 82.3 G
LV LATERAL E/E' RATIO: 11.14 M/S
LV SEPTAL E/E' RATIO: 13 M/S
MV A" WAVE DURATION": 7.42 MSEC
MV PEAK A VEL: 1.02 M/S
MV PEAK E VEL: 0.78 M/S
MV STENOSIS PRESSURE HALF TIME: 75.86 MS
MV VALVE AREA P 1/2 METHOD: 2.9 CM2
OHS CV CPX 1 MINUTE RECOVERY HEART RATE: 103 BPM
OHS CV CPX 85 PERCENT MAX PREDICTED HEART RATE MALE: 128
OHS CV CPX ESTIMATED METS: 7
OHS CV CPX MAX PREDICTED HEART RATE: 151
OHS CV CPX PATIENT IS FEMALE: 1
OHS CV CPX PATIENT IS MALE: 0
OHS CV CPX PEAK DIASTOLIC BLOOD PRESSURE: 96 MMHG
OHS CV CPX PEAK HEAR RATE: 118 BPM
OHS CV CPX PEAK RATE PRESSURE PRODUCT: NORMAL
OHS CV CPX PEAK SYSTOLIC BLOOD PRESSURE: 171 MMHG
OHS CV CPX PERCENT MAX PREDICTED HEART RATE ACHIEVED: 78
OHS CV CPX RATE PRESSURE PRODUCT PRESENTING: NORMAL
PISA TR MAX VEL: 2.73 M/S
PULM VEIN S/D RATIO: 1.39
PV PEAK D VEL: 0.36 M/S
PV PEAK S VEL: 0.5 M/S
RA MAJOR: 4.62 CM
RA PRESSURE: 3 MMHG
RA WIDTH: 3.49 CM
RIGHT VENTRICULAR END-DIASTOLIC DIMENSION: 2.87 CM
RV TISSUE DOPPLER FREE WALL SYSTOLIC VELOCITY 1 (APICAL 4 CHAMBER VIEW): 13.04 CM/S
SINUS: 2.74 CM
STJ: 2.37 CM
STRESS ECHO POST EXERCISE DUR MIN: 5 MINUTES
STRESS ECHO POST EXERCISE DUR SEC: 5 SECONDS
SYSTOLIC BLOOD PRESSURE: 140 MMHG
TDI LATERAL: 0.07 M/S
TDI SEPTAL: 0.06 M/S
TDI: 0.07 M/S
TR MAX PG: 30 MMHG
TRICUSPID ANNULAR PLANE SYSTOLIC EXCURSION: 2.05 CM
TV REST PULMONARY ARTERY PRESSURE: 33 MMHG

## 2021-05-05 PROCEDURE — 93351 STRESS TTE COMPLETE: CPT

## 2021-05-05 PROCEDURE — 93351 STRESS TTE COMPLETE: CPT | Mod: 26,,, | Performed by: INTERNAL MEDICINE

## 2021-05-05 PROCEDURE — 93351 STRESS ECHO (CUPID ONLY): ICD-10-PCS | Mod: 26,,, | Performed by: INTERNAL MEDICINE

## 2021-05-06 RX ORDER — HYDRALAZINE HYDROCHLORIDE 50 MG/1
50 TABLET, FILM COATED ORAL 2 TIMES DAILY
Qty: 180 TABLET | Refills: 3 | Status: SHIPPED | OUTPATIENT
Start: 2021-05-06 | End: 2021-05-26 | Stop reason: ALTCHOICE

## 2021-05-07 ENCOUNTER — HOSPITAL ENCOUNTER (EMERGENCY)
Facility: HOSPITAL | Age: 61
Discharge: HOME | End: 2021-05-07
Attending: EMERGENCY MEDICINE
Payer: COMMERCIAL

## 2021-05-07 VITALS
OXYGEN SATURATION: 97 % | DIASTOLIC BLOOD PRESSURE: 82 MMHG | BODY MASS INDEX: 31.89 KG/M2 | SYSTOLIC BLOOD PRESSURE: 153 MMHG | RESPIRATION RATE: 20 BRPM | HEART RATE: 80 BPM | TEMPERATURE: 98 F | WEIGHT: 180 LBS

## 2021-05-07 DIAGNOSIS — G62.9 NEUROPATHY: ICD-10-CM

## 2021-05-07 DIAGNOSIS — R91.8 LUNG MASS: Primary | ICD-10-CM

## 2021-05-07 DIAGNOSIS — E87.1 HYPONATREMIA: ICD-10-CM

## 2021-05-07 DIAGNOSIS — R07.9 CHEST PAIN: ICD-10-CM

## 2021-05-07 LAB
ALBUMIN SERPL BCP-MCNC: 3.9 G/DL (ref 3.5–5.2)
ALP SERPL-CCNC: 136 U/L (ref 55–135)
ALT SERPL W/O P-5'-P-CCNC: 30 U/L (ref 10–44)
ANION GAP SERPL CALC-SCNC: 10 MMOL/L (ref 8–16)
AST SERPL-CCNC: 21 U/L (ref 10–40)
BASOPHILS # BLD AUTO: 0.05 K/UL (ref 0–0.2)
BASOPHILS NFR BLD: 0.8 % (ref 0–1.9)
BILIRUB SERPL-MCNC: 0.5 MG/DL (ref 0.1–1)
BNP SERPL-MCNC: 13 PG/ML (ref 0–99)
BUN SERPL-MCNC: 12 MG/DL (ref 8–23)
BUN SERPL-MCNC: 14 MG/DL (ref 6–30)
CALCIUM SERPL-MCNC: 9.7 MG/DL (ref 8.7–10.5)
CHLORIDE SERPL-SCNC: 100 MMOL/L (ref 95–110)
CHLORIDE SERPL-SCNC: 102 MMOL/L (ref 95–110)
CK SERPL-CCNC: 78 U/L (ref 20–180)
CO2 SERPL-SCNC: 22 MMOL/L (ref 23–29)
CREAT SERPL-MCNC: 0.7 MG/DL (ref 0.5–1.4)
CREAT SERPL-MCNC: 0.8 MG/DL (ref 0.5–1.4)
CTP QC/QA: YES
D DIMER PPP IA.FEU-MCNC: 0.22 MG/L FEU
DIFFERENTIAL METHOD: ABNORMAL
EOSINOPHIL # BLD AUTO: 0.1 K/UL (ref 0–0.5)
EOSINOPHIL NFR BLD: 1.4 % (ref 0–8)
ERYTHROCYTE [DISTWIDTH] IN BLOOD BY AUTOMATED COUNT: 14.8 % (ref 11.5–14.5)
EST. GFR  (AFRICAN AMERICAN): >60 ML/MIN/1.73 M^2
EST. GFR  (NON AFRICAN AMERICAN): >60 ML/MIN/1.73 M^2
GLUCOSE SERPL-MCNC: 114 MG/DL (ref 70–110)
GLUCOSE SERPL-MCNC: 116 MG/DL (ref 70–110)
HCT VFR BLD AUTO: 43 % (ref 37–48.5)
HCT VFR BLD CALC: 44 %PCV (ref 36–54)
HGB BLD-MCNC: 14.2 G/DL (ref 12–16)
IMM GRANULOCYTES # BLD AUTO: 0.05 K/UL (ref 0–0.04)
IMM GRANULOCYTES NFR BLD AUTO: 0.8 % (ref 0–0.5)
LIPASE SERPL-CCNC: 11 U/L (ref 4–60)
LYMPHOCYTES # BLD AUTO: 1.4 K/UL (ref 1–4.8)
LYMPHOCYTES NFR BLD: 21.3 % (ref 18–48)
MCH RBC QN AUTO: 28.5 PG (ref 27–31)
MCHC RBC AUTO-ENTMCNC: 33 G/DL (ref 32–36)
MCV RBC AUTO: 86 FL (ref 82–98)
MONOCYTES # BLD AUTO: 0.5 K/UL (ref 0.3–1)
MONOCYTES NFR BLD: 7.9 % (ref 4–15)
NEUTROPHILS # BLD AUTO: 4.4 K/UL (ref 1.8–7.7)
NEUTROPHILS NFR BLD: 67.8 % (ref 38–73)
NRBC BLD-RTO: 0 /100 WBC
PLATELET # BLD AUTO: 313 K/UL (ref 150–450)
PMV BLD AUTO: 9.8 FL (ref 9.2–12.9)
POC IONIZED CALCIUM: 1.21 MMOL/L (ref 1.06–1.42)
POC TCO2 (MEASURED): 24 MMOL/L (ref 23–29)
POTASSIUM BLD-SCNC: 4.4 MMOL/L (ref 3.5–5.1)
POTASSIUM SERPL-SCNC: 4.4 MMOL/L (ref 3.5–5.1)
PROT SERPL-MCNC: 7.5 G/DL (ref 6–8.4)
RBC # BLD AUTO: 4.99 M/UL (ref 4–5.4)
SAMPLE: ABNORMAL
SARS-COV-2 RDRP RESP QL NAA+PROBE: NEGATIVE
SODIUM BLD-SCNC: 133 MMOL/L (ref 136–145)
SODIUM SERPL-SCNC: 134 MMOL/L (ref 136–145)
TROPONIN I SERPL DL<=0.01 NG/ML-MCNC: <0.006 NG/ML (ref 0–0.03)
WBC # BLD AUTO: 6.48 K/UL (ref 3.9–12.7)

## 2021-05-07 PROCEDURE — 80053 COMPREHEN METABOLIC PANEL: CPT | Performed by: EMERGENCY MEDICINE

## 2021-05-07 PROCEDURE — 82330 ASSAY OF CALCIUM: CPT

## 2021-05-07 PROCEDURE — 85379 FIBRIN DEGRADATION QUANT: CPT | Performed by: EMERGENCY MEDICINE

## 2021-05-07 PROCEDURE — 25000003 PHARM REV CODE 250: Performed by: EMERGENCY MEDICINE

## 2021-05-07 PROCEDURE — 96375 TX/PRO/DX INJ NEW DRUG ADDON: CPT

## 2021-05-07 PROCEDURE — U0002 COVID-19 LAB TEST NON-CDC: HCPCS | Performed by: EMERGENCY MEDICINE

## 2021-05-07 PROCEDURE — 99284 PR EMERGENCY DEPT VISIT,LEVEL IV: ICD-10-PCS | Mod: CS,,, | Performed by: EMERGENCY MEDICINE

## 2021-05-07 PROCEDURE — 93010 EKG 12-LEAD: ICD-10-PCS | Mod: ,,, | Performed by: INTERNAL MEDICINE

## 2021-05-07 PROCEDURE — 96361 HYDRATE IV INFUSION ADD-ON: CPT

## 2021-05-07 PROCEDURE — 25500020 PHARM REV CODE 255: Performed by: EMERGENCY MEDICINE

## 2021-05-07 PROCEDURE — 99285 EMERGENCY DEPT VISIT HI MDM: CPT | Mod: 25

## 2021-05-07 PROCEDURE — 80047 BASIC METABLC PNL IONIZED CA: CPT

## 2021-05-07 PROCEDURE — 83880 ASSAY OF NATRIURETIC PEPTIDE: CPT | Performed by: EMERGENCY MEDICINE

## 2021-05-07 PROCEDURE — 83690 ASSAY OF LIPASE: CPT | Performed by: EMERGENCY MEDICINE

## 2021-05-07 PROCEDURE — 82550 ASSAY OF CK (CPK): CPT | Performed by: EMERGENCY MEDICINE

## 2021-05-07 PROCEDURE — 93005 ELECTROCARDIOGRAM TRACING: CPT

## 2021-05-07 PROCEDURE — 84484 ASSAY OF TROPONIN QUANT: CPT | Performed by: EMERGENCY MEDICINE

## 2021-05-07 PROCEDURE — 85025 COMPLETE CBC W/AUTO DIFF WBC: CPT | Performed by: EMERGENCY MEDICINE

## 2021-05-07 PROCEDURE — 96374 THER/PROPH/DIAG INJ IV PUSH: CPT | Mod: 59

## 2021-05-07 PROCEDURE — 93010 ELECTROCARDIOGRAM REPORT: CPT | Mod: ,,, | Performed by: INTERNAL MEDICINE

## 2021-05-07 PROCEDURE — 63600175 PHARM REV CODE 636 W HCPCS: Performed by: EMERGENCY MEDICINE

## 2021-05-07 PROCEDURE — 99284 EMERGENCY DEPT VISIT MOD MDM: CPT | Mod: CS,,, | Performed by: EMERGENCY MEDICINE

## 2021-05-07 RX ORDER — KETOROLAC TROMETHAMINE 30 MG/ML
10 INJECTION, SOLUTION INTRAMUSCULAR; INTRAVENOUS
Status: DISCONTINUED | OUTPATIENT
Start: 2021-05-07 | End: 2021-05-07

## 2021-05-07 RX ORDER — METOCLOPRAMIDE HYDROCHLORIDE 5 MG/ML
10 INJECTION INTRAMUSCULAR; INTRAVENOUS
Status: COMPLETED | OUTPATIENT
Start: 2021-05-07 | End: 2021-05-07

## 2021-05-07 RX ORDER — DIPHENHYDRAMINE HYDROCHLORIDE 50 MG/ML
25 INJECTION INTRAMUSCULAR; INTRAVENOUS
Status: COMPLETED | OUTPATIENT
Start: 2021-05-07 | End: 2021-05-07

## 2021-05-07 RX ORDER — METHOCARBAMOL 500 MG/1
1000 TABLET, FILM COATED ORAL
Status: COMPLETED | OUTPATIENT
Start: 2021-05-07 | End: 2021-05-07

## 2021-05-07 RX ADMIN — METHOCARBAMOL 1000 MG: 500 TABLET ORAL at 10:05

## 2021-05-07 RX ADMIN — IOHEXOL 75 ML: 350 INJECTION, SOLUTION INTRAVENOUS at 10:05

## 2021-05-07 RX ADMIN — DIPHENHYDRAMINE HYDROCHLORIDE 25 MG: 50 INJECTION, SOLUTION INTRAMUSCULAR; INTRAVENOUS at 10:05

## 2021-05-07 RX ADMIN — METOCLOPRAMIDE 10 MG: 5 INJECTION, SOLUTION INTRAMUSCULAR; INTRAVENOUS at 10:05

## 2021-05-11 ENCOUNTER — OFFICE VISIT (OUTPATIENT)
Dept: NEUROLOGY | Facility: CLINIC | Age: 61
End: 2021-05-11
Payer: COMMERCIAL

## 2021-05-11 ENCOUNTER — LAB VISIT (OUTPATIENT)
Dept: LAB | Facility: HOSPITAL | Age: 61
End: 2021-05-11
Attending: NEUROLOGICAL SURGERY
Payer: COMMERCIAL

## 2021-05-11 VITALS
HEART RATE: 83 BPM | SYSTOLIC BLOOD PRESSURE: 146 MMHG | WEIGHT: 184.5 LBS | DIASTOLIC BLOOD PRESSURE: 74 MMHG | HEIGHT: 63 IN | BODY MASS INDEX: 32.69 KG/M2

## 2021-05-11 DIAGNOSIS — R42 DIZZINESS AND GIDDINESS: ICD-10-CM

## 2021-05-11 DIAGNOSIS — R20.0 NUMBNESS: ICD-10-CM

## 2021-05-11 DIAGNOSIS — R53.83 FATIGUE, UNSPECIFIED TYPE: Primary | ICD-10-CM

## 2021-05-11 DIAGNOSIS — R53.83 FATIGUE, UNSPECIFIED TYPE: ICD-10-CM

## 2021-05-11 LAB — ERYTHROCYTE [SEDIMENTATION RATE] IN BLOOD BY WESTERGREN METHOD: 8 MM/HR (ref 0–36)

## 2021-05-11 PROCEDURE — 86038 ANTINUCLEAR ANTIBODIES: CPT | Performed by: NEUROLOGICAL SURGERY

## 2021-05-11 PROCEDURE — 85652 RBC SED RATE AUTOMATED: CPT | Performed by: NEUROLOGICAL SURGERY

## 2021-05-11 PROCEDURE — 99215 PR OFFICE/OUTPT VISIT, EST, LEVL V, 40-54 MIN: ICD-10-PCS | Mod: S$GLB,,, | Performed by: NEUROLOGICAL SURGERY

## 2021-05-11 PROCEDURE — 86039 ANTINUCLEAR ANTIBODIES (ANA): CPT | Performed by: NEUROLOGICAL SURGERY

## 2021-05-11 PROCEDURE — 86140 C-REACTIVE PROTEIN: CPT | Performed by: NEUROLOGICAL SURGERY

## 2021-05-11 PROCEDURE — 82550 ASSAY OF CK (CPK): CPT | Performed by: NEUROLOGICAL SURGERY

## 2021-05-11 PROCEDURE — 99999 PR PBB SHADOW E&M-EST. PATIENT-LVL IV: CPT | Mod: PBBFAC,,, | Performed by: NEUROLOGICAL SURGERY

## 2021-05-11 PROCEDURE — 86235 NUCLEAR ANTIGEN ANTIBODY: CPT | Mod: 59 | Performed by: NEUROLOGICAL SURGERY

## 2021-05-11 PROCEDURE — 36415 COLL VENOUS BLD VENIPUNCTURE: CPT | Mod: PO | Performed by: NEUROLOGICAL SURGERY

## 2021-05-11 PROCEDURE — 3008F PR BODY MASS INDEX (BMI) DOCUMENTED: ICD-10-PCS | Mod: CPTII,S$GLB,, | Performed by: NEUROLOGICAL SURGERY

## 2021-05-11 PROCEDURE — 99999 PR PBB SHADOW E&M-EST. PATIENT-LVL IV: ICD-10-PCS | Mod: PBBFAC,,, | Performed by: NEUROLOGICAL SURGERY

## 2021-05-11 PROCEDURE — 99215 OFFICE O/P EST HI 40 MIN: CPT | Mod: S$GLB,,, | Performed by: NEUROLOGICAL SURGERY

## 2021-05-11 PROCEDURE — 1125F AMNT PAIN NOTED PAIN PRSNT: CPT | Mod: S$GLB,,, | Performed by: NEUROLOGICAL SURGERY

## 2021-05-11 PROCEDURE — 1125F PR PAIN SEVERITY QUANTIFIED, PAIN PRESENT: ICD-10-PCS | Mod: S$GLB,,, | Performed by: NEUROLOGICAL SURGERY

## 2021-05-11 PROCEDURE — 83519 RIA NONANTIBODY: CPT | Mod: 59 | Performed by: NEUROLOGICAL SURGERY

## 2021-05-11 PROCEDURE — 3008F BODY MASS INDEX DOCD: CPT | Mod: CPTII,S$GLB,, | Performed by: NEUROLOGICAL SURGERY

## 2021-05-11 PROCEDURE — 83519 RIA NONANTIBODY: CPT | Performed by: NEUROLOGICAL SURGERY

## 2021-05-12 LAB
ANA PATTERN 1: NORMAL
ANA SER QL IF: POSITIVE
ANA TITR SER IF: NORMAL {TITER}
CK SERPL-CCNC: 67 U/L (ref 20–180)
CRP SERPL-MCNC: 3.5 MG/L (ref 0–8.2)

## 2021-05-13 LAB
ANTI SM ANTIBODY: 0.08 RATIO (ref 0–0.99)
ANTI SM/RNP ANTIBODY: 0.12 RATIO (ref 0–0.99)
ANTI-SM INTERPRETATION: NEGATIVE
ANTI-SM/RNP INTERPRETATION: NEGATIVE
ANTI-SSA ANTIBODY: 0.05 RATIO (ref 0–0.99)
ANTI-SSA INTERPRETATION: NEGATIVE
ANTI-SSB ANTIBODY: 0.08 RATIO (ref 0–0.99)
ANTI-SSB INTERPRETATION: NEGATIVE
DSDNA AB SER-ACNC: NORMAL [IU]/ML

## 2021-05-18 ENCOUNTER — OFFICE VISIT (OUTPATIENT)
Dept: SURGERY | Facility: CLINIC | Age: 61
End: 2021-05-18
Payer: COMMERCIAL

## 2021-05-18 ENCOUNTER — TELEPHONE (OUTPATIENT)
Dept: SURGERY | Facility: CLINIC | Age: 61
End: 2021-05-18

## 2021-05-18 ENCOUNTER — PATIENT MESSAGE (OUTPATIENT)
Dept: SURGERY | Facility: CLINIC | Age: 61
End: 2021-05-18

## 2021-05-18 VITALS
DIASTOLIC BLOOD PRESSURE: 85 MMHG | SYSTOLIC BLOOD PRESSURE: 124 MMHG | HEART RATE: 82 BPM | BODY MASS INDEX: 32.58 KG/M2 | HEIGHT: 63 IN | WEIGHT: 183.88 LBS

## 2021-05-18 DIAGNOSIS — K63.9 COLON WALL THICKENING: Primary | ICD-10-CM

## 2021-05-18 DIAGNOSIS — R10.9 ABDOMINAL CRAMPING: ICD-10-CM

## 2021-05-18 DIAGNOSIS — K44.9 HIATAL HERNIA: ICD-10-CM

## 2021-05-18 PROCEDURE — 99999 PR PBB SHADOW E&M-EST. PATIENT-LVL IV: CPT | Mod: PBBFAC,,, | Performed by: COLON & RECTAL SURGERY

## 2021-05-18 PROCEDURE — 99213 PR OFFICE/OUTPT VISIT, EST, LEVL III, 20-29 MIN: ICD-10-PCS | Mod: S$GLB,,, | Performed by: COLON & RECTAL SURGERY

## 2021-05-18 PROCEDURE — 99213 OFFICE O/P EST LOW 20 MIN: CPT | Mod: S$GLB,,, | Performed by: COLON & RECTAL SURGERY

## 2021-05-18 PROCEDURE — 1126F PR PAIN SEVERITY QUANTIFIED, NO PAIN PRESENT: ICD-10-PCS | Mod: S$GLB,,, | Performed by: COLON & RECTAL SURGERY

## 2021-05-18 PROCEDURE — 99999 PR PBB SHADOW E&M-EST. PATIENT-LVL IV: ICD-10-PCS | Mod: PBBFAC,,, | Performed by: COLON & RECTAL SURGERY

## 2021-05-18 PROCEDURE — 1126F AMNT PAIN NOTED NONE PRSNT: CPT | Mod: S$GLB,,, | Performed by: COLON & RECTAL SURGERY

## 2021-05-18 PROCEDURE — 3008F PR BODY MASS INDEX (BMI) DOCUMENTED: ICD-10-PCS | Mod: CPTII,S$GLB,, | Performed by: COLON & RECTAL SURGERY

## 2021-05-18 PROCEDURE — 3008F BODY MASS INDEX DOCD: CPT | Mod: CPTII,S$GLB,, | Performed by: COLON & RECTAL SURGERY

## 2021-05-18 RX ORDER — DICYCLOMINE HYDROCHLORIDE 20 MG/1
20 TABLET ORAL EVERY 6 HOURS PRN
Qty: 60 TABLET | Refills: 5 | Status: SHIPPED | OUTPATIENT
Start: 2021-05-18 | End: 2021-11-14 | Stop reason: SDUPTHER

## 2021-05-19 ENCOUNTER — PATIENT MESSAGE (OUTPATIENT)
Dept: ADMINISTRATIVE | Facility: OTHER | Age: 61
End: 2021-05-19

## 2021-05-19 ENCOUNTER — PATIENT OUTREACH (OUTPATIENT)
Dept: ADMINISTRATIVE | Facility: OTHER | Age: 61
End: 2021-05-19

## 2021-05-19 LAB
ACHR BIND AB SER-SCNC: 0 NMOL/L
MUSK ANTIBODY TEST: 0 NMOL/L (ref 0–0.02)
VGCC-N BIND AB SER-SCNC: NORMAL PMOL/L
VGCC-P/Q BIND AB SER-SCNC: 0 NMOL/L

## 2021-05-20 ENCOUNTER — OFFICE VISIT (OUTPATIENT)
Dept: PULMONOLOGY | Facility: CLINIC | Age: 61
End: 2021-05-20
Payer: COMMERCIAL

## 2021-05-20 VITALS
BODY MASS INDEX: 32.54 KG/M2 | WEIGHT: 183.63 LBS | HEART RATE: 87 BPM | SYSTOLIC BLOOD PRESSURE: 118 MMHG | OXYGEN SATURATION: 97 % | DIASTOLIC BLOOD PRESSURE: 74 MMHG | HEIGHT: 63 IN

## 2021-05-20 DIAGNOSIS — R06.02 SOB (SHORTNESS OF BREATH): Primary | ICD-10-CM

## 2021-05-20 DIAGNOSIS — R91.1 NODULE OF LOWER LOBE OF RIGHT LUNG: ICD-10-CM

## 2021-05-20 PROCEDURE — 99204 OFFICE O/P NEW MOD 45 MIN: CPT | Mod: S$GLB,,, | Performed by: NURSE PRACTITIONER

## 2021-05-20 PROCEDURE — 3008F BODY MASS INDEX DOCD: CPT | Mod: CPTII,S$GLB,, | Performed by: NURSE PRACTITIONER

## 2021-05-20 PROCEDURE — 99999 PR PBB SHADOW E&M-EST. PATIENT-LVL IV: CPT | Mod: PBBFAC,,, | Performed by: NURSE PRACTITIONER

## 2021-05-20 PROCEDURE — 3008F PR BODY MASS INDEX (BMI) DOCUMENTED: ICD-10-PCS | Mod: CPTII,S$GLB,, | Performed by: NURSE PRACTITIONER

## 2021-05-20 PROCEDURE — 1125F AMNT PAIN NOTED PAIN PRSNT: CPT | Mod: S$GLB,,, | Performed by: NURSE PRACTITIONER

## 2021-05-20 PROCEDURE — 99999 PR PBB SHADOW E&M-EST. PATIENT-LVL IV: ICD-10-PCS | Mod: PBBFAC,,, | Performed by: NURSE PRACTITIONER

## 2021-05-20 PROCEDURE — 1125F PR PAIN SEVERITY QUANTIFIED, PAIN PRESENT: ICD-10-PCS | Mod: S$GLB,,, | Performed by: NURSE PRACTITIONER

## 2021-05-20 PROCEDURE — 99204 PR OFFICE/OUTPT VISIT, NEW, LEVL IV, 45-59 MIN: ICD-10-PCS | Mod: S$GLB,,, | Performed by: NURSE PRACTITIONER

## 2021-05-20 RX ORDER — DIAZEPAM 10 MG/1
10 TABLET ORAL 2 TIMES DAILY PRN
COMMUNITY
Start: 2021-05-11 | End: 2022-05-10

## 2021-05-22 ENCOUNTER — HOSPITAL ENCOUNTER (OUTPATIENT)
Dept: RADIOLOGY | Facility: HOSPITAL | Age: 61
Discharge: HOME | End: 2021-05-22
Attending: NEUROLOGICAL SURGERY
Payer: COMMERCIAL

## 2021-05-22 DIAGNOSIS — R53.83 FATIGUE, UNSPECIFIED TYPE: ICD-10-CM

## 2021-05-22 DIAGNOSIS — R20.0 NUMBNESS: ICD-10-CM

## 2021-05-22 DIAGNOSIS — R42 DIZZINESS AND GIDDINESS: ICD-10-CM

## 2021-05-22 PROCEDURE — 70551 MRI BRAIN STEM W/O DYE: CPT | Mod: TC

## 2021-05-22 PROCEDURE — 72141 MRI CERVICAL SPINE WITHOUT CONTRAST: ICD-10-PCS | Mod: 26,,, | Performed by: RADIOLOGY

## 2021-05-22 PROCEDURE — 72141 MRI NECK SPINE W/O DYE: CPT | Mod: 26,,, | Performed by: RADIOLOGY

## 2021-05-22 PROCEDURE — 72141 MRI NECK SPINE W/O DYE: CPT | Mod: TC

## 2021-05-22 PROCEDURE — 70551 MRI BRAIN STEM W/O DYE: CPT | Mod: 26,,, | Performed by: RADIOLOGY

## 2021-05-22 PROCEDURE — 70551 MRI BRAIN WITHOUT CONTRAST: ICD-10-PCS | Mod: 26,,, | Performed by: RADIOLOGY

## 2021-05-25 PROBLEM — R06.02 SOB (SHORTNESS OF BREATH): Status: ACTIVE | Noted: 2021-05-25

## 2021-05-26 ENCOUNTER — PATIENT MESSAGE (OUTPATIENT)
Dept: INTERNAL MEDICINE | Facility: CLINIC | Age: 61
End: 2021-05-26

## 2021-05-26 ENCOUNTER — PATIENT MESSAGE (OUTPATIENT)
Dept: NEUROLOGY | Facility: CLINIC | Age: 61
End: 2021-05-26

## 2021-05-26 RX ORDER — HYDROCHLOROTHIAZIDE 25 MG/1
25 TABLET ORAL DAILY
Qty: 90 TABLET | Refills: 3 | Status: SHIPPED | OUTPATIENT
Start: 2021-05-26 | End: 2021-10-01 | Stop reason: SDUPTHER

## 2021-05-27 ENCOUNTER — HOSPITAL ENCOUNTER (OUTPATIENT)
Dept: PULMONOLOGY | Facility: CLINIC | Age: 61
Discharge: HOME | End: 2021-05-27
Payer: COMMERCIAL

## 2021-05-27 VITALS — HEIGHT: 62 IN | WEIGHT: 181.19 LBS | BODY MASS INDEX: 33.34 KG/M2

## 2021-05-27 DIAGNOSIS — R06.02 SOB (SHORTNESS OF BREATH): ICD-10-CM

## 2021-05-27 DIAGNOSIS — R53.83 FATIGUE, UNSPECIFIED TYPE: Primary | ICD-10-CM

## 2021-05-27 DIAGNOSIS — R20.0 NUMBNESS: ICD-10-CM

## 2021-05-27 PROCEDURE — 94618 PULMONARY STRESS TESTING: CPT | Mod: S$GLB,,, | Performed by: INTERNAL MEDICINE

## 2021-05-27 PROCEDURE — 94010 BREATHING CAPACITY TEST: CPT | Mod: S$GLB,,, | Performed by: INTERNAL MEDICINE

## 2021-05-27 PROCEDURE — 94729 DIFFUSING CAPACITY: CPT | Mod: S$GLB,,, | Performed by: INTERNAL MEDICINE

## 2021-05-27 PROCEDURE — 94729 PR C02/MEMBANE DIFFUSE CAPACITY: ICD-10-PCS | Mod: S$GLB,,, | Performed by: INTERNAL MEDICINE

## 2021-05-27 PROCEDURE — 94618 PULMONARY STRESS TESTING: ICD-10-PCS | Mod: S$GLB,,, | Performed by: INTERNAL MEDICINE

## 2021-05-27 PROCEDURE — 94727 PR PULM FUNCTION TEST BY GAS: ICD-10-PCS | Mod: S$GLB,,, | Performed by: INTERNAL MEDICINE

## 2021-05-27 PROCEDURE — 94010 BREATHING CAPACITY TEST: ICD-10-PCS | Mod: S$GLB,,, | Performed by: INTERNAL MEDICINE

## 2021-05-27 PROCEDURE — 94727 GAS DIL/WSHOT DETER LNG VOL: CPT | Mod: S$GLB,,, | Performed by: INTERNAL MEDICINE

## 2021-06-08 ENCOUNTER — OFFICE VISIT (OUTPATIENT)
Dept: RHEUMATOLOGY | Facility: CLINIC | Age: 61
End: 2021-06-08
Payer: COMMERCIAL

## 2021-06-08 VITALS
HEIGHT: 62 IN | SYSTOLIC BLOOD PRESSURE: 109 MMHG | HEART RATE: 69 BPM | BODY MASS INDEX: 33.31 KG/M2 | DIASTOLIC BLOOD PRESSURE: 76 MMHG | WEIGHT: 181 LBS

## 2021-06-08 DIAGNOSIS — M25.512 CHRONIC PAIN OF BOTH SHOULDERS: Primary | ICD-10-CM

## 2021-06-08 DIAGNOSIS — G89.29 CHRONIC PAIN OF BOTH SHOULDERS: Primary | ICD-10-CM

## 2021-06-08 DIAGNOSIS — M25.511 CHRONIC PAIN OF BOTH SHOULDERS: Primary | ICD-10-CM

## 2021-06-08 DIAGNOSIS — R20.0 NUMBNESS: ICD-10-CM

## 2021-06-08 DIAGNOSIS — M54.9 UPPER BACK PAIN: ICD-10-CM

## 2021-06-08 DIAGNOSIS — M25.50 POLYARTHRALGIA: Primary | ICD-10-CM

## 2021-06-08 DIAGNOSIS — R53.83 FATIGUE, UNSPECIFIED TYPE: ICD-10-CM

## 2021-06-08 PROCEDURE — 3008F PR BODY MASS INDEX (BMI) DOCUMENTED: ICD-10-PCS | Mod: CPTII,S$GLB,, | Performed by: INTERNAL MEDICINE

## 2021-06-08 PROCEDURE — 99999 PR PBB SHADOW E&M-EST. PATIENT-LVL V: CPT | Mod: PBBFAC,,, | Performed by: INTERNAL MEDICINE

## 2021-06-08 PROCEDURE — 3008F BODY MASS INDEX DOCD: CPT | Mod: CPTII,S$GLB,, | Performed by: INTERNAL MEDICINE

## 2021-06-08 PROCEDURE — 99999 PR PBB SHADOW E&M-EST. PATIENT-LVL V: ICD-10-PCS | Mod: PBBFAC,,, | Performed by: INTERNAL MEDICINE

## 2021-06-08 PROCEDURE — 1125F AMNT PAIN NOTED PAIN PRSNT: CPT | Mod: S$GLB,,, | Performed by: INTERNAL MEDICINE

## 2021-06-08 PROCEDURE — 1125F PR PAIN SEVERITY QUANTIFIED, PAIN PRESENT: ICD-10-PCS | Mod: S$GLB,,, | Performed by: INTERNAL MEDICINE

## 2021-06-08 PROCEDURE — 99205 OFFICE O/P NEW HI 60 MIN: CPT | Mod: S$GLB,,, | Performed by: INTERNAL MEDICINE

## 2021-06-08 PROCEDURE — 99205 PR OFFICE/OUTPT VISIT, NEW, LEVL V, 60-74 MIN: ICD-10-PCS | Mod: S$GLB,,, | Performed by: INTERNAL MEDICINE

## 2021-06-08 RX ORDER — CYCLOBENZAPRINE HCL 10 MG
10 TABLET ORAL NIGHTLY
Qty: 30 TABLET | Refills: 3 | Status: SHIPPED | OUTPATIENT
Start: 2021-06-08 | End: 2021-09-15

## 2021-06-11 ENCOUNTER — HOSPITAL ENCOUNTER (OUTPATIENT)
Dept: RADIOLOGY | Facility: HOSPITAL | Age: 61
Discharge: HOME | End: 2021-06-11
Attending: INTERNAL MEDICINE
Payer: COMMERCIAL

## 2021-06-11 DIAGNOSIS — R20.0 NUMBNESS: ICD-10-CM

## 2021-06-11 DIAGNOSIS — R53.83 FATIGUE, UNSPECIFIED TYPE: ICD-10-CM

## 2021-06-11 PROCEDURE — 77077 JOINT SURVEY SINGLE VIEW: CPT | Mod: TC,PO

## 2021-06-11 PROCEDURE — 77077 JOINT SURVEY SINGLE VIEW: CPT | Mod: 26,,, | Performed by: RADIOLOGY

## 2021-06-11 PROCEDURE — 77077 XR ARTHRITIS SURVEY: ICD-10-PCS | Mod: 26,,, | Performed by: RADIOLOGY

## 2021-06-24 ENCOUNTER — LAB VISIT (OUTPATIENT)
Dept: LAB | Facility: HOSPITAL | Age: 61
End: 2021-06-24
Attending: INTERNAL MEDICINE
Payer: COMMERCIAL

## 2021-06-24 DIAGNOSIS — I70.0 AORTIC CALCIFICATION: ICD-10-CM

## 2021-06-24 DIAGNOSIS — I25.10 ATHEROSCLEROSIS OF NATIVE CORONARY ARTERY OF NATIVE HEART WITHOUT ANGINA PECTORIS: ICD-10-CM

## 2021-06-24 LAB
ALBUMIN SERPL BCP-MCNC: 3.9 G/DL (ref 3.5–5.2)
ALP SERPL-CCNC: 108 U/L (ref 55–135)
ALT SERPL W/O P-5'-P-CCNC: 21 U/L (ref 10–44)
ANION GAP SERPL CALC-SCNC: 11 MMOL/L (ref 8–16)
AST SERPL-CCNC: 19 U/L (ref 10–40)
BILIRUB SERPL-MCNC: 0.4 MG/DL (ref 0.1–1)
BUN SERPL-MCNC: 13 MG/DL (ref 8–23)
CALCIUM SERPL-MCNC: 9.6 MG/DL (ref 8.7–10.5)
CHLORIDE SERPL-SCNC: 100 MMOL/L (ref 95–110)
CHOLEST SERPL-MCNC: 157 MG/DL (ref 120–199)
CHOLEST/HDLC SERPL: 2.6 {RATIO} (ref 2–5)
CO2 SERPL-SCNC: 24 MMOL/L (ref 23–29)
CREAT SERPL-MCNC: 0.9 MG/DL (ref 0.5–1.4)
EST. GFR  (AFRICAN AMERICAN): >60 ML/MIN/1.73 M^2
EST. GFR  (NON AFRICAN AMERICAN): >60 ML/MIN/1.73 M^2
GLUCOSE SERPL-MCNC: 108 MG/DL (ref 70–110)
HDLC SERPL-MCNC: 61 MG/DL (ref 40–75)
HDLC SERPL: 38.9 % (ref 20–50)
LDLC SERPL CALC-MCNC: 79.8 MG/DL (ref 63–159)
NONHDLC SERPL-MCNC: 96 MG/DL
POTASSIUM SERPL-SCNC: 4.5 MMOL/L (ref 3.5–5.1)
PROT SERPL-MCNC: 7 G/DL (ref 6–8.4)
SODIUM SERPL-SCNC: 135 MMOL/L (ref 136–145)
TRIGL SERPL-MCNC: 81 MG/DL (ref 30–150)

## 2021-06-24 PROCEDURE — 36415 COLL VENOUS BLD VENIPUNCTURE: CPT | Mod: PO | Performed by: INTERNAL MEDICINE

## 2021-06-24 PROCEDURE — 80053 COMPREHEN METABOLIC PANEL: CPT | Performed by: INTERNAL MEDICINE

## 2021-06-24 PROCEDURE — 80061 LIPID PANEL: CPT | Performed by: INTERNAL MEDICINE

## 2021-06-27 ENCOUNTER — PATIENT MESSAGE (OUTPATIENT)
Dept: RHEUMATOLOGY | Facility: CLINIC | Age: 61
End: 2021-06-27

## 2021-06-27 DIAGNOSIS — M25.50 POLYARTHRALGIA: Primary | ICD-10-CM

## 2021-07-01 ENCOUNTER — PATIENT OUTREACH (OUTPATIENT)
Dept: ADMINISTRATIVE | Facility: OTHER | Age: 61
End: 2021-07-01

## 2021-07-06 ENCOUNTER — OFFICE VISIT (OUTPATIENT)
Dept: SURGERY | Facility: CLINIC | Age: 61
End: 2021-07-06
Payer: COMMERCIAL

## 2021-07-06 VITALS
BODY MASS INDEX: 32.3 KG/M2 | SYSTOLIC BLOOD PRESSURE: 127 MMHG | HEIGHT: 63 IN | HEART RATE: 85 BPM | WEIGHT: 182.31 LBS | DIASTOLIC BLOOD PRESSURE: 77 MMHG

## 2021-07-06 DIAGNOSIS — K21.9 GASTROESOPHAGEAL REFLUX DISEASE, UNSPECIFIED WHETHER ESOPHAGITIS PRESENT: Primary | ICD-10-CM

## 2021-07-06 DIAGNOSIS — K63.9 COLON WALL THICKENING: ICD-10-CM

## 2021-07-06 DIAGNOSIS — R10.31 RIGHT LOWER QUADRANT ABDOMINAL PAIN: ICD-10-CM

## 2021-07-06 PROCEDURE — 1125F PR PAIN SEVERITY QUANTIFIED, PAIN PRESENT: ICD-10-PCS | Mod: S$GLB,,, | Performed by: COLON & RECTAL SURGERY

## 2021-07-06 PROCEDURE — 99999 PR PBB SHADOW E&M-EST. PATIENT-LVL IV: ICD-10-PCS | Mod: PBBFAC,,, | Performed by: COLON & RECTAL SURGERY

## 2021-07-06 PROCEDURE — 1125F AMNT PAIN NOTED PAIN PRSNT: CPT | Mod: S$GLB,,, | Performed by: COLON & RECTAL SURGERY

## 2021-07-06 PROCEDURE — 3008F PR BODY MASS INDEX (BMI) DOCUMENTED: ICD-10-PCS | Mod: CPTII,S$GLB,, | Performed by: COLON & RECTAL SURGERY

## 2021-07-06 PROCEDURE — 99999 PR PBB SHADOW E&M-EST. PATIENT-LVL IV: CPT | Mod: PBBFAC,,, | Performed by: COLON & RECTAL SURGERY

## 2021-07-06 PROCEDURE — 99213 OFFICE O/P EST LOW 20 MIN: CPT | Mod: S$GLB,,, | Performed by: COLON & RECTAL SURGERY

## 2021-07-06 PROCEDURE — 3008F BODY MASS INDEX DOCD: CPT | Mod: CPTII,S$GLB,, | Performed by: COLON & RECTAL SURGERY

## 2021-07-06 PROCEDURE — 99213 PR OFFICE/OUTPT VISIT, EST, LEVL III, 20-29 MIN: ICD-10-PCS | Mod: S$GLB,,, | Performed by: COLON & RECTAL SURGERY

## 2021-07-06 RX ORDER — ALPRAZOLAM 0.5 MG/1
TABLET ORAL
Qty: 30 TABLET | Refills: 5 | Status: SHIPPED | OUTPATIENT
Start: 2021-07-06 | End: 2022-01-07 | Stop reason: SDUPTHER

## 2021-07-06 RX ORDER — PANTOPRAZOLE SODIUM 40 MG/1
40 TABLET, DELAYED RELEASE ORAL DAILY
Qty: 30 TABLET | Refills: 11 | Status: SHIPPED | OUTPATIENT
Start: 2021-07-06 | End: 2022-01-25

## 2021-07-09 ENCOUNTER — OFFICE VISIT (OUTPATIENT)
Dept: INTERNAL MEDICINE | Facility: CLINIC | Age: 61
End: 2021-07-09
Payer: COMMERCIAL

## 2021-07-09 ENCOUNTER — TELEPHONE (OUTPATIENT)
Dept: PHARMACY | Facility: CLINIC | Age: 61
End: 2021-07-09

## 2021-07-09 ENCOUNTER — PATIENT MESSAGE (OUTPATIENT)
Dept: PHARMACY | Facility: CLINIC | Age: 61
End: 2021-07-09

## 2021-07-09 VITALS
HEART RATE: 72 BPM | WEIGHT: 182.13 LBS | HEIGHT: 63 IN | OXYGEN SATURATION: 95 % | BODY MASS INDEX: 32.27 KG/M2 | SYSTOLIC BLOOD PRESSURE: 122 MMHG | DIASTOLIC BLOOD PRESSURE: 68 MMHG

## 2021-07-09 DIAGNOSIS — R91.1 NODULE OF LOWER LOBE OF RIGHT LUNG: ICD-10-CM

## 2021-07-09 DIAGNOSIS — Z98.84 GASTRIC BYPASS STATUS FOR OBESITY: ICD-10-CM

## 2021-07-09 DIAGNOSIS — R07.81 PLEURITIC CHEST PAIN: ICD-10-CM

## 2021-07-09 DIAGNOSIS — G89.29 CHRONIC ABDOMINAL PAIN: ICD-10-CM

## 2021-07-09 DIAGNOSIS — R06.02 SOB (SHORTNESS OF BREATH): Primary | ICD-10-CM

## 2021-07-09 DIAGNOSIS — R10.9 CHRONIC ABDOMINAL PAIN: ICD-10-CM

## 2021-07-09 DIAGNOSIS — R05.9 COUGH: ICD-10-CM

## 2021-07-09 DIAGNOSIS — I10 ESSENTIAL HYPERTENSION: ICD-10-CM

## 2021-07-09 PROCEDURE — 99214 PR OFFICE/OUTPT VISIT, EST, LEVL IV, 30-39 MIN: ICD-10-PCS | Mod: S$GLB,,, | Performed by: INTERNAL MEDICINE

## 2021-07-09 PROCEDURE — 1125F PR PAIN SEVERITY QUANTIFIED, PAIN PRESENT: ICD-10-PCS | Mod: S$GLB,,, | Performed by: INTERNAL MEDICINE

## 2021-07-09 PROCEDURE — 3078F PR MOST RECENT DIASTOLIC BLOOD PRESSURE < 80 MM HG: ICD-10-PCS | Mod: CPTII,S$GLB,, | Performed by: INTERNAL MEDICINE

## 2021-07-09 PROCEDURE — 3008F BODY MASS INDEX DOCD: CPT | Mod: CPTII,S$GLB,, | Performed by: INTERNAL MEDICINE

## 2021-07-09 PROCEDURE — 3074F SYST BP LT 130 MM HG: CPT | Mod: CPTII,S$GLB,, | Performed by: INTERNAL MEDICINE

## 2021-07-09 PROCEDURE — 3078F DIAST BP <80 MM HG: CPT | Mod: CPTII,S$GLB,, | Performed by: INTERNAL MEDICINE

## 2021-07-09 PROCEDURE — 3008F PR BODY MASS INDEX (BMI) DOCUMENTED: ICD-10-PCS | Mod: CPTII,S$GLB,, | Performed by: INTERNAL MEDICINE

## 2021-07-09 PROCEDURE — 99999 PR PBB SHADOW E&M-EST. PATIENT-LVL IV: ICD-10-PCS | Mod: PBBFAC,,, | Performed by: INTERNAL MEDICINE

## 2021-07-09 PROCEDURE — 3074F PR MOST RECENT SYSTOLIC BLOOD PRESSURE < 130 MM HG: ICD-10-PCS | Mod: CPTII,S$GLB,, | Performed by: INTERNAL MEDICINE

## 2021-07-09 PROCEDURE — 99999 PR PBB SHADOW E&M-EST. PATIENT-LVL IV: CPT | Mod: PBBFAC,,, | Performed by: INTERNAL MEDICINE

## 2021-07-09 PROCEDURE — 1125F AMNT PAIN NOTED PAIN PRSNT: CPT | Mod: S$GLB,,, | Performed by: INTERNAL MEDICINE

## 2021-07-09 PROCEDURE — 99214 OFFICE O/P EST MOD 30 MIN: CPT | Mod: S$GLB,,, | Performed by: INTERNAL MEDICINE

## 2021-07-09 RX ORDER — ATORVASTATIN CALCIUM 40 MG/1
40 TABLET, FILM COATED ORAL NIGHTLY
Qty: 90 TABLET | Refills: 3 | Status: SHIPPED | OUTPATIENT
Start: 2021-07-09 | End: 2022-07-05 | Stop reason: SDUPTHER

## 2021-07-09 RX ORDER — TRETINOIN 1 MG/G
CREAM TOPICAL
Qty: 45 G | Refills: 11 | Status: SHIPPED | OUTPATIENT
Start: 2021-07-09 | End: 2022-12-21 | Stop reason: SDUPTHER

## 2021-07-09 RX ORDER — BENZONATATE 200 MG/1
200 CAPSULE ORAL 2 TIMES DAILY PRN
Qty: 20 CAPSULE | Refills: 3 | Status: SHIPPED | OUTPATIENT
Start: 2021-07-09 | End: 2022-01-07 | Stop reason: SDUPTHER

## 2021-07-09 RX ORDER — BUPROPION HYDROCHLORIDE 100 MG/1
100 TABLET, EXTENDED RELEASE ORAL 2 TIMES DAILY
Qty: 180 TABLET | Refills: 3 | Status: SHIPPED | OUTPATIENT
Start: 2021-07-09 | End: 2022-08-12

## 2021-07-13 ENCOUNTER — HOSPITAL ENCOUNTER (OUTPATIENT)
Dept: RADIOLOGY | Facility: HOSPITAL | Age: 61
Discharge: HOME | End: 2021-07-13
Attending: COLON & RECTAL SURGERY
Payer: COMMERCIAL

## 2021-07-13 DIAGNOSIS — R10.31 RIGHT LOWER QUADRANT ABDOMINAL PAIN: ICD-10-CM

## 2021-07-13 PROCEDURE — 74177 CT ABDOMEN PELVIS WITH CONTRAST: ICD-10-PCS | Mod: 26,,, | Performed by: RADIOLOGY

## 2021-07-13 PROCEDURE — 74177 CT ABD & PELVIS W/CONTRAST: CPT | Mod: 26,,, | Performed by: RADIOLOGY

## 2021-07-13 PROCEDURE — 25500020 PHARM REV CODE 255: Performed by: COLON & RECTAL SURGERY

## 2021-07-13 PROCEDURE — 74177 CT ABD & PELVIS W/CONTRAST: CPT | Mod: TC

## 2021-07-13 RX ADMIN — IOHEXOL 15 ML: 350 INJECTION, SOLUTION INTRAVENOUS at 05:07

## 2021-07-13 RX ADMIN — IOHEXOL 100 ML: 350 INJECTION, SOLUTION INTRAVENOUS at 06:07

## 2021-07-21 ENCOUNTER — PATIENT MESSAGE (OUTPATIENT)
Dept: RHEUMATOLOGY | Facility: CLINIC | Age: 61
End: 2021-07-21

## 2021-10-01 ENCOUNTER — OFFICE VISIT (OUTPATIENT)
Dept: INTERNAL MEDICINE | Facility: CLINIC | Age: 61
End: 2021-10-01
Payer: COMMERCIAL

## 2021-10-01 VITALS
BODY MASS INDEX: 32.27 KG/M2 | WEIGHT: 182.13 LBS | OXYGEN SATURATION: 96 % | DIASTOLIC BLOOD PRESSURE: 76 MMHG | HEIGHT: 63 IN | HEART RATE: 72 BPM | SYSTOLIC BLOOD PRESSURE: 124 MMHG

## 2021-10-01 DIAGNOSIS — R06.02 SOB (SHORTNESS OF BREATH): ICD-10-CM

## 2021-10-01 DIAGNOSIS — R91.1 NODULE OF LOWER LOBE OF RIGHT LUNG: Primary | ICD-10-CM

## 2021-10-01 DIAGNOSIS — F33.1 MODERATE EPISODE OF RECURRENT MAJOR DEPRESSIVE DISORDER: ICD-10-CM

## 2021-10-01 DIAGNOSIS — M15.9 OSTEOARTHRITIS OF MULTIPLE JOINTS, UNSPECIFIED OSTEOARTHRITIS TYPE: ICD-10-CM

## 2021-10-01 DIAGNOSIS — K63.9 COLON WALL THICKENING: ICD-10-CM

## 2021-10-01 DIAGNOSIS — R07.81 PLEURITIC CHEST PAIN: ICD-10-CM

## 2021-10-01 DIAGNOSIS — K21.9 GASTROESOPHAGEAL REFLUX DISEASE WITHOUT ESOPHAGITIS: ICD-10-CM

## 2021-10-01 DIAGNOSIS — K52.9 COLITIS: ICD-10-CM

## 2021-10-01 PROCEDURE — 1160F RVW MEDS BY RX/DR IN RCRD: CPT | Mod: CPTII,S$GLB,, | Performed by: INTERNAL MEDICINE

## 2021-10-01 PROCEDURE — 3078F DIAST BP <80 MM HG: CPT | Mod: CPTII,S$GLB,, | Performed by: INTERNAL MEDICINE

## 2021-10-01 PROCEDURE — 3008F PR BODY MASS INDEX (BMI) DOCUMENTED: ICD-10-PCS | Mod: CPTII,S$GLB,, | Performed by: INTERNAL MEDICINE

## 2021-10-01 PROCEDURE — 3074F PR MOST RECENT SYSTOLIC BLOOD PRESSURE < 130 MM HG: ICD-10-PCS | Mod: CPTII,S$GLB,, | Performed by: INTERNAL MEDICINE

## 2021-10-01 PROCEDURE — 99999 PR PBB SHADOW E&M-EST. PATIENT-LVL V: CPT | Mod: PBBFAC,,, | Performed by: INTERNAL MEDICINE

## 2021-10-01 PROCEDURE — 4010F PR ACE/ARB THEARPY RXD/TAKEN: ICD-10-PCS | Mod: CPTII,S$GLB,, | Performed by: INTERNAL MEDICINE

## 2021-10-01 PROCEDURE — 99999 PR PBB SHADOW E&M-EST. PATIENT-LVL V: ICD-10-PCS | Mod: PBBFAC,,, | Performed by: INTERNAL MEDICINE

## 2021-10-01 PROCEDURE — 1159F PR MEDICATION LIST DOCUMENTED IN MEDICAL RECORD: ICD-10-PCS | Mod: CPTII,S$GLB,, | Performed by: INTERNAL MEDICINE

## 2021-10-01 PROCEDURE — 1159F MED LIST DOCD IN RCRD: CPT | Mod: CPTII,S$GLB,, | Performed by: INTERNAL MEDICINE

## 2021-10-01 PROCEDURE — 1160F PR REVIEW ALL MEDS BY PRESCRIBER/CLIN PHARMACIST DOCUMENTED: ICD-10-PCS | Mod: CPTII,S$GLB,, | Performed by: INTERNAL MEDICINE

## 2021-10-01 PROCEDURE — 99214 PR OFFICE/OUTPT VISIT, EST, LEVL IV, 30-39 MIN: ICD-10-PCS | Mod: S$GLB,,, | Performed by: INTERNAL MEDICINE

## 2021-10-01 PROCEDURE — 3074F SYST BP LT 130 MM HG: CPT | Mod: CPTII,S$GLB,, | Performed by: INTERNAL MEDICINE

## 2021-10-01 PROCEDURE — 3078F PR MOST RECENT DIASTOLIC BLOOD PRESSURE < 80 MM HG: ICD-10-PCS | Mod: CPTII,S$GLB,, | Performed by: INTERNAL MEDICINE

## 2021-10-01 PROCEDURE — 3008F BODY MASS INDEX DOCD: CPT | Mod: CPTII,S$GLB,, | Performed by: INTERNAL MEDICINE

## 2021-10-01 PROCEDURE — 99214 OFFICE O/P EST MOD 30 MIN: CPT | Mod: S$GLB,,, | Performed by: INTERNAL MEDICINE

## 2021-10-01 PROCEDURE — 4010F ACE/ARB THERAPY RXD/TAKEN: CPT | Mod: CPTII,S$GLB,, | Performed by: INTERNAL MEDICINE

## 2021-10-01 RX ORDER — HYDROCHLOROTHIAZIDE 25 MG/1
25 TABLET ORAL DAILY
Qty: 90 TABLET | Refills: 3 | Status: SHIPPED | OUTPATIENT
Start: 2021-10-01 | End: 2022-11-15 | Stop reason: SDUPTHER

## 2021-10-01 RX ORDER — AMLODIPINE BESYLATE 2.5 MG/1
TABLET ORAL
Qty: 90 TABLET | Refills: 3 | Status: SHIPPED | OUTPATIENT
Start: 2021-10-01 | End: 2022-07-05 | Stop reason: SDUPTHER

## 2021-10-01 RX ORDER — METOPROLOL SUCCINATE 25 MG/1
25 TABLET, EXTENDED RELEASE ORAL DAILY
Qty: 90 TABLET | Refills: 3 | Status: SHIPPED | OUTPATIENT
Start: 2021-10-01 | End: 2022-10-21 | Stop reason: SDUPTHER

## 2021-10-13 ENCOUNTER — PATIENT MESSAGE (OUTPATIENT)
Dept: ENDOSCOPY | Facility: HOSPITAL | Age: 61
End: 2021-10-13
Payer: COMMERCIAL

## 2021-11-01 ENCOUNTER — TELEPHONE (OUTPATIENT)
Dept: GASTROENTEROLOGY | Facility: CLINIC | Age: 61
End: 2021-11-01
Payer: COMMERCIAL

## 2021-11-02 ENCOUNTER — HOSPITAL ENCOUNTER (OUTPATIENT)
Facility: HOSPITAL | Age: 61
Discharge: HOME | End: 2021-11-02
Attending: INTERNAL MEDICINE | Admitting: INTERNAL MEDICINE
Payer: COMMERCIAL

## 2021-11-02 ENCOUNTER — ANESTHESIA (OUTPATIENT)
Dept: ENDOSCOPY | Facility: HOSPITAL | Age: 61
End: 2021-11-02
Payer: COMMERCIAL

## 2021-11-02 ENCOUNTER — ANESTHESIA EVENT (OUTPATIENT)
Dept: ENDOSCOPY | Facility: HOSPITAL | Age: 61
End: 2021-11-02
Payer: COMMERCIAL

## 2021-11-02 VITALS
BODY MASS INDEX: 31.89 KG/M2 | WEIGHT: 180 LBS | HEART RATE: 64 BPM | RESPIRATION RATE: 17 BRPM | HEIGHT: 63 IN | DIASTOLIC BLOOD PRESSURE: 82 MMHG | SYSTOLIC BLOOD PRESSURE: 157 MMHG | OXYGEN SATURATION: 100 % | TEMPERATURE: 98 F

## 2021-11-02 DIAGNOSIS — R10.13 EPIGASTRIC ABDOMINAL PAIN: ICD-10-CM

## 2021-11-02 PROCEDURE — 88305 TISSUE EXAM BY PATHOLOGIST: ICD-10-PCS | Mod: 26,,, | Performed by: PATHOLOGY

## 2021-11-02 PROCEDURE — E9220 PRA ENDO ANESTHESIA: HCPCS | Mod: ,,, | Performed by: NURSE ANESTHETIST, CERTIFIED REGISTERED

## 2021-11-02 PROCEDURE — 43239 PR EGD, FLEX, W/BIOPSY, SGL/MULTI: ICD-10-PCS | Mod: ,,, | Performed by: INTERNAL MEDICINE

## 2021-11-02 PROCEDURE — 88305 TISSUE EXAM BY PATHOLOGIST: CPT | Mod: 26,,, | Performed by: PATHOLOGY

## 2021-11-02 PROCEDURE — 27201012 HC FORCEPS, HOT/COLD, DISP: Performed by: INTERNAL MEDICINE

## 2021-11-02 PROCEDURE — 25000003 PHARM REV CODE 250: Performed by: INTERNAL MEDICINE

## 2021-11-02 PROCEDURE — 37000008 HC ANESTHESIA 1ST 15 MINUTES: Performed by: INTERNAL MEDICINE

## 2021-11-02 PROCEDURE — 63600175 PHARM REV CODE 636 W HCPCS: Performed by: NURSE ANESTHETIST, CERTIFIED REGISTERED

## 2021-11-02 PROCEDURE — 43239 EGD BIOPSY SINGLE/MULTIPLE: CPT | Mod: ,,, | Performed by: INTERNAL MEDICINE

## 2021-11-02 PROCEDURE — 88305 TISSUE EXAM BY PATHOLOGIST: CPT | Performed by: PATHOLOGY

## 2021-11-02 PROCEDURE — E9220 PRA ENDO ANESTHESIA: ICD-10-PCS | Mod: ,,, | Performed by: NURSE ANESTHETIST, CERTIFIED REGISTERED

## 2021-11-02 PROCEDURE — 37000009 HC ANESTHESIA EA ADD 15 MINS: Performed by: INTERNAL MEDICINE

## 2021-11-02 PROCEDURE — 25000003 PHARM REV CODE 250: Performed by: NURSE ANESTHETIST, CERTIFIED REGISTERED

## 2021-11-02 PROCEDURE — 43239 EGD BIOPSY SINGLE/MULTIPLE: CPT | Performed by: INTERNAL MEDICINE

## 2021-11-02 RX ORDER — LIDOCAINE HYDROCHLORIDE 20 MG/ML
INJECTION INTRAVENOUS
Status: DISCONTINUED | OUTPATIENT
Start: 2021-11-02 | End: 2021-11-02

## 2021-11-02 RX ORDER — PROPOFOL 10 MG/ML
VIAL (ML) INTRAVENOUS CONTINUOUS PRN
Status: DISCONTINUED | OUTPATIENT
Start: 2021-11-02 | End: 2021-11-02

## 2021-11-02 RX ORDER — PROPOFOL 10 MG/ML
VIAL (ML) INTRAVENOUS
Status: DISCONTINUED | OUTPATIENT
Start: 2021-11-02 | End: 2021-11-02

## 2021-11-02 RX ORDER — SODIUM CHLORIDE 9 MG/ML
INJECTION, SOLUTION INTRAVENOUS CONTINUOUS
Status: DISCONTINUED | OUTPATIENT
Start: 2021-11-02 | End: 2021-11-02 | Stop reason: HOSPADM

## 2021-11-02 RX ADMIN — PROPOFOL 20 MG: 10 INJECTION, EMULSION INTRAVENOUS at 09:11

## 2021-11-02 RX ADMIN — PROPOFOL 50 MG: 10 INJECTION, EMULSION INTRAVENOUS at 08:11

## 2021-11-02 RX ADMIN — LIDOCAINE HYDROCHLORIDE 80 MG: 20 INJECTION, SOLUTION INTRAVENOUS at 08:11

## 2021-11-02 RX ADMIN — SODIUM CHLORIDE: 0.9 INJECTION, SOLUTION INTRAVENOUS at 07:11

## 2021-11-02 RX ADMIN — Medication 150 MCG/KG/MIN: at 08:11

## 2021-11-09 LAB
FINAL PATHOLOGIC DIAGNOSIS: NORMAL
Lab: NORMAL

## 2021-11-14 DIAGNOSIS — R10.9 ABDOMINAL CRAMPING: ICD-10-CM

## 2021-11-15 RX ORDER — DICYCLOMINE HYDROCHLORIDE 20 MG/1
20 TABLET ORAL EVERY 6 HOURS PRN
Qty: 60 TABLET | Refills: 5 | Status: SHIPPED | OUTPATIENT
Start: 2021-11-15 | End: 2022-01-01

## 2021-11-22 ENCOUNTER — PATIENT OUTREACH (OUTPATIENT)
Dept: ADMINISTRATIVE | Facility: OTHER | Age: 61
End: 2021-11-22
Payer: COMMERCIAL

## 2021-11-22 ENCOUNTER — PATIENT MESSAGE (OUTPATIENT)
Dept: ADMINISTRATIVE | Facility: OTHER | Age: 61
End: 2021-11-22
Payer: COMMERCIAL

## 2021-11-22 DIAGNOSIS — Z12.31 ENCOUNTER FOR SCREENING MAMMOGRAM FOR MALIGNANT NEOPLASM OF BREAST: Primary | ICD-10-CM

## 2021-11-23 ENCOUNTER — OFFICE VISIT (OUTPATIENT)
Dept: PULMONOLOGY | Facility: CLINIC | Age: 61
End: 2021-11-23
Payer: COMMERCIAL

## 2021-11-23 VITALS
OXYGEN SATURATION: 97 % | HEART RATE: 87 BPM | DIASTOLIC BLOOD PRESSURE: 84 MMHG | SYSTOLIC BLOOD PRESSURE: 140 MMHG | HEIGHT: 63 IN | BODY MASS INDEX: 32.32 KG/M2 | WEIGHT: 182.44 LBS | TEMPERATURE: 97 F | RESPIRATION RATE: 20 BRPM

## 2021-11-23 DIAGNOSIS — R91.1 NODULE OF LOWER LOBE OF RIGHT LUNG: ICD-10-CM

## 2021-11-23 DIAGNOSIS — G47.30 SLEEP APNEA, UNSPECIFIED TYPE: ICD-10-CM

## 2021-11-23 DIAGNOSIS — R06.09 DOE (DYSPNEA ON EXERTION): Primary | ICD-10-CM

## 2021-11-23 DIAGNOSIS — R06.02 SOB (SHORTNESS OF BREATH): ICD-10-CM

## 2021-11-23 DIAGNOSIS — R07.89 OTHER CHEST PAIN: ICD-10-CM

## 2021-11-23 PROCEDURE — 99214 OFFICE O/P EST MOD 30 MIN: CPT | Mod: S$GLB,,, | Performed by: INTERNAL MEDICINE

## 2021-11-23 PROCEDURE — 4010F ACE/ARB THERAPY RXD/TAKEN: CPT | Mod: CPTII,S$GLB,, | Performed by: INTERNAL MEDICINE

## 2021-11-23 PROCEDURE — 4010F PR ACE/ARB THEARPY RXD/TAKEN: ICD-10-PCS | Mod: CPTII,S$GLB,, | Performed by: INTERNAL MEDICINE

## 2021-11-23 PROCEDURE — 99999 PR PBB SHADOW E&M-EST. PATIENT-LVL V: CPT | Mod: PBBFAC,,, | Performed by: INTERNAL MEDICINE

## 2021-11-23 PROCEDURE — 99999 PR PBB SHADOW E&M-EST. PATIENT-LVL V: ICD-10-PCS | Mod: PBBFAC,,, | Performed by: INTERNAL MEDICINE

## 2021-11-23 PROCEDURE — 99214 PR OFFICE/OUTPT VISIT, EST, LEVL IV, 30-39 MIN: ICD-10-PCS | Mod: S$GLB,,, | Performed by: INTERNAL MEDICINE

## 2021-11-23 RX ORDER — ALBUTEROL SULFATE 90 UG/1
1-2 AEROSOL, METERED RESPIRATORY (INHALATION) EVERY 4 HOURS PRN
Qty: 18 G | Refills: 11 | Status: SHIPPED | OUTPATIENT
Start: 2021-11-23 | End: 2022-05-03 | Stop reason: SDUPTHER

## 2021-12-09 ENCOUNTER — HOSPITAL ENCOUNTER (OUTPATIENT)
Dept: PULMONOLOGY | Facility: CLINIC | Age: 61
Discharge: HOME | End: 2021-12-09
Payer: COMMERCIAL

## 2021-12-09 ENCOUNTER — HOSPITAL ENCOUNTER (OUTPATIENT)
Dept: CARDIOLOGY | Facility: HOSPITAL | Age: 61
Discharge: HOME | End: 2021-12-09
Attending: INTERNAL MEDICINE
Payer: COMMERCIAL

## 2021-12-09 ENCOUNTER — HOSPITAL ENCOUNTER (OUTPATIENT)
Dept: RADIOLOGY | Facility: HOSPITAL | Age: 61
Discharge: HOME | End: 2021-12-09
Attending: INTERNAL MEDICINE
Payer: COMMERCIAL

## 2021-12-09 VITALS
HEIGHT: 63 IN | WEIGHT: 182 LBS | DIASTOLIC BLOOD PRESSURE: 83 MMHG | BODY MASS INDEX: 32.25 KG/M2 | SYSTOLIC BLOOD PRESSURE: 150 MMHG | HEART RATE: 67 BPM

## 2021-12-09 DIAGNOSIS — R91.1 NODULE OF LOWER LOBE OF RIGHT LUNG: ICD-10-CM

## 2021-12-09 DIAGNOSIS — R06.09 DOE (DYSPNEA ON EXERTION): ICD-10-CM

## 2021-12-09 DIAGNOSIS — R06.02 SOB (SHORTNESS OF BREATH): ICD-10-CM

## 2021-12-09 DIAGNOSIS — R06.02 SOB (SHORTNESS OF BREATH): Primary | ICD-10-CM

## 2021-12-09 LAB
ASCENDING AORTA: 2.63 CM
AV INDEX (PROSTH): 0.79
AV MEAN GRADIENT: 5 MMHG
AV PEAK GRADIENT: 9 MMHG
AV VALVE AREA: 2.37 CM2
AV VELOCITY RATIO: 0.66
BSA FOR ECHO PROCEDURE: 1.92 M2
CV ECHO LV RWT: 0.41 CM
DLCO ADJ PRE: 14.05 ML/(MIN*MMHG) (ref 14.73–26.19)
DLCO SINGLE BREATH LLN: 14.73
DLCO SINGLE BREATH PRE REF: 68.7 %
DLCO SINGLE BREATH REF: 20.46
DLCOC SBVA LLN: 2.96
DLCOC SBVA PRE REF: 86 %
DLCOC SBVA REF: 4.61
DLCOC SINGLE BREATH LLN: 14.73
DLCOC SINGLE BREATH PRE REF: 68.7 %
DLCOC SINGLE BREATH REF: 20.46
DLCOCSBVAULN: 6.26
DLCOCSINGLEBREATHULN: 26.19
DLCOSINGLEBREATHULN: 26.19
DLCOVA LLN: 2.96
DLCOVA PRE REF: 86 %
DLCOVA PRE: 3.96 ML/(MIN*MMHG*L) (ref 2.96–6.26)
DLCOVA REF: 4.61
DLCOVAULN: 6.26
DLVAADJ PRE: 3.96 ML/(MIN*MMHG*L) (ref 2.96–6.26)
DOP CALC AO PEAK VEL: 1.52 M/S
DOP CALC AO VTI: 26.62 CM
DOP CALC LVOT AREA: 3 CM2
DOP CALC LVOT DIAMETER: 1.95 CM
DOP CALC LVOT PEAK VEL: 1 M/S
DOP CALC LVOT STROKE VOLUME: 62.98 CM3
DOP CALCLVOT PEAK VEL VTI: 21.1 CM
E WAVE DECELERATION TIME: 342.3 MSEC
E/A RATIO: 0.89
E/E' RATIO: 14.91 M/S
ECHO LV POSTERIOR WALL: 0.77 CM (ref 0.6–1.1)
EJECTION FRACTION: 65 %
FEF 25 75 LLN: 0.98
FEF 25 75 PRE REF: 146.2 %
FEF 25 75 REF: 1.98
FET100 CHG: -1.2 %
FEV05 LLN: 0.8
FEV05 REF: 1.66
FEV1 CHG: -1 %
FEV1 FVC LLN: 67
FEV1 FVC PRE REF: 104.4 %
FEV1 FVC REF: 79
FEV1 LLN: 1.61
FEV1 PRE REF: 102.7 %
FEV1 REF: 2.16
FEV1 VOL CHG: -0.02
FRACTIONAL SHORTENING: 31 % (ref 28–44)
FVC CHG: -2.3 %
FVC LLN: 2.05
FVC PRE REF: 97.8 %
FVC REF: 2.74
FVC VOL CHG: -0.06
INTERVENTRICULAR SEPTUM: 0.82 CM (ref 0.6–1.1)
IVC PRE: 2.48 L (ref 2.05–3.43)
IVC SINGLE BREATH LLN: 2.05
IVC SINGLE BREATH PRE REF: 90.5 %
IVC SINGLE BREATH REF: 2.74
IVCSINGLEBREATHULN: 3.43
IVRT: 85.63 MSEC
LA MAJOR: 4.8 CM
LA MINOR: 5.19 CM
LA WIDTH: 3.12 CM
LEFT ATRIUM SIZE: 3.79 CM
LEFT ATRIUM VOLUME INDEX: 27 ML/M2
LEFT ATRIUM VOLUME: 50.13 CM3
LEFT INTERNAL DIMENSION IN SYSTOLE: 2.6 CM (ref 2.1–4)
LEFT VENTRICLE DIASTOLIC VOLUME INDEX: 32.66 ML/M2
LEFT VENTRICLE DIASTOLIC VOLUME: 60.75 ML
LEFT VENTRICLE MASS INDEX: 45 G/M2
LEFT VENTRICLE SYSTOLIC VOLUME INDEX: 13.3 ML/M2
LEFT VENTRICLE SYSTOLIC VOLUME: 24.7 ML
LEFT VENTRICULAR INTERNAL DIMENSION IN DIASTOLE: 3.77 CM (ref 3.5–6)
LEFT VENTRICULAR MASS: 84.14 G
LV LATERAL E/E' RATIO: 13.67 M/S
LV SEPTAL E/E' RATIO: 16.4 M/S
MV A" WAVE DURATION": 8.28 MSEC
MV PEAK A VEL: 0.92 M/S
MV PEAK E VEL: 0.82 M/S
MV STENOSIS PRESSURE HALF TIME: 99.27 MS
MV VALVE AREA P 1/2 METHOD: 2.22 CM2
PEF LLN: 4.12
PEF PRE REF: 100.6 %
PEF REF: 5.67
PHYSICIAN COMMENT: ABNORMAL
PISA TR MAX VEL: 2.47 M/S
POST FEF 25 75: 2.88 L/S (ref 0.98–2.98)
POST FET 100: 6.86 SEC
POST FEV1 FVC: 84 % (ref 66.81–91.83)
POST FEV1: 2.2 L (ref 1.61–2.71)
POST FEV5: 1.88 L (ref 0.8–2.51)
POST FVC: 2.62 L (ref 2.05–3.43)
POST PEF: 6.21 L/S (ref 4.12–7.23)
PRE DLCO: 14.05 ML/(MIN*MMHG) (ref 14.73–26.19)
PRE FEF 25 75: 2.9 L/S (ref 0.98–2.98)
PRE FET 100: 6.94 SEC
PRE FEV05 REF: 114.3 %
PRE FEV1 FVC: 82.85 % (ref 66.81–91.83)
PRE FEV1: 2.22 L (ref 1.61–2.71)
PRE FEV5: 1.89 L (ref 0.8–2.51)
PRE FVC: 2.68 L (ref 2.05–3.43)
PRE PEF: 5.71 L/S (ref 4.12–7.23)
PULM VEIN S/D RATIO: 1.61
PV PEAK D VEL: 0.28 M/S
PV PEAK S VEL: 0.45 M/S
RA MAJOR: 4.19 CM
RA PRESSURE: 3 MMHG
RA WIDTH: 2.9 CM
RIGHT VENTRICULAR END-DIASTOLIC DIMENSION: 3.33 CM
RV TISSUE DOPPLER FREE WALL SYSTOLIC VELOCITY 1 (APICAL 4 CHAMBER VIEW): 11.01 CM/S
SINUS: 2.89 CM
STJ: 2.43 CM
TDI LATERAL: 0.06 M/S
TDI SEPTAL: 0.05 M/S
TDI: 0.06 M/S
TR MAX PG: 24 MMHG
TRICUSPID ANNULAR PLANE SYSTOLIC EXCURSION: 1.96 CM
TV REST PULMONARY ARTERY PRESSURE: 27 MMHG
VA PRE: 3.54 L (ref 4.29–4.29)
VA SINGLE BREATH PRE REF: 82.6 %
VA SINGLE BREATH REF: 4.29

## 2021-12-09 PROCEDURE — 94729 DIFFUSING CAPACITY: CPT | Mod: S$GLB,,, | Performed by: INTERNAL MEDICINE

## 2021-12-09 PROCEDURE — 94060 PR EVAL OF BRONCHOSPASM: ICD-10-PCS | Mod: S$GLB,,, | Performed by: INTERNAL MEDICINE

## 2021-12-09 PROCEDURE — 71250 CT THORAX DX C-: CPT | Mod: TC

## 2021-12-09 PROCEDURE — 93306 TTE W/DOPPLER COMPLETE: CPT

## 2021-12-09 PROCEDURE — 94060 EVALUATION OF WHEEZING: CPT | Mod: S$GLB,,, | Performed by: INTERNAL MEDICINE

## 2021-12-09 PROCEDURE — 71250 CT THORAX DX C-: CPT | Mod: 26,,, | Performed by: RADIOLOGY

## 2021-12-09 PROCEDURE — 93306 ECHO (CUPID ONLY): ICD-10-PCS | Mod: 26,,, | Performed by: INTERNAL MEDICINE

## 2021-12-09 PROCEDURE — 94729 PR C02/MEMBANE DIFFUSE CAPACITY: ICD-10-PCS | Mod: S$GLB,,, | Performed by: INTERNAL MEDICINE

## 2021-12-09 PROCEDURE — 71250 CT CHEST WITHOUT CONTRAST: ICD-10-PCS | Mod: 26,,, | Performed by: RADIOLOGY

## 2021-12-09 PROCEDURE — 93306 TTE W/DOPPLER COMPLETE: CPT | Mod: 26,,, | Performed by: INTERNAL MEDICINE

## 2021-12-14 ENCOUNTER — PATIENT MESSAGE (OUTPATIENT)
Dept: PULMONOLOGY | Facility: CLINIC | Age: 61
End: 2021-12-14
Payer: COMMERCIAL

## 2021-12-17 ENCOUNTER — HOSPITAL ENCOUNTER (OUTPATIENT)
Dept: RADIOLOGY | Facility: HOSPITAL | Age: 61
Discharge: HOME | End: 2021-12-17
Attending: PHYSICAL MEDICINE & REHABILITATION
Payer: COMMERCIAL

## 2021-12-17 ENCOUNTER — OFFICE VISIT (OUTPATIENT)
Dept: PHYSICAL MEDICINE AND REHAB | Facility: CLINIC | Age: 61
End: 2021-12-17
Payer: COMMERCIAL

## 2021-12-17 VITALS
DIASTOLIC BLOOD PRESSURE: 94 MMHG | SYSTOLIC BLOOD PRESSURE: 139 MMHG | BODY MASS INDEX: 34.48 KG/M2 | HEART RATE: 81 BPM | HEIGHT: 61 IN | WEIGHT: 182.63 LBS

## 2021-12-17 DIAGNOSIS — G89.29 CHRONIC NECK PAIN: ICD-10-CM

## 2021-12-17 DIAGNOSIS — M47.26 OSTEOARTHRITIS OF SPINE WITH RADICULOPATHY, LUMBAR REGION: ICD-10-CM

## 2021-12-17 DIAGNOSIS — M25.562 BILATERAL CHRONIC KNEE PAIN: ICD-10-CM

## 2021-12-17 DIAGNOSIS — G89.29 BILATERAL CHRONIC KNEE PAIN: ICD-10-CM

## 2021-12-17 DIAGNOSIS — M25.561 BILATERAL CHRONIC KNEE PAIN: ICD-10-CM

## 2021-12-17 DIAGNOSIS — M54.42 CHRONIC BILATERAL LOW BACK PAIN WITH BILATERAL SCIATICA: ICD-10-CM

## 2021-12-17 DIAGNOSIS — G89.29 CHRONIC BILATERAL LOW BACK PAIN WITH BILATERAL SCIATICA: Primary | ICD-10-CM

## 2021-12-17 DIAGNOSIS — G89.29 CHRONIC BILATERAL LOW BACK PAIN WITH BILATERAL SCIATICA: ICD-10-CM

## 2021-12-17 DIAGNOSIS — M54.42 CHRONIC BILATERAL LOW BACK PAIN WITH BILATERAL SCIATICA: Primary | ICD-10-CM

## 2021-12-17 DIAGNOSIS — M17.0 PRIMARY OSTEOARTHRITIS OF BOTH KNEES: ICD-10-CM

## 2021-12-17 DIAGNOSIS — M47.22 OSTEOARTHRITIS OF SPINE WITH RADICULOPATHY, CERVICAL REGION: ICD-10-CM

## 2021-12-17 DIAGNOSIS — M54.41 CHRONIC BILATERAL LOW BACK PAIN WITH BILATERAL SCIATICA: Primary | ICD-10-CM

## 2021-12-17 DIAGNOSIS — M54.41 CHRONIC BILATERAL LOW BACK PAIN WITH BILATERAL SCIATICA: ICD-10-CM

## 2021-12-17 DIAGNOSIS — M25.50 POLYARTHRALGIA: ICD-10-CM

## 2021-12-17 DIAGNOSIS — M54.2 CHRONIC NECK PAIN: ICD-10-CM

## 2021-12-17 DIAGNOSIS — E66.9 OBESITY (BMI 30.0-34.9): ICD-10-CM

## 2021-12-17 PROCEDURE — 72110 X-RAY EXAM L-2 SPINE 4/>VWS: CPT | Mod: TC

## 2021-12-17 PROCEDURE — 72110 XR LUMBAR SPINE AP AND LAT WITH FLEX/EXT: ICD-10-PCS | Mod: 26,,, | Performed by: RADIOLOGY

## 2021-12-17 PROCEDURE — 73560 XR KNEE AP STANDING WITH BOTH LATERAL: ICD-10-PCS | Mod: 26,,, | Performed by: RADIOLOGY

## 2021-12-17 PROCEDURE — 72110 X-RAY EXAM L-2 SPINE 4/>VWS: CPT | Mod: 26,,, | Performed by: RADIOLOGY

## 2021-12-17 PROCEDURE — 72050 X-RAY EXAM NECK SPINE 4/5VWS: CPT | Mod: 26,,, | Performed by: RADIOLOGY

## 2021-12-17 PROCEDURE — 4010F ACE/ARB THERAPY RXD/TAKEN: CPT | Mod: CPTII,S$GLB,, | Performed by: PHYSICAL MEDICINE & REHABILITATION

## 2021-12-17 PROCEDURE — 99204 OFFICE O/P NEW MOD 45 MIN: CPT | Mod: S$GLB,,, | Performed by: PHYSICAL MEDICINE & REHABILITATION

## 2021-12-17 PROCEDURE — 99999 PR PBB SHADOW E&M-EST. PATIENT-LVL III: CPT | Mod: PBBFAC,,, | Performed by: PHYSICAL MEDICINE & REHABILITATION

## 2021-12-17 PROCEDURE — 73560 X-RAY EXAM OF KNEE 1 OR 2: CPT | Mod: 26,,, | Performed by: RADIOLOGY

## 2021-12-17 PROCEDURE — 4010F PR ACE/ARB THEARPY RXD/TAKEN: ICD-10-PCS | Mod: CPTII,S$GLB,, | Performed by: PHYSICAL MEDICINE & REHABILITATION

## 2021-12-17 PROCEDURE — 99204 PR OFFICE/OUTPT VISIT, NEW, LEVL IV, 45-59 MIN: ICD-10-PCS | Mod: S$GLB,,, | Performed by: PHYSICAL MEDICINE & REHABILITATION

## 2021-12-17 PROCEDURE — 73560 X-RAY EXAM OF KNEE 1 OR 2: CPT | Mod: TC,50

## 2021-12-17 PROCEDURE — 72050 X-RAY EXAM NECK SPINE 4/5VWS: CPT | Mod: TC

## 2021-12-17 PROCEDURE — 72050 XR CERVICAL SPINE AP LAT WITH FLEX EXTEN: ICD-10-PCS | Mod: 26,,, | Performed by: RADIOLOGY

## 2021-12-17 PROCEDURE — 99999 PR PBB SHADOW E&M-EST. PATIENT-LVL III: ICD-10-PCS | Mod: PBBFAC,,, | Performed by: PHYSICAL MEDICINE & REHABILITATION

## 2021-12-17 RX ORDER — ACETAMINOPHEN 500 MG
500-1000 TABLET ORAL 3 TIMES DAILY PRN
Refills: 0 | COMMUNITY
Start: 2021-12-17

## 2021-12-17 RX ORDER — DULOXETIN HYDROCHLORIDE 30 MG/1
30 CAPSULE, DELAYED RELEASE ORAL DAILY
Qty: 30 CAPSULE | Refills: 4 | Status: SHIPPED | OUTPATIENT
Start: 2021-12-17 | End: 2023-01-05 | Stop reason: SDUPTHER

## 2021-12-21 ENCOUNTER — TELEPHONE (OUTPATIENT)
Dept: INTERNAL MEDICINE | Facility: CLINIC | Age: 61
End: 2021-12-21
Payer: COMMERCIAL

## 2021-12-21 ENCOUNTER — PATIENT MESSAGE (OUTPATIENT)
Dept: INTERNAL MEDICINE | Facility: CLINIC | Age: 61
End: 2021-12-21
Payer: COMMERCIAL

## 2021-12-28 ENCOUNTER — HOSPITAL ENCOUNTER (OUTPATIENT)
Dept: RADIOLOGY | Facility: HOSPITAL | Age: 61
Discharge: HOME | End: 2021-12-28
Attending: INTERNAL MEDICINE
Payer: COMMERCIAL

## 2021-12-28 VITALS — HEIGHT: 61 IN | WEIGHT: 178 LBS | BODY MASS INDEX: 33.61 KG/M2

## 2021-12-28 DIAGNOSIS — Z12.31 ENCOUNTER FOR SCREENING MAMMOGRAM FOR MALIGNANT NEOPLASM OF BREAST: ICD-10-CM

## 2021-12-28 PROCEDURE — 77067 SCR MAMMO BI INCL CAD: CPT | Mod: 26,,, | Performed by: RADIOLOGY

## 2021-12-28 PROCEDURE — 77063 BREAST TOMOSYNTHESIS BI: CPT | Mod: 26,,, | Performed by: RADIOLOGY

## 2021-12-28 PROCEDURE — 77067 MAMMO DIGITAL SCREENING BILAT WITH TOMO: ICD-10-PCS | Mod: 26,,, | Performed by: RADIOLOGY

## 2021-12-28 PROCEDURE — 77063 MAMMO DIGITAL SCREENING BILAT WITH TOMO: ICD-10-PCS | Mod: 26,,, | Performed by: RADIOLOGY

## 2021-12-28 PROCEDURE — 77067 SCR MAMMO BI INCL CAD: CPT | Mod: TC

## 2022-01-04 ENCOUNTER — PATIENT OUTREACH (OUTPATIENT)
Dept: ADMINISTRATIVE | Facility: OTHER | Age: 62
End: 2022-01-04
Payer: COMMERCIAL

## 2022-01-04 NOTE — PROGRESS NOTES
Health Maintenance Due   Topic Date Due    Pneumococcal Vaccines (Age 0-64) (1 of 2 - PPSV23) Never done    Aspirin/Antiplatelet Therapy  Never done    COVID-19 Vaccine (3 - Booster for Pfizer series) 09/15/2021     Updates were requested from care everywhere.  Chart was reviewed for overdue [facility]active [facility] Encounters ([facility]) topics (CRS, Breast Cancer Screening, Eye exam)  Health Maintenance has been updated.  LINKS immunization registry triggered.  Immunizations were reconciled.

## 2022-01-07 ENCOUNTER — OFFICE VISIT (OUTPATIENT)
Dept: RHEUMATOLOGY | Facility: CLINIC | Age: 62
End: 2022-01-07
Payer: COMMERCIAL

## 2022-01-07 VITALS
SYSTOLIC BLOOD PRESSURE: 113 MMHG | WEIGHT: 189.13 LBS | DIASTOLIC BLOOD PRESSURE: 71 MMHG | HEART RATE: 69 BPM | HEIGHT: 63 IN | BODY MASS INDEX: 33.51 KG/M2

## 2022-01-07 DIAGNOSIS — M15.9 PRIMARY OSTEOARTHRITIS INVOLVING MULTIPLE JOINTS: Primary | ICD-10-CM

## 2022-01-07 DIAGNOSIS — M79.7 PRIMARY FIBROMYALGIA: ICD-10-CM

## 2022-01-07 PROCEDURE — 3074F SYST BP LT 130 MM HG: CPT | Mod: CPTII,S$GLB,, | Performed by: INTERNAL MEDICINE

## 2022-01-07 PROCEDURE — 1159F PR MEDICATION LIST DOCUMENTED IN MEDICAL RECORD: ICD-10-PCS | Mod: CPTII,S$GLB,, | Performed by: INTERNAL MEDICINE

## 2022-01-07 PROCEDURE — 3074F PR MOST RECENT SYSTOLIC BLOOD PRESSURE < 130 MM HG: ICD-10-PCS | Mod: CPTII,S$GLB,, | Performed by: INTERNAL MEDICINE

## 2022-01-07 PROCEDURE — 99214 OFFICE O/P EST MOD 30 MIN: CPT | Mod: S$GLB,,, | Performed by: INTERNAL MEDICINE

## 2022-01-07 PROCEDURE — 3078F DIAST BP <80 MM HG: CPT | Mod: CPTII,S$GLB,, | Performed by: INTERNAL MEDICINE

## 2022-01-07 PROCEDURE — 99999 PR PBB SHADOW E&M-EST. PATIENT-LVL V: CPT | Mod: PBBFAC,,, | Performed by: INTERNAL MEDICINE

## 2022-01-07 PROCEDURE — 3008F BODY MASS INDEX DOCD: CPT | Mod: CPTII,S$GLB,, | Performed by: INTERNAL MEDICINE

## 2022-01-07 PROCEDURE — 3078F PR MOST RECENT DIASTOLIC BLOOD PRESSURE < 80 MM HG: ICD-10-PCS | Mod: CPTII,S$GLB,, | Performed by: INTERNAL MEDICINE

## 2022-01-07 PROCEDURE — 3008F PR BODY MASS INDEX (BMI) DOCUMENTED: ICD-10-PCS | Mod: CPTII,S$GLB,, | Performed by: INTERNAL MEDICINE

## 2022-01-07 PROCEDURE — 99214 PR OFFICE/OUTPT VISIT, EST, LEVL IV, 30-39 MIN: ICD-10-PCS | Mod: S$GLB,,, | Performed by: INTERNAL MEDICINE

## 2022-01-07 PROCEDURE — 1159F MED LIST DOCD IN RCRD: CPT | Mod: CPTII,S$GLB,, | Performed by: INTERNAL MEDICINE

## 2022-01-07 PROCEDURE — 99999 PR PBB SHADOW E&M-EST. PATIENT-LVL V: ICD-10-PCS | Mod: PBBFAC,,, | Performed by: INTERNAL MEDICINE

## 2022-01-07 RX ORDER — ALPRAZOLAM 0.5 MG/1
TABLET ORAL
Qty: 30 TABLET | Refills: 5 | Status: CANCELLED | OUTPATIENT
Start: 2022-01-07

## 2022-01-07 RX ORDER — BACLOFEN 10 MG/1
10 TABLET ORAL 3 TIMES DAILY PRN
Qty: 90 TABLET | Refills: 11 | Status: SHIPPED | OUTPATIENT
Start: 2022-01-07 | End: 2022-07-05 | Stop reason: SDUPTHER

## 2022-01-07 RX ORDER — DICLOFENAC SODIUM 10 MG/G
2 GEL TOPICAL 4 TIMES DAILY PRN
Qty: 1 EACH | Refills: 11 | Status: SHIPPED | OUTPATIENT
Start: 2022-01-07 | End: 2023-01-05 | Stop reason: SDUPTHER

## 2022-01-07 NOTE — PROGRESS NOTES
Rapid3 Question Responses and Scores 1/6/2022   MDHAQ Score 0.2   Psychologic Score 4.4   Pain Score 6   When you awakened in the morning OVER THE LAST WEEK, did you feel stiff? Yes   If Yes, please indicate the number of hours until you are as limber as you will be for the day 1   Fatigue Score 2   Global Health Score 3   RAPID3 Score 3.22     Answers for HPI/ROS submitted by the patient on 1/6/2022  fever: No  eye redness: No  mouth sores: No  headaches: No  shortness of breath: Yes  chest pain: No  trouble swallowing: No  diarrhea: Yes  constipation: Yes  unexpected weight change: No  genital sore: No  dysuria: No  During the last 3 days, have you had a skin rash?: No  Bruises or bleeds easily: Yes  cough: No

## 2022-01-07 NOTE — PROGRESS NOTES
"Subjective:       Patient ID: Cherie Richards is a 61 y.o. female.    Chief Complaint: Disease Management    HPI 61 year old F with PMH of anxiety, abnormal uterine cancer cells s/p hysterectomy, dry eyes, HTN, gastric bypass, gastritis, liver hemangioma, hiatal hernia here for evaluation.  She has recent diagnosis of "Spiculated right lower lobe nodule." She reports being tired for 2 months with shortness of breath.  She has pain in all over including muscles. She has been in pain for 6 months.   She feels like she is cramping in muscles.  She reports hands also hurt. Today, her pain is 4/10. Her pain can be as high as 8/10. She reports numbness in hands and feet for last 6 months.    Reports that her toes will get purple but not white or red. Reports hair loss for about 3 years.  Denies any rashes, photosensitivity, oral ulcers. On occasion, she feels like she has pleurisy.  Overuse makes her pain worse. She sleeps only 2 hours. She has good appetite.She has RUQ pain for a year and was noted to have colitis on CT scan.  Reports diarrhea and constipation. Denies bloody stools.        Interval history: she tried cymbalta and it made her nauseated.She tried flexeril and it made her sleepy but awake.  She is set up with PT.     Past Medical History:   Diagnosis Date    Abnormal Pap smear     Anemia     history    Anxiety     Cancer     uterine-cancer cells     Colon polyps, next C-scope 2019. 4/22/2014    Dry eyes     Dyspareunia, female 6/24/2020    Essential hypertension, started in her mid 30's 1/18/2017    Family history of malignant neoplasm of pancreas 6/1/2018    Gastric bypass status for obesity 8/21/2012    GERD (gastroesophageal reflux disease)     Helicobacter pylori gastritis, 2008. 8/20/2014    Hemangioma of liver, stable 2010, 2012, right lobe 8/20/2014    Hematuria     History of cosmetic surgeries, tummy tuck 2011. 4/5/2013    History of frquent UTI 6/24/2020    HTN (hypertension) " "8/21/2012    120s-130s/80s    Kidney stone     Right sided sciatica 6/1/2018    Sleep apnea     patient does not use the C-PAP mask-cannot tolerate    Ureteral stone 4/8/2014    Urinary incontinence     Urinary retention     Varicose veins of lower extremity with inflammation 4/8/2015    Vertigo     mild    Vitamin D deficiency disease 6/1/2018       Review of Systems   Constitutional: Negative for activity change, appetite change, chills, diaphoresis, fatigue, fever and unexpected weight change.   HENT: Negative for congestion, ear discharge, ear pain, facial swelling, mouth sores, sinus pressure, sneezing, sore throat, tinnitus and trouble swallowing.    Eyes: Negative for photophobia, pain, discharge, redness, itching and visual disturbance.   Respiratory: Negative for apnea, cough, choking, chest tightness, shortness of breath, wheezing and stridor.    Cardiovascular: Negative for chest pain and leg swelling.   Gastrointestinal: Negative for abdominal distention, abdominal pain, anal bleeding, blood in stool, constipation, diarrhea and nausea.   Endocrine: Negative for cold intolerance and heat intolerance.   Genitourinary: Negative for difficulty urinating, dysuria and genital sores.   Musculoskeletal: Positive for joint swelling and myalgias. Negative for arthralgias, back pain, gait problem, neck pain and neck stiffness.   Skin: Negative for color change, pallor, rash and wound.   Neurological: Negative for dizziness, seizures, light-headedness, numbness and headaches.   Hematological: Negative for adenopathy. Does not bruise/bleed easily.   Psychiatric/Behavioral: Negative for sleep disturbance. The patient is not nervous/anxious.            Objective:   /76   Pulse 69   Ht 5' 2" (1.575 m)   Wt 82.1 kg (181 lb)   BMI 33.11 kg/m²      Physical Exam   Constitutional: She is oriented to person, place, and time.   HENT:   Head: Normocephalic and atraumatic.   Right Ear: External ear normal. "   Left Ear: External ear normal.   Nose: Nose normal.   Mouth/Throat: Oropharynx is clear and moist. No oropharyngeal exudate.   Eyes: Conjunctivae and EOM are normal. Pupils are equal, round, and reactive to light. Right eye exhibits no discharge. Left eye exhibits no discharge. No scleral icterus.   Neck: No JVD present. No thyromegaly present.   Cardiovascular: Normal rate, regular rhythm, normal heart sounds and intact distal pulses.  Exam reveals no gallop and no friction rub.    No murmur heard.  Pulmonary/Chest: Effort normal and breath sounds normal. No respiratory distress. She has no wheezes. She has no rales. She exhibits no tenderness.   Abdominal: Soft. Bowel sounds are normal. She exhibits no distension and no mass. There is no abdominal tenderness. There is no rebound and no guarding.   Lymphadenopathy:     She has no cervical adenopathy.   Neurological: She is alert and oriented to person, place, and time. No cranial nerve deficit. Gait normal. Coordination normal.   Skin: Skin is dry. No rash noted. No erythema. No pallor.     Psychiatric: Affect and judgment normal.   Musculoskeletal: No tenderness, deformity or edema.          No data to display     Assessment:      61 year old F with PMH of anxiety, abnormal uterine cancer cells s/p hysterectomy, dry eyes, HTN, gastric bypass, gastritis, liver hemangioma, hiatal hernia here for evaluation.  On exam, she is diffusely tender consistent with fibromyalgia.    +ROBERT: she has +ROBERT but negative subserologies. I have low suspicion for SLE but will finish up the work up.  Labs    # fibromyalgia: she has diffuse muscular tenderness in her back, particularly in shoulder blades. Did not tolerate cymbalta or flexeril.  Start baclofen 10mg po TID PRN  PT consult placed at last visit  Followed by PMR      #nodule: being evaluated by pulmonary.  #obesity: encourage weight loss    30 * minutes of total time spent on the encounter, which includes face to face time  and non-face to face time preparing to see the patient (eg, review of tests), Obtaining and/or reviewing separately obtained history, Documenting clinical information in the electronic or other health record, Independently interpreting results (not separately reported) and communicating results to the patient/family/caregiver, or Care coordination (not separately reported).

## 2022-01-08 NOTE — TELEPHONE ENCOUNTER
No new care gaps identified.  Powered by [employer]. Reference number: [id number].   1/07/2022 8:42:14 PM CST

## 2022-01-10 RX ORDER — BENZONATATE 200 MG/1
200 CAPSULE ORAL 2 TIMES DAILY PRN
Qty: 20 CAPSULE | Refills: 3 | Status: SHIPPED | OUTPATIENT
Start: 2022-01-10 | End: 2022-09-12

## 2022-01-25 ENCOUNTER — OFFICE VISIT (OUTPATIENT)
Dept: INTERNAL MEDICINE | Facility: CLINIC | Age: 62
End: 2022-01-25
Payer: COMMERCIAL

## 2022-01-25 ENCOUNTER — LAB VISIT (OUTPATIENT)
Dept: LAB | Facility: HOSPITAL | Age: 62
End: 2022-01-25
Attending: INTERNAL MEDICINE
Payer: COMMERCIAL

## 2022-01-25 VITALS
OXYGEN SATURATION: 98 % | SYSTOLIC BLOOD PRESSURE: 124 MMHG | HEART RATE: 89 BPM | BODY MASS INDEX: 31.89 KG/M2 | WEIGHT: 180 LBS | DIASTOLIC BLOOD PRESSURE: 78 MMHG | HEIGHT: 63 IN

## 2022-01-25 DIAGNOSIS — I10 ESSENTIAL HYPERTENSION: Primary | ICD-10-CM

## 2022-01-25 DIAGNOSIS — K21.9 GASTROESOPHAGEAL REFLUX DISEASE WITHOUT ESOPHAGITIS: ICD-10-CM

## 2022-01-25 DIAGNOSIS — F41.1 GAD (GENERALIZED ANXIETY DISORDER): ICD-10-CM

## 2022-01-25 DIAGNOSIS — G47.30 SLEEP APNEA, UNSPECIFIED TYPE: ICD-10-CM

## 2022-01-25 DIAGNOSIS — R91.1 NODULE OF LOWER LOBE OF RIGHT LUNG: ICD-10-CM

## 2022-01-25 DIAGNOSIS — R79.9 ABNORMAL BLOOD CHEMISTRY: ICD-10-CM

## 2022-01-25 DIAGNOSIS — E55.9 VITAMIN D DEFICIENCY DISEASE: ICD-10-CM

## 2022-01-25 LAB
ESTIMATED AVG GLUCOSE: 117 MG/DL (ref 68–131)
HBA1C MFR BLD: 5.7 % (ref 4–5.6)

## 2022-01-25 PROCEDURE — 3044F HG A1C LEVEL LT 7.0%: CPT | Mod: CPTII,S$GLB,, | Performed by: INTERNAL MEDICINE

## 2022-01-25 PROCEDURE — 3044F PR MOST RECENT HEMOGLOBIN A1C LEVEL <7.0%: ICD-10-PCS | Mod: CPTII,S$GLB,, | Performed by: INTERNAL MEDICINE

## 2022-01-25 PROCEDURE — 99999 PR PBB SHADOW E&M-EST. PATIENT-LVL V: CPT | Mod: PBBFAC,,, | Performed by: INTERNAL MEDICINE

## 2022-01-25 PROCEDURE — 99999 PR PBB SHADOW E&M-EST. PATIENT-LVL V: ICD-10-PCS | Mod: PBBFAC,,, | Performed by: INTERNAL MEDICINE

## 2022-01-25 PROCEDURE — 3074F PR MOST RECENT SYSTOLIC BLOOD PRESSURE < 130 MM HG: ICD-10-PCS | Mod: CPTII,S$GLB,, | Performed by: INTERNAL MEDICINE

## 2022-01-25 PROCEDURE — 1160F RVW MEDS BY RX/DR IN RCRD: CPT | Mod: CPTII,S$GLB,, | Performed by: INTERNAL MEDICINE

## 2022-01-25 PROCEDURE — 3008F BODY MASS INDEX DOCD: CPT | Mod: CPTII,S$GLB,, | Performed by: INTERNAL MEDICINE

## 2022-01-25 PROCEDURE — 83036 HEMOGLOBIN GLYCOSYLATED A1C: CPT | Performed by: INTERNAL MEDICINE

## 2022-01-25 PROCEDURE — 99214 OFFICE O/P EST MOD 30 MIN: CPT | Mod: S$GLB,,, | Performed by: INTERNAL MEDICINE

## 2022-01-25 PROCEDURE — 1159F PR MEDICATION LIST DOCUMENTED IN MEDICAL RECORD: ICD-10-PCS | Mod: CPTII,S$GLB,, | Performed by: INTERNAL MEDICINE

## 2022-01-25 PROCEDURE — 3078F PR MOST RECENT DIASTOLIC BLOOD PRESSURE < 80 MM HG: ICD-10-PCS | Mod: CPTII,S$GLB,, | Performed by: INTERNAL MEDICINE

## 2022-01-25 PROCEDURE — 1160F PR REVIEW ALL MEDS BY PRESCRIBER/CLIN PHARMACIST DOCUMENTED: ICD-10-PCS | Mod: CPTII,S$GLB,, | Performed by: INTERNAL MEDICINE

## 2022-01-25 PROCEDURE — 36415 COLL VENOUS BLD VENIPUNCTURE: CPT | Performed by: INTERNAL MEDICINE

## 2022-01-25 PROCEDURE — 3078F DIAST BP <80 MM HG: CPT | Mod: CPTII,S$GLB,, | Performed by: INTERNAL MEDICINE

## 2022-01-25 PROCEDURE — 3008F PR BODY MASS INDEX (BMI) DOCUMENTED: ICD-10-PCS | Mod: CPTII,S$GLB,, | Performed by: INTERNAL MEDICINE

## 2022-01-25 PROCEDURE — 99214 PR OFFICE/OUTPT VISIT, EST, LEVL IV, 30-39 MIN: ICD-10-PCS | Mod: S$GLB,,, | Performed by: INTERNAL MEDICINE

## 2022-01-25 PROCEDURE — 1159F MED LIST DOCD IN RCRD: CPT | Mod: CPTII,S$GLB,, | Performed by: INTERNAL MEDICINE

## 2022-01-25 PROCEDURE — 3074F SYST BP LT 130 MM HG: CPT | Mod: CPTII,S$GLB,, | Performed by: INTERNAL MEDICINE

## 2022-01-25 RX ORDER — PANTOPRAZOLE SODIUM 40 MG/1
40 TABLET, DELAYED RELEASE ORAL 2 TIMES DAILY
Qty: 60 TABLET | Refills: 2 | Status: SHIPPED | OUTPATIENT
Start: 2022-01-25 | End: 2022-05-18 | Stop reason: SDUPTHER
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
## 2022-02-01 ENCOUNTER — PATIENT MESSAGE (OUTPATIENT)
Dept: ADMINISTRATIVE | Facility: OTHER | Age: 62
End: 2022-02-01
Payer: COMMERCIAL

## 2022-02-01 ENCOUNTER — PATIENT MESSAGE (OUTPATIENT)
Dept: INTERNAL MEDICINE | Facility: CLINIC | Age: 62
End: 2022-02-01
Payer: COMMERCIAL

## 2022-02-13 ENCOUNTER — PATIENT MESSAGE (OUTPATIENT)
Dept: INTERNAL MEDICINE | Facility: CLINIC | Age: 62
End: 2022-02-13
Payer: COMMERCIAL

## 2022-02-13 DIAGNOSIS — I10 ESSENTIAL HYPERTENSION: Primary | ICD-10-CM

## 2022-02-14 RX ORDER — LOSARTAN POTASSIUM 100 MG/1
100 TABLET ORAL EVERY MORNING
Qty: 90 TABLET | Refills: 3 | Status: SHIPPED | OUTPATIENT
Start: 2022-02-14 | End: 2022-11-15 | Stop reason: SDUPTHER

## 2022-02-14 NOTE — TELEPHONE ENCOUNTER
No new care gaps identified.  Powered by [employer]. Reference number: [id number].   2/14/2022 8:59:32 AM CST

## 2022-02-23 ENCOUNTER — OFFICE VISIT (OUTPATIENT)
Dept: PULMONOLOGY | Facility: CLINIC | Age: 62
End: 2022-02-23
Payer: COMMERCIAL

## 2022-02-23 VITALS
BODY MASS INDEX: 32.48 KG/M2 | HEIGHT: 63 IN | DIASTOLIC BLOOD PRESSURE: 70 MMHG | OXYGEN SATURATION: 98 % | WEIGHT: 183.31 LBS | RESPIRATION RATE: 16 BRPM | HEART RATE: 65 BPM | SYSTOLIC BLOOD PRESSURE: 114 MMHG

## 2022-02-23 DIAGNOSIS — J84.10 CALCIFIED GRANULOMA OF LUNG: ICD-10-CM

## 2022-02-23 DIAGNOSIS — R06.02 SOB (SHORTNESS OF BREATH): ICD-10-CM

## 2022-02-23 DIAGNOSIS — G47.30 SLEEP APNEA, UNSPECIFIED TYPE: Primary | ICD-10-CM

## 2022-02-23 PROBLEM — J98.4 CALCIFIED GRANULOMA OF LUNG: Status: ACTIVE | Noted: 2022-02-23

## 2022-02-23 PROCEDURE — 1159F PR MEDICATION LIST DOCUMENTED IN MEDICAL RECORD: ICD-10-PCS | Mod: CPTII,S$GLB,, | Performed by: INTERNAL MEDICINE

## 2022-02-23 PROCEDURE — 99999 PR PBB SHADOW E&M-EST. PATIENT-LVL V: CPT | Mod: PBBFAC,,, | Performed by: INTERNAL MEDICINE

## 2022-02-23 PROCEDURE — 3078F DIAST BP <80 MM HG: CPT | Mod: CPTII,S$GLB,, | Performed by: INTERNAL MEDICINE

## 2022-02-23 PROCEDURE — 99214 OFFICE O/P EST MOD 30 MIN: CPT | Mod: S$GLB,,, | Performed by: INTERNAL MEDICINE

## 2022-02-23 PROCEDURE — 1159F MED LIST DOCD IN RCRD: CPT | Mod: CPTII,S$GLB,, | Performed by: INTERNAL MEDICINE

## 2022-02-23 PROCEDURE — 3078F PR MOST RECENT DIASTOLIC BLOOD PRESSURE < 80 MM HG: ICD-10-PCS | Mod: CPTII,S$GLB,, | Performed by: INTERNAL MEDICINE

## 2022-02-23 PROCEDURE — 4010F PR ACE/ARB THEARPY RXD/TAKEN: ICD-10-PCS | Mod: CPTII,S$GLB,, | Performed by: INTERNAL MEDICINE

## 2022-02-23 PROCEDURE — 3074F PR MOST RECENT SYSTOLIC BLOOD PRESSURE < 130 MM HG: ICD-10-PCS | Mod: CPTII,S$GLB,, | Performed by: INTERNAL MEDICINE

## 2022-02-23 PROCEDURE — 3074F SYST BP LT 130 MM HG: CPT | Mod: CPTII,S$GLB,, | Performed by: INTERNAL MEDICINE

## 2022-02-23 PROCEDURE — 3008F BODY MASS INDEX DOCD: CPT | Mod: CPTII,S$GLB,, | Performed by: INTERNAL MEDICINE

## 2022-02-23 PROCEDURE — 3008F PR BODY MASS INDEX (BMI) DOCUMENTED: ICD-10-PCS | Mod: CPTII,S$GLB,, | Performed by: INTERNAL MEDICINE

## 2022-02-23 PROCEDURE — 99999 PR PBB SHADOW E&M-EST. PATIENT-LVL V: ICD-10-PCS | Mod: PBBFAC,,, | Performed by: INTERNAL MEDICINE

## 2022-02-23 PROCEDURE — 99214 PR OFFICE/OUTPT VISIT, EST, LEVL IV, 30-39 MIN: ICD-10-PCS | Mod: S$GLB,,, | Performed by: INTERNAL MEDICINE

## 2022-02-23 PROCEDURE — 3044F HG A1C LEVEL LT 7.0%: CPT | Mod: CPTII,S$GLB,, | Performed by: INTERNAL MEDICINE

## 2022-02-23 PROCEDURE — 4010F ACE/ARB THERAPY RXD/TAKEN: CPT | Mod: CPTII,S$GLB,, | Performed by: INTERNAL MEDICINE

## 2022-02-23 PROCEDURE — 3044F PR MOST RECENT HEMOGLOBIN A1C LEVEL <7.0%: ICD-10-PCS | Mod: CPTII,S$GLB,, | Performed by: INTERNAL MEDICINE
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
## 2022-02-23 NOTE — PATIENT INSTRUCTIONS
CT chest shows lung nodule improved  Dr. Daniels bariatric surgeon- consider follow up for hiatal hernia and digestion issues. Not sure another weight loss surgery would be the best  Weight loss discussed and recommended

## 2022-05-03 DIAGNOSIS — R06.09 DOE (DYSPNEA ON EXERTION): ICD-10-CM

## 2022-05-04 RX ORDER — ALPRAZOLAM 0.5 MG/1
TABLET ORAL
Qty: 30 TABLET | Refills: 3 | Status: CANCELLED | OUTPATIENT
Start: 2022-05-04

## 2022-05-04 RX ORDER — ALBUTEROL SULFATE 90 UG/1
1-2 AEROSOL, METERED RESPIRATORY (INHALATION) EVERY 4 HOURS PRN
Qty: 18 G | Refills: 11 | Status: SHIPPED | OUTPATIENT
Start: 2022-05-04 | End: 2023-07-20 | Stop reason: SDUPTHER

## 2022-05-09 RX ORDER — ALPRAZOLAM 0.5 MG/1
TABLET ORAL
Qty: 30 TABLET | Refills: 3 | Status: CANCELLED | OUTPATIENT
Start: 2022-05-09

## 2022-05-10 RX ORDER — ALPRAZOLAM 0.5 MG/1
TABLET ORAL
Qty: 30 TABLET | Refills: 3 | Status: SHIPPED | OUTPATIENT
Start: 2022-05-10 | End: 2022-07-05 | Stop reason: SDUPTHER

## 2022-05-18 DIAGNOSIS — K21.9 GASTROESOPHAGEAL REFLUX DISEASE WITHOUT ESOPHAGITIS: ICD-10-CM

## 2022-05-18 RX ORDER — PANTOPRAZOLE SODIUM 40 MG/1
40 TABLET, DELAYED RELEASE ORAL 2 TIMES DAILY
Qty: 60 TABLET | Refills: 2 | Status: SHIPPED | OUTPATIENT
Start: 2022-05-18 | End: 2023-01-05 | Stop reason: SDUPTHER

## 2022-05-18 RX ORDER — CYCLOBENZAPRINE HCL 10 MG
10 TABLET ORAL NIGHTLY
Qty: 30 TABLET | Refills: 6 | Status: SHIPPED | OUTPATIENT
Start: 2022-05-18 | End: 2022-12-21 | Stop reason: SDUPTHER

## 2022-05-18 NOTE — TELEPHONE ENCOUNTER
Care Due:                  Date            Visit Type   Department     Provider  --------------------------------------------------------------------------------                                EP -                              PRIMARY      [facility] INTERNAL  Last Visit: 01-      CARE ([facility])   [facility]                              EP -                              PRIMARY      [facility] INTERNAL  Next Visit: 07-      CARE ([facility])   [facility]                                                            Last  Test          Frequency    Reason                     Performed    Due Date  --------------------------------------------------------------------------------    CMP.........  12 months..  losartan.................  06- 06-    [employer] Embedded Care Gaps. Reference number: [id number]. 5/18/2022   6:35:10 AM CDT

## 2022-05-18 NOTE — TELEPHONE ENCOUNTER
Refill Routing Note   Medication(s) are not appropriate for processing by [facility] for the following reason(s):      - Outside of protocol    [facility] action(s):  Route       Medication Therapy Plan: TOTAL DAILY DOSE EXCEEDS MAINTENANCE DOSING FOR PPI  Medication reconciliation completed: No     Appointments  past 12m or future 3m with PCP    Date Provider   Last Visit   1/25/2022 Mariah Jones MD   Next Visit   7/26/2022 Mairah Jones MD   ED visits in past 90 days: 0        Note composed:8:14 AM 05/18/2022

## 2022-06-12 RX ORDER — BUPROPION HYDROCHLORIDE 100 MG/1
100 TABLET, EXTENDED RELEASE ORAL 2 TIMES DAILY
Qty: 180 TABLET | Refills: 3 | Status: CANCELLED | OUTPATIENT
Start: 2022-06-12 | End: 2023-06-12

## 2022-06-13 RX ORDER — BUPROPION HYDROCHLORIDE 100 MG/1
100 TABLET, EXTENDED RELEASE ORAL 2 TIMES DAILY
Qty: 180 TABLET | Refills: 3 | Status: CANCELLED | OUTPATIENT
Start: 2022-06-12 | End: 2023-06-12

## 2022-06-13 NOTE — TELEPHONE ENCOUNTER
No new care gaps identified.  [facility] Embedded Care Gaps. Reference number: [id number]. 6/13/2022   10:49:49 AM DIT

## 2022-07-05 ENCOUNTER — OFFICE VISIT (OUTPATIENT)
Dept: INTERNAL MEDICINE | Facility: CLINIC | Age: 62
End: 2022-07-05
Payer: COMMERCIAL

## 2022-07-05 VITALS
OXYGEN SATURATION: 97 % | HEIGHT: 63 IN | HEART RATE: 75 BPM | DIASTOLIC BLOOD PRESSURE: 81 MMHG | SYSTOLIC BLOOD PRESSURE: 127 MMHG | BODY MASS INDEX: 32.32 KG/M2 | WEIGHT: 182.44 LBS

## 2022-07-05 DIAGNOSIS — Z00.00 VISIT FOR ANNUAL HEALTH EXAMINATION: Primary | ICD-10-CM

## 2022-07-05 DIAGNOSIS — M25.551 ACUTE RIGHT HIP PAIN: ICD-10-CM

## 2022-07-05 DIAGNOSIS — I70.0 AORTIC CALCIFICATION: ICD-10-CM

## 2022-07-05 DIAGNOSIS — E55.9 VITAMIN D DEFICIENCY DISEASE: ICD-10-CM

## 2022-07-05 DIAGNOSIS — F41.1 GAD (GENERALIZED ANXIETY DISORDER): ICD-10-CM

## 2022-07-05 DIAGNOSIS — Z98.84 GASTRIC BYPASS STATUS FOR OBESITY: ICD-10-CM

## 2022-07-05 DIAGNOSIS — K21.9 GASTROESOPHAGEAL REFLUX DISEASE WITHOUT ESOPHAGITIS: ICD-10-CM

## 2022-07-05 DIAGNOSIS — M15.9 OSTEOARTHRITIS OF MULTIPLE JOINTS, UNSPECIFIED OSTEOARTHRITIS TYPE: ICD-10-CM

## 2022-07-05 DIAGNOSIS — D18.03 HEMANGIOMA OF LIVER: ICD-10-CM

## 2022-07-05 DIAGNOSIS — R91.1 NODULE OF LOWER LOBE OF RIGHT LUNG: ICD-10-CM

## 2022-07-05 DIAGNOSIS — R73.03 PREDIABETES: ICD-10-CM

## 2022-07-05 DIAGNOSIS — I10 ESSENTIAL HYPERTENSION: ICD-10-CM

## 2022-07-05 DIAGNOSIS — F33.1 MODERATE EPISODE OF RECURRENT MAJOR DEPRESSIVE DISORDER: ICD-10-CM

## 2022-07-05 DIAGNOSIS — G47.00 INSOMNIA, UNSPECIFIED TYPE: ICD-10-CM

## 2022-07-05 DIAGNOSIS — E66.09 CLASS 1 OBESITY DUE TO EXCESS CALORIES WITH SERIOUS COMORBIDITY AND BODY MASS INDEX (BMI) OF 32.0 TO 32.9 IN ADULT: ICD-10-CM

## 2022-07-05 DIAGNOSIS — M48.061 SPINAL STENOSIS OF LUMBAR REGION WITHOUT NEUROGENIC CLAUDICATION: ICD-10-CM

## 2022-07-05 PROCEDURE — 3044F HG A1C LEVEL LT 7.0%: CPT | Mod: CPTII,S$GLB,, | Performed by: INTERNAL MEDICINE

## 2022-07-05 PROCEDURE — 99396 PREV VISIT EST AGE 40-64: CPT | Mod: S$GLB,,, | Performed by: INTERNAL MEDICINE

## 2022-07-05 PROCEDURE — 99999 PR PBB SHADOW E&M-EST. PATIENT-LVL V: ICD-10-PCS | Mod: PBBFAC,,, | Performed by: INTERNAL MEDICINE

## 2022-07-05 PROCEDURE — 99499 UNLISTED E&M SERVICE: CPT | Mod: S$GLB,,, | Performed by: INTERNAL MEDICINE

## 2022-07-05 PROCEDURE — 4010F ACE/ARB THERAPY RXD/TAKEN: CPT | Mod: CPTII,S$GLB,, | Performed by: INTERNAL MEDICINE

## 2022-07-05 PROCEDURE — 3079F PR MOST RECENT DIASTOLIC BLOOD PRESSURE 80-89 MM HG: ICD-10-PCS | Mod: CPTII,S$GLB,, | Performed by: INTERNAL MEDICINE

## 2022-07-05 PROCEDURE — 99999 PR PBB SHADOW E&M-EST. PATIENT-LVL V: CPT | Mod: PBBFAC,,, | Performed by: INTERNAL MEDICINE

## 2022-07-05 PROCEDURE — 3008F PR BODY MASS INDEX (BMI) DOCUMENTED: ICD-10-PCS | Mod: CPTII,S$GLB,, | Performed by: INTERNAL MEDICINE

## 2022-07-05 PROCEDURE — 4010F PR ACE/ARB THEARPY RXD/TAKEN: ICD-10-PCS | Mod: CPTII,S$GLB,, | Performed by: INTERNAL MEDICINE

## 2022-07-05 PROCEDURE — 1160F RVW MEDS BY RX/DR IN RCRD: CPT | Mod: CPTII,S$GLB,, | Performed by: INTERNAL MEDICINE

## 2022-07-05 PROCEDURE — 1160F PR REVIEW ALL MEDS BY PRESCRIBER/CLIN PHARMACIST DOCUMENTED: ICD-10-PCS | Mod: CPTII,S$GLB,, | Performed by: INTERNAL MEDICINE

## 2022-07-05 PROCEDURE — 1159F MED LIST DOCD IN RCRD: CPT | Mod: CPTII,S$GLB,, | Performed by: INTERNAL MEDICINE

## 2022-07-05 PROCEDURE — 3074F SYST BP LT 130 MM HG: CPT | Mod: CPTII,S$GLB,, | Performed by: INTERNAL MEDICINE

## 2022-07-05 PROCEDURE — 3044F PR MOST RECENT HEMOGLOBIN A1C LEVEL <7.0%: ICD-10-PCS | Mod: CPTII,S$GLB,, | Performed by: INTERNAL MEDICINE

## 2022-07-05 PROCEDURE — 3074F PR MOST RECENT SYSTOLIC BLOOD PRESSURE < 130 MM HG: ICD-10-PCS | Mod: CPTII,S$GLB,, | Performed by: INTERNAL MEDICINE

## 2022-07-05 PROCEDURE — 99396 PR PREVENTIVE VISIT,EST,40-64: ICD-10-PCS | Mod: S$GLB,,, | Performed by: INTERNAL MEDICINE

## 2022-07-05 PROCEDURE — 1159F PR MEDICATION LIST DOCUMENTED IN MEDICAL RECORD: ICD-10-PCS | Mod: CPTII,S$GLB,, | Performed by: INTERNAL MEDICINE

## 2022-07-05 PROCEDURE — 99499 RISK ADDL DX/OHS AUDIT: ICD-10-PCS | Mod: S$GLB,,, | Performed by: INTERNAL MEDICINE

## 2022-07-05 PROCEDURE — 3079F DIAST BP 80-89 MM HG: CPT | Mod: CPTII,S$GLB,, | Performed by: INTERNAL MEDICINE

## 2022-07-05 PROCEDURE — 3008F BODY MASS INDEX DOCD: CPT | Mod: CPTII,S$GLB,, | Performed by: INTERNAL MEDICINE

## 2022-07-05 RX ORDER — ALPRAZOLAM 0.5 MG/1
TABLET ORAL
Qty: 30 TABLET | Refills: 3 | Status: SHIPPED | OUTPATIENT
Start: 2022-07-05 | End: 2022-12-21 | Stop reason: SDUPTHER

## 2022-07-05 RX ORDER — ATORVASTATIN CALCIUM 40 MG/1
40 TABLET, FILM COATED ORAL NIGHTLY
Qty: 90 TABLET | Refills: 3 | Status: SHIPPED | OUTPATIENT
Start: 2022-07-05 | End: 2023-01-05 | Stop reason: SDUPTHER

## 2022-07-05 RX ORDER — AMLODIPINE BESYLATE 2.5 MG/1
TABLET ORAL
Qty: 90 TABLET | Refills: 3 | Status: SHIPPED | OUTPATIENT
Start: 2022-07-05 | End: 2023-01-05 | Stop reason: SDUPTHER

## 2022-07-05 RX ORDER — BUPROPION HYDROCHLORIDE 100 MG/1
100 TABLET, EXTENDED RELEASE ORAL 2 TIMES DAILY
Qty: 180 TABLET | Refills: 3 | Status: SHIPPED | OUTPATIENT
Start: 2022-07-05 | End: 2023-01-05 | Stop reason: SDUPTHER

## 2022-07-05 RX ORDER — CYANOCOBALAMIN 1000 UG/ML
INJECTION, SOLUTION INTRAMUSCULAR; SUBCUTANEOUS
Qty: 1 ML | Refills: 11 | Status: SHIPPED | OUTPATIENT
Start: 2022-07-05 | End: 2023-01-05 | Stop reason: SDUPTHER

## 2022-07-05 RX ORDER — BACLOFEN 10 MG/1
10 TABLET ORAL 3 TIMES DAILY PRN
Qty: 90 TABLET | Refills: 11 | Status: SHIPPED | OUTPATIENT
Start: 2022-07-05 | End: 2024-01-31 | Stop reason: SDUPTHER

## 2022-07-05 NOTE — PROGRESS NOTES
INTERNAL MEDICINE ESTABLISHED PATIENT VISIT NOTE    Subjective:     Chief Complaint: Annual Exam       Patient ID: Cherie Richards is a 62 y.o. female with depression, HTN, mixed urinary incontinence, hemangioma of R liver (resolved), vit D def, gastric bypass, H. Pylori infection, GERD, colon polyps, R sided sciatica, ANDREW (corrected), RLL nodule (resolved 12/2021), last seen by me 1/2022, here today for follow-up.    Acute onset of R sided hip pain for past 24 hours. Recently went out on boat ride, sat on ice chest for prolonged periods of time. Pain has slowly worsened, now with some difficulty walking. No saddle anesthesia, bowel/bladder incontinence. No falls or injuries. No radiation.     Considering Ozempic as weight loss medication.     Past Medical History:  Past Medical History:   Diagnosis Date    Abnormal Pap smear     Anemia     history    Anxiety     Cancer     uterine-cancer cells     Colon polyps, next C-scope 2019. 4/22/2014    Dry eyes     Dyspareunia, female 6/24/2020    Essential hypertension, started in her mid 30's 1/18/2017    Family history of malignant neoplasm of pancreas 6/1/2018    Gastric bypass status for obesity 8/21/2012    GERD (gastroesophageal reflux disease)     Helicobacter pylori gastritis, 2008. 8/20/2014    Hemangioma of liver, stable 2010, 2012, right lobe 8/20/2014    Hematuria     History of cosmetic surgeries, tummy tuck 2011. 4/5/2013    History of frquent UTI 6/24/2020    HTN (hypertension) 8/21/2012    120s-130s/80s    Kidney stone     Right sided sciatica 6/1/2018    Sleep apnea     patient does not use the C-PAP mask-cannot tolerate    Ureteral stone 4/8/2014    Urinary incontinence     Urinary retention     Varicose veins of lower extremity with inflammation 4/8/2015    Vertigo     mild    Vitamin D deficiency disease 6/1/2018          Review of Systems:  Review of Systems   HENT: Negative for hearing loss.    Eyes: Negative for discharge.  "  Respiratory: Negative for wheezing.    Cardiovascular: Negative for palpitations.   Gastrointestinal: Negative for blood in stool, constipation, diarrhea and vomiting.   Genitourinary: Negative for hematuria.   Musculoskeletal: Negative for neck pain.   Neurological: Positive for headaches. Negative for weakness.   Endo/Heme/Allergies: Negative for polydipsia.          Health Maintenance:   Immunizations:   Influenza - fall 2022  Tdap - 4/2013  Covid 19 - complete, discussed booster  HPV  Prevnar rec at 63   Shingrix rec at 48 - complete     Cancer Screening:  PAP: 2/2019 NILM  Mammogram:  12/2021 BIRAD 1  Colonoscopy:  2/2020 1 benign polyp, repeat 2/2025  DEXA:  n/a     Objective:   /81 (BP Location: Right arm, Patient Position: Sitting, BP Method: Medium (Manual))   Pulse 75   Ht 5' 3" (1.6 m)   Wt 82.7 kg (182 lb 6.9 oz)   SpO2 97%   BMI 32.32 kg/m²        General: AAO x3, no apparent distress  HEENT: PERRL  CV: RRR, no m/r/g  Pulm: Lungs CTAB, no crackles, no wheezes  Abd: s/NT/ND +BS  Extremities: no c/c/e  MSK: Pain with passive external/internal rotation of R hip.     Labs:       Assessment/Plan     Visit for annual health examination  Labs ordered    Acute right hip pain  Joint vs trochanteric etiology, obtain imaging  -     X-Ray Hip 2 or 3 views Right (with Pelvis when performed); Future; Expected date: 07/05/2022    Essential hypertension  Controlled  -     amLODIPine (NORVASC) 2.5 MG tablet; Take one tablet by mouth as needed for a blood pressure spike over 140  Dispense: 90 tablet; Refill: 3  -     CBC Auto Differential; Future; Expected date: 07/05/2022  -     Comprehensive Metabolic Panel; Future; Expected date: 07/05/2022    Gastroesophageal reflux disease without esophagitis  Not well-controlled on PPI BID  Previously referred to GI, no appt made; advised to schedule as referral unexpired    KAILEE (generalized anxiety disorder)  Stable, continue current regimen  -     ALPRAZolam (XANAX) " 0.5 MG tablet; TAKE 1 TABLET(0.5 MG) BY MOUTH EVERY NIGHT AS NEEDED FOR INSOMNIA OR ANXIETY  Dispense: 30 tablet; Refill: 3  -     buPROPion (WELLBUTRIN SR) 100 MG TBSR 12 hr tablet; Take 1 tablet (100 mg total) by mouth 2 (two) times daily.  Dispense: 180 tablet; Refill: 3    Moderate episode of recurrent major depressive disorder  Stable, continue current regimen    Osteoarthritis of multiple joints, unspecified osteoarthritis type  Stable    Gastric bypass status for obesity, 08-.  -     cyanocobalamin 1,000 mcg/mL injection; INJECT 1 ML (CC) INTRAMUSCULARLY ONCE EVERY MONTH  Dispense: 1 mL; Refill: 11    Prediabetes  1/2022 HgbA1C 5.7; repeat  -     Hemoglobin A1C; Future; Expected date: 07/05/2022  -     TSH; Future; Expected date: 07/05/2022    Insomnia, unspecified type  Stable, continue current regimen    Hemangioma of liver, stable 2010, 2012, right lobe; resolved     Vitamin D deficiency disease  Supplement    Aortic calcification, by CT scan 5/4/2021  -     atorvastatin (LIPITOR) 40 MG tablet; Take 1 tablet (40 mg total) by mouth every evening. To lower cholesterol  Dispense: 90 tablet; Refill: 3  -     Lipid Panel; Future; Expected date: 07/05/2022    Nodule of lower lobe of right lung, spiculated 1.4 cm by CT scan 5/4/2021, resolved 12/2021    Spinal stenosis of lumbar region without neurogenic claudication  -     baclofen (LIORESAL) 10 MG tablet; Take 1 tablet (10 mg total) by mouth 3 (three) times daily as needed (muscle spasm).  Dispense: 90 tablet; Refill: 11    Class 1 obesity due to excess calories with serious comorbidity and body mass index (BMI) of 32.0 to 32.9 in adult  Discussed Ozempic, Trulicity options for weight loss; advised on administration, side effects, contraindications  Obtain labs and send rx as indicated    HM as above    RTC in 6 mo, sooner if needed.    Patsy Jones MD  Department of Internal Medicine - [facility]  07/05/2022

## 2022-07-05 NOTE — PATIENT INSTRUCTIONS
Take Tylenol 1000mg up to three times daily. Can alternate with ibuprofen, up to 800mg total daily, with food and water.     Apply heat or cold compresses to affected area.     Please schedule GI appt.

## 2022-07-06 ENCOUNTER — HOSPITAL ENCOUNTER (OUTPATIENT)
Dept: RADIOLOGY | Facility: HOSPITAL | Age: 62
Discharge: HOME | End: 2022-07-06
Attending: INTERNAL MEDICINE
Payer: COMMERCIAL

## 2022-07-06 DIAGNOSIS — M25.551 ACUTE RIGHT HIP PAIN: ICD-10-CM

## 2022-07-06 PROCEDURE — 73502 XR HIP WITH PELVIS WHEN PERFORMED, 2 OR 3  VIEWS RIGHT: ICD-10-PCS | Mod: 26,RT,, | Performed by: RADIOLOGY

## 2022-07-06 PROCEDURE — 73502 X-RAY EXAM HIP UNI 2-3 VIEWS: CPT | Mod: 26,RT,, | Performed by: RADIOLOGY

## 2022-07-06 PROCEDURE — 73502 X-RAY EXAM HIP UNI 2-3 VIEWS: CPT | Mod: TC,RT

## 2022-07-15 ENCOUNTER — TELEPHONE (OUTPATIENT)
Dept: RHEUMATOLOGY | Facility: CLINIC | Age: 62
End: 2022-07-15
Payer: COMMERCIAL

## 2022-07-15 NOTE — TELEPHONE ENCOUNTER
Spoke with patient regarding July 18 appointment, letting patient know that Dr. Patel will not be in clinic and the appointment had to be cancelled.  Will be reaching back out to reschedule with an available appointment time.

## 2022-07-19 ENCOUNTER — PATIENT MESSAGE (OUTPATIENT)
Dept: RESEARCH | Facility: CLINIC | Age: 62
End: 2022-07-19
Payer: COMMERCIAL

## 2022-08-03 ENCOUNTER — OFFICE VISIT (OUTPATIENT)
Dept: RHEUMATOLOGY | Facility: CLINIC | Age: 62
End: 2022-08-03
Payer: COMMERCIAL

## 2022-08-03 VITALS
DIASTOLIC BLOOD PRESSURE: 82 MMHG | WEIGHT: 181.69 LBS | BODY MASS INDEX: 32.19 KG/M2 | HEIGHT: 63 IN | TEMPERATURE: 99 F | HEART RATE: 76 BPM | SYSTOLIC BLOOD PRESSURE: 123 MMHG

## 2022-08-03 DIAGNOSIS — M79.7 PRIMARY FIBROMYALGIA: Primary | ICD-10-CM

## 2022-08-03 DIAGNOSIS — M48.061 SPINAL STENOSIS OF LUMBAR REGION WITHOUT NEUROGENIC CLAUDICATION: ICD-10-CM

## 2022-08-03 PROCEDURE — 3079F DIAST BP 80-89 MM HG: CPT | Mod: CPTII,S$GLB,, | Performed by: INTERNAL MEDICINE

## 2022-08-03 PROCEDURE — 4010F ACE/ARB THERAPY RXD/TAKEN: CPT | Mod: CPTII,S$GLB,, | Performed by: INTERNAL MEDICINE

## 2022-08-03 PROCEDURE — 4010F PR ACE/ARB THEARPY RXD/TAKEN: ICD-10-PCS | Mod: CPTII,S$GLB,, | Performed by: INTERNAL MEDICINE

## 2022-08-03 PROCEDURE — 3044F PR MOST RECENT HEMOGLOBIN A1C LEVEL <7.0%: ICD-10-PCS | Mod: CPTII,S$GLB,, | Performed by: INTERNAL MEDICINE

## 2022-08-03 PROCEDURE — 3008F BODY MASS INDEX DOCD: CPT | Mod: CPTII,S$GLB,, | Performed by: INTERNAL MEDICINE

## 2022-08-03 PROCEDURE — 3074F PR MOST RECENT SYSTOLIC BLOOD PRESSURE < 130 MM HG: ICD-10-PCS | Mod: CPTII,S$GLB,, | Performed by: INTERNAL MEDICINE

## 2022-08-03 PROCEDURE — 99214 PR OFFICE/OUTPT VISIT, EST, LEVL IV, 30-39 MIN: ICD-10-PCS | Mod: S$GLB,,, | Performed by: INTERNAL MEDICINE

## 2022-08-03 PROCEDURE — 3044F HG A1C LEVEL LT 7.0%: CPT | Mod: CPTII,S$GLB,, | Performed by: INTERNAL MEDICINE

## 2022-08-03 PROCEDURE — 3079F PR MOST RECENT DIASTOLIC BLOOD PRESSURE 80-89 MM HG: ICD-10-PCS | Mod: CPTII,S$GLB,, | Performed by: INTERNAL MEDICINE

## 2022-08-03 PROCEDURE — 99214 OFFICE O/P EST MOD 30 MIN: CPT | Mod: S$GLB,,, | Performed by: INTERNAL MEDICINE

## 2022-08-03 PROCEDURE — 99999 PR PBB SHADOW E&M-EST. PATIENT-LVL IV: ICD-10-PCS | Mod: PBBFAC,,, | Performed by: INTERNAL MEDICINE

## 2022-08-03 PROCEDURE — 3074F SYST BP LT 130 MM HG: CPT | Mod: CPTII,S$GLB,, | Performed by: INTERNAL MEDICINE

## 2022-08-03 PROCEDURE — 3008F PR BODY MASS INDEX (BMI) DOCUMENTED: ICD-10-PCS | Mod: CPTII,S$GLB,, | Performed by: INTERNAL MEDICINE

## 2022-08-03 PROCEDURE — 99999 PR PBB SHADOW E&M-EST. PATIENT-LVL IV: CPT | Mod: PBBFAC,,, | Performed by: INTERNAL MEDICINE

## 2022-08-03 ASSESSMENT — ROUTINE ASSESSMENT OF PATIENT INDEX DATA (RAPID3)
PSYCHOLOGICAL DISTRESS SCORE: 2.2
PATIENT GLOBAL ASSESSMENT SCORE: 9.5
MDHAQ FUNCTION SCORE: 0.9
TOTAL RAPID3 SCORE: 6.83
WHEN YOU AWAKENED IN THE MORNING OVER THE LAST WEEK, PLEASE INDICATE THE AMOUNT OF TIME IT TAKES UNTIL YOU ARE AS LIMBER AS YOU WILL BE FOR THE DAY: 4 HOURS
AM STIFFNESS SCORE: 1, YES
FATIGUE SCORE: 10
PAIN SCORE: 8

## 2022-08-03 NOTE — PROGRESS NOTES
"Subjective:       Patient ID: Cherie Richards is a 61 y.o. female.    Chief Complaint: Disease Management    HPI 61 year old F with PMH of anxiety, abnormal uterine cancer cells s/p hysterectomy, dry eyes, HTN, gastric bypass, gastritis, liver hemangioma, hiatal hernia here for evaluation.  She has recent diagnosis of "Spiculated right lower lobe nodule." She reports being tired for 2 months with shortness of breath.  She has pain in all over including muscles. She has been in pain for 6 months.   She feels like she is cramping in muscles.  She reports hands also hurt. Today, her pain is 4/10. Her pain can be as high as 8/10. She reports numbness in hands and feet for last 6 months.    Reports that her toes will get purple but not white or red. Reports hair loss for about 3 years.  Denies any rashes, photosensitivity, oral ulcers. On occasion, she feels like she has pleurisy.  Overuse makes her pain worse. She sleeps only 2 hours. She has good appetite.She has RUQ pain for a year and was noted to have colitis on CT scan.  Reports diarrhea and constipation. Denies bloody stools.        Interval history: She has not followed up with PMR.  She takes baclofen 10mg po TID. She takes xanax and it helps her with sleep.  Pain levelis 7/10.  Reports right side of her leg feels lower back.  She tried cymbalta and it made her nauseated.She tried flexeril and it made her sleepy but awake.  She is set up with PT.     Past Medical History:   Diagnosis Date    Abnormal Pap smear     Anemia     history    Anxiety     Cancer     uterine-cancer cells     Colon polyps, next C-scope 2019. 4/22/2014    Dry eyes     Dyspareunia, female 6/24/2020    Essential hypertension, started in her mid 30's 1/18/2017    Family history of malignant neoplasm of pancreas 6/1/2018    Gastric bypass status for obesity 8/21/2012    GERD (gastroesophageal reflux disease)     Helicobacter pylori gastritis, 2008. 8/20/2014    Hemangioma of " "liver, stable 2010, 2012, right lobe 8/20/2014    Hematuria     History of cosmetic surgeries, tummy tuck 2011. 4/5/2013    History of frquent UTI 6/24/2020    HTN (hypertension) 8/21/2012    120s-130s/80s    Kidney stone     Right sided sciatica 6/1/2018    Sleep apnea     patient does not use the C-PAP mask-cannot tolerate    Ureteral stone 4/8/2014    Urinary incontinence     Urinary retention     Varicose veins of lower extremity with inflammation 4/8/2015    Vertigo     mild    Vitamin D deficiency disease 6/1/2018       Review of Systems   Constitutional: Negative for activity change, appetite change, chills, diaphoresis, fatigue, fever and unexpected weight change.   HENT: Negative for congestion, ear discharge, ear pain, facial swelling, mouth sores, sinus pressure, sneezing, sore throat, tinnitus and trouble swallowing.    Eyes: Negative for photophobia, pain, discharge, redness, itching and visual disturbance.   Respiratory: Negative for apnea, cough, choking, chest tightness, shortness of breath, wheezing and stridor.    Cardiovascular: Negative for chest pain and leg swelling.   Gastrointestinal: Negative for abdominal distention, abdominal pain, anal bleeding, blood in stool, constipation, diarrhea and nausea.   Endocrine: Negative for cold intolerance and heat intolerance.   Genitourinary: Negative for difficulty urinating, dysuria and genital sores.   Musculoskeletal: Positive for joint swelling and myalgias. Negative for arthralgias, back pain, gait problem, neck pain and neck stiffness.   Skin: Negative for color change, pallor, rash and wound.   Neurological: Negative for dizziness, seizures, light-headedness, numbness and headaches.   Hematological: Negative for adenopathy. Does not bruise/bleed easily.   Psychiatric/Behavioral: Negative for sleep disturbance. The patient is not nervous/anxious.            Objective:   /76   Pulse 69   Ht 5' 2" (1.575 m)   Wt 82.1 kg (181 lb) "   BMI 33.11 kg/m²      Physical Exam   Constitutional: She is oriented to person, place, and time.   HENT:   Head: Normocephalic and atraumatic.   Right Ear: External ear normal.   Left Ear: External ear normal.   Nose: Nose normal.   Mouth/Throat: Oropharynx is clear and moist. No oropharyngeal exudate.   Eyes: Conjunctivae and EOM are normal. Pupils are equal, round, and reactive to light. Right eye exhibits no discharge. Left eye exhibits no discharge. No scleral icterus.   Neck: No JVD present. No thyromegaly present.   Cardiovascular: Normal rate, regular rhythm, normal heart sounds and intact distal pulses.  Exam reveals no gallop and no friction rub.    No murmur heard.  Pulmonary/Chest: Effort normal and breath sounds normal. No respiratory distress. She has no wheezes. She has no rales. She exhibits no tenderness.   Abdominal: Soft. Bowel sounds are normal. She exhibits no distension and no mass. There is no abdominal tenderness. There is no rebound and no guarding.   Lymphadenopathy:     She has no cervical adenopathy.   Neurological: She is alert and oriented to person, place, and time. No cranial nerve deficit. Gait normal. Coordination normal.   Skin: Skin is dry. No rash noted. No erythema. No pallor.     Psychiatric: Affect and judgment normal.   Musculoskeletal: No tenderness, deformity or edema.          No data to display     Assessment:      61 year old F with PMH of anxiety, abnormal uterine cancer cells s/p hysterectomy, dry eyes, HTN, gastric bypass, gastritis, liver hemangioma, hiatal hernia here for evaluation.  On exam, she is diffusely tender consistent with fibromyalgia.    # fibromyalgia: she has diffuse muscular tenderness in her back, particularly in shoulder blades. Did not tolerate cymbalta or flexeril.  Continue baclofen 10mg in AM, 10mg in afternoon but increase nighttime dose to 20mg po qhs  PT consult placed at last visit but she is too busy  Asked her to make appt with PMR  again.      #nodule: being evaluated by pulmonary.  #obesity: encourage weight loss    30 * minutes of total time spent on the encounter, which includes face to face time and non-face to face time preparing to see the patient (eg, review of tests), Obtaining and/or reviewing separately obtained history, Documenting clinical information in the electronic or other health record, Independently interpreting results (not separately reported) and communicating results to the patient/family/caregiver, or Care coordination (not separately reported).

## 2022-08-03 NOTE — PROGRESS NOTES
Rapid3 Question Responses and Scores 8/2/2022   MDHAQ Score 0.9   Psychologic Score 2.2   Pain Score 8   When you awakened in the morning OVER THE LAST WEEK, did you feel stiff? Yes   If Yes, please indicate the number of hours until you are as limber as you will be for the day 4   Fatigue Score 10   Global Health Score 9.5   RAPID3 Score 6.83         Answers for HPI/ROS submitted by the patient on 8/2/2022  fever: No  eye redness: No  mouth sores: No  headaches: Yes  shortness of breath: No  chest pain: No  trouble swallowing: No  diarrhea: No  constipation: No  unexpected weight change: No  genital sore: No  dysuria: Yes  During the last 3 days, have you had a skin rash?: No  Bruises or bleeds easily: Yes  cough: No

## 2022-10-27 RX ORDER — METOPROLOL SUCCINATE 25 MG/1
25 TABLET, EXTENDED RELEASE ORAL DAILY
Qty: 90 TABLET | Refills: 3 | Status: SHIPPED | OUTPATIENT
Start: 2022-10-27 | End: 2023-01-05 | Stop reason: SDUPTHER

## 2022-10-28 ENCOUNTER — HOSPITAL ENCOUNTER (EMERGENCY)
Facility: HOSPITAL | Age: 62
Discharge: HOME | End: 2022-10-28
Attending: EMERGENCY MEDICINE
Payer: COMMERCIAL

## 2022-10-28 VITALS
TEMPERATURE: 98 F | SYSTOLIC BLOOD PRESSURE: 169 MMHG | DIASTOLIC BLOOD PRESSURE: 79 MMHG | WEIGHT: 180 LBS | OXYGEN SATURATION: 96 % | BODY MASS INDEX: 31.89 KG/M2 | HEART RATE: 96 BPM | RESPIRATION RATE: 18 BRPM

## 2022-10-28 DIAGNOSIS — R10.9 ABDOMINAL PAIN: ICD-10-CM

## 2022-10-28 DIAGNOSIS — R11.2 NAUSEA AND VOMITING, UNSPECIFIED VOMITING TYPE: Primary | ICD-10-CM

## 2022-10-28 DIAGNOSIS — N39.0 URINARY TRACT INFECTION WITHOUT HEMATURIA, SITE UNSPECIFIED: ICD-10-CM

## 2022-10-28 LAB
ALBUMIN SERPL BCP-MCNC: 4.3 G/DL (ref 3.5–5.2)
ALP SERPL-CCNC: 126 U/L (ref 55–135)
ALT SERPL W/O P-5'-P-CCNC: 18 U/L (ref 10–44)
ANION GAP SERPL CALC-SCNC: 15 MMOL/L (ref 8–16)
AST SERPL-CCNC: 17 U/L (ref 10–40)
BACTERIA #/AREA URNS AUTO: ABNORMAL /HPF
BASOPHILS # BLD AUTO: 0.02 K/UL (ref 0–0.2)
BASOPHILS NFR BLD: 0.2 % (ref 0–1.9)
BILIRUB SERPL-MCNC: 0.6 MG/DL (ref 0.1–1)
BILIRUB UR QL STRIP: NEGATIVE
BUN SERPL-MCNC: 14 MG/DL (ref 8–23)
CALCIUM SERPL-MCNC: 9.8 MG/DL (ref 8.7–10.5)
CHLORIDE SERPL-SCNC: 99 MMOL/L (ref 95–110)
CLARITY UR REFRACT.AUTO: ABNORMAL
CO2 SERPL-SCNC: 19 MMOL/L (ref 23–29)
COLOR UR AUTO: YELLOW
CREAT SERPL-MCNC: 0.8 MG/DL (ref 0.5–1.4)
DIFFERENTIAL METHOD: ABNORMAL
EOSINOPHIL # BLD AUTO: 0 K/UL (ref 0–0.5)
EOSINOPHIL NFR BLD: 0.3 % (ref 0–8)
ERYTHROCYTE [DISTWIDTH] IN BLOOD BY AUTOMATED COUNT: 13.8 % (ref 11.5–14.5)
EST. GFR  (NO RACE VARIABLE): >60 ML/MIN/1.73 M^2
GLUCOSE SERPL-MCNC: 132 MG/DL (ref 70–110)
GLUCOSE UR QL STRIP: NEGATIVE
HCT VFR BLD AUTO: 40.9 % (ref 37–48.5)
HGB BLD-MCNC: 13.6 G/DL (ref 12–16)
HGB UR QL STRIP: NEGATIVE
IMM GRANULOCYTES # BLD AUTO: 0.05 K/UL (ref 0–0.04)
IMM GRANULOCYTES NFR BLD AUTO: 0.5 % (ref 0–0.5)
KETONES UR QL STRIP: ABNORMAL
LEUKOCYTE ESTERASE UR QL STRIP: ABNORMAL
LIPASE SERPL-CCNC: 10 U/L (ref 4–60)
LYMPHOCYTES # BLD AUTO: 1.2 K/UL (ref 1–4.8)
LYMPHOCYTES NFR BLD: 12.3 % (ref 18–48)
MCH RBC QN AUTO: 28.9 PG (ref 27–31)
MCHC RBC AUTO-ENTMCNC: 33.3 G/DL (ref 32–36)
MCV RBC AUTO: 87 FL (ref 82–98)
MICROSCOPIC COMMENT: ABNORMAL
MONOCYTES # BLD AUTO: 0.7 K/UL (ref 0.3–1)
MONOCYTES NFR BLD: 6.4 % (ref 4–15)
NEUTROPHILS # BLD AUTO: 8.1 K/UL (ref 1.8–7.7)
NEUTROPHILS NFR BLD: 80.3 % (ref 38–73)
NITRITE UR QL STRIP: NEGATIVE
NRBC BLD-RTO: 0 /100 WBC
PH UR STRIP: 6 [PH] (ref 5–8)
PLATELET # BLD AUTO: 306 K/UL (ref 150–450)
PMV BLD AUTO: 9.6 FL (ref 9.2–12.9)
POTASSIUM SERPL-SCNC: 4.4 MMOL/L (ref 3.5–5.1)
PROT SERPL-MCNC: 7.8 G/DL (ref 6–8.4)
PROT UR QL STRIP: NEGATIVE
RBC # BLD AUTO: 4.7 M/UL (ref 4–5.4)
RBC #/AREA URNS AUTO: 3 /HPF (ref 0–4)
SODIUM SERPL-SCNC: 133 MMOL/L (ref 136–145)
SP GR UR STRIP: 1.02 (ref 1–1.03)
SQUAMOUS #/AREA URNS AUTO: 10 /HPF
TROPONIN I SERPL DL<=0.01 NG/ML-MCNC: <0.006 NG/ML (ref 0–0.03)
URN SPEC COLLECT METH UR: ABNORMAL
WBC # BLD AUTO: 10.09 K/UL (ref 3.9–12.7)
WBC #/AREA URNS AUTO: 29 /HPF (ref 0–5)

## 2022-10-28 PROCEDURE — 99285 EMERGENCY DEPT VISIT HI MDM: CPT | Mod: ,,, | Performed by: EMERGENCY MEDICINE

## 2022-10-28 PROCEDURE — 93005 ELECTROCARDIOGRAM TRACING: CPT

## 2022-10-28 PROCEDURE — 87088 URINE BACTERIA CULTURE: CPT | Performed by: STUDENT IN AN ORGANIZED HEALTH CARE EDUCATION/TRAINING PROGRAM

## 2022-10-28 PROCEDURE — 93010 EKG 12-LEAD: ICD-10-PCS | Mod: ,,, | Performed by: INTERNAL MEDICINE

## 2022-10-28 PROCEDURE — 63600175 PHARM REV CODE 636 W HCPCS: Performed by: STUDENT IN AN ORGANIZED HEALTH CARE EDUCATION/TRAINING PROGRAM

## 2022-10-28 PROCEDURE — 84484 ASSAY OF TROPONIN QUANT: CPT | Performed by: STUDENT IN AN ORGANIZED HEALTH CARE EDUCATION/TRAINING PROGRAM

## 2022-10-28 PROCEDURE — 87077 CULTURE AEROBIC IDENTIFY: CPT | Performed by: STUDENT IN AN ORGANIZED HEALTH CARE EDUCATION/TRAINING PROGRAM

## 2022-10-28 PROCEDURE — 25000003 PHARM REV CODE 250: Performed by: STUDENT IN AN ORGANIZED HEALTH CARE EDUCATION/TRAINING PROGRAM

## 2022-10-28 PROCEDURE — 93010 ELECTROCARDIOGRAM REPORT: CPT | Mod: ,,, | Performed by: INTERNAL MEDICINE

## 2022-10-28 PROCEDURE — 87086 URINE CULTURE/COLONY COUNT: CPT | Performed by: STUDENT IN AN ORGANIZED HEALTH CARE EDUCATION/TRAINING PROGRAM

## 2022-10-28 PROCEDURE — 80053 COMPREHEN METABOLIC PANEL: CPT | Performed by: STUDENT IN AN ORGANIZED HEALTH CARE EDUCATION/TRAINING PROGRAM

## 2022-10-28 PROCEDURE — 83690 ASSAY OF LIPASE: CPT | Performed by: STUDENT IN AN ORGANIZED HEALTH CARE EDUCATION/TRAINING PROGRAM

## 2022-10-28 PROCEDURE — 96361 HYDRATE IV INFUSION ADD-ON: CPT

## 2022-10-28 PROCEDURE — 81001 URINALYSIS AUTO W/SCOPE: CPT | Performed by: STUDENT IN AN ORGANIZED HEALTH CARE EDUCATION/TRAINING PROGRAM

## 2022-10-28 PROCEDURE — 96365 THER/PROPH/DIAG IV INF INIT: CPT

## 2022-10-28 PROCEDURE — 99284 EMERGENCY DEPT VISIT MOD MDM: CPT | Mod: 25

## 2022-10-28 PROCEDURE — 99285 PR EMERGENCY DEPT VISIT,LEVEL V: ICD-10-PCS | Mod: ,,, | Performed by: EMERGENCY MEDICINE

## 2022-10-28 PROCEDURE — 87186 SC STD MICRODIL/AGAR DIL: CPT | Performed by: STUDENT IN AN ORGANIZED HEALTH CARE EDUCATION/TRAINING PROGRAM

## 2022-10-28 PROCEDURE — 96375 TX/PRO/DX INJ NEW DRUG ADDON: CPT

## 2022-10-28 PROCEDURE — 85025 COMPLETE CBC W/AUTO DIFF WBC: CPT | Performed by: STUDENT IN AN ORGANIZED HEALTH CARE EDUCATION/TRAINING PROGRAM

## 2022-10-28 RX ORDER — MAG HYDROX/ALUMINUM HYD/SIMETH 200-200-20
30 SUSPENSION, ORAL (FINAL DOSE FORM) ORAL
Status: COMPLETED | OUTPATIENT
Start: 2022-10-28 | End: 2022-10-28

## 2022-10-28 RX ORDER — DROPERIDOL 2.5 MG/ML
1.25 INJECTION, SOLUTION INTRAMUSCULAR; INTRAVENOUS ONCE
Status: COMPLETED | OUTPATIENT
Start: 2022-10-28 | End: 2022-10-28

## 2022-10-28 RX ORDER — PROMETHAZINE HYDROCHLORIDE 25 MG/1
25 TABLET ORAL EVERY 6 HOURS PRN
Qty: 15 TABLET | Refills: 0 | Status: SHIPPED | OUTPATIENT
Start: 2022-10-28 | End: 2024-03-22

## 2022-10-28 RX ORDER — CEPHALEXIN 500 MG/1
500 CAPSULE ORAL 4 TIMES DAILY
Qty: 20 CAPSULE | Refills: 0 | Status: SHIPPED | OUTPATIENT
Start: 2022-10-28 | End: 2022-11-02

## 2022-10-28 RX ADMIN — ALUMINUM HYDROXIDE, MAGNESIUM HYDROXIDE, AND SIMETHICONE 30 ML: 200; 200; 20 SUSPENSION ORAL at 08:10

## 2022-10-28 RX ADMIN — DROPERIDOL 1.25 MG: 2.5 INJECTION, SOLUTION INTRAMUSCULAR; INTRAVENOUS at 09:10

## 2022-10-28 RX ADMIN — PROMETHAZINE HYDROCHLORIDE 12.5 MG: 25 INJECTION INTRAMUSCULAR; INTRAVENOUS at 08:10

## 2022-10-28 RX ADMIN — SODIUM CHLORIDE 1000 ML: 0.9 INJECTION, SOLUTION INTRAVENOUS at 07:10

## 2022-10-28 NOTE — DISCHARGE INSTRUCTIONS
Please continue to take Maalox OTC for abdominal pain. Take the Zofran or the phenergan for nausea or vomiting

## 2022-10-28 NOTE — ED TRIAGE NOTES
Pt c/o 9/10 abdominal pain, N/V since taking Clindamycin and methylprednisone prior to dental procedure. Pt reports being unable to tolerate sips of water. Denies CP, SOB.

## 2022-10-28 NOTE — ED PROVIDER NOTES
Encounter Date: 10/28/2022       History     Chief Complaint   Patient presents with    Abdominal Pain     Pt had a dental implant procedure on Wed of this week. Pt reports since taking antibiotics the day after she has been vomiting and cannot tolerate food and only small sips of water. Pt also c/o 9/10 burning epigastric pain x3 days. Denies fevers or rashes     62-year-old female with history of GERD, gastric bypass, HTN  presenting to the ED with epigastric pain, intractable nausea, vomiting x2 days.  Epigastric pain worsens with eating and drinking.  Patient tried taking Zofran without relief in her nausea or vomiting.  She has been able to tolerate sips of water, though it worsens her epigastric pain.  Symptoms started after her dental procedure, started on clindamycin.  She did take 800 mg of ibuprofen before symptoms started.  Denies any significant chronic NSAID use.  Last endoscopy approximately one year ago showed chronic gastritis.  Has had history of cholecystectomy.      Review of patient's allergies indicates:   Allergen Reactions    Adhesive tape-silicones Other (See Comments)     Other reaction(s): BLISTERS    Penicillins Hives and Itching    Sulfamethoxazole-trimethoprim Hives and Itching    Prosed ec [meth-hyos-atrp-m blue-ba-phsal] Hives, Itching and Swelling    Pyridium [phenazopyridine]      Past Medical History:   Diagnosis Date    Abnormal Pap smear     Anemia     history    Anxiety     Cancer     uterine-cancer cells     Colon polyps, next C-scope 2019. 4/22/2014    Dry eyes     Dyspareunia, female 6/24/2020    Essential hypertension, started in her mid 30's 1/18/2017    Family history of malignant neoplasm of pancreas 6/1/2018    Gastric bypass status for obesity 8/21/2012    GERD (gastroesophageal reflux disease)     Helicobacter pylori gastritis, 2008. 8/20/2014    Hemangioma of liver, stable 2010, 2012, right lobe 8/20/2014    Hematuria     History of cosmetic surgeries, tummy tuck 2011.  4/5/2013    History of frquent UTI 6/24/2020    HTN (hypertension) 8/21/2012    120s-130s/80s    Kidney stone     Right sided sciatica 6/1/2018    Sleep apnea     patient does not use the C-PAP mask-cannot tolerate    Ureteral stone 4/8/2014    Urinary incontinence     Urinary retention     Varicose veins of lower extremity with inflammation 4/8/2015    Vertigo     mild    Vitamin D deficiency disease 6/1/2018     Past Surgical History:   Procedure Laterality Date    Abdominoplasty with Liposcuction      APPENDECTOMY      CERVICAL BIOPSY  W/ LOOP ELECTRODE EXCISION      Prior to hysterectomy    CHOLECYSTECTOMY      Laparoscopic    COLONOSCOPY N/A 2/13/2020    Procedure: COLONOSCOPY;  Surgeon: Pb Smith MD;  [facility]  Service: Endoscopy;  Laterality: N/A;    ESOPHAGOGASTRODUODENOSCOPY N/A 11/2/2021    Procedure: EGD (ESOPHAGOGASTRODUODENOSCOPY);  Surgeon: Clayton Martínez MD;  [facility]  Service: Endoscopy;  Laterality: N/A;  fully vac. 3/15/21 / instr portal -ml    GASTRIC BYPASS      HERNIA REPAIR      HYSTERECTOMY  1980     TVH  both ovaries remain     Family History   Problem Relation Age of Onset    Heart disease Mother     Stroke Mother     Coronary artery disease Mother     Heart failure Mother     Obesity Mother     Diabetes Father     Obesity Father     Breast cancer Maternal Aunt     Colon cancer Maternal Uncle     Hearing loss Maternal Grandmother     No Known Problems Sister     No Known Problems Brother     No Known Problems Paternal Aunt     No Known Problems Paternal Uncle     No Known Problems Maternal Grandfather     No Known Problems Paternal Grandmother     No Known Problems Paternal Grandfather     Ovarian cancer Neg Hx     Anesthesia problems Neg Hx     Amblyopia Neg Hx     Blindness Neg Hx     Cancer Neg Hx     Cataracts Neg Hx     Glaucoma Neg Hx     Hypertension Neg Hx     Macular degeneration Neg Hx     Retinal detachment Neg Hx      Strabismus Neg Hx     Thyroid disease Neg Hx      Social History     Tobacco Use    Smoking status: Never    Smokeless tobacco: Never    Tobacco comments:     .  .   Occup:  .     Substance Use Topics    Alcohol use: No    Drug use: No     Review of Systems   Constitutional:  Negative for fever.   HENT:  Negative for sore throat.    Respiratory:  Negative for shortness of breath.    Cardiovascular:  Negative for chest pain.   Gastrointestinal:  Positive for abdominal pain, nausea and vomiting. Negative for diarrhea.   Genitourinary:  Negative for dysuria.   Musculoskeletal:  Negative for back pain.   Skin:  Negative for rash.   Neurological:  Negative for weakness.   Hematological:  Does not bruise/bleed easily.     Physical Exam     Initial Vitals [10/28/22 0607]   BP Pulse Resp Temp SpO2   (!) 190/108 105 16 98.1 °F (36.7 °C) 98 %      MAP       --         Physical Exam    Nursing note and vitals reviewed.  Constitutional: She appears well-developed and well-nourished. She is not diaphoretic. No distress.   HENT:   Head: Normocephalic and atraumatic.   Eyes: Conjunctivae and EOM are normal. Right eye exhibits no discharge. Left eye exhibits no discharge. No scleral icterus.   Neck:   Normal range of motion.  Cardiovascular:  Normal rate and regular rhythm.     Exam reveals no gallop and no friction rub.       No murmur heard.  Pulmonary/Chest: No respiratory distress. She has no wheezes. She has no rhonchi. She has no rales. She exhibits no tenderness.   Abdominal: Abdomen is soft. She exhibits no distension and no mass. There is abdominal tenderness.   Epigastric TTP There is no rebound and no guarding.   Musculoskeletal:      Cervical back: Normal range of motion.     Neurological: She is alert.   Skin: Skin is warm and dry. No erythema. No pallor.       ED Course   Procedures  Labs Reviewed   CBC W/ AUTO DIFFERENTIAL - Abnormal; Notable for the following components:       Result Value     Gran # (ANC) 8.1 (*)     Immature Grans (Abs) 0.05 (*)     Gran % 80.3 (*)     Lymph % 12.3 (*)     All other components within normal limits   COMPREHENSIVE METABOLIC PANEL - Abnormal; Notable for the following components:    Sodium 133 (*)     CO2 19 (*)     Glucose 132 (*)     All other components within normal limits   URINALYSIS, REFLEX TO URINE CULTURE - Abnormal; Notable for the following components:    Appearance, UA Hazy (*)     Ketones, UA Trace (*)     Leukocytes, UA 3+ (*)     All other components within normal limits    Narrative:     Specimen Source->Urine   URINALYSIS MICROSCOPIC - Abnormal; Notable for the following components:    WBC, UA 29 (*)     Bacteria Moderate (*)     All other components within normal limits    Narrative:     Specimen Source->Urine   CULTURE, URINE   LIPASE   TROPONIN I     EKG Readings: (Independently Interpreted)   Normal sinus rhythm, rate of 95. No ST elevations or depressions concerning for ischemia.  No STEMI per my independent interpretation     ECG Results              EKG 12-lead (Final result)  Result time 10/28/22 09:01:41      Final result by Interface, Lab In [facility] (10/28/22 09:01:41)                   Narrative:    Test Reason : R10.9,    Vent. Rate : 095 BPM     Atrial Rate : 095 BPM     P-R Int : 152 ms          QRS Dur : 076 ms      QT Int : 336 ms       P-R-T Axes : 017 026 016 degrees     QTc Int : 422 ms    Normal sinus rhythm  Low voltage QRS in the precordial leads only  Otherwise normal ECG  When compared with ECG of 07-MAY-2021 08:48,  No significant change was found  Confirmed by Ion SANTANA, Diego GARNER ([id number]) on 10/28/2022 9:01:28 AM    Referred By: SONIA   SELF           Confirmed By:Diego Lemon MD                                  Imaging Results    None          Medications   promethazine (PHENERGAN) 12.5 mg in dextrose 5 % 50 mL IVPB (0 mg Intravenous Stopped 10/28/22 0822)   sodium chloride 0.9% bolus 1,000 mL (0 mLs Intravenous Stopped  10/28/22 0923)   aluminum-magnesium hydroxide-simethicone 200-200-20 mg/5 mL suspension 30 mL (30 mLs Oral Given 10/28/22 0821)   droperidoL injection 1.25 mg (1.25 mg Intravenous Given 10/28/22 0959)     Medical Decision Making:   History:   Old Medical Records: I decided to obtain old medical records.  Initial Assessment:   62-year-old female presenting to the ED with epigastric pain, nausea, vomiting since starting antibiotics two days ago after procedure.  Has history cholecystectomy, tenderness palpation in the epigastrium.    Differential includes was not limited to gastritis, medication side effect, electrolyte abnormalities, pancreatitis.    CBC showed no leukocytosis or anemia. CMP showed no electrolyte abnormalities concerning for hospitalization. UA showed concern for UTI.  Prescribed Keflex.  EKG showed no concern for ischemia; troponin negative, doubt ACS.   Initially, had mild relief with Phenergan, though after drinking Maalox, had worsening epigastric pain.  Given droperidol with improvement in symptoms.  Patient comfortable with discharge.      PCP follow-up within 2-3 days was recommended. Given prescription for Phenergan, Keflex    After taking into careful account the patient's history, physical exam findings, as well as empirical and objective data obtained throughout ED workup, I feel no emergent medical condition has been identified. No further evaluation or admission was felt to be required, and the patient is stable for discharge from the ED. The patient and any additional family present were updated with test results, overall clinical impression, and recommended further plan of care, including discharge instructions as provided and outpatient follow-up for continued evaluation and management as needed. All questions were answered. The patient expressed understanding and agreed with current plan for discharge and follow-up plan of care. Strict ED return precautions were provided, including  return/worsening of current symptoms or any other concerns.    Independently Interpreted Test(s):   I have ordered and independently interpreted EKG Reading(s) - see prior notes  Clinical Tests:   Lab Tests: Ordered and Reviewed  Medical Tests: Ordered and Reviewed          Attending Attestation:   Physician Attestation Statement for Resident:  As the supervising MD   Physician Attestation Statement: I have personally seen and examined this patient.   I agree with the above history.  -:   As the supervising MD I agree with the above PE.     As the supervising MD I agree with the above treatment, course, plan, and disposition.                  ED Course as of 10/29/22 0944   Fri Oct 28, 2022   0922 On reexamination, patient reported feeling improved after the Phenergan; however, after drinking Maalox, had worsening nausea.  Will give droperidol. [AU]      ED Course User Index  [AU] Nii Germain MD                 Clinical Impression:   Final diagnoses:  [R10.9] Abdominal pain  [R11.2] Nausea and vomiting, unspecified vomiting type (Primary)  [N39.0] Urinary tract infection without hematuria, site unspecified        ED Disposition Condition    Discharge Stable          ED Prescriptions       Medication Sig Dispense Start Date End Date Auth. Provider    cephALEXin (KEFLEX) 500 MG capsule Take 1 capsule (500 mg total) by mouth 4 (four) times daily. for 5 days 20 capsule 10/28/2022 11/2/2022 Nii Germain MD    promethazine (PHENERGAN) 25 MG tablet Take 1 tablet (25 mg total) by mouth every 6 (six) hours as needed for Nausea. 15 tablet 10/28/2022 -- Nii Germain MD          Follow-up Information       Follow up With Specialties Details Why Contact Info    Mariah Jonse MD Internal Medicine In 3 days for re-evaluation of your symptoms [address] [facility] [id number]  [phone]      [facility] - Emergency Dept Emergency Medicine  If symptoms worsen [address] [facility]  [place] [address]  [phone]             Nii Germain MD  Resident  10/28/22 1459       Shawna Nayak MD  10/29/22 5975

## 2022-10-30 LAB — BACTERIA UR CULT: ABNORMAL

## 2022-11-15 ENCOUNTER — LAB VISIT (OUTPATIENT)
Dept: LAB | Facility: HOSPITAL | Age: 62
End: 2022-11-15
Payer: COMMERCIAL

## 2022-11-15 ENCOUNTER — OFFICE VISIT (OUTPATIENT)
Dept: INTERNAL MEDICINE | Facility: CLINIC | Age: 62
End: 2022-11-15
Payer: COMMERCIAL

## 2022-11-15 VITALS
SYSTOLIC BLOOD PRESSURE: 150 MMHG | DIASTOLIC BLOOD PRESSURE: 90 MMHG | RESPIRATION RATE: 93 BRPM | HEIGHT: 63 IN | BODY MASS INDEX: 32.69 KG/M2 | WEIGHT: 184.5 LBS | HEART RATE: 80 BPM

## 2022-11-15 DIAGNOSIS — R30.0 DYSURIA: ICD-10-CM

## 2022-11-15 DIAGNOSIS — Z87.440 HISTORY OF UTI: Primary | ICD-10-CM

## 2022-11-15 DIAGNOSIS — H10.13 ALLERGIC CONJUNCTIVITIS OF BOTH EYES: ICD-10-CM

## 2022-11-15 DIAGNOSIS — E01.0 THYROMEGALY: ICD-10-CM

## 2022-11-15 DIAGNOSIS — I10 ESSENTIAL HYPERTENSION: ICD-10-CM

## 2022-11-15 DIAGNOSIS — Z12.31 ENCOUNTER FOR SCREENING MAMMOGRAM FOR MALIGNANT NEOPLASM OF BREAST: ICD-10-CM

## 2022-11-15 DIAGNOSIS — H92.01 OTALGIA OF RIGHT EAR: ICD-10-CM

## 2022-11-15 LAB
BACTERIA #/AREA URNS AUTO: ABNORMAL /HPF
BILIRUB UR QL STRIP: NEGATIVE
CLARITY UR REFRACT.AUTO: ABNORMAL
COLOR UR AUTO: YELLOW
GLUCOSE UR QL STRIP: NEGATIVE
HGB UR QL STRIP: NEGATIVE
KETONES UR QL STRIP: NEGATIVE
LEUKOCYTE ESTERASE UR QL STRIP: ABNORMAL
MICROSCOPIC COMMENT: ABNORMAL
NITRITE UR QL STRIP: POSITIVE
PH UR STRIP: 6 [PH] (ref 5–8)
PROT UR QL STRIP: NEGATIVE
RBC #/AREA URNS AUTO: 3 /HPF (ref 0–4)
SP GR UR STRIP: 1.01 (ref 1–1.03)
SQUAMOUS #/AREA URNS AUTO: 6 /HPF
URN SPEC COLLECT METH UR: ABNORMAL
WBC #/AREA URNS AUTO: 24 /HPF (ref 0–5)

## 2022-11-15 PROCEDURE — 87186 SC STD MICRODIL/AGAR DIL: CPT | Performed by: INTERNAL MEDICINE

## 2022-11-15 PROCEDURE — 1159F PR MEDICATION LIST DOCUMENTED IN MEDICAL RECORD: ICD-10-PCS | Mod: CPTII,S$GLB,, | Performed by: INTERNAL MEDICINE

## 2022-11-15 PROCEDURE — 3044F PR MOST RECENT HEMOGLOBIN A1C LEVEL <7.0%: ICD-10-PCS | Mod: CPTII,S$GLB,, | Performed by: INTERNAL MEDICINE

## 2022-11-15 PROCEDURE — 3080F DIAST BP >= 90 MM HG: CPT | Mod: CPTII,S$GLB,, | Performed by: INTERNAL MEDICINE

## 2022-11-15 PROCEDURE — 3044F HG A1C LEVEL LT 7.0%: CPT | Mod: CPTII,S$GLB,, | Performed by: INTERNAL MEDICINE

## 2022-11-15 PROCEDURE — 87088 URINE BACTERIA CULTURE: CPT | Performed by: INTERNAL MEDICINE

## 2022-11-15 PROCEDURE — 87077 CULTURE AEROBIC IDENTIFY: CPT | Performed by: INTERNAL MEDICINE

## 2022-11-15 PROCEDURE — 1160F PR REVIEW ALL MEDS BY PRESCRIBER/CLIN PHARMACIST DOCUMENTED: ICD-10-PCS | Mod: CPTII,S$GLB,, | Performed by: INTERNAL MEDICINE

## 2022-11-15 PROCEDURE — 3077F SYST BP >= 140 MM HG: CPT | Mod: CPTII,S$GLB,, | Performed by: INTERNAL MEDICINE

## 2022-11-15 PROCEDURE — 3077F PR MOST RECENT SYSTOLIC BLOOD PRESSURE >= 140 MM HG: ICD-10-PCS | Mod: CPTII,S$GLB,, | Performed by: INTERNAL MEDICINE

## 2022-11-15 PROCEDURE — 4010F ACE/ARB THERAPY RXD/TAKEN: CPT | Mod: CPTII,S$GLB,, | Performed by: INTERNAL MEDICINE

## 2022-11-15 PROCEDURE — 99999 PR PBB SHADOW E&M-EST. PATIENT-LVL V: ICD-10-PCS | Mod: PBBFAC,,, | Performed by: INTERNAL MEDICINE

## 2022-11-15 PROCEDURE — 87086 URINE CULTURE/COLONY COUNT: CPT | Performed by: INTERNAL MEDICINE

## 2022-11-15 PROCEDURE — 1160F RVW MEDS BY RX/DR IN RCRD: CPT | Mod: CPTII,S$GLB,, | Performed by: INTERNAL MEDICINE

## 2022-11-15 PROCEDURE — 3008F BODY MASS INDEX DOCD: CPT | Mod: CPTII,S$GLB,, | Performed by: INTERNAL MEDICINE

## 2022-11-15 PROCEDURE — 4010F PR ACE/ARB THEARPY RXD/TAKEN: ICD-10-PCS | Mod: CPTII,S$GLB,, | Performed by: INTERNAL MEDICINE

## 2022-11-15 PROCEDURE — 99999 PR PBB SHADOW E&M-EST. PATIENT-LVL V: CPT | Mod: PBBFAC,,, | Performed by: INTERNAL MEDICINE

## 2022-11-15 PROCEDURE — 1159F MED LIST DOCD IN RCRD: CPT | Mod: CPTII,S$GLB,, | Performed by: INTERNAL MEDICINE

## 2022-11-15 PROCEDURE — 3008F PR BODY MASS INDEX (BMI) DOCUMENTED: ICD-10-PCS | Mod: CPTII,S$GLB,, | Performed by: INTERNAL MEDICINE

## 2022-11-15 PROCEDURE — 99214 PR OFFICE/OUTPT VISIT, EST, LEVL IV, 30-39 MIN: ICD-10-PCS | Mod: S$GLB,,, | Performed by: INTERNAL MEDICINE

## 2022-11-15 PROCEDURE — 81001 URINALYSIS AUTO W/SCOPE: CPT | Performed by: INTERNAL MEDICINE

## 2022-11-15 PROCEDURE — 99214 OFFICE O/P EST MOD 30 MIN: CPT | Mod: S$GLB,,, | Performed by: INTERNAL MEDICINE

## 2022-11-15 PROCEDURE — 3080F PR MOST RECENT DIASTOLIC BLOOD PRESSURE >= 90 MM HG: ICD-10-PCS | Mod: CPTII,S$GLB,, | Performed by: INTERNAL MEDICINE

## 2022-11-15 RX ORDER — HYDROCHLOROTHIAZIDE 25 MG/1
25 TABLET ORAL DAILY
Qty: 90 TABLET | Refills: 3 | Status: SHIPPED | OUTPATIENT
Start: 2022-11-15 | End: 2023-01-05 | Stop reason: SDUPTHER

## 2022-11-15 RX ORDER — LOSARTAN POTASSIUM 100 MG/1
100 TABLET ORAL EVERY MORNING
Qty: 90 TABLET | Refills: 3 | Status: SHIPPED | OUTPATIENT
Start: 2022-11-15 | End: 2023-01-05 | Stop reason: SDUPTHER

## 2022-11-15 RX ORDER — OLOPATADINE HYDROCHLORIDE 1 MG/ML
1 SOLUTION/ DROPS OPHTHALMIC 2 TIMES DAILY
Qty: 5 ML | Refills: 6 | Status: SHIPPED | OUTPATIENT
Start: 2022-11-15 | End: 2023-12-14 | Stop reason: SDUPTHER

## 2022-11-15 NOTE — PROGRESS NOTES
INTERNAL MEDICINE ESTABLISHED PATIENT VISIT NOTE    Subjective:     Chief Complaint: Follow-up and Otalgia       Patient ID: Cherie Richards is a 62 y.o. female with depression, HTN, mixed urinary incontinence, hemangioma of R liver (resolved), vit D def, gastric bypass, H. Pylori infection, GERD, colon polyps, R sided sciatica, ANDREW (corrected), RLL nodule (resolved 12/2021), last seen by me 7/2022, here today for follow-up.    ER visit 10/28/2022: Presenting to the ED with epigastric pain, nausea, vomiting since starting antibiotics two days ago after procedure.  Has history cholecystectomy, tenderness palpation in the epigastrium. CBC showed no leukocytosis or anemia. CMP showed no electrolyte abnormalities concerning for hospitalization. UA showed concern for UTI.  Prescribed Keflex.  EKG showed no concern for ischemia; troponin negative, doubt ACS.   Initially, had mild relief with Phenergan, though after drinking Maalox, had worsening epigastric pain.  Given droperidol with improvement in symptoms.  Patient comfortable with discharge. PCP follow-up within 2-3 days was recommended. Given prescription for Phenergan, Keflex    Today, reports completion of antibiotic course about 3 days ago. Able to tolerate Keflex with Benadryl. Continuing to have pain with urination and back pain, similar to ER visit. States abdominal symptoms started after undergoing dental procedure, prescribed steroids and Clindamycin. Did not complete either prescription due to onset of GI symptoms.     Also reports R sided ear pain past 24 hours and associated R throat soreness. Also concerned about swelling at front of neck. Has been taking Allegra and Claritin-D, last dose today.      Checks BP at home, normotensive. Last took Norvasc prescribed for BP spikes about 4 days ago.     Past Medical History:  Past Medical History:   Diagnosis Date    Abnormal Pap smear     Anemia     history    Anxiety     Cancer     uterine-cancer cells      "Colon polyps, next C-scope 2019. 4/22/2014    Dry eyes     Dyspareunia, female 6/24/2020    Essential hypertension, started in her mid 30's 1/18/2017    Family history of malignant neoplasm of pancreas 6/1/2018    Gastric bypass status for obesity 8/21/2012    GERD (gastroesophageal reflux disease)     Helicobacter pylori gastritis, 2008. 8/20/2014    Hemangioma of liver, stable 2010, 2012, right lobe 8/20/2014    Hematuria     History of cosmetic surgeries, tummy tuck 2011. 4/5/2013    History of frquent UTI 6/24/2020    HTN (hypertension) 8/21/2012    120s-130s/80s    Kidney stone     Right sided sciatica 6/1/2018    Sleep apnea     patient does not use the C-PAP mask-cannot tolerate    Ureteral stone 4/8/2014    Urinary incontinence     Urinary retention     Varicose veins of lower extremity with inflammation 4/8/2015    Vertigo     mild    Vitamin D deficiency disease 6/1/2018       Review of Systems:  Review of Systems   Constitutional:  Negative for chills and weight loss.   HENT:  Positive for ear pain. Negative for congestion, hearing loss, sinus pain and sore throat.    Respiratory:  Negative for cough and shortness of breath.    Gastrointestinal:  Positive for constipation. Negative for abdominal pain, blood in stool, nausea and vomiting.   Genitourinary:  Positive for dysuria, flank pain, frequency and urgency. Negative for hematuria.   Musculoskeletal:  Negative for back pain and falls.   Skin:  Negative for rash.   Neurological:  Negative for weakness.     Health Maintenance:   Immunizations:   Influenza - complete  Tdap - 4/2013  Covid 19 - complete  HPV  Prevnar rec at 63   Shingrix rec at 48 - complete     Cancer Screening:  PAP: 2/2019 NILM  Mammogram:  12/2021 BIRAD 1, order  Colonoscopy:  2/2020 1 benign polyp, repeat 2/2025  DEXA:  n/a     Objective:   BP (!) 150/90 (BP Location: Left arm, Patient Position: Sitting)   Pulse 80   Resp (!) 93   Ht 5' 3" (1.6 m)   Wt 83.7 kg (184 lb 8.4 oz)   " BMI 32.69 kg/m²      General: AAO x3, no apparent distress  HEENT: PERRL, TM intact without erythema/bulging, thyromegaly  CV: RRR, no m/r/g  Pulm: Lungs CTAB, no crackles, no wheezes  Abd: s/ND +BS, suprapubic and bilateral flank discomfort  Extremities: no c/c/e    Labs:       Assessment/Plan     History of UTI  Recently completed course of Keflex for UTI, sensitive per review of culture  Now with recurrent symptoms; given multiple recent antibiotics, obtain UA and treat appropriately  No systemic symptoms presently    Dysuria  -     Urinalysis, Reflex to Urine Culture; Future; Expected date: 11/15/2022    Otalgia of right ear  No evidence of effusion or infection on exam; advised continued use of Flonase, anti-histamine, saline nasal spray    Essential hypertension  Elevated in setting of using anti-histamine with decongestant; likely medication side-effect, advised to cease use  Continue current regimen, take dose of Norvasc as prescribed and continue monitoring BP at home  -     losartan (COZAAR) 100 MG tablet; Take 1 tablet (100 mg total) by mouth every morning.  Dispense: 90 tablet; Refill: 3  -     hydroCHLOROthiazide (HYDRODIURIL) 25 MG tablet; Take 1 tablet (25 mg total) by mouth once daily.  Dispense: 90 tablet; Refill: 3    Encounter for screening mammogram for malignant neoplasm of breast  -     Mammo Digital Screening Bilat w/ Doc; Future; Expected date: 11/15/2022    Thyromegaly  -     US Soft Tissue Head Neck Thyroid; Future; Expected date: 11/15/2022    Allergic conjunctivitis of both eyes  -     olopatadine (PATANOL) 0.1 % ophthalmic solution; Place 1 drop into both eyes 2 (two) times daily.  Dispense: 5 mL; Refill: 6     HM as above.    Patsy Jones MD  Department of Internal Medicine - [facility]  11/15/2022

## 2022-11-16 DIAGNOSIS — N30.00 ACUTE CYSTITIS WITHOUT HEMATURIA: Primary | ICD-10-CM

## 2022-11-16 RX ORDER — CIPROFLOXACIN 500 MG/1
500 TABLET ORAL EVERY 12 HOURS
Qty: 14 TABLET | Refills: 0 | Status: SHIPPED | OUTPATIENT
Start: 2022-11-16 | End: 2023-01-05 | Stop reason: ALTCHOICE

## 2022-11-17 ENCOUNTER — PATIENT MESSAGE (OUTPATIENT)
Dept: INTERNAL MEDICINE | Facility: CLINIC | Age: 62
End: 2022-11-17
Payer: COMMERCIAL

## 2022-11-17 ENCOUNTER — TELEPHONE (OUTPATIENT)
Dept: INTERNAL MEDICINE | Facility: CLINIC | Age: 62
End: 2022-11-17
Payer: COMMERCIAL

## 2022-11-17 NOTE — TELEPHONE ENCOUNTER
----- Message from Mariah Jones MD sent at 11/16/2022  8:22 AM CST -----  Urine studies consistent with infection. Antibiotic sent to pharmacy. Will follow up on culture sensitivities. Please inform pt.

## 2022-11-18 LAB — BACTERIA UR CULT: ABNORMAL

## 2022-11-19 ENCOUNTER — PATIENT MESSAGE (OUTPATIENT)
Dept: INTERNAL MEDICINE | Facility: CLINIC | Age: 62
End: 2022-11-19
Payer: COMMERCIAL

## 2022-11-19 ENCOUNTER — TELEPHONE (OUTPATIENT)
Dept: INTERNAL MEDICINE | Facility: CLINIC | Age: 62
End: 2022-11-19
Payer: COMMERCIAL

## 2022-11-19 NOTE — TELEPHONE ENCOUNTER
----- Message from Mariah Jones MD sent at 11/18/2022  8:09 AM CST -----  Appropriate antibiotic sent. Please inform pt.

## 2022-11-25 ENCOUNTER — HOSPITAL ENCOUNTER (OUTPATIENT)
Dept: RADIOLOGY | Facility: HOSPITAL | Age: 62
Discharge: HOME | End: 2022-11-25
Attending: INTERNAL MEDICINE
Payer: COMMERCIAL

## 2022-11-25 DIAGNOSIS — E01.0 THYROMEGALY: ICD-10-CM

## 2022-11-25 PROCEDURE — 76536 US SOFT TISSUE HEAD NECK THYROID: ICD-10-PCS | Mod: 26,,, | Performed by: INTERNAL MEDICINE

## 2022-11-25 PROCEDURE — 76536 US EXAM OF HEAD AND NECK: CPT | Mod: TC

## 2022-11-25 PROCEDURE — 76536 US EXAM OF HEAD AND NECK: CPT | Mod: 26,,, | Performed by: INTERNAL MEDICINE

## 2022-11-30 ENCOUNTER — TELEPHONE (OUTPATIENT)
Dept: INTERNAL MEDICINE | Facility: CLINIC | Age: 62
End: 2022-11-30
Payer: COMMERCIAL

## 2022-11-30 ENCOUNTER — PATIENT MESSAGE (OUTPATIENT)
Dept: INTERNAL MEDICINE | Facility: CLINIC | Age: 62
End: 2022-11-30
Payer: COMMERCIAL

## 2022-11-30 DIAGNOSIS — D17.0 LIPOMA OF NECK: Primary | ICD-10-CM

## 2022-11-30 NOTE — TELEPHONE ENCOUNTER
----- Message from Mariah Jones MD sent at 11/29/2022  5:43 PM CST -----  Thyroid US reviewed; possible fatty collection or lipoma noted of neck. Pt can be referred to ENT for further evaluation. If agreeable, please let me know.

## 2022-12-01 ENCOUNTER — TELEPHONE (OUTPATIENT)
Dept: OTOLARYNGOLOGY | Facility: CLINIC | Age: 62
End: 2022-12-01
Payer: COMMERCIAL

## 2022-12-07 ENCOUNTER — OFFICE VISIT (OUTPATIENT)
Dept: OTOLARYNGOLOGY | Facility: CLINIC | Age: 62
End: 2022-12-07
Payer: COMMERCIAL

## 2022-12-07 VITALS
SYSTOLIC BLOOD PRESSURE: 155 MMHG | BODY MASS INDEX: 32.59 KG/M2 | WEIGHT: 184 LBS | TEMPERATURE: 79 F | DIASTOLIC BLOOD PRESSURE: 83 MMHG

## 2022-12-07 DIAGNOSIS — D17.0 LIPOMA OF NECK: Primary | ICD-10-CM

## 2022-12-07 PROCEDURE — 3077F PR MOST RECENT SYSTOLIC BLOOD PRESSURE >= 140 MM HG: ICD-10-PCS | Mod: CPTII,S$GLB,, | Performed by: OTOLARYNGOLOGY

## 2022-12-07 PROCEDURE — 99204 PR OFFICE/OUTPT VISIT, NEW, LEVL IV, 45-59 MIN: ICD-10-PCS | Mod: S$GLB,,, | Performed by: OTOLARYNGOLOGY

## 2022-12-07 PROCEDURE — 4010F ACE/ARB THERAPY RXD/TAKEN: CPT | Mod: CPTII,S$GLB,, | Performed by: OTOLARYNGOLOGY

## 2022-12-07 PROCEDURE — 3008F BODY MASS INDEX DOCD: CPT | Mod: CPTII,S$GLB,, | Performed by: OTOLARYNGOLOGY

## 2022-12-07 PROCEDURE — 1159F PR MEDICATION LIST DOCUMENTED IN MEDICAL RECORD: ICD-10-PCS | Mod: CPTII,S$GLB,, | Performed by: OTOLARYNGOLOGY

## 2022-12-07 PROCEDURE — 3079F DIAST BP 80-89 MM HG: CPT | Mod: CPTII,S$GLB,, | Performed by: OTOLARYNGOLOGY

## 2022-12-07 PROCEDURE — 4010F PR ACE/ARB THEARPY RXD/TAKEN: ICD-10-PCS | Mod: CPTII,S$GLB,, | Performed by: OTOLARYNGOLOGY

## 2022-12-07 PROCEDURE — 3044F HG A1C LEVEL LT 7.0%: CPT | Mod: CPTII,S$GLB,, | Performed by: OTOLARYNGOLOGY

## 2022-12-07 PROCEDURE — 1160F PR REVIEW ALL MEDS BY PRESCRIBER/CLIN PHARMACIST DOCUMENTED: ICD-10-PCS | Mod: CPTII,S$GLB,, | Performed by: OTOLARYNGOLOGY

## 2022-12-07 PROCEDURE — 3008F PR BODY MASS INDEX (BMI) DOCUMENTED: ICD-10-PCS | Mod: CPTII,S$GLB,, | Performed by: OTOLARYNGOLOGY

## 2022-12-07 PROCEDURE — 99999 PR PBB SHADOW E&M-EST. PATIENT-LVL V: ICD-10-PCS | Mod: PBBFAC,,, | Performed by: OTOLARYNGOLOGY

## 2022-12-07 PROCEDURE — 3044F PR MOST RECENT HEMOGLOBIN A1C LEVEL <7.0%: ICD-10-PCS | Mod: CPTII,S$GLB,, | Performed by: OTOLARYNGOLOGY

## 2022-12-07 PROCEDURE — 3077F SYST BP >= 140 MM HG: CPT | Mod: CPTII,S$GLB,, | Performed by: OTOLARYNGOLOGY

## 2022-12-07 PROCEDURE — 1160F RVW MEDS BY RX/DR IN RCRD: CPT | Mod: CPTII,S$GLB,, | Performed by: OTOLARYNGOLOGY

## 2022-12-07 PROCEDURE — 3079F PR MOST RECENT DIASTOLIC BLOOD PRESSURE 80-89 MM HG: ICD-10-PCS | Mod: CPTII,S$GLB,, | Performed by: OTOLARYNGOLOGY

## 2022-12-07 PROCEDURE — 99204 OFFICE O/P NEW MOD 45 MIN: CPT | Mod: S$GLB,,, | Performed by: OTOLARYNGOLOGY

## 2022-12-07 PROCEDURE — 1159F MED LIST DOCD IN RCRD: CPT | Mod: CPTII,S$GLB,, | Performed by: OTOLARYNGOLOGY

## 2022-12-07 PROCEDURE — 99999 PR PBB SHADOW E&M-EST. PATIENT-LVL V: CPT | Mod: PBBFAC,,, | Performed by: OTOLARYNGOLOGY

## 2022-12-07 NOTE — PROGRESS NOTES
History of Present Illness:   Cherie Richards is a 62 y.o. year old female evaluated on 12/7/2022, in the Otolaryngology-Head and Neck Surgery Clinic at [facility]. The patient was referred by Dr. Jones for evaluation of neck mass.  Patient reports a lump to the right side the front of the neck that has been present for about 2-3 years.  She has noted slow enlargement of the lump.  She reports that she feels this more in it irritates her.  She also has had some issues swallowing with compressive symptoms but denies any weight loss or odynophagia.  She denies any trauma to the area.  She denies any preceding URI or prior dental work.             Past Medical/Surgical History  Past Medical History:   Diagnosis Date    Abnormal Pap smear     Anemia     history    Anxiety     Cancer     uterine-cancer cells     Colon polyps, next C-scope 2019. 4/22/2014    Dry eyes     Dyspareunia, female 6/24/2020    Essential hypertension, started in her mid 30's 1/18/2017    Family history of malignant neoplasm of pancreas 6/1/2018    Gastric bypass status for obesity 8/21/2012    GERD (gastroesophageal reflux disease)     Helicobacter pylori gastritis, 2008. 8/20/2014    Hemangioma of liver, stable 2010, 2012, right lobe 8/20/2014    Hematuria     History of cosmetic surgeries, tummy tuck 2011. 4/5/2013    History of frquent UTI 6/24/2020    HTN (hypertension) 8/21/2012    120s-130s/80s    Kidney stone     Right sided sciatica 6/1/2018    Sleep apnea     patient does not use the C-PAP mask-cannot tolerate    Ureteral stone 4/8/2014    Urinary incontinence     Urinary retention     Varicose veins of lower extremity with inflammation 4/8/2015    Vertigo     mild    Vitamin D deficiency disease 6/1/2018     Her  has a past surgical history that includes Appendectomy; Abdominoplasty with Liposcuction; Hysterectomy (1980); Cervical biopsy w/ loop electrode excision; Gastric bypass; Hernia repair; Colonoscopy (N/A, 2/13/2020);  Esophagogastroduodenoscopy (N/A, 11/2/2021); and Cholecystectomy.     Past Family/Social History  Her family history includes Breast cancer in her maternal aunt; Colon cancer in her maternal uncle; Coronary artery disease in her mother; Diabetes in her father; Hearing loss in her maternal grandmother; Heart disease in her mother; Heart failure in her mother; No Known Problems in her brother, maternal grandfather, paternal aunt, paternal grandfather, paternal grandmother, paternal uncle, and sister; Obesity in her father and mother; Stroke in her mother.  She  reports that she has never smoked. She has never used smokeless tobacco. She reports that she does not drink alcohol and does not use drugs.     Medications/Allergies/Immunizations  Her current medication(s) include:   Current Outpatient Medications   Medication Sig Dispense Refill    acetaminophen (TYLENOL) 500 MG tablet Take 1-2 tablets (500-1,000 mg total) by mouth 3 (three) times daily as needed for Pain.  0    albuterol (PROVENTIL/VENTOLIN HFA) 90 mcg/actuation inhaler Inhale 1-2 puffs into the lungs every 4 (four) hours as needed for Shortness of Breath (coughing). Rescue 18 g 11    ALPRAZolam (XANAX) 0.5 MG tablet TAKE 1 TABLET(0.5 MG) BY MOUTH EVERY NIGHT AS NEEDED FOR INSOMNIA OR ANXIETY 30 tablet 3    amLODIPine (NORVASC) 2.5 MG tablet Take one tablet by mouth as needed for a blood pressure spike over 140 90 tablet 3    atorvastatin (LIPITOR) 40 MG tablet Take 1 tablet (40 mg total) by mouth every evening. To lower cholesterol 90 tablet 3    baclofen (LIORESAL) 10 MG tablet Take 1 tablet (10 mg total) by mouth 3 (three) times daily as needed (muscle spasm). 90 tablet 11    buPROPion (WELLBUTRIN SR) 100 MG TBSR 12 hr tablet Take 1 tablet (100 mg total) by mouth 2 (two) times daily. 180 tablet 3    cholecalciferol, vitamin D3, 5,000 unit Tab Take by mouth. 1 Tablet Oral Every day      ciprofloxacin HCl (CIPRO) 500 MG tablet Take 1 tablet (500 mg total)  "by mouth every 12 (twelve) hours. 14 tablet 0    cyanocobalamin 1,000 mcg/mL injection INJECT 1 ML (CC) INTRAMUSCULARLY ONCE EVERY MONTH 1 mL 11    cyclobenzaprine (FLEXERIL) 10 MG tablet Take 1 tablet (10 mg total) by mouth every evening. 30 tablet 6    cycloSPORINE (RESTASIS) 0.05 % ophthalmic emulsion Instill 1 drop into both eyes twice daily 180 each 3    diclofenac sodium (VOLTAREN) 1 % Gel Apply 2 g topically 4 (four) times daily as needed (pain). (Patient not taking: Reported on 11/15/2022) 1 each 11    DULoxetine (CYMBALTA) 30 MG capsule Take 1 capsule (30 mg total) by mouth once daily. 30 capsule 4    hydroCHLOROthiazide (HYDRODIURIL) 25 MG tablet Take 1 tablet (25 mg total) by mouth once daily. 90 tablet 3    losartan (COZAAR) 100 MG tablet Take 1 tablet (100 mg total) by mouth every morning. 90 tablet 3    loteprednol etabonate (EYSUVIS) 0.25 % DrpS Instill 1 Drop into both eyes every day as needed, no more than twice daily (Patient not taking: Reported on 11/15/2022) 8.3 mL 3    loteprednol etabonate 0.25 % DrpS instill 1 drop into both eyes every day as needed, no more than twice daily (Patient not taking: Reported on 8/3/2022) 8.3 mL 0    metoprolol succinate (TOPROL-XL) 25 MG 24 hr tablet Take 1 tablet (25 mg total) by mouth once daily. 90 tablet 3    olopatadine (PATANOL) 0.1 % ophthalmic solution Place 1 drop into both eyes 2 (two) times daily. 5 mL 6    pantoprazole (PROTONIX) 40 MG tablet Take 1 tablet (40 mg total) by mouth 2 (two) times daily. 60 tablet 2    promethazine (PHENERGAN) 25 MG tablet Take 1 tablet (25 mg total) by mouth every 6 (six) hours as needed for Nausea. 15 tablet 0    syringe with needle (BD LUER-CLARIBEL SYRINGE) 3 mL 25 x 1 1/2 " Syrg Use as directed with Cyanocobalamin 10 Syringe 3    tretinoin (RETIN-A) 0.1 % cream Apply to affected area Topical Every day (Patient taking differently: Apply to affected area Topical Every day) 45 g 11     No current facility-administered " medications for this visit.        Allergies: Adhesive tape-silicones, Penicillins, Sulfamethoxazole-trimethoprim, Prosed ec [meth-hyos-atrp-m blue-ba-phsal], and Pyridium [phenazopyridine]     Immunizations:   Immunization History   Administered Date(s) Administered    COVID-19, MRNA, LN-S, PF (Pfizer) (Purple Cap) 02/22/2021, 02/22/2021, 03/15/2021, 03/15/2021    Influenza (FLUAD) - Quadrivalent - Adjuvanted - PF *Preferred* (65+) 09/22/2021    Influenza - Quadrivalent 10/08/2014, 10/20/2015    Influenza - Quadrivalent - PF *Preferred* (6 months and older) 10/13/2016, 09/16/2017, 09/16/2017, 09/20/2018, 10/14/2019, 09/08/2020, 09/28/2022    Influenza - Trivalent (ADULT) 10/17/2007, 10/20/2008, 10/28/2009, 09/29/2010, 10/14/2011, 10/26/2012, 10/24/2013    Influenza Split 10/17/2007, 10/20/2008, 10/28/2009, 09/29/2010, 10/14/2011, 10/26/2012, 10/26/2012, 10/24/2013, 10/24/2013    Tdap 04/09/2013    Zoster Recombinant 01/23/2020, 08/18/2020         Review of Systems   Constitutional: Negative for fever, weight loss and weight gain.  Skin: Negative for rash, itchiness, dryness  HENT:  As per HPI  Cardiovascular: Negative for chest pain and dyspnea on exertion .   Respiratory: Is not experiencing shortness of breath.   Gastrointestinal: Negative for nausea and vomiting.   Neurological: Negative for headaches.   Lymph/Heme: Negative for lymphadenopathy or easy bruising  Musculoskeletal: Negative for joint or muscle pain  Psychiatric: The patient is not nervous/anxious.        All other systems are negative except for that listed in the HPI.      PHYSICAL EXAM:   Vital Signs:  BP (!) 155/83 (Patient Position: Sitting)   Temp (!) 79 °F (26.1 °C)   Wt 83.5 kg (184 lb)   BMI 32.59 kg/m²      General:  Well-developed, well-nourished  Communication and Voice:  Clear pitch and clarity  Hearing: Hearing adequate for verbal communication bilaterally   Inspection:  Normocephalic and atraumatic without mass or  lesion  Palpation:  Facial skeleton intact without bony stepoffs  Parotid Glands:  No mass or tenderness  Facial Strength:  Facial motility symmetric and full bilaterally  Pinna:  External ear intact and fully developed  External canal:  Canal is patent with intact skin  Tympanic Membrane:  Clear and mobile  External nose:  No scar or anatomic deformity  Internal Nose:  Septum intact and midline.  No edema, polyp, or rhinorrhea.  TMJ:  No pain to palpation with full mobility  Oral cavity, Lips, Teeth, and Gums:  Mucosa and teeth intact and viable, No lesions, masses or ulcers  Oropharynx: No erythema or exudate, no masses or ulcerations, non-obstructive tonsils  Nasopharynx:  No mass or lesion with intact mucosa  Hypopharynx:  Not well visualized secondary to gagging  Larynx:  Not well visualized secondary to gagging  Neck, Trachea, Lymphatics:  Midline trachea without mass or lesion, subcutaneous soft proximally 3 cm mass that appears to be deep to the platysma no lymphadenopathy  Thyroid:  No mass or nodularity  Eyes: No nystagmus with equal extraocular motion bilaterally  Neuro/Psych/Balance: Patient oriented and appropriate in interaction;  Appropriate mood and affect;  Gait is intact with no imbalance; Cranial nerves I-XII are intact  Respiratory effort:  Equal inspiration and expiration without stridor  Peripheral Vascular:  Warm extremities with equal pulses       RADIOLOGIC REVIEW:    Ultrasound neck 11/25/2022   Impression:     Thyroid nodules as above.  No nodules meet ACR TI-RADS criteria for imaging surveillance or tissue sampling.     Well-circumscribed heterogeneous mass measures up to 3.8 cm, in the right paramedian neck superior to the thyroid.  Appearance not entirely specific, though suggestive of lipoma.  Further evaluation as clinically warranted    ASSESSMENT:   1. Lipoma of neck            PLAN:   Benign nature of the mass and very likely lipoma given exam and ultrasound findings was discussed  with the patient.  I offered her removal verses watchful observation.  I explained to her that if we decide to proceed with removal would be in the operating room and I would need a preoperative CT scan.  She would like to think about this and consider her options and will call back.      I believe that Ms. Richards has a good understanding of the issues involved and I answered all of her questions.     DISCLAIMER: This note was prepared with [employer] voice recognition transcription software. Garbled syntax, mangled pronouns, and other bizarre constructions may be attributed to that software system. While efforts were made to correct any mistakes made by this voice recognition program, some errors and/or omissions may remain in the note that were missed when the note was originally created.

## 2022-12-12 ENCOUNTER — OFFICE VISIT (OUTPATIENT)
Dept: RHEUMATOLOGY | Facility: CLINIC | Age: 62
End: 2022-12-12
Payer: COMMERCIAL

## 2022-12-12 VITALS
DIASTOLIC BLOOD PRESSURE: 79 MMHG | BODY MASS INDEX: 33.28 KG/M2 | WEIGHT: 187.81 LBS | HEIGHT: 63 IN | SYSTOLIC BLOOD PRESSURE: 152 MMHG | HEART RATE: 93 BPM

## 2022-12-12 DIAGNOSIS — M48.061 SPINAL STENOSIS OF LUMBAR REGION WITHOUT NEUROGENIC CLAUDICATION: Primary | ICD-10-CM

## 2022-12-12 DIAGNOSIS — M79.7 PRIMARY FIBROMYALGIA: ICD-10-CM

## 2022-12-12 PROCEDURE — 3077F PR MOST RECENT SYSTOLIC BLOOD PRESSURE >= 140 MM HG: ICD-10-PCS | Mod: CPTII,S$GLB,, | Performed by: INTERNAL MEDICINE

## 2022-12-12 PROCEDURE — 1159F MED LIST DOCD IN RCRD: CPT | Mod: CPTII,S$GLB,, | Performed by: INTERNAL MEDICINE

## 2022-12-12 PROCEDURE — 3044F HG A1C LEVEL LT 7.0%: CPT | Mod: CPTII,S$GLB,, | Performed by: INTERNAL MEDICINE

## 2022-12-12 PROCEDURE — 3008F BODY MASS INDEX DOCD: CPT | Mod: CPTII,S$GLB,, | Performed by: INTERNAL MEDICINE

## 2022-12-12 PROCEDURE — 4010F PR ACE/ARB THEARPY RXD/TAKEN: ICD-10-PCS | Mod: CPTII,S$GLB,, | Performed by: INTERNAL MEDICINE

## 2022-12-12 PROCEDURE — 3078F PR MOST RECENT DIASTOLIC BLOOD PRESSURE < 80 MM HG: ICD-10-PCS | Mod: CPTII,S$GLB,, | Performed by: INTERNAL MEDICINE

## 2022-12-12 PROCEDURE — 99999 PR PBB SHADOW E&M-EST. PATIENT-LVL IV: ICD-10-PCS | Mod: PBBFAC,,, | Performed by: INTERNAL MEDICINE

## 2022-12-12 PROCEDURE — 4010F ACE/ARB THERAPY RXD/TAKEN: CPT | Mod: CPTII,S$GLB,, | Performed by: INTERNAL MEDICINE

## 2022-12-12 PROCEDURE — 99214 OFFICE O/P EST MOD 30 MIN: CPT | Mod: S$GLB,,, | Performed by: INTERNAL MEDICINE

## 2022-12-12 PROCEDURE — 1159F PR MEDICATION LIST DOCUMENTED IN MEDICAL RECORD: ICD-10-PCS | Mod: CPTII,S$GLB,, | Performed by: INTERNAL MEDICINE

## 2022-12-12 PROCEDURE — 99999 PR PBB SHADOW E&M-EST. PATIENT-LVL IV: CPT | Mod: PBBFAC,,, | Performed by: INTERNAL MEDICINE

## 2022-12-12 PROCEDURE — 3078F DIAST BP <80 MM HG: CPT | Mod: CPTII,S$GLB,, | Performed by: INTERNAL MEDICINE

## 2022-12-12 PROCEDURE — 3044F PR MOST RECENT HEMOGLOBIN A1C LEVEL <7.0%: ICD-10-PCS | Mod: CPTII,S$GLB,, | Performed by: INTERNAL MEDICINE

## 2022-12-12 PROCEDURE — 99214 PR OFFICE/OUTPT VISIT, EST, LEVL IV, 30-39 MIN: ICD-10-PCS | Mod: S$GLB,,, | Performed by: INTERNAL MEDICINE

## 2022-12-12 PROCEDURE — 3008F PR BODY MASS INDEX (BMI) DOCUMENTED: ICD-10-PCS | Mod: CPTII,S$GLB,, | Performed by: INTERNAL MEDICINE

## 2022-12-12 PROCEDURE — 3077F SYST BP >= 140 MM HG: CPT | Mod: CPTII,S$GLB,, | Performed by: INTERNAL MEDICINE

## 2022-12-12 RX ORDER — CYCLOBENZAPRINE HCL 10 MG
10 TABLET ORAL 2 TIMES DAILY PRN
Qty: 60 TABLET | Refills: 4 | Status: SHIPPED | OUTPATIENT
Start: 2022-12-12 | End: 2023-01-11

## 2022-12-12 NOTE — PROGRESS NOTES
Rapid3 Question Responses and Scores 12/11/2022   MDHAQ Score 0.2   Psychologic Score 6.6   Pain Score 8.5   When you awakened in the morning OVER THE LAST WEEK, did you feel stiff? Yes   If Yes, please indicate the number of hours until you are as limber as you will be for the day 3   Fatigue Score 9   Global Health Score 4.5   RAPID3 Score 4.56     Answers submitted by the patient for this visit:  Rheumatology Questionnaire (Submitted on 12/11/2022)  fever: No  eye redness: No  mouth sores: No  headaches: Yes  shortness of breath: Yes  chest pain: No  trouble swallowing: No  diarrhea: No  constipation: Yes  unexpected weight change: No  genital sore: No  dysuria: Yes  During the last 3 days, have you had a skin rash?: No  Bruises or bleeds easily: Yes  cough: No

## 2022-12-12 NOTE — PROGRESS NOTES
"Subjective:       Patient ID: Cherie Richards is a 61 y.o. female.    Chief Complaint: Disease Management    HPI 61 year old F with PMH of anxiety, abnormal uterine cancer cells s/p hysterectomy, dry eyes, HTN, gastric bypass, gastritis, liver hemangioma, hiatal hernia here for evaluation.  She has recent diagnosis of "Spiculated right lower lobe nodule." She reports being tired for 2 months with shortness of breath.  She has pain in all over including muscles. She has been in pain for 6 months.   She feels like she is cramping in muscles.  She reports hands also hurt. Today, her pain is 4/10. Her pain can be as high as 8/10. She reports numbness in hands and feet for last 6 months.    Reports that her toes will get purple but not white or red. Reports hair loss for about 3 years.  Denies any rashes, photosensitivity, oral ulcers. On occasion, she feels like she has pleurisy.  Overuse makes her pain worse. She sleeps only 2 hours. She has good appetite.She has RUQ pain for a year and was noted to have colitis on CT scan.  Reports diarrhea and constipation. Denies bloody stools.        Interval history: She took antibiotic recently for dental work and developed nausea so she stopped it.  She has not followed up with PMR.  She reports that she is off cymbalta because it makes her feel weird.  She takes flexeril 10mg and baclofen 10mg and it does not make her sleepy.   TID. She takes xanax and it helps her with sleep. Reports right side of her leg feels lower back.She is set up with PT.     Past Medical History:   Diagnosis Date    Abnormal Pap smear     Anemia     history    Anxiety     Cancer     uterine-cancer cells     Colon polyps, next C-scope 2019. 4/22/2014    Dry eyes     Dyspareunia, female 6/24/2020    Essential hypertension, started in her mid 30's 1/18/2017    Family history of malignant neoplasm of pancreas 6/1/2018    Gastric bypass status for obesity 8/21/2012    GERD (gastroesophageal reflux disease)  "    Helicobacter pylori gastritis, 2008. 8/20/2014    Hemangioma of liver, stable 2010, 2012, right lobe 8/20/2014    Hematuria     History of cosmetic surgeries, tummy tuck 2011. 4/5/2013    History of frquent UTI 6/24/2020    HTN (hypertension) 8/21/2012    120s-130s/80s    Kidney stone     Right sided sciatica 6/1/2018    Sleep apnea     patient does not use the C-PAP mask-cannot tolerate    Ureteral stone 4/8/2014    Urinary incontinence     Urinary retention     Varicose veins of lower extremity with inflammation 4/8/2015    Vertigo     mild    Vitamin D deficiency disease 6/1/2018       Review of Systems   Constitutional: Negative for activity change, appetite change, chills, diaphoresis, fatigue, fever and unexpected weight change.   HENT: Negative for congestion, ear discharge, ear pain, facial swelling, mouth sores, sinus pressure, sneezing, sore throat, tinnitus and trouble swallowing.    Eyes: Negative for photophobia, pain, discharge, redness, itching and visual disturbance.   Respiratory: Negative for apnea, cough, choking, chest tightness, shortness of breath, wheezing and stridor.    Cardiovascular: Negative for chest pain and leg swelling.   Gastrointestinal: Negative for abdominal distention, abdominal pain, anal bleeding, blood in stool, constipation, diarrhea and nausea.   Endocrine: Negative for cold intolerance and heat intolerance.   Genitourinary: Negative for difficulty urinating, dysuria and genital sores.   Musculoskeletal: Positive for joint swelling and myalgias. Negative for arthralgias, back pain, gait problem, neck pain and neck stiffness.   Skin: Negative for color change, pallor, rash and wound.   Neurological: Negative for dizziness, seizures, light-headedness, numbness and headaches.   Hematological: Negative for adenopathy. Does not bruise/bleed easily.   Psychiatric/Behavioral: Negative for sleep disturbance. The patient is not nervous/anxious.            Objective:   /76    "Pulse 69   Ht 5' 2" (1.575 m)   Wt 82.1 kg (181 lb)   BMI 33.11 kg/m²      Physical Exam   Constitutional: She is oriented to person, place, and time.   HENT:   Head: Normocephalic and atraumatic.   Right Ear: External ear normal.   Left Ear: External ear normal.   Nose: Nose normal.   Mouth/Throat: Oropharynx is clear and moist. No oropharyngeal exudate.   Eyes: Conjunctivae and EOM are normal. Pupils are equal, round, and reactive to light. Right eye exhibits no discharge. Left eye exhibits no discharge. No scleral icterus.   Neck: No JVD present. No thyromegaly present.   Cardiovascular: Normal rate, regular rhythm, normal heart sounds and intact distal pulses.  Exam reveals no gallop and no friction rub.    No murmur heard.  Pulmonary/Chest: Effort normal and breath sounds normal. No respiratory distress. She has no wheezes. She has no rales. She exhibits no tenderness.   Abdominal: Soft. Bowel sounds are normal. She exhibits no distension and no mass. There is no abdominal tenderness. There is no rebound and no guarding.   Lymphadenopathy:     She has no cervical adenopathy.   Neurological: She is alert and oriented to person, place, and time. No cranial nerve deficit. Gait normal. Coordination normal.   Skin: Skin is dry. No rash noted. No erythema. No pallor.     Psychiatric: Affect and judgment normal.   Musculoskeletal: No tenderness, deformity or edema.          No data to display     Assessment:      61 year old F with PMH of anxiety, abnormal uterine cancer cells s/p hysterectomy, dry eyes, HTN, gastric bypass, gastritis, liver hemangioma, hiatal hernia here for evaluation.  On exam, she is diffusely tender consistent with fibromyalgia.    # fibromyalgia: she has diffuse muscular tenderness in her back, particularly in shoulder blades. Did not tolerate cymbalta.  -increase flexeril from 10mg qhs to BID  Stop baclofen  Consider tramadol  PT consult placed at last visit but she is too busy  Asked her to " make appt with PMR again.      #nodule: being evaluated by pulmonary.  #obesity: encourage weight loss    30 * minutes of total time spent on the encounter, which includes face to face time and non-face to face time preparing to see the patient (eg, review of tests), Obtaining and/or reviewing separately obtained history, Documenting clinical information in the electronic or other health record, Independently interpreting results (not separately reported) and communicating results to the patient/family/caregiver, or Care coordination (not separately reported).

## 2022-12-21 DIAGNOSIS — F41.1 GAD (GENERALIZED ANXIETY DISORDER): ICD-10-CM

## 2022-12-21 NOTE — TELEPHONE ENCOUNTER
No new care gaps identified.  [facility] Embedded Care Gaps. Reference number: [id number]. 12/21/2022   3:16:22 PM CST

## 2022-12-21 NOTE — TELEPHONE ENCOUNTER
Refill Routing Note   Medication(s) are not appropriate for processing by [facility] for the following reason(s):      - Outside of protocol    [facility] action(s):  Route          Medication reconciliation completed: No     Appointments  past 12m or future 3m with PCP    Date Provider   Last Visit   11/15/2022 Mariah Jones MD   Next Visit   1/5/2023 Mariah Jones MD   ED visits in past 90 days: 1        Note composed:5:31 PM 12/21/2022

## 2022-12-22 RX ORDER — CYCLOBENZAPRINE HCL 10 MG
10 TABLET ORAL NIGHTLY
Qty: 30 TABLET | Refills: 6 | Status: SHIPPED | OUTPATIENT
Start: 2022-12-22 | End: 2023-08-21 | Stop reason: SDUPTHER

## 2022-12-22 RX ORDER — ALPRAZOLAM 0.5 MG/1
TABLET ORAL
Qty: 30 TABLET | Refills: 5 | Status: SHIPPED | OUTPATIENT
Start: 2022-12-22 | End: 2023-06-22 | Stop reason: SDUPTHER

## 2022-12-22 RX ORDER — TRETINOIN 1 MG/G
CREAM TOPICAL
Qty: 45 G | Refills: 11 | Status: SHIPPED | OUTPATIENT
Start: 2022-12-22

## 2022-12-23 ENCOUNTER — TELEPHONE (OUTPATIENT)
Dept: PHARMACY | Facility: CLINIC | Age: 62
End: 2022-12-23
Payer: COMMERCIAL

## 2023-01-05 ENCOUNTER — OFFICE VISIT (OUTPATIENT)
Dept: INTERNAL MEDICINE | Facility: CLINIC | Age: 63
End: 2023-01-05
Payer: MEDICAID

## 2023-01-05 VITALS
HEIGHT: 63 IN | DIASTOLIC BLOOD PRESSURE: 74 MMHG | SYSTOLIC BLOOD PRESSURE: 124 MMHG | OXYGEN SATURATION: 98 % | WEIGHT: 183.56 LBS | HEART RATE: 88 BPM | BODY MASS INDEX: 32.52 KG/M2

## 2023-01-05 DIAGNOSIS — M25.562 BILATERAL CHRONIC KNEE PAIN: ICD-10-CM

## 2023-01-05 DIAGNOSIS — M25.561 BILATERAL CHRONIC KNEE PAIN: ICD-10-CM

## 2023-01-05 DIAGNOSIS — M54.42 CHRONIC BILATERAL LOW BACK PAIN WITH BILATERAL SCIATICA: ICD-10-CM

## 2023-01-05 DIAGNOSIS — M54.41 CHRONIC BILATERAL LOW BACK PAIN WITH BILATERAL SCIATICA: ICD-10-CM

## 2023-01-05 DIAGNOSIS — Z98.84 GASTRIC BYPASS STATUS FOR OBESITY: ICD-10-CM

## 2023-01-05 DIAGNOSIS — F41.1 GAD (GENERALIZED ANXIETY DISORDER): ICD-10-CM

## 2023-01-05 DIAGNOSIS — D17.0 LIPOMA OF NECK: ICD-10-CM

## 2023-01-05 DIAGNOSIS — N39.0 RECURRENT UTI: ICD-10-CM

## 2023-01-05 DIAGNOSIS — I70.0 AORTIC CALCIFICATION: ICD-10-CM

## 2023-01-05 DIAGNOSIS — G89.29 CHRONIC BILATERAL LOW BACK PAIN WITH BILATERAL SCIATICA: ICD-10-CM

## 2023-01-05 DIAGNOSIS — M54.2 CHRONIC NECK PAIN: ICD-10-CM

## 2023-01-05 DIAGNOSIS — G89.29 BILATERAL CHRONIC KNEE PAIN: ICD-10-CM

## 2023-01-05 DIAGNOSIS — M47.22 OSTEOARTHRITIS OF SPINE WITH RADICULOPATHY, CERVICAL REGION: ICD-10-CM

## 2023-01-05 DIAGNOSIS — R30.0 DYSURIA: Primary | ICD-10-CM

## 2023-01-05 DIAGNOSIS — K21.9 GASTROESOPHAGEAL REFLUX DISEASE WITHOUT ESOPHAGITIS: ICD-10-CM

## 2023-01-05 DIAGNOSIS — G89.29 CHRONIC NECK PAIN: ICD-10-CM

## 2023-01-05 DIAGNOSIS — I10 ESSENTIAL HYPERTENSION: ICD-10-CM

## 2023-01-05 PROCEDURE — 4010F ACE/ARB THERAPY RXD/TAKEN: CPT | Mod: CPTII,,, | Performed by: INTERNAL MEDICINE

## 2023-01-05 PROCEDURE — 3008F BODY MASS INDEX DOCD: CPT | Mod: CPTII,,, | Performed by: INTERNAL MEDICINE

## 2023-01-05 PROCEDURE — 99999 PR PBB SHADOW E&M-EST. PATIENT-LVL V: ICD-10-PCS | Mod: PBBFAC,,, | Performed by: INTERNAL MEDICINE

## 2023-01-05 PROCEDURE — 1159F MED LIST DOCD IN RCRD: CPT | Mod: CPTII,,, | Performed by: INTERNAL MEDICINE

## 2023-01-05 PROCEDURE — 1160F PR REVIEW ALL MEDS BY PRESCRIBER/CLIN PHARMACIST DOCUMENTED: ICD-10-PCS | Mod: CPTII,,, | Performed by: INTERNAL MEDICINE

## 2023-01-05 PROCEDURE — 3078F PR MOST RECENT DIASTOLIC BLOOD PRESSURE < 80 MM HG: ICD-10-PCS | Mod: CPTII,,, | Performed by: INTERNAL MEDICINE

## 2023-01-05 PROCEDURE — 1160F RVW MEDS BY RX/DR IN RCRD: CPT | Mod: CPTII,,, | Performed by: INTERNAL MEDICINE

## 2023-01-05 PROCEDURE — 3074F PR MOST RECENT SYSTOLIC BLOOD PRESSURE < 130 MM HG: ICD-10-PCS | Mod: CPTII,,, | Performed by: INTERNAL MEDICINE

## 2023-01-05 PROCEDURE — 99499 RISK ADDL DX/OHS AUDIT: ICD-10-PCS | Mod: S$PBB,,, | Performed by: INTERNAL MEDICINE

## 2023-01-05 PROCEDURE — 99499 UNLISTED E&M SERVICE: CPT | Mod: S$PBB,,, | Performed by: INTERNAL MEDICINE

## 2023-01-05 PROCEDURE — 3074F SYST BP LT 130 MM HG: CPT | Mod: CPTII,,, | Performed by: INTERNAL MEDICINE

## 2023-01-05 PROCEDURE — 99214 PR OFFICE/OUTPT VISIT, EST, LEVL IV, 30-39 MIN: ICD-10-PCS | Mod: S$PBB,,, | Performed by: INTERNAL MEDICINE

## 2023-01-05 PROCEDURE — 99215 OFFICE O/P EST HI 40 MIN: CPT | Mod: PBBFAC | Performed by: INTERNAL MEDICINE

## 2023-01-05 PROCEDURE — 3008F PR BODY MASS INDEX (BMI) DOCUMENTED: ICD-10-PCS | Mod: CPTII,,, | Performed by: INTERNAL MEDICINE

## 2023-01-05 PROCEDURE — 99214 OFFICE O/P EST MOD 30 MIN: CPT | Mod: S$PBB,,, | Performed by: INTERNAL MEDICINE

## 2023-01-05 PROCEDURE — 4010F PR ACE/ARB THEARPY RXD/TAKEN: ICD-10-PCS | Mod: CPTII,,, | Performed by: INTERNAL MEDICINE

## 2023-01-05 PROCEDURE — 99999 PR PBB SHADOW E&M-EST. PATIENT-LVL V: CPT | Mod: PBBFAC,,, | Performed by: INTERNAL MEDICINE

## 2023-01-05 PROCEDURE — 1159F PR MEDICATION LIST DOCUMENTED IN MEDICAL RECORD: ICD-10-PCS | Mod: CPTII,,, | Performed by: INTERNAL MEDICINE

## 2023-01-05 PROCEDURE — 3078F DIAST BP <80 MM HG: CPT | Mod: CPTII,,, | Performed by: INTERNAL MEDICINE

## 2023-01-05 RX ORDER — LOSARTAN POTASSIUM 100 MG/1
100 TABLET ORAL EVERY MORNING
Qty: 90 TABLET | Refills: 3 | Status: SHIPPED | OUTPATIENT
Start: 2023-01-05 | End: 2023-12-14 | Stop reason: SDUPTHER

## 2023-01-05 RX ORDER — ATORVASTATIN CALCIUM 40 MG/1
40 TABLET, FILM COATED ORAL NIGHTLY
Qty: 90 TABLET | Refills: 3 | Status: SHIPPED | OUTPATIENT
Start: 2023-01-05 | End: 2023-12-14

## 2023-01-05 RX ORDER — AMLODIPINE BESYLATE 2.5 MG/1
TABLET ORAL
Qty: 90 TABLET | Refills: 3 | Status: SHIPPED | OUTPATIENT
Start: 2023-01-05 | End: 2023-12-14

## 2023-01-05 RX ORDER — CYANOCOBALAMIN 1000 UG/ML
INJECTION, SOLUTION INTRAMUSCULAR; SUBCUTANEOUS
Qty: 1 ML | Refills: 11 | Status: SHIPPED | OUTPATIENT
Start: 2023-01-05 | End: 2023-09-12 | Stop reason: SDUPTHER

## 2023-01-05 RX ORDER — DULOXETIN HYDROCHLORIDE 30 MG/1
30 CAPSULE, DELAYED RELEASE ORAL DAILY
Qty: 90 CAPSULE | Refills: 3 | Status: SHIPPED | OUTPATIENT
Start: 2023-01-05 | End: 2023-01-20

## 2023-01-05 RX ORDER — HYDROCHLOROTHIAZIDE 25 MG/1
25 TABLET ORAL DAILY
Qty: 90 TABLET | Refills: 3 | Status: SHIPPED | OUTPATIENT
Start: 2023-01-05 | End: 2023-12-14

## 2023-01-05 RX ORDER — PANTOPRAZOLE SODIUM 40 MG/1
40 TABLET, DELAYED RELEASE ORAL 2 TIMES DAILY
Qty: 180 TABLET | Refills: 3 | Status: SHIPPED | OUTPATIENT
Start: 2023-01-05 | End: 2023-12-14 | Stop reason: SDUPTHER

## 2023-01-05 RX ORDER — DICLOFENAC SODIUM 10 MG/G
2 GEL TOPICAL 4 TIMES DAILY PRN
Qty: 400 G | Refills: 3 | Status: SHIPPED | OUTPATIENT
Start: 2023-01-05

## 2023-01-05 RX ORDER — METOPROLOL SUCCINATE 25 MG/1
25 TABLET, EXTENDED RELEASE ORAL DAILY
Qty: 90 TABLET | Refills: 3 | Status: SHIPPED | OUTPATIENT
Start: 2023-01-05 | End: 2023-12-14 | Stop reason: SDUPTHER

## 2023-01-05 RX ORDER — CIPROFLOXACIN 500 MG/1
500 TABLET ORAL EVERY 12 HOURS
Qty: 14 TABLET | Refills: 0 | Status: SHIPPED | OUTPATIENT
Start: 2023-01-05 | End: 2023-01-12

## 2023-01-05 RX ORDER — BUPROPION HYDROCHLORIDE 100 MG/1
100 TABLET, EXTENDED RELEASE ORAL 2 TIMES DAILY
Qty: 180 TABLET | Refills: 3 | Status: SHIPPED | OUTPATIENT
Start: 2023-01-05 | End: 2023-12-14 | Stop reason: SDUPTHER

## 2023-01-05 NOTE — PROGRESS NOTES
INTERNAL MEDICINE ESTABLISHED PATIENT VISIT NOTE    Subjective:     Chief Complaint: Follow-up       Patient ID: Cherie Richards is a 62 y.o. female with depression, HTN, mixed urinary incontinence, hemangioma of R liver (resolved), vit D def, gastric bypass, H. Pylori infection, GERD, colon polyps, R sided sciatica, ANDREW (corrected), RLL nodule (resolved 12/2021), last seen by me 11/2022, here today for follow-up.    12/2022 ENT - Lipoma of neck. Removal vs observation.    Treated for UTI November 2022. Completed course of Cipro. Now having repeat burning with urination. Multiple UTIs recently. Previously seen by [facility] Urogynecologist - no recent visit.     Past Medical History:  Past Medical History:   Diagnosis Date    Abnormal Pap smear     Anemia     history    Anxiety     Cancer     uterine-cancer cells     Colon polyps, next C-scope 2019. 4/22/2014    Dry eyes     Dyspareunia, female 6/24/2020    Essential hypertension, started in her mid 30's 1/18/2017    Family history of malignant neoplasm of pancreas 6/1/2018    Gastric bypass status for obesity 8/21/2012    GERD (gastroesophageal reflux disease)     Helicobacter pylori gastritis, 2008. 8/20/2014    Hemangioma of liver, stable 2010, 2012, right lobe 8/20/2014    Hematuria     History of cosmetic surgeries, tummy tuck 2011. 4/5/2013    History of frquent UTI 6/24/2020    HTN (hypertension) 8/21/2012    120s-130s/80s    Kidney stone     Right sided sciatica 6/1/2018    Sleep apnea     patient does not use the C-PAP mask-cannot tolerate    Ureteral stone 4/8/2014    Urinary incontinence     Urinary retention     Varicose veins of lower extremity with inflammation 4/8/2015    Vertigo     mild    Vitamin D deficiency disease 6/1/2018          Review of Systems:  Review of Systems   Constitutional:  Negative for chills and fever.   HENT:  Negative for congestion.    Respiratory:  Negative for cough and shortness of breath.    Cardiovascular:  Negative for chest  "pain.   Gastrointestinal:  Negative for constipation, nausea and vomiting.   Genitourinary:  Positive for dysuria. Negative for hematuria and urgency.   Musculoskeletal:  Positive for joint pain (chronic). Negative for falls.   Skin:  Negative for rash.   Neurological:  Negative for dizziness and loss of consciousness.     Health Maintenance:   Immunizations:   IInfluenza - complete  Tdap - 4/2013  Covid 19 - complete  HPV  Prevnar rec at 63   Shingrix rec at 48 - complete     Cancer Screening:  PAP: 2/2019 NILM  Mammogram:  scheduled  Colonoscopy:  2/2020 1 benign polyp, repeat 2/2025  DEXA:  n/a     Objective:   /74 (BP Location: Left arm, Patient Position: Sitting, BP Method: Medium (Manual))   Pulse 88   Ht 5' 3" (1.6 m)   Wt 83.3 kg (183 lb 8.5 oz)   SpO2 98%   BMI 32.51 kg/m²        Labs:       Assessment/Plan     Dysuria  Recurrent UTI symptoms; UA/culture today, empirically treat with Ciprofloxacin while awaiting results  Refer to Urogynecology for further evaluation given recurrent UTI  -     Urinalysis, Reflex to Urine Culture; Future; Expected date: 01/05/2023  -     ciprofloxacin HCl (CIPRO) 500 MG tablet; Take 1 tablet (500 mg total) by mouth every 12 (twelve) hours. for 7 days  Dispense: 14 tablet; Refill: 0    Recurrent UTI  -     Ambulatory referral/consult to Urogynecology; Future; Expected date: 01/12/2023  -     ciprofloxacin HCl (CIPRO) 500 MG tablet; Take 1 tablet (500 mg total) by mouth every 12 (twelve) hours. for 7 days  Dispense: 14 tablet; Refill: 0    Lipoma of neck  Evaluated by ENT; no compressive symptoms, monitor    Gastroesophageal reflux disease without esophagitis  -     pantoprazole (PROTONIX) 40 MG tablet; Take 1 tablet (40 mg total) by mouth 2 (two) times daily.  Dispense: 180 tablet; Refill: 3    Essential hypertension  -     metoprolol succinate (TOPROL-XL) 25 MG 24 hr tablet; Take 1 tablet (25 mg total) by mouth once daily.  Dispense: 90 tablet; Refill: 3  -     " losartan (COZAAR) 100 MG tablet; Take 1 tablet (100 mg total) by mouth every morning.  Dispense: 90 tablet; Refill: 3  -     hydroCHLOROthiazide (HYDRODIURIL) 25 MG tablet; Take 1 tablet (25 mg total) by mouth once daily.  Dispense: 90 tablet; Refill: 3  -     amLODIPine (NORVASC) 2.5 MG tablet; Take one tablet by mouth as needed for a blood pressure spike over 140  Dispense: 90 tablet; Refill: 3    Chronic bilateral low back pain with bilateral sciatica  -     DULoxetine (CYMBALTA) 30 MG capsule; Take 1 capsule (30 mg total) by mouth once daily.  Dispense: 90 capsule; Refill: 3  -     diclofenac sodium (VOLTAREN) 1 % Gel; Apply 2 g topically 4 (four) times daily as needed (pain).  Dispense: 350 g; Refill: 3    Chronic neck pain  -     DULoxetine (CYMBALTA) 30 MG capsule; Take 1 capsule (30 mg total) by mouth once daily.  Dispense: 90 capsule; Refill: 3    Osteoarthritis of spine with radiculopathy, cervical region  -     DULoxetine (CYMBALTA) 30 MG capsule; Take 1 capsule (30 mg total) by mouth once daily.  Dispense: 90 capsule; Refill: 3    Bilateral chronic knee pain  -     DULoxetine (CYMBALTA) 30 MG capsule; Take 1 capsule (30 mg total) by mouth once daily.  Dispense: 90 capsule; Refill: 3    KAILEE (generalized anxiety disorder)  -     buPROPion (WELLBUTRIN SR) 100 MG TBSR 12 hr tablet; Take 1 tablet (100 mg total) by mouth 2 (two) times daily.  Dispense: 180 tablet; Refill: 3    Aortic calcification, by CT scan 5/4/2021  -     atorvastatin (LIPITOR) 40 MG tablet; Take 1 tablet (40 mg total) by mouth every evening. To lower cholesterol  Dispense: 90 tablet; Refill: 3    Gastric bypass status for obesity, 08-.  -     cyanocobalamin 1,000 mcg/mL injection; INJECT 1 ML (CC) INTRAMUSCULARLY ONCE EVERY MONTH  Dispense: 1 mL; Refill: 11    HM as above.    Patsy Jones MD  Department of Internal Medicine - [facility]  01/05/2023

## 2023-01-11 ENCOUNTER — TELEPHONE (OUTPATIENT)
Dept: INTERNAL MEDICINE | Facility: CLINIC | Age: 63
End: 2023-01-11
Payer: MEDICAID

## 2023-01-11 ENCOUNTER — PATIENT MESSAGE (OUTPATIENT)
Dept: ADMINISTRATIVE | Facility: OTHER | Age: 63
End: 2023-01-11
Payer: MEDICAID

## 2023-01-11 NOTE — TELEPHONE ENCOUNTER
Called Patient to inform of PCP message. Patient reports that she is still having some burning which started back on 1/11/23 with dark urine.     ----- Message from Mariah Jones MD sent at 1/9/2023  7:25 AM CST -----  Appropriate antibiotic sent, please inform pt.

## 2023-01-20 ENCOUNTER — OFFICE VISIT (OUTPATIENT)
Dept: UROGYNECOLOGY | Facility: CLINIC | Age: 63
End: 2023-01-20
Payer: MEDICAID

## 2023-01-20 ENCOUNTER — LAB VISIT (OUTPATIENT)
Dept: LAB | Facility: OTHER | Age: 63
End: 2023-01-20
Attending: INTERNAL MEDICINE
Payer: MEDICAID

## 2023-01-20 VITALS
WEIGHT: 184.94 LBS | BODY MASS INDEX: 32.77 KG/M2 | SYSTOLIC BLOOD PRESSURE: 130 MMHG | HEIGHT: 63 IN | DIASTOLIC BLOOD PRESSURE: 72 MMHG

## 2023-01-20 DIAGNOSIS — K58.2 IRRITABLE BOWEL SYNDROME WITH BOTH CONSTIPATION AND DIARRHEA: ICD-10-CM

## 2023-01-20 DIAGNOSIS — Z78.0 POSTMENOPAUSAL: ICD-10-CM

## 2023-01-20 DIAGNOSIS — R39.15 URINARY URGENCY: ICD-10-CM

## 2023-01-20 DIAGNOSIS — N39.0 RECURRENT URINARY TRACT INFECTION: Primary | ICD-10-CM

## 2023-01-20 DIAGNOSIS — N39.41 URGENCY INCONTINENCE: ICD-10-CM

## 2023-01-20 DIAGNOSIS — N39.0 RECURRENT URINARY TRACT INFECTION: ICD-10-CM

## 2023-01-20 DIAGNOSIS — R35.0 URINARY FREQUENCY: ICD-10-CM

## 2023-01-20 LAB
BILIRUB UR QL STRIP: NEGATIVE
FSH SERPL-ACNC: 82.54 MIU/ML
GLUCOSE UR QL STRIP: NEGATIVE
KETONES UR QL STRIP: NEGATIVE
LEUKOCYTE ESTERASE UR QL STRIP: POSITIVE
PH, POC UA: 6
POC BLOOD, URINE: POSITIVE
POC NITRATES, URINE: NEGATIVE
POC RESIDUAL URINE VOLUME: 34 ML (ref 0–100)
PROT UR QL STRIP: POSITIVE
SP GR UR STRIP: 1.01 (ref 1–1.03)
UROBILINOGEN UR STRIP-ACNC: NEGATIVE (ref 0.1–1.1)

## 2023-01-20 PROCEDURE — 3078F PR MOST RECENT DIASTOLIC BLOOD PRESSURE < 80 MM HG: ICD-10-PCS | Mod: CPTII,,, | Performed by: OBSTETRICS & GYNECOLOGY

## 2023-01-20 PROCEDURE — 82672 ASSAY OF ESTROGEN: CPT | Performed by: OBSTETRICS & GYNECOLOGY

## 2023-01-20 PROCEDURE — 3078F DIAST BP <80 MM HG: CPT | Mod: CPTII,,, | Performed by: OBSTETRICS & GYNECOLOGY

## 2023-01-20 PROCEDURE — 99214 OFFICE O/P EST MOD 30 MIN: CPT | Mod: PBBFAC | Performed by: OBSTETRICS & GYNECOLOGY

## 2023-01-20 PROCEDURE — 87086 URINE CULTURE/COLONY COUNT: CPT | Performed by: OBSTETRICS & GYNECOLOGY

## 2023-01-20 PROCEDURE — 99999 PR PBB SHADOW E&M-EST. PATIENT-LVL IV: ICD-10-PCS | Mod: PBBFAC,,, | Performed by: OBSTETRICS & GYNECOLOGY

## 2023-01-20 PROCEDURE — 99999 PR PBB SHADOW E&M-EST. PATIENT-LVL IV: CPT | Mod: PBBFAC,,, | Performed by: OBSTETRICS & GYNECOLOGY

## 2023-01-20 PROCEDURE — 1160F RVW MEDS BY RX/DR IN RCRD: CPT | Mod: CPTII,,, | Performed by: OBSTETRICS & GYNECOLOGY

## 2023-01-20 PROCEDURE — 1159F PR MEDICATION LIST DOCUMENTED IN MEDICAL RECORD: ICD-10-PCS | Mod: CPTII,,, | Performed by: OBSTETRICS & GYNECOLOGY

## 2023-01-20 PROCEDURE — 4010F ACE/ARB THERAPY RXD/TAKEN: CPT | Mod: CPTII,,, | Performed by: OBSTETRICS & GYNECOLOGY

## 2023-01-20 PROCEDURE — 3075F SYST BP GE 130 - 139MM HG: CPT | Mod: CPTII,,, | Performed by: OBSTETRICS & GYNECOLOGY

## 2023-01-20 PROCEDURE — 99215 PR OFFICE/OUTPT VISIT, EST, LEVL V, 40-54 MIN: ICD-10-PCS | Mod: S$PBB,,, | Performed by: OBSTETRICS & GYNECOLOGY

## 2023-01-20 PROCEDURE — 99215 OFFICE O/P EST HI 40 MIN: CPT | Mod: S$PBB,,, | Performed by: OBSTETRICS & GYNECOLOGY

## 2023-01-20 PROCEDURE — 3008F BODY MASS INDEX DOCD: CPT | Mod: CPTII,,, | Performed by: OBSTETRICS & GYNECOLOGY

## 2023-01-20 PROCEDURE — 83001 ASSAY OF GONADOTROPIN (FSH): CPT | Performed by: OBSTETRICS & GYNECOLOGY

## 2023-01-20 PROCEDURE — 1160F PR REVIEW ALL MEDS BY PRESCRIBER/CLIN PHARMACIST DOCUMENTED: ICD-10-PCS | Mod: CPTII,,, | Performed by: OBSTETRICS & GYNECOLOGY

## 2023-01-20 PROCEDURE — 81001 URINALYSIS AUTO W/SCOPE: CPT | Performed by: OBSTETRICS & GYNECOLOGY

## 2023-01-20 PROCEDURE — 3008F PR BODY MASS INDEX (BMI) DOCUMENTED: ICD-10-PCS | Mod: CPTII,,, | Performed by: OBSTETRICS & GYNECOLOGY

## 2023-01-20 PROCEDURE — 3075F PR MOST RECENT SYSTOLIC BLOOD PRESS GE 130-139MM HG: ICD-10-PCS | Mod: CPTII,,, | Performed by: OBSTETRICS & GYNECOLOGY

## 2023-01-20 PROCEDURE — 1159F MED LIST DOCD IN RCRD: CPT | Mod: CPTII,,, | Performed by: OBSTETRICS & GYNECOLOGY

## 2023-01-20 PROCEDURE — 4010F PR ACE/ARB THEARPY RXD/TAKEN: ICD-10-PCS | Mod: CPTII,,, | Performed by: OBSTETRICS & GYNECOLOGY

## 2023-01-20 NOTE — PROGRESS NOTES
Chief Complaint   Patient presents with    Urinary Tract Infection        HPI: Patient is a 62 y.o. female  who presents today with c/o recurrent urinary tract infections. Patient was last seen by Dr. Vale 2020 for urinary incontinence and UTI's. Review of records shows that the patient has had three since 10/28/22. All cultures 10/28/22, 11/15/22, and 23 were positive for E.coli and all were pan-sensitive to antibiotics. Patient states that she has always had UTI's but now they are occurring more often. She states that symptoms include dysuria, back pain and suprapubic. She also has urinary urgency and frequency. She denies blood in the urine. She has tried vaginal estrogen and an Estring but needed to discontinue both treatment as it caused itching and swelling. Patient took the medication for about two months but didn't seem to make a difference in symptoms. Patient reports that most of her infections are occurring after sexual activity. She states that a MD at [facility] used to prescribe a suppression antibiotic after intercourse.   CT Abd/PV on 21: No acute process is noted in the abdomen or pelvis. Colon appears improved when compared to prior CT.Stable appearing right cardiophrenic lymph node.Postsurgical changes of gastric bypass. Small hiatal hernia.Stable right upper quadrant lipoma.  Normal kidneys and ureters.     Patient also reports urinary urgency but symptoms are intermittent. She states that however that sometimes that she has severe urgency and before she can reach the bathroom she will leak urine. She always wears a pad as a result. She uses about 3 per day. She reports urinary frequency. She voids about every hour during the day and voids 3 times per night. She states however she sleeps poorly due to anxiety and is already awake. She denies enuresis. She denies stress urinary incontinence. 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   She drinks 4 bottles of water daily, diet Dr. Pepper 3 cans, and infrequently one cup of coffee.      She denies vaginal bulge symptoms.     She reports variable BM's. She has not seen GI for her bowel symptoms. She denies accidental bowel leakage recently. Has a h/o one episode about 4 years ago.     She reports pain with intercourse. She is very dry and always has been. She uses Dove unscented bar soap for bathing. Uses a wash cloth. Does not use "Always" brand pads.     Review of Systems   Constitutional:  Positive for activity change and fatigue. Negative for appetite change, chills, fever and unexpected weight change.   HENT:  Positive for dental problem. Negative for nasal congestion, hearing loss, mouth dryness and sore throat.    Eyes:  Positive for eye dryness. Negative for pain.   Respiratory:  Positive for shortness of breath. Negative for cough and wheezing.    Cardiovascular:  Positive for leg swelling. Negative for chest pain and palpitations.   Gastrointestinal:  Positive for constipation, diarrhea and rectal pain. Negative for abdominal distention, abdominal pain and blood in stool.   Endocrine: Negative for cold intolerance and heat intolerance.   Genitourinary:  Positive for hot flashes and vaginal dryness. Negative for dyspareunia.        +night sweats and decreased libido   Musculoskeletal:  Positive for arthralgias, back pain, joint swelling, myalgias, neck pain and neck stiffness. Negative for gait problem.   Integumentary:  Negative for rash.   Neurological:  Positive for light-headedness, numbness and headaches. Negative for dizziness, tremors, seizures, speech difficulty and weakness.   Psychiatric/Behavioral:  Positive for sleep disturbance. Negative for confusion and dysphoric mood. The patient is nervous/anxious.       Past Medical History:   Diagnosis Date    Abnormal Pap smear     Anemia     history    " Anxiety     Cancer     uterine-cancer cells     Colon polyps, next C-scope 2019. 4/22/2014    Dry eyes     Dyspareunia, female 6/24/2020    Essential hypertension, started in her mid 30's 1/18/2017    Family history of malignant neoplasm of pancreas 6/1/2018    Gastric bypass status for obesity 8/21/2012    GERD (gastroesophageal reflux disease)     Helicobacter pylori gastritis, 2008. 8/20/2014    Hemangioma of liver, stable 2010, 2012, right lobe 8/20/2014    Hematuria     History of cosmetic surgeries, tummy tuck 2011. 4/5/2013    History of frquent UTI 6/24/2020    HTN (hypertension) 8/21/2012    120s-130s/80s    Kidney stone     Right sided sciatica 6/1/2018    Sleep apnea     patient does not use the C-PAP mask-cannot tolerate    Ureteral stone 4/8/2014    Urinary incontinence     Urinary retention     Varicose veins of lower extremity with inflammation 4/8/2015    Vertigo     mild    Vitamin D deficiency disease 6/1/2018       Past Surgical History:   Procedure Laterality Date    Abdominoplasty with Liposcuction      APPENDECTOMY      CERVICAL BIOPSY  W/ LOOP ELECTRODE EXCISION      Prior to hysterectomy    CHOLECYSTECTOMY      Laparoscopic    COLONOSCOPY N/A 2/13/2020    Procedure: COLONOSCOPY;  Surgeon: Pb Smith MD;  Location: [facility] ([address] [place] [address]);  Service: Endoscopy;  Laterality: N/A;    ESOPHAGOGASTRODUODENOSCOPY N/A 11/2/2021    Procedure: EGD (ESOPHAGOGASTRODUODENOSCOPY);  Surgeon: Clayton Martínez MD;  Location: [address]);  Service: Endoscopy;  Laterality: N/A;  fully vac. 3/15/21 / instr portal -ml    GASTRIC BYPASS      HERNIA REPAIR      HYSTERECTOMY  1980     TVH  both ovaries remain         Current Outpatient Medications:     acetaminophen (TYLENOL) 500 MG tablet, Take 1-2 tablets (500-1,000 mg total) by mouth 3 (three) times daily as needed for Pain., Disp: , Rfl: 0    albuterol (PROVENTIL/VENTOLIN HFA) 90 mcg/actuation inhaler, Inhale 1-2 puffs into the lungs every 4  (four) hours as needed for Shortness of Breath (coughing). Rescue, Disp: 18 g, Rfl: 11    ALPRAZolam (XANAX) 0.5 MG tablet, TAKE 1 TABLET(0.5 MG) BY MOUTH EVERY NIGHT AS NEEDED FOR INSOMNIA OR ANXIETY, Disp: 30 tablet, Rfl: 5    amLODIPine (NORVASC) 2.5 MG tablet, Take one tablet by mouth as needed for a blood pressure spike over 140, Disp: 90 tablet, Rfl: 3    atorvastatin (LIPITOR) 40 MG tablet, Take 1 tablet (40 mg total) by mouth every evening. To lower cholesterol, Disp: 90 tablet, Rfl: 3    baclofen (LIORESAL) 10 MG tablet, Take 1 tablet (10 mg total) by mouth 3 (three) times daily as needed (muscle spasm)., Disp: 90 tablet, Rfl: 11    buPROPion (WELLBUTRIN SR) 100 MG TBSR 12 hr tablet, Take 1 tablet (100 mg total) by mouth 2 (two) times daily., Disp: 180 tablet, Rfl: 3    cholecalciferol, vitamin D3, 5,000 unit Tab, Take by mouth. 1 Tablet Oral Every day, Disp: , Rfl:     cyanocobalamin 1,000 mcg/mL injection, INJECT 1 ML (CC) INTRAMUSCULARLY ONCE EVERY MONTH, Disp: 1 mL, Rfl: 11    cyclobenzaprine (FLEXERIL) 10 MG tablet, Take 1 tablet (10 mg total) by mouth every evening., Disp: 30 tablet, Rfl: 6    cycloSPORINE (RESTASIS) 0.05 % ophthalmic emulsion, Instill 1 drop into both eyes twice daily, Disp: 180 each, Rfl: 3    diclofenac sodium (VOLTAREN) 1 % Gel, Apply 2 g topically 4 (four) times daily as needed (pain)., Disp: 400 g, Rfl: 3    hydroCHLOROthiazide (HYDRODIURIL) 25 MG tablet, Take 1 tablet (25 mg total) by mouth once daily., Disp: 90 tablet, Rfl: 3    losartan (COZAAR) 100 MG tablet, Take 1 tablet (100 mg total) by mouth every morning., Disp: 90 tablet, Rfl: 3    metoprolol succinate (TOPROL-XL) 25 MG 24 hr tablet, Take 1 tablet (25 mg total) by mouth once daily., Disp: 90 tablet, Rfl: 3    olopatadine (PATANOL) 0.1 % ophthalmic solution, Place 1 drop into both eyes 2 (two) times daily., Disp: 5 mL, Rfl: 6    pantoprazole (PROTONIX) 40 MG tablet, Take 1 tablet (40 mg total) by mouth 2 (two) times  "daily., Disp: 180 tablet, Rfl: 3    promethazine (PHENERGAN) 25 MG tablet, Take 1 tablet (25 mg total) by mouth every 6 (six) hours as needed for Nausea., Disp: 15 tablet, Rfl: 0    syringe with needle (BD LUER-CLARIBEL SYRINGE) 3 mL 25 x 1 1/2 " Syrg, Use as directed with Cyanocobalamin, Disp: 10 Syringe, Rfl: 3    tretinoin (RETIN-A) 0.1 % cream, Apply to affected area Topical Every day, Disp: 45 g, Rfl: 11    Review of patient's allergies indicates:   Allergen Reactions    Adhesive tape-silicones Other (See Comments)     Other reaction(s): BLISTERS    Penicillins Hives and Itching    Sulfamethoxazole-trimethoprim Hives and Itching    Prosed ec [meth-hyos-atrp-m blue-ba-phsal] Hives, Itching and Swelling    Pyridium [phenazopyridine]     Cephalexin Nausea And Vomiting and Other (See Comments)     Dehydration       Family History   Problem Relation Age of Onset    Heart disease Mother     Stroke Mother     Coronary artery disease Mother     Heart failure Mother     Obesity Mother     Cancer Father         pancreatic cancer    Diabetes Father     Obesity Father     No Known Problems Sister     No Known Problems Brother     Hearing loss Maternal Grandmother     No Known Problems Maternal Grandfather     No Known Problems Paternal Grandmother     No Known Problems Paternal Grandfather     Breast cancer Maternal Aunt     Colon cancer Maternal Uncle     No Known Problems Paternal Aunt     No Known Problems Paternal Uncle     Ovarian cancer Neg Hx     Anesthesia problems Neg Hx     Amblyopia Neg Hx     Blindness Neg Hx     Cataracts Neg Hx     Glaucoma Neg Hx     Hypertension Neg Hx     Macular degeneration Neg Hx     Retinal detachment Neg Hx     Strabismus Neg Hx     Thyroid disease Neg Hx        Social History     Socioeconomic History    Marital status:    Occupational History    Occupation:    Tobacco Use    Smoking status: Never    Smokeless tobacco: Never    Tobacco comments:     .  .   " Occup:  .     Substance and Sexual Activity    Alcohol use: No    Drug use: No    Sexual activity: Yes     Partners: Male     Birth control/protection: Surgical   Social History Narrative    Patient is     She and her  own business together.    [place] [employer] [place].    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         Feels safe in her home and with spouse.      Social Determinants of Health     Financial Resource Strain: Unknown    Difficulty of Paying Living Expenses: Patient refused   Food Insecurity: Unknown    Worried About Running Out of Food in the Last Year: Patient refused    Ran Out of Food in the Last Year: Patient refused   Transportation Needs: Unknown    Lack of Transportation (Medical): Patient refused    Lack of Transportation (Non-Medical): Patient refused   Physical Activity: Sufficiently Active    Days of Exercise per Week: 5 days    Minutes of Exercise per Session: 30 min   Stress: Stress Concern Present    Feeling of Stress : Very much   Social Connections: Unknown    Frequency of Communication with Friends and Family: Three times a week    Frequency of Social Gatherings with Friends and Family: Patient refused    Active Member of Clubs or Organizations: No    Attends Club or Organization Meetings: Patient refused    Marital Status:    Housing Stability: Unknown    Unable to Pay for Housing in the Last Year: Patient refused    Number of Places Lived in the Last Year: 1    Unstable Housing in the Last Year: Patient refused       OB History          3    Para   2    Term   2            AB   1    Living   2         SAB   1    IAB        Ectopic        Multiple        Live Births   2           Obstetric Comments   S/p TVH in  for uterine cancer ?  Denies abnl pap, pt was still getting pap smear after  c-scope in , repeat in 5 years  MMG in 2019               Gyn  History    Mammogram: 12/28/21 BI-RADS 1, negative  Pap smear: s/p hysterectomy  LMP: No LMP recorded. Patient has had a hysterectomy.   Postmenopausal bleeding: n/a      INITIAL PHYSICAL EXAMINATION    Vitals:    01/20/23 0835   BP: 130/72      General: Healthy in appearance, Well nourished, Affect Normal, NAD.  Neck: Supple, No masses, Trachea midline, Thyroid normal size,  non-tender, no masses or nodules.  Nodes: No clavicular/cervical adenopathy.  Skin: Normal temperature, No atypical lesions or rash.  Heart: Normal sounds, no murmurs  Lungs: Normal respiratory effort.  Gastrointestinal: Non tender, Non distended, No masses, guarding or  rebound, No hepatosplenomegally, No hernia.  Ext: No clubbing, cyanosis, edema or varicosities.  DTR's: 2+ bilaterally  Strength 5/5 bilateral upper and lower extremities    Genitourinary-  Vulva: normal without lesions, masses, atrophy or pain  Urethra: meatus central and normal in appearance, no prolapse/caruncle, no masses or discharge. Empty supine stress test was negative.  Bladder: non-tender, no masses  Vagina: No discharge or lesions, moderate atrophy, no masses appreciated.  [See POP-Q]  Cervix: absent  Uterus:  absent  Adnexa: no masses, non tender.  Rectal: deferred   Neuro: S2-4- pin prick and light touch intact, Anal wink present  Levator strength: 3/5  Levator tenderness: left 4/10 and right 6/10    POP-Q Exam- pelvic organ prolapse quantitative    Aa   Anterior Wall -3 Ba   Anterior wall -3 C   Cervix or cuff -10   Gh   Genital hiatus 4 pb   perineal body 3 tvl   Total vaginal length 10   Ap   Posterior wall -2 Bp   Posterior wall -2 D   Posterior fornix     without Uterus    POP-Q Stage:1      Office Visit on 01/20/2023   Component Date Value Ref Range Status    POC Blood, Urine 01/20/2023 Positive (A)  Negative Final    POC Bilirubin, Urine 01/20/2023 Negative  Negative Final    POC Urobilinogen, Urine 01/20/2023 Negative  0.1 - 1.1 Final    POC Ketones, Urine  01/20/2023 Negative  Negative Final    POC Protein, Urine 01/20/2023 Positive (A)  Negative Final    POC Nitrates, Urine 01/20/2023 Negative  Negative Final    POC Glucose, Urine 01/20/2023 Negative  Negative Final    pH, UA 01/20/2023 6   Final    POC Specific Gravity, Urine 01/20/2023 1.015  1.003 - 1.029 Final    POC Leukocytes, Urine 01/20/2023 Positive (A)  Negative Final    POC Residual Urine Volume 01/20/2023 34  0 - 100 mL Final          ASSESSMENT & PLAN:    Recurrent urinary tract infection  -     Urinalysis; Standing  -     Urine culture; Standing  -     Urinalysis Microscopic; Standing  -     Cystoscopy; Future  -     Urinalysis Microscopic  -     Urine culture    Urgency incontinence    Urinary urgency    Urinary frequency    Postmenopausal  -     Follicle Stimulating Hormone; Future; Expected date: 01/20/2023  -     ESTROGENS, TOTAL; Future; Expected date: 01/20/2023    Irritable bowel syndrome with both constipation and diarrhea  -     Ambulatory referral/consult to Gastroenterology; Future; Expected date: 01/27/2023       Exam findings and treatment options were discussed in detail with the patient. Her history is consistent with recurrent bladder infections. We discussed various strategies to reduce her risk for infection and I have asked her to provide a sample for UA, C&S whenever she has symptoms of an infection. A standing order was placed. We will proceed with office cystoscopy. Patient has been unable to tolerate vaginal estrogen therapy. We reviewed alternate strategies including D-Mannose, Methenamine and postcoital suppression. Will start with D-Mannose as E.coli infections respond well to treatment typically. Reviewed that suppression with antibiotics have the increased risk of allergic reactions and bacterial resistance.     For her urinary incontinence she will start with bladder training an reduction of dietary irritants. Handouts on OAB and Bladder training were provided. Aware that we  can do medication if symptoms do not improve. If her lifestyle changes then she may consider PFPT.     For her variable bowel habits of diarrhea and constipation she has been referred to GI.     Patient inquired about her hormonal levels and strongly desires that they be checked as her maternal GM went through menopause late in life. She had a hysterectomy in her 20's and never felt typical menopause symptoms. FSH and estrogen levels ordered.     All questions were answered today. The patient was encouraged to contact the office as needed with any additional questions or concerns.     Total time spent on visit was 50 minutes.  This includes face to face time and non-face to face time preparing to see the patient (eg, review of tests), Obtaining and/or reviewing separately obtained history, Documenting clinical information in the electronic or other health record, Independently interpreting resultsand communicating results to the patient/family/caregiver, or Care coordination.    Adriana Waller MD

## 2023-01-21 LAB
BACTERIA #/AREA URNS AUTO: ABNORMAL /HPF
BACTERIA UR CULT: NO GROWTH
MICROSCOPIC COMMENT: ABNORMAL
RBC #/AREA URNS AUTO: 1 /HPF (ref 0–4)
SQUAMOUS #/AREA URNS AUTO: 1 /HPF
WBC #/AREA URNS AUTO: 9 /HPF (ref 0–5)
YEAST UR QL AUTO: ABNORMAL

## 2023-01-23 LAB — ESTROGEN SERPL-MCNC: 86 PG/ML

## 2023-01-24 ENCOUNTER — PATIENT MESSAGE (OUTPATIENT)
Dept: UROGYNECOLOGY | Facility: CLINIC | Age: 63
End: 2023-01-24
Payer: MEDICAID

## 2023-01-24 DIAGNOSIS — B37.9 YEAST INFECTION: Primary | ICD-10-CM

## 2023-01-24 RX ORDER — FLUCONAZOLE 150 MG/1
150 TABLET ORAL ONCE
Qty: 1 TABLET | Refills: 0 | Status: SHIPPED | OUTPATIENT
Start: 2023-01-24 | End: 2023-01-24

## 2023-03-01 ENCOUNTER — LAB VISIT (OUTPATIENT)
Dept: LAB | Facility: HOSPITAL | Age: 63
End: 2023-03-01
Attending: OBSTETRICS & GYNECOLOGY
Payer: MEDICAID

## 2023-03-01 DIAGNOSIS — N39.0 RECURRENT URINARY TRACT INFECTION: ICD-10-CM

## 2023-03-01 LAB
BACTERIA #/AREA URNS AUTO: ABNORMAL /HPF
BILIRUB UR QL STRIP: NEGATIVE
CLARITY UR REFRACT.AUTO: ABNORMAL
COLOR UR AUTO: YELLOW
GLUCOSE UR QL STRIP: NEGATIVE
HGB UR QL STRIP: NEGATIVE
KETONES UR QL STRIP: NEGATIVE
LEUKOCYTE ESTERASE UR QL STRIP: ABNORMAL
MICROSCOPIC COMMENT: ABNORMAL
NITRITE UR QL STRIP: NEGATIVE
PH UR STRIP: 7 [PH] (ref 5–8)
PROT UR QL STRIP: NEGATIVE
RBC #/AREA URNS AUTO: 1 /HPF (ref 0–4)
SP GR UR STRIP: 1.01 (ref 1–1.03)
SQUAMOUS #/AREA URNS AUTO: 4 /HPF
URN SPEC COLLECT METH UR: ABNORMAL
WBC #/AREA URNS AUTO: 87 /HPF (ref 0–5)
WBC CLUMPS UR QL AUTO: ABNORMAL

## 2023-03-01 PROCEDURE — 87186 SC STD MICRODIL/AGAR DIL: CPT | Performed by: OBSTETRICS & GYNECOLOGY

## 2023-03-01 PROCEDURE — 87077 CULTURE AEROBIC IDENTIFY: CPT | Performed by: OBSTETRICS & GYNECOLOGY

## 2023-03-01 PROCEDURE — 81001 URINALYSIS AUTO W/SCOPE: CPT | Performed by: OBSTETRICS & GYNECOLOGY

## 2023-03-01 PROCEDURE — 87086 URINE CULTURE/COLONY COUNT: CPT | Performed by: OBSTETRICS & GYNECOLOGY

## 2023-03-01 PROCEDURE — 87088 URINE BACTERIA CULTURE: CPT | Performed by: OBSTETRICS & GYNECOLOGY

## 2023-03-03 ENCOUNTER — TELEPHONE (OUTPATIENT)
Dept: UROGYNECOLOGY | Facility: CLINIC | Age: 63
End: 2023-03-03
Payer: MEDICAID

## 2023-03-03 NOTE — TELEPHONE ENCOUNTER
Spoke with patient that her preliminary results indicate she has a UTI. Cancelled her cystoscopy for today and will re-schedule. Patient feels ok to wait for final results before starting treatment. Discussed that once she completes treatment will have her start Methenamine with Vitamin C. Patient reports vulvovaginal swelling when she attempted to use vaginal estrogen. Message sent ot staff to have cystoscopy re-scheduled.   Adriana Waller

## 2023-03-04 DIAGNOSIS — N39.0 RECURRENT URINARY TRACT INFECTION: Primary | ICD-10-CM

## 2023-03-04 LAB — BACTERIA UR CULT: ABNORMAL

## 2023-03-04 RX ORDER — CIPROFLOXACIN 500 MG/1
500 TABLET ORAL EVERY 12 HOURS
Qty: 10 TABLET | Refills: 0 | Status: SHIPPED | OUTPATIENT
Start: 2023-03-04 | End: 2023-03-09

## 2023-03-06 ENCOUNTER — TELEPHONE (OUTPATIENT)
Dept: INTERNAL MEDICINE | Facility: CLINIC | Age: 63
End: 2023-03-06
Payer: MEDICAID

## 2023-03-06 NOTE — TELEPHONE ENCOUNTER
----- Message from Myranda Silva sent at 3/6/2023 12:44 PM CST -----  Contact: Self/[phone]  Medicaid pt was a patient of Dr Jones is asking if she can be scheduled for an appt with Dr Chand. Please advise

## 2023-03-17 ENCOUNTER — OFFICE VISIT (OUTPATIENT)
Dept: UROGYNECOLOGY | Facility: CLINIC | Age: 63
End: 2023-03-17
Payer: MEDICAID

## 2023-03-17 DIAGNOSIS — N39.0 RECURRENT URINARY TRACT INFECTION: ICD-10-CM

## 2023-03-17 LAB
BILIRUB SERPL-MCNC: NORMAL MG/DL
BLOOD URINE, POC: NORMAL
CLARITY, POC UA: CLEAR
COLOR, POC UA: YELLOW
GLUCOSE UR QL STRIP: NORMAL
KETONES UR QL STRIP: NORMAL
LEUKOCYTE ESTERASE URINE, POC: NORMAL
NITRITE, POC UA: NORMAL
PH, POC UA: 6
PROTEIN, POC: NORMAL
SPECIFIC GRAVITY, POC UA: 1.01
UROBILINOGEN, POC UA: NORMAL

## 2023-03-17 PROCEDURE — 99499 UNLISTED E&M SERVICE: CPT | Mod: S$PBB,,, | Performed by: OBSTETRICS & GYNECOLOGY

## 2023-03-17 PROCEDURE — 99499 NO LOS: ICD-10-PCS | Mod: S$PBB,,, | Performed by: OBSTETRICS & GYNECOLOGY

## 2023-03-17 PROCEDURE — 1159F MED LIST DOCD IN RCRD: CPT | Mod: CPTII,,, | Performed by: OBSTETRICS & GYNECOLOGY

## 2023-03-17 PROCEDURE — 99213 OFFICE O/P EST LOW 20 MIN: CPT | Mod: PBBFAC | Performed by: OBSTETRICS & GYNECOLOGY

## 2023-03-17 PROCEDURE — 52000 CYSTOURETHROSCOPY: CPT | Mod: PBBFAC | Performed by: OBSTETRICS & GYNECOLOGY

## 2023-03-17 PROCEDURE — 99999 PR PBB SHADOW E&M-EST. PATIENT-LVL III: ICD-10-PCS | Mod: PBBFAC,,, | Performed by: OBSTETRICS & GYNECOLOGY

## 2023-03-17 PROCEDURE — 52000 CYSTOSCOPY: ICD-10-PCS | Mod: S$PBB,,, | Performed by: OBSTETRICS & GYNECOLOGY

## 2023-03-17 PROCEDURE — 99999 PR PBB SHADOW E&M-EST. PATIENT-LVL III: CPT | Mod: PBBFAC,,, | Performed by: OBSTETRICS & GYNECOLOGY

## 2023-03-17 PROCEDURE — 4010F PR ACE/ARB THEARPY RXD/TAKEN: ICD-10-PCS | Mod: CPTII,,, | Performed by: OBSTETRICS & GYNECOLOGY

## 2023-03-17 PROCEDURE — 4010F ACE/ARB THERAPY RXD/TAKEN: CPT | Mod: CPTII,,, | Performed by: OBSTETRICS & GYNECOLOGY

## 2023-03-17 PROCEDURE — 1159F PR MEDICATION LIST DOCUMENTED IN MEDICAL RECORD: ICD-10-PCS | Mod: CPTII,,, | Performed by: OBSTETRICS & GYNECOLOGY

## 2023-03-17 PROCEDURE — 81002 URINALYSIS NONAUTO W/O SCOPE: CPT | Mod: PBBFAC | Performed by: OBSTETRICS & GYNECOLOGY

## 2023-03-17 RX ORDER — LIDOCAINE HYDROCHLORIDE 20 MG/ML
JELLY TOPICAL ONCE
Status: COMPLETED | OUTPATIENT
Start: 2023-03-17 | End: 2023-03-17

## 2023-03-17 RX ORDER — NITROFURANTOIN (MACROCRYSTALS) 100 MG/1
100 CAPSULE ORAL DAILY
Qty: 90 CAPSULE | Refills: 1 | Status: SHIPPED | OUTPATIENT
Start: 2023-03-17 | End: 2023-09-21

## 2023-03-17 RX ORDER — CIPROFLOXACIN 500 MG/1
500 TABLET ORAL
Status: COMPLETED | OUTPATIENT
Start: 2023-03-17 | End: 2023-03-17

## 2023-03-17 RX ADMIN — CIPROFLOXACIN 500 MG: 500 TABLET ORAL at 03:03

## 2023-03-17 RX ADMIN — LIDOCAINE HYDROCHLORIDE 5 ML: 20 JELLY TOPICAL at 03:03

## 2023-03-17 NOTE — PROCEDURES
Cystoscopy    Date/Time: 3/17/2023 3:00 PM  Performed by: Adriana Waller MD  Authorized by: Adriana Waller MD     Timeout: prior to procedure the correct patient, procedure, and site was verified    Prep: patient was prepped and draped in usual sterile fashion    Local anesthesia used?: Yes    Anesthesia:  Intraurethral instillation  Local anesthetic:  Lidocaine 2% topical gel  Indications: recurrent UTIs    Position:  Dorsal lithotomy  Anesthesia:  Intraurethral instillation  Patient sedated?: No    Preparation: Patient was prepped and draped in usual sterile fashion    Scope type:  Flexible cystoscope  External exam normal: Yes    Digital exam performed: No      Patient taken to procedure room and placed in dorsal lithotomy position. Perineum was prepped and draped using sterile technique.The urethra was prepped with betadine, and 10 mL of 2% lidocaine gel were instilled into the bladder through the urethra.   Next, cystourethroscopy was performed utilizing a flexible scope. The flexible cystourethroscope was introduced into the bladder under direct visualization. The bladder was distended with approximately 300 cubic centimeters of sterile water. A systematic survey was performed in which the bladder was surveyed using multiple sequential passes in a clockwise fashion from the bladder dome to the bladder base to the urethrovesical junction. The trigone and ureteral orifices were observed. The scope was then flipped back on itself, and the urethrovesical junction was viewed. Upon completion of the bladder survey, the telescope was then completely withdrawn.    Findings:  Meatus: normal in appearance  Urethra: normal in appearance  Ureteral orifices: normal in appearance with clear efflux from the ureteral orifices bilaterally  Bladder Diverticulum: none  Bladder Stone(s): none  Bladder Tumor: none   Bladder Mucosa: normal in appearance with evidence of cystitis cystica throughout the bladder.   Bladder Wall:  normal in appearance    Patient tolerated procedure well without complications. Antibiotic prophylaxis and pyridium was given.    ASSESSMENT/ PLAN:  Recurrent urinary tract infection  -     Ambulatory referral/consult to Urogynecology  -     Cystoscopy  -     POCT URINE DIPSTICK WITHOUT MICROSCOPE  -     LIDOcaine HCl 2% urojet  -     ciprofloxacin HCl tablet 500 mg  -     nitrofurantoin (MACRODANTIN) 100 MG capsule; Take 1 capsule (100 mg total) by mouth once daily.  Dispense: 90 capsule; Refill: 1      61 yo with recurrent UTI's presented today for a cystoscopy. Patient was most recently treated for a Citrobacter UTI on 3/1/23. She has not been able to tolerate vaginal estrogen therapy. Review of her medication allergies indicates that she is allergic to Prosed which has methenamine in it. Will try a daily antibiotic suppression with Nitrofurantoin 100 mg instead. Advised patient to stay on the D-Mannose. Will schedule a follow-up in 3 months. She is aware of the standing order for a UA and culture.   Patient provided the opportunity to ask questions and all concerns were addressed today.    Adriana Waller MD

## 2023-04-03 ENCOUNTER — LAB VISIT (OUTPATIENT)
Dept: LAB | Facility: HOSPITAL | Age: 63
End: 2023-04-03
Attending: INTERNAL MEDICINE
Payer: MEDICARE

## 2023-04-03 ENCOUNTER — OFFICE VISIT (OUTPATIENT)
Dept: RHEUMATOLOGY | Facility: CLINIC | Age: 63
End: 2023-04-03
Payer: MEDICARE

## 2023-04-03 VITALS
DIASTOLIC BLOOD PRESSURE: 80 MMHG | WEIGHT: 190.69 LBS | HEART RATE: 82 BPM | SYSTOLIC BLOOD PRESSURE: 138 MMHG | HEIGHT: 63 IN | BODY MASS INDEX: 33.79 KG/M2

## 2023-04-03 DIAGNOSIS — M79.7 PRIMARY FIBROMYALGIA: ICD-10-CM

## 2023-04-03 DIAGNOSIS — M79.7 PRIMARY FIBROMYALGIA: Primary | ICD-10-CM

## 2023-04-03 LAB
BILIRUB UR QL STRIP: NEGATIVE
CLARITY UR REFRACT.AUTO: CLEAR
COLOR UR AUTO: YELLOW
CREAT UR-MCNC: 42 MG/DL (ref 15–325)
GLUCOSE UR QL STRIP: NEGATIVE
HGB UR QL STRIP: NEGATIVE
KETONES UR QL STRIP: NEGATIVE
LEUKOCYTE ESTERASE UR QL STRIP: NEGATIVE
NITRITE UR QL STRIP: NEGATIVE
PH UR STRIP: 6 [PH] (ref 5–8)
PROT UR QL STRIP: NEGATIVE
PROT UR-MCNC: <7 MG/DL (ref 0–15)
PROT/CREAT UR: NORMAL MG/G{CREAT} (ref 0–0.2)
SP GR UR STRIP: 1.01 (ref 1–1.03)
URN SPEC COLLECT METH UR: NORMAL

## 2023-04-03 PROCEDURE — 4010F ACE/ARB THERAPY RXD/TAKEN: CPT | Mod: CPTII,S$GLB,, | Performed by: INTERNAL MEDICINE

## 2023-04-03 PROCEDURE — 3075F SYST BP GE 130 - 139MM HG: CPT | Mod: CPTII,S$GLB,, | Performed by: INTERNAL MEDICINE

## 2023-04-03 PROCEDURE — 3008F PR BODY MASS INDEX (BMI) DOCUMENTED: ICD-10-PCS | Mod: CPTII,S$GLB,, | Performed by: INTERNAL MEDICINE

## 2023-04-03 PROCEDURE — 3075F PR MOST RECENT SYSTOLIC BLOOD PRESS GE 130-139MM HG: ICD-10-PCS | Mod: CPTII,S$GLB,, | Performed by: INTERNAL MEDICINE

## 2023-04-03 PROCEDURE — 99214 PR OFFICE/OUTPT VISIT, EST, LEVL IV, 30-39 MIN: ICD-10-PCS | Mod: S$GLB,,, | Performed by: INTERNAL MEDICINE

## 2023-04-03 PROCEDURE — 1159F PR MEDICATION LIST DOCUMENTED IN MEDICAL RECORD: ICD-10-PCS | Mod: CPTII,S$GLB,, | Performed by: INTERNAL MEDICINE

## 2023-04-03 PROCEDURE — 99999 PR PBB SHADOW E&M-EST. PATIENT-LVL IV: ICD-10-PCS | Mod: PBBFAC,,, | Performed by: INTERNAL MEDICINE

## 2023-04-03 PROCEDURE — 82570 ASSAY OF URINE CREATININE: CPT | Performed by: INTERNAL MEDICINE

## 2023-04-03 PROCEDURE — 81003 URINALYSIS AUTO W/O SCOPE: CPT | Performed by: INTERNAL MEDICINE

## 2023-04-03 PROCEDURE — 1159F MED LIST DOCD IN RCRD: CPT | Mod: CPTII,S$GLB,, | Performed by: INTERNAL MEDICINE

## 2023-04-03 PROCEDURE — 3079F DIAST BP 80-89 MM HG: CPT | Mod: CPTII,S$GLB,, | Performed by: INTERNAL MEDICINE

## 2023-04-03 PROCEDURE — 3008F BODY MASS INDEX DOCD: CPT | Mod: CPTII,S$GLB,, | Performed by: INTERNAL MEDICINE

## 2023-04-03 PROCEDURE — 99999 PR PBB SHADOW E&M-EST. PATIENT-LVL IV: CPT | Mod: PBBFAC,,, | Performed by: INTERNAL MEDICINE

## 2023-04-03 PROCEDURE — 3079F PR MOST RECENT DIASTOLIC BLOOD PRESSURE 80-89 MM HG: ICD-10-PCS | Mod: CPTII,S$GLB,, | Performed by: INTERNAL MEDICINE

## 2023-04-03 PROCEDURE — 99214 OFFICE O/P EST MOD 30 MIN: CPT | Mod: S$GLB,,, | Performed by: INTERNAL MEDICINE

## 2023-04-03 PROCEDURE — 4010F PR ACE/ARB THEARPY RXD/TAKEN: ICD-10-PCS | Mod: CPTII,S$GLB,, | Performed by: INTERNAL MEDICINE

## 2023-04-03 PROCEDURE — 99214 OFFICE O/P EST MOD 30 MIN: CPT | Mod: PBBFAC | Performed by: INTERNAL MEDICINE

## 2023-04-03 RX ORDER — CYCLOBENZAPRINE HCL 5 MG
TABLET ORAL
Qty: 60 TABLET | Refills: 3 | Status: SHIPPED | OUTPATIENT
Start: 2023-04-03 | End: 2024-01-23

## 2023-04-03 NOTE — PROGRESS NOTES
Rapid3 Question Responses and Scores 4/1/2023   MDHAQ Score 0.5   Psychologic Score 6.6   Pain Score 5.5   When you awakened in the morning OVER THE LAST WEEK, did you feel stiff? Yes   If Yes, please indicate the number of hours until you are as limber as you will be for the day -   Fatigue Score 6   Global Health Score 4.5   RAPID3 Score 3.89     Answers submitted by the patient for this visit:  Rheumatology Questionnaire (Submitted on 4/1/2023)  fever: No  eye redness: Yes  mouth sores: No  headaches: Yes  shortness of breath: No  chest pain: No  trouble swallowing: No  diarrhea: No  constipation: Yes  unexpected weight change: No  genital sore: No  dysuria: Yes  During the last 3 days, have you had a skin rash?: No  Bruises or bleeds easily: Yes  cough: No

## 2023-04-03 NOTE — PROGRESS NOTES
"Subjective:       Patient ID: Cherie Richards is a 61 y.o. female.    Chief Complaint: Disease Management    HPI 61 year old F with PMH of anxiety, abnormal uterine cancer cells s/p hysterectomy, dry eyes, HTN, gastric bypass, gastritis, liver hemangioma, hiatal hernia here for evaluation.  She has recent diagnosis of "Spiculated right lower lobe nodule." She reports being tired for 2 months with shortness of breath.  She has pain in all over including muscles. She has been in pain for 6 months.   She feels like she is cramping in muscles.  She reports hands also hurt. Today, her pain is 4/10. Her pain can be as high as 8/10. She reports numbness in hands and feet for last 6 months.    Reports that her toes will get purple but not white or red. Reports hair loss for about 3 years.  Denies any rashes, photosensitivity, oral ulcers. On occasion, she feels like she has pleurisy.  Overuse makes her pain worse. She sleeps only 2 hours. She has good appetite.She has RUQ pain for a year and was noted to have colitis on CT scan.  Reports diarrhea and constipation. Denies bloody stools.        Interval history:   She has not followed up with PMR.  She reports that she is off cymbalta because it makes her feel weird.  She takes flexeril 10mg qhs and baclofen  TID. She takes xanax and it helps her with sleep. Reports right side of her leg feels lower back.She is set up with PT.     Past Medical History:   Diagnosis Date    Abnormal Pap smear     Anemia     history    Anxiety     Cancer     uterine-cancer cells     Colon polyps, next C-scope 2019. 4/22/2014    Dry eyes     Dyspareunia, female 6/24/2020    Essential hypertension, started in her mid 30's 1/18/2017    Family history of malignant neoplasm of pancreas 6/1/2018    Gastric bypass status for obesity 8/21/2012    GERD (gastroesophageal reflux disease)     Helicobacter pylori gastritis, 2008. 8/20/2014    Hemangioma of liver, stable 2010, 2012, right lobe 8/20/2014    " "Hematuria     History of cosmetic surgeries, tummy tuck 2011. 4/5/2013    History of frquent UTI 6/24/2020    HTN (hypertension) 8/21/2012    120s-130s/80s    Kidney stone     Right sided sciatica 6/1/2018    Sleep apnea     patient does not use the C-PAP mask-cannot tolerate    Ureteral stone 4/8/2014    Urinary incontinence     Urinary retention     Varicose veins of lower extremity with inflammation 4/8/2015    Vertigo     mild    Vitamin D deficiency disease 6/1/2018       Review of Systems   Constitutional: Negative for activity change, appetite change, chills, diaphoresis, fatigue, fever and unexpected weight change.   HENT: Negative for congestion, ear discharge, ear pain, facial swelling, mouth sores, sinus pressure, sneezing, sore throat, tinnitus and trouble swallowing.    Eyes: Negative for photophobia, pain, discharge, redness, itching and visual disturbance.   Respiratory: Negative for apnea, cough, choking, chest tightness, shortness of breath, wheezing and stridor.    Cardiovascular: Negative for chest pain and leg swelling.   Gastrointestinal: Negative for abdominal distention, abdominal pain, anal bleeding, blood in stool, constipation, diarrhea and nausea.   Endocrine: Negative for cold intolerance and heat intolerance.   Genitourinary: Negative for difficulty urinating, dysuria and genital sores.   Musculoskeletal: Positive for joint swelling and myalgias. Negative for arthralgias, back pain, gait problem, neck pain and neck stiffness.   Skin: Negative for color change, pallor, rash and wound.   Neurological: Negative for dizziness, seizures, light-headedness, numbness and headaches.   Hematological: Negative for adenopathy. Does not bruise/bleed easily.   Psychiatric/Behavioral: Negative for sleep disturbance. The patient is not nervous/anxious.            Objective:   /76   Pulse 69   Ht 5' 2" (1.575 m)   Wt 82.1 kg (181 lb)   BMI 33.11 kg/m²      Physical Exam   Constitutional: She is " oriented to person, place, and time.   HENT:   Head: Normocephalic and atraumatic.   Right Ear: External ear normal.   Left Ear: External ear normal.   Nose: Nose normal.   Mouth/Throat: Oropharynx is clear and moist. No oropharyngeal exudate.   Eyes: Conjunctivae and EOM are normal. Pupils are equal, round, and reactive to light. Right eye exhibits no discharge. Left eye exhibits no discharge. No scleral icterus.   Neck: No JVD present. No thyromegaly present.   Cardiovascular: Normal rate, regular rhythm, normal heart sounds and intact distal pulses.  Exam reveals no gallop and no friction rub.    No murmur heard.  Pulmonary/Chest: Effort normal and breath sounds normal. No respiratory distress. She has no wheezes. She has no rales. She exhibits no tenderness.   Abdominal: Soft. Bowel sounds are normal. She exhibits no distension and no mass. There is no abdominal tenderness. There is no rebound and no guarding.   Lymphadenopathy:     She has no cervical adenopathy.   Neurological: She is alert and oriented to person, place, and time. No cranial nerve deficit. Gait normal. Coordination normal.   Skin: Skin is dry. No rash noted. No erythema. No pallor.     Psychiatric: Affect and judgment normal.   Musculoskeletal: No tenderness, deformity or edema.          No data to display     Assessment:      62  year old F with PMH of anxiety, abnormal uterine cancer cells s/p hysterectomy, dry eyes, HTN, gastric bypass, gastritis, liver hemangioma, hiatal hernia here for evaluation.  On exam, she is diffusely tender consistent with fibromyalgia.  She reports rash in cheeks for months. Asked her to see derm and I will get labs today.    # fibromyalgia: she has diffuse muscular tenderness in her back, particularly in shoulder blades. Did not tolerate cymbalta.  -labs  cyclobenzaprine (FLEXERIL) 5 MG tablet 60 tablet 3 4/3/2023  No   Sig: Take 1 tablet by mouth in the morning and 2 tablets at bedtime       Consider  tramadol  PT consult placed at last visit but she is too busy  Asked her to make appt with PMR again.      #nodule: being evaluated by pulmonary.  #obesity: encourage weight loss    30 * minutes of total time spent on the encounter, which includes face to face time and non-face to face time preparing to see the patient (eg, review of tests), Obtaining and/or reviewing separately obtained history, Documenting clinical information in the electronic or other health record, Independently interpreting results (not separately reported) and communicating results to the patient/family/caregiver, or Care coordination (not separately reported).         NO FOLLOW UP NEEDED. I told her she needs to see pain specialist.

## 2023-04-06 ENCOUNTER — PATIENT MESSAGE (OUTPATIENT)
Dept: RHEUMATOLOGY | Facility: CLINIC | Age: 63
End: 2023-04-06
Payer: MEDICAID

## 2023-04-13 ENCOUNTER — LAB VISIT (OUTPATIENT)
Dept: LAB | Facility: HOSPITAL | Age: 63
End: 2023-04-13
Payer: MEDICARE

## 2023-04-13 ENCOUNTER — OFFICE VISIT (OUTPATIENT)
Dept: INTERNAL MEDICINE | Facility: CLINIC | Age: 63
End: 2023-04-13
Payer: MEDICARE

## 2023-04-13 VITALS
SYSTOLIC BLOOD PRESSURE: 121 MMHG | WEIGHT: 188.06 LBS | BODY MASS INDEX: 36.92 KG/M2 | HEART RATE: 87 BPM | DIASTOLIC BLOOD PRESSURE: 60 MMHG | HEIGHT: 60 IN | OXYGEN SATURATION: 97 %

## 2023-04-13 DIAGNOSIS — G89.29 CHRONIC BILATERAL LOW BACK PAIN WITH BILATERAL SCIATICA: ICD-10-CM

## 2023-04-13 DIAGNOSIS — R79.89 OTHER SPECIFIED ABNORMAL FINDINGS OF BLOOD CHEMISTRY: ICD-10-CM

## 2023-04-13 DIAGNOSIS — K91.2 POSTSURGICAL MALABSORPTION, NOT ELSEWHERE CLASSIFIED: ICD-10-CM

## 2023-04-13 DIAGNOSIS — Z98.84 GASTRIC BYPASS STATUS FOR OBESITY: ICD-10-CM

## 2023-04-13 DIAGNOSIS — Z76.89 ENCOUNTER TO ESTABLISH CARE: Primary | ICD-10-CM

## 2023-04-13 DIAGNOSIS — J84.10 CALCIFIED GRANULOMA OF LUNG: ICD-10-CM

## 2023-04-13 DIAGNOSIS — M54.42 CHRONIC BILATERAL LOW BACK PAIN WITH BILATERAL SCIATICA: ICD-10-CM

## 2023-04-13 DIAGNOSIS — M54.41 CHRONIC BILATERAL LOW BACK PAIN WITH BILATERAL SCIATICA: ICD-10-CM

## 2023-04-13 DIAGNOSIS — F33.1 MODERATE EPISODE OF RECURRENT MAJOR DEPRESSIVE DISORDER: ICD-10-CM

## 2023-04-13 DIAGNOSIS — I10 PRIMARY HYPERTENSION: ICD-10-CM

## 2023-04-13 DIAGNOSIS — K59.09 CHRONIC CONSTIPATION: ICD-10-CM

## 2023-04-13 LAB
25(OH)D3+25(OH)D2 SERPL-MCNC: 120 NG/ML (ref 30–96)
ALBUMIN SERPL BCP-MCNC: 4.1 G/DL (ref 3.5–5.2)
ALP SERPL-CCNC: 133 U/L (ref 55–135)
ALT SERPL W/O P-5'-P-CCNC: 20 U/L (ref 10–44)
ANION GAP SERPL CALC-SCNC: 12 MMOL/L (ref 8–16)
AST SERPL-CCNC: 19 U/L (ref 10–40)
BASOPHILS # BLD AUTO: 0.06 K/UL (ref 0–0.2)
BASOPHILS NFR BLD: 0.8 % (ref 0–1.9)
BILIRUB SERPL-MCNC: 0.3 MG/DL (ref 0.1–1)
BUN SERPL-MCNC: 14 MG/DL (ref 8–23)
CALCIUM SERPL-MCNC: 10 MG/DL (ref 8.7–10.5)
CHLORIDE SERPL-SCNC: 100 MMOL/L (ref 95–110)
CO2 SERPL-SCNC: 24 MMOL/L (ref 23–29)
CREAT SERPL-MCNC: 0.8 MG/DL (ref 0.5–1.4)
DIFFERENTIAL METHOD: NORMAL
EOSINOPHIL # BLD AUTO: 0.2 K/UL (ref 0–0.5)
EOSINOPHIL NFR BLD: 2 % (ref 0–8)
ERYTHROCYTE [DISTWIDTH] IN BLOOD BY AUTOMATED COUNT: 14 % (ref 11.5–14.5)
EST. GFR  (NO RACE VARIABLE): >60 ML/MIN/1.73 M^2
GLUCOSE SERPL-MCNC: 84 MG/DL (ref 70–110)
HCT VFR BLD AUTO: 40.1 % (ref 37–48.5)
HGB BLD-MCNC: 13 G/DL (ref 12–16)
IMM GRANULOCYTES # BLD AUTO: 0.03 K/UL (ref 0–0.04)
IMM GRANULOCYTES NFR BLD AUTO: 0.4 % (ref 0–0.5)
IRON SERPL-MCNC: 69 UG/DL (ref 30–160)
LYMPHOCYTES # BLD AUTO: 2 K/UL (ref 1–4.8)
LYMPHOCYTES NFR BLD: 25.9 % (ref 18–48)
MCH RBC QN AUTO: 28.8 PG (ref 27–31)
MCHC RBC AUTO-ENTMCNC: 32.4 G/DL (ref 32–36)
MCV RBC AUTO: 89 FL (ref 82–98)
MONOCYTES # BLD AUTO: 0.8 K/UL (ref 0.3–1)
MONOCYTES NFR BLD: 9.8 % (ref 4–15)
NEUTROPHILS # BLD AUTO: 4.8 K/UL (ref 1.8–7.7)
NEUTROPHILS NFR BLD: 61.1 % (ref 38–73)
NRBC BLD-RTO: 0 /100 WBC
PLATELET # BLD AUTO: 329 K/UL (ref 150–450)
PMV BLD AUTO: 9.8 FL (ref 9.2–12.9)
POTASSIUM SERPL-SCNC: 3.7 MMOL/L (ref 3.5–5.1)
PROT SERPL-MCNC: 7.6 G/DL (ref 6–8.4)
RBC # BLD AUTO: 4.52 M/UL (ref 4–5.4)
SATURATED IRON: 17 % (ref 20–50)
SODIUM SERPL-SCNC: 136 MMOL/L (ref 136–145)
TOTAL IRON BINDING CAPACITY: 400 UG/DL (ref 250–450)
TRANSFERRIN SERPL-MCNC: 270 MG/DL (ref 200–375)
TSH SERPL DL<=0.005 MIU/L-ACNC: 1.16 UIU/ML (ref 0.4–4)
WBC # BLD AUTO: 7.84 K/UL (ref 3.9–12.7)

## 2023-04-13 PROCEDURE — 84443 ASSAY THYROID STIM HORMONE: CPT | Performed by: INTERNAL MEDICINE

## 2023-04-13 PROCEDURE — 3008F BODY MASS INDEX DOCD: CPT | Mod: CPTII,S$GLB,, | Performed by: INTERNAL MEDICINE

## 2023-04-13 PROCEDURE — 3008F PR BODY MASS INDEX (BMI) DOCUMENTED: ICD-10-PCS | Mod: CPTII,S$GLB,, | Performed by: INTERNAL MEDICINE

## 2023-04-13 PROCEDURE — 82607 VITAMIN B-12: CPT | Performed by: INTERNAL MEDICINE

## 2023-04-13 PROCEDURE — 3078F DIAST BP <80 MM HG: CPT | Mod: CPTII,S$GLB,, | Performed by: INTERNAL MEDICINE

## 2023-04-13 PROCEDURE — 99214 OFFICE O/P EST MOD 30 MIN: CPT | Mod: S$GLB,,, | Performed by: INTERNAL MEDICINE

## 2023-04-13 PROCEDURE — 85025 COMPLETE CBC W/AUTO DIFF WBC: CPT | Performed by: INTERNAL MEDICINE

## 2023-04-13 PROCEDURE — 80053 COMPREHEN METABOLIC PANEL: CPT | Performed by: INTERNAL MEDICINE

## 2023-04-13 PROCEDURE — 82306 VITAMIN D 25 HYDROXY: CPT | Performed by: INTERNAL MEDICINE

## 2023-04-13 PROCEDURE — 4010F PR ACE/ARB THEARPY RXD/TAKEN: ICD-10-PCS | Mod: CPTII,S$GLB,, | Performed by: INTERNAL MEDICINE

## 2023-04-13 PROCEDURE — 99214 PR OFFICE/OUTPT VISIT, EST, LEVL IV, 30-39 MIN: ICD-10-PCS | Mod: S$GLB,,, | Performed by: INTERNAL MEDICINE

## 2023-04-13 PROCEDURE — 3078F PR MOST RECENT DIASTOLIC BLOOD PRESSURE < 80 MM HG: ICD-10-PCS | Mod: CPTII,S$GLB,, | Performed by: INTERNAL MEDICINE

## 2023-04-13 PROCEDURE — 3074F PR MOST RECENT SYSTOLIC BLOOD PRESSURE < 130 MM HG: ICD-10-PCS | Mod: CPTII,S$GLB,, | Performed by: INTERNAL MEDICINE

## 2023-04-13 PROCEDURE — 3044F PR MOST RECENT HEMOGLOBIN A1C LEVEL <7.0%: ICD-10-PCS | Mod: CPTII,S$GLB,, | Performed by: INTERNAL MEDICINE

## 2023-04-13 PROCEDURE — 99499 UNLISTED E&M SERVICE: CPT | Mod: S$GLB,,, | Performed by: INTERNAL MEDICINE

## 2023-04-13 PROCEDURE — 99499 RISK ADDL DX/OHS AUDIT: ICD-10-PCS | Mod: S$GLB,,, | Performed by: INTERNAL MEDICINE

## 2023-04-13 PROCEDURE — 36415 COLL VENOUS BLD VENIPUNCTURE: CPT | Performed by: INTERNAL MEDICINE

## 2023-04-13 PROCEDURE — 83036 HEMOGLOBIN GLYCOSYLATED A1C: CPT | Performed by: INTERNAL MEDICINE

## 2023-04-13 PROCEDURE — 4010F ACE/ARB THERAPY RXD/TAKEN: CPT | Mod: CPTII,S$GLB,, | Performed by: INTERNAL MEDICINE

## 2023-04-13 PROCEDURE — 99999 PR PBB SHADOW E&M-EST. PATIENT-LVL III: CPT | Mod: PBBFAC,,, | Performed by: INTERNAL MEDICINE

## 2023-04-13 PROCEDURE — 99999 PR PBB SHADOW E&M-EST. PATIENT-LVL III: ICD-10-PCS | Mod: PBBFAC,,, | Performed by: INTERNAL MEDICINE

## 2023-04-13 PROCEDURE — 3044F HG A1C LEVEL LT 7.0%: CPT | Mod: CPTII,S$GLB,, | Performed by: INTERNAL MEDICINE

## 2023-04-13 PROCEDURE — 3074F SYST BP LT 130 MM HG: CPT | Mod: CPTII,S$GLB,, | Performed by: INTERNAL MEDICINE

## 2023-04-13 PROCEDURE — 84466 ASSAY OF TRANSFERRIN: CPT | Performed by: INTERNAL MEDICINE

## 2023-04-13 RX ORDER — GABAPENTIN 100 MG/1
100 CAPSULE ORAL NIGHTLY
Qty: 30 CAPSULE | Refills: 11 | Status: SHIPPED | OUTPATIENT
Start: 2023-04-13 | End: 2023-09-14 | Stop reason: SDUPTHER

## 2023-04-13 NOTE — PROGRESS NOTES
Subjective:       Patient ID: Cherie Richards is a 63 y.o. female.    Chief Complaint: Establish Care    HPI    Presents to establish care.       Fibromyaglia she has diffuse muscular tenderness in her back, particularly in shoulder blades. Did not tolerate cymbalta. Follows with rheumatolgoy.   HTN: well controlled on Amlodipine 2.5 mg daily, HCTZ 25 mg daily, losartan 100 mg daily and metoprolol 25 mg daily.   Liver Hemangioma   Vit D   Gastric Bypass in in 2010. Lost 70 Ibs lowest weight was 130 now at 188   H Pylori   GERD / Hiatal hernia. On PPI.   ANDREW   HLD well controlled on statin.   abnormal uterine cancer cells s/p hysterectomy  Mixed urinary incontinence follows with urogyn   Depression/ Anxiety: on xanax and Wellbutrin.       Health Maintenance:  Colon Cancer Screening: colonoscopy done in 2020 with polyp removed. Due in 2025   Mammogram: scheduled in June   DEXA: discuss next visit.   HIV: negative 2021   negative on 9/27/21   Hep C: negative 2021   Lipids:Order today   Vaccines: needs PNA vax otherwise up to date.     Review of Systems   Constitutional:  Negative for activity change, appetite change and chills.   HENT:  Negative for ear pain, sinus pressure/congestion and sneezing.    Respiratory:  Negative for cough and shortness of breath.    Cardiovascular:  Negative for chest pain, palpitations and leg swelling.   Gastrointestinal:  Negative for abdominal distention, abdominal pain, constipation, diarrhea, nausea and vomiting.   Genitourinary:  Negative for dysuria and hematuria.   Musculoskeletal:  Negative for arthralgias, back pain and myalgias.   Neurological:  Negative for dizziness and headaches.   Psychiatric/Behavioral:  Negative for agitation. The patient is not nervous/anxious.          Past Medical History:   Diagnosis Date    Abnormal Pap smear     Anemia     history    Anxiety     Cancer     uterine-cancer cells     Colon polyps, next C-scope 2019. 4/22/2014    Dry eyes      Dyspareunia, female 6/24/2020    Essential hypertension, started in her mid 30's 1/18/2017    Family history of malignant neoplasm of pancreas 6/1/2018    Gastric bypass status for obesity 8/21/2012    GERD (gastroesophageal reflux disease)     Helicobacter pylori gastritis, 2008. 8/20/2014    Hemangioma of liver, stable 2010, 2012, right lobe 8/20/2014    Hematuria     History of cosmetic surgeries, tummy tuck 2011. 4/5/2013    History of frquent UTI 6/24/2020    HTN (hypertension) 8/21/2012    120s-130s/80s    Kidney stone     Right sided sciatica 6/1/2018    Sleep apnea     patient does not use the C-PAP mask-cannot tolerate    Ureteral stone 4/8/2014    Urinary incontinence     Urinary retention     Varicose veins of lower extremity with inflammation 4/8/2015    Vertigo     mild    Vitamin D deficiency disease 6/1/2018     Past Surgical History:   Procedure Laterality Date    Abdominoplasty with Liposcuction      APPENDECTOMY      CERVICAL BIOPSY  W/ LOOP ELECTRODE EXCISION      Prior to hysterectomy    CHOLECYSTECTOMY      Laparoscopic    COLONOSCOPY N/A 2/13/2020    Procedure: COLONOSCOPY;  Surgeon: Pb Smith MD;  Location: [facility] ([address] [place] [address]);  Service: Endoscopy;  Laterality: N/A;    ESOPHAGOGASTRODUODENOSCOPY N/A 11/2/2021    Procedure: EGD (ESOPHAGOGASTRODUODENOSCOPY);  Surgeon: Clayton Martínez MD;  Location: [facility] ([address] [place] [address]);  Service: Endoscopy;  Laterality: N/A;  fully vac. 3/15/21 / instr portal -ml    GASTRIC BYPASS      HERNIA REPAIR      HYSTERECTOMY  1980     TVH  both ovaries remain      Patient Active Problem List   Diagnosis    GERD (gastroesophageal reflux disease), repair of [place] fundoplication 08-.    Gastric bypass status for obesity, 08-.    Sleep apnea, resolved following gastric bypass surgery. CPAP intolerant.    Colon polyps    Helicobacter pylori gastritis, 2008.    Hemangioma of liver, stable 2010, 2012, right lobe; resolved     Essential  hypertension, started in her mid 30's    Vitamin D deficiency disease    Right sided sciatica    Urinary incontinence, mixed    Chronic constipation    Major depressive disorder    Nodule of lower lobe of right lung, spiculated 1.4 cm by CT scan 5/4/2021, resolved 12/2021    Colon wall thickening, distal transverse colon to mid descending colon by CT 5/4/2021    Atherosclerosis of native coronary artery of native heart without angina pectoris by CT scan 5/4/2021    Aortic calcification, by CT scan 5/4/2021    SOB (shortness of breath)    Other chest pain    Calcified granuloma of lung    Moderate episode of recurrent major depressive disorder        Objective:      Physical Exam  Constitutional:       Appearance: Normal appearance.   HENT:      Head: Normocephalic.      Right Ear: Tympanic membrane normal.      Left Ear: Tympanic membrane normal.      Nose: Nose normal.   Cardiovascular:      Rate and Rhythm: Normal rate and regular rhythm.      Pulses: Normal pulses.      Heart sounds: Normal heart sounds.   Pulmonary:      Effort: Pulmonary effort is normal.      Breath sounds: Normal breath sounds.   Abdominal:      General: Abdomen is flat. Bowel sounds are normal.      Palpations: Abdomen is soft.   Musculoskeletal:         General: Normal range of motion.      Cervical back: Normal range of motion and neck supple.   Skin:     General: Skin is warm and dry.   Neurological:      General: No focal deficit present.      Mental Status: She is alert and oriented to person, place, and time.   Psychiatric:         Mood and Affect: Mood normal.       Assessment:       Problem List Items Addressed This Visit          Psychiatric    Moderate episode of recurrent major depressive disorder       Pulmonary    Calcified granuloma of lung       Endocrine    Gastric bypass status for obesity, 08-.    Relevant Orders    Comprehensive Metabolic Panel (Completed)    CBC Auto Differential (Completed)    Lipid Panel     Hemoglobin A1C (Completed)    IRON AND TIBC (Completed)    VITAMIN B12 (Completed)    Vitamin D 25 hydroxy (Completed)       GI    Chronic constipation    Relevant Orders    Ambulatory referral/consult to Gastroenterology     Other Visit Diagnoses       Encounter to establish care    -  Primary    Chronic bilateral low back pain with bilateral sciatica        Primary hypertension        Relevant Orders    Comprehensive Metabolic Panel (Completed)    Lipid Panel    TSH (Completed)    Other specified abnormal findings of blood chemistry        Relevant Orders    CBC Auto Differential (Completed)    Hemoglobin A1C (Completed)    IRON AND TIBC (Completed)    VITAMIN B12 (Completed)    Vitamin D 25 hydroxy (Completed)    Postsurgical malabsorption, not elsewhere classified        Relevant Orders    VITAMIN B12 (Completed)            Plan:         Cherie was seen today for establish care.    Diagnoses and all orders for this visit:    Encounter to establish care  Colon Cancer Screening: colonoscopy done in 2020 with polyp removed. Due in 2025   Mammogram: scheduled in June   DEXA: discuss next visit.   HIV: negative 2021   negative on 9/27/21   Hep C: negative 2021   Lipids:Order today   Vaccines: needs PNA vax otherwise up to date.     Moderate episode of recurrent major depressive disorder  Continue Wellbutrin and XANAX PRN.     Calcified granuloma of lung  Stable on last scan. Seen by pulmonology     Chronic bilateral low back pain with bilateral sciatica  Follow up PMR and rheum   Start gabapentin today     Gastric bypass status for obesity  Postsurgical malabsorption, not elsewhere classified  -     VITAMIN B12; Future  Check labs today     Primary hypertension  Well controlled on current meds     Chronic constipation  -     Ambulatory referral/consult to Gastroenterology; Future              Tracy Chand MD   Internal Medicine   Primary Care

## 2023-04-14 ENCOUNTER — PATIENT MESSAGE (OUTPATIENT)
Dept: INTERNAL MEDICINE | Facility: CLINIC | Age: 63
End: 2023-04-14
Payer: MEDICARE

## 2023-04-14 LAB
ESTIMATED AVG GLUCOSE: 117 MG/DL (ref 68–131)
HBA1C MFR BLD: 5.7 % (ref 4–5.6)
VIT B12 SERPL-MCNC: 930 PG/ML (ref 210–950)

## 2023-06-02 ENCOUNTER — LAB VISIT (OUTPATIENT)
Dept: LAB | Facility: HOSPITAL | Age: 63
End: 2023-06-02
Attending: OBSTETRICS & GYNECOLOGY
Payer: MEDICARE

## 2023-06-02 DIAGNOSIS — N39.0 RECURRENT URINARY TRACT INFECTION: ICD-10-CM

## 2023-06-02 LAB
BACTERIA #/AREA URNS AUTO: ABNORMAL /HPF
BILIRUB UR QL STRIP: NEGATIVE
CLARITY UR REFRACT.AUTO: CLEAR
COLOR UR AUTO: YELLOW
GLUCOSE UR QL STRIP: NEGATIVE
HGB UR QL STRIP: NEGATIVE
HYALINE CASTS UR QL AUTO: 4 /LPF
KETONES UR QL STRIP: NEGATIVE
LEUKOCYTE ESTERASE UR QL STRIP: NEGATIVE
MICROSCOPIC COMMENT: ABNORMAL
NITRITE UR QL STRIP: NEGATIVE
PH UR STRIP: 7 [PH] (ref 5–8)
PROT UR QL STRIP: NEGATIVE
RBC #/AREA URNS AUTO: 1 /HPF (ref 0–4)
SP GR UR STRIP: 1.01 (ref 1–1.03)
SQUAMOUS #/AREA URNS AUTO: 14 /HPF
URN SPEC COLLECT METH UR: NORMAL
WBC #/AREA URNS AUTO: 4 /HPF (ref 0–5)
YEAST UR QL AUTO: ABNORMAL

## 2023-06-02 PROCEDURE — 87086 URINE CULTURE/COLONY COUNT: CPT | Performed by: OBSTETRICS & GYNECOLOGY

## 2023-06-02 PROCEDURE — 81001 URINALYSIS AUTO W/SCOPE: CPT | Performed by: OBSTETRICS & GYNECOLOGY

## 2023-06-03 LAB — BACTERIA UR CULT: NORMAL

## 2023-06-04 DIAGNOSIS — B37.9 YEAST INFECTION: Primary | ICD-10-CM

## 2023-06-04 RX ORDER — FLUCONAZOLE 150 MG/1
150 TABLET ORAL
Qty: 2 TABLET | Refills: 0 | Status: SHIPPED | OUTPATIENT
Start: 2023-06-04 | End: 2023-06-08

## 2023-06-21 DIAGNOSIS — R06.09 DOE (DYSPNEA ON EXERTION): ICD-10-CM

## 2023-06-21 DIAGNOSIS — F41.1 GAD (GENERALIZED ANXIETY DISORDER): ICD-10-CM

## 2023-06-21 RX ORDER — ALBUTEROL SULFATE 90 UG/1
1-2 AEROSOL, METERED RESPIRATORY (INHALATION) EVERY 4 HOURS PRN
Qty: 18 G | Refills: 11 | OUTPATIENT
Start: 2023-06-21 | End: 2024-06-20

## 2023-06-21 RX ORDER — ALPRAZOLAM 0.5 MG/1
TABLET ORAL
Qty: 30 TABLET | Refills: 5 | Status: CANCELLED | OUTPATIENT
Start: 2023-06-21

## 2023-06-22 ENCOUNTER — PATIENT MESSAGE (OUTPATIENT)
Dept: INTERNAL MEDICINE | Facility: CLINIC | Age: 63
End: 2023-06-22
Payer: MEDICARE

## 2023-06-22 DIAGNOSIS — F41.1 GAD (GENERALIZED ANXIETY DISORDER): ICD-10-CM

## 2023-06-22 RX ORDER — ALPRAZOLAM 0.5 MG/1
TABLET ORAL
Qty: 30 TABLET | Refills: 5 | Status: CANCELLED | OUTPATIENT
Start: 2023-06-22

## 2023-06-22 RX ORDER — ALPRAZOLAM 0.5 MG/1
TABLET ORAL
Qty: 30 TABLET | Refills: 5 | Status: SHIPPED | OUTPATIENT
Start: 2023-06-22 | End: 2023-12-18 | Stop reason: SDUPTHER

## 2023-06-22 NOTE — TELEPHONE ENCOUNTER
No care due was identified.  [facility] Embedded Care Due Messages. Reference number: [id number].   6/22/2023 11:36:21 AM CDT

## 2023-06-23 DIAGNOSIS — F41.1 GAD (GENERALIZED ANXIETY DISORDER): ICD-10-CM

## 2023-06-23 RX ORDER — ALPRAZOLAM 0.5 MG/1
TABLET ORAL
Qty: 30 TABLET | Refills: 5 | Status: CANCELLED | OUTPATIENT
Start: 2023-06-22

## 2023-06-27 NOTE — PATIENT INSTRUCTIONS
MD recommendations:    Dr. Ed Peres (63+y)  Dr. Tracy Chand (sees pt of all ages)  Appt for May or June 2023.     Schedule Urogynecology appointment.    [FreeTextEntry1] : 65-year-old male is under our care for lower back / left ankle pain. We will continue prescribing him a compound cream in addition to Lidoderm patches. He will continue his Oxycontin and Oxycodone which provides at least 50% pain relief and allow him to perform ADLs. He will f/u in 4 weeks for reevaluation. If there are any issues he can contact the office.\par \par

## 2023-06-30 ENCOUNTER — OFFICE VISIT (OUTPATIENT)
Dept: UROGYNECOLOGY | Facility: CLINIC | Age: 63
End: 2023-06-30
Payer: MEDICARE

## 2023-06-30 VITALS
HEART RATE: 85 BPM | SYSTOLIC BLOOD PRESSURE: 120 MMHG | DIASTOLIC BLOOD PRESSURE: 66 MMHG | BODY MASS INDEX: 36.21 KG/M2 | WEIGHT: 185.44 LBS

## 2023-06-30 DIAGNOSIS — N39.0 RECURRENT URINARY TRACT INFECTION: Primary | ICD-10-CM

## 2023-06-30 PROCEDURE — 1159F MED LIST DOCD IN RCRD: CPT | Mod: CPTII,S$GLB,, | Performed by: OBSTETRICS & GYNECOLOGY

## 2023-06-30 PROCEDURE — 3008F PR BODY MASS INDEX (BMI) DOCUMENTED: ICD-10-PCS | Mod: CPTII,S$GLB,, | Performed by: OBSTETRICS & GYNECOLOGY

## 2023-06-30 PROCEDURE — 3078F DIAST BP <80 MM HG: CPT | Mod: CPTII,S$GLB,, | Performed by: OBSTETRICS & GYNECOLOGY

## 2023-06-30 PROCEDURE — 99999 PR PBB SHADOW E&M-EST. PATIENT-LVL IV: ICD-10-PCS | Mod: PBBFAC,,, | Performed by: OBSTETRICS & GYNECOLOGY

## 2023-06-30 PROCEDURE — 3008F BODY MASS INDEX DOCD: CPT | Mod: CPTII,S$GLB,, | Performed by: OBSTETRICS & GYNECOLOGY

## 2023-06-30 PROCEDURE — 81002 POCT URINE DIPSTICK WITHOUT MICROSCOPE: ICD-10-PCS | Mod: S$GLB,,, | Performed by: OBSTETRICS & GYNECOLOGY

## 2023-06-30 PROCEDURE — 4010F PR ACE/ARB THEARPY RXD/TAKEN: ICD-10-PCS | Mod: CPTII,S$GLB,, | Performed by: OBSTETRICS & GYNECOLOGY

## 2023-06-30 PROCEDURE — 1159F PR MEDICATION LIST DOCUMENTED IN MEDICAL RECORD: ICD-10-PCS | Mod: CPTII,S$GLB,, | Performed by: OBSTETRICS & GYNECOLOGY

## 2023-06-30 PROCEDURE — 81002 URINALYSIS NONAUTO W/O SCOPE: CPT | Mod: S$GLB,,, | Performed by: OBSTETRICS & GYNECOLOGY

## 2023-06-30 PROCEDURE — 3044F PR MOST RECENT HEMOGLOBIN A1C LEVEL <7.0%: ICD-10-PCS | Mod: CPTII,S$GLB,, | Performed by: OBSTETRICS & GYNECOLOGY

## 2023-06-30 PROCEDURE — 99213 OFFICE O/P EST LOW 20 MIN: CPT | Mod: S$GLB,,, | Performed by: OBSTETRICS & GYNECOLOGY

## 2023-06-30 PROCEDURE — 99213 PR OFFICE/OUTPT VISIT, EST, LEVL III, 20-29 MIN: ICD-10-PCS | Mod: S$GLB,,, | Performed by: OBSTETRICS & GYNECOLOGY

## 2023-06-30 PROCEDURE — 4010F ACE/ARB THERAPY RXD/TAKEN: CPT | Mod: CPTII,S$GLB,, | Performed by: OBSTETRICS & GYNECOLOGY

## 2023-06-30 PROCEDURE — 3074F PR MOST RECENT SYSTOLIC BLOOD PRESSURE < 130 MM HG: ICD-10-PCS | Mod: CPTII,S$GLB,, | Performed by: OBSTETRICS & GYNECOLOGY

## 2023-06-30 PROCEDURE — 99999 PR PBB SHADOW E&M-EST. PATIENT-LVL IV: CPT | Mod: PBBFAC,,, | Performed by: OBSTETRICS & GYNECOLOGY

## 2023-06-30 PROCEDURE — 3044F HG A1C LEVEL LT 7.0%: CPT | Mod: CPTII,S$GLB,, | Performed by: OBSTETRICS & GYNECOLOGY

## 2023-06-30 PROCEDURE — 3078F PR MOST RECENT DIASTOLIC BLOOD PRESSURE < 80 MM HG: ICD-10-PCS | Mod: CPTII,S$GLB,, | Performed by: OBSTETRICS & GYNECOLOGY

## 2023-06-30 PROCEDURE — 3074F SYST BP LT 130 MM HG: CPT | Mod: CPTII,S$GLB,, | Performed by: OBSTETRICS & GYNECOLOGY

## 2023-06-30 NOTE — PROGRESS NOTES
Chief Complaint   Patient presents with    Urinary Tract Infection        HPI: Patient is a 63 y.o. female  with a h/o recurrent UTI's presents today for a follow up visit. Patient was last seen for cystoscopy on 3/17/23. Her most recent documented UTI was on 3/1/23.   She has not had a UTI since starting the nitrofurantoin suppression. She states that she had some mild symptoms a month ago and was diagnosed with a yeast infection. She states that she is not leaking urine but since her cystoscopy her urine stream is a little slower to start.   She also is taking D-Mannose. She is unable to use any vaginal estrogen and it causes local swelling. She is also using Aquaphor.   She uses a combination of Dove bar and liquid soap. Also uses a wash cloth. She has decreased pad use to using them only when she leaves the house.       REVIEW OF SYSTEMS:  A full 14-point ROS was performed and was significant for those  mentioned in the HPI.    The following portions of the patient's history were reviewed and updated as appropriate: allergies, current medications, past medical history, past surgical history and problem list.    PHYSICAL EXAMINATION    Vitals:    23 1306   BP: 120/66   Pulse: 85        General: Healthy in appearance, Well nourished, Affect Normal, NAD.    Office Visit on 2023   Component Date Value Ref Range Status    Color, UA 2023 Yellow   Final    pH, UA 2023 6   Final    WBC, UA 2023 n   Final    Nitrite, UA 2023 n   Final    Protein, POC 2023 n   Final    Glucose, UA 2023 n   Final    Ketones, UA 2023 n   Final    Urobilinogen, UA 2023 n   Final    Bilirubin, POC 2023 n   Final    Blood, UA 2023 n   Final    Clarity, UA 2023 Clear   Final    Spec Grav UA 2023 1.015   Final        ASSESSMENT & PLAN:  Recurrent urinary tract infection  -     POCT URINE DIPSTICK WITHOUT MICROSCOPE       64 yo with a h/o recurrent urinary  tract infections presented today for a follow up. She has not had a recent UTI. Not able to use vaginal estrogen due to local reaction including compounded medication. She will continue with D-Mannose. Discussed the risks of remaining long term on Macrobid including pulmonary fibrosis, liver dysfunction, allergic reaction and bacterial resistance. Patient verbalized understanding. Would like to stay on the Macrodantin for another three months and then we will either switch to something different versus stopping and monitoring symptoms. Reviewed perineal hygiene. Discussed avoiding the Aquaphor spray as there is alcohol and it can irritate/ burn the skin. Recommended avoiding wash cloth use and using only dove bar soap to the perineum. Additionally reviewed that she should continue to minimize pad use as there is some evidence that they may contribute to infections as they harbor bacteria.   Patient to return in 3 months for re-evaluation.     All questions were answered today. The patient was encouraged to contact the office as needed with any additional questions or concerns.     Total time spent on visit was 20 minutes.  This includes face to face time and non-face to face time preparing to see the patient (eg, review of tests), Obtaining and/or reviewing separately obtained history, Documenting clinical information in the electronic or other health record, Independently interpreting resultsand communicating results to the patient/family/caregiver, or Care coordination.    Adriana Waller MD

## 2023-07-20 DIAGNOSIS — R06.09 DOE (DYSPNEA ON EXERTION): ICD-10-CM

## 2023-07-20 RX ORDER — ALBUTEROL SULFATE 90 UG/1
1-2 AEROSOL, METERED RESPIRATORY (INHALATION) EVERY 4 HOURS PRN
Qty: 8.5 G | Refills: 11 | Status: SHIPPED | OUTPATIENT
Start: 2023-07-20 | End: 2024-07-19

## 2023-07-22 ENCOUNTER — PATIENT MESSAGE (OUTPATIENT)
Dept: ADMINISTRATIVE | Facility: OTHER | Age: 63
End: 2023-07-22
Payer: MEDICARE

## 2023-07-29 ENCOUNTER — PATIENT MESSAGE (OUTPATIENT)
Dept: ADMINISTRATIVE | Facility: OTHER | Age: 63
End: 2023-07-29
Payer: MEDICARE

## 2023-08-21 RX ORDER — CYCLOBENZAPRINE HCL 10 MG
10 TABLET ORAL NIGHTLY
Qty: 30 TABLET | Refills: 6 | Status: SHIPPED | OUTPATIENT
Start: 2023-08-21 | End: 2024-01-23

## 2023-09-12 DIAGNOSIS — Z98.84 GASTRIC BYPASS STATUS FOR OBESITY: ICD-10-CM

## 2023-09-12 RX ORDER — CYANOCOBALAMIN 1000 UG/ML
INJECTION, SOLUTION INTRAMUSCULAR; SUBCUTANEOUS
Qty: 1 ML | Refills: 11 | Status: SHIPPED | OUTPATIENT
Start: 2023-09-12 | End: 2023-09-14 | Stop reason: SDUPTHER

## 2023-09-12 NOTE — TELEPHONE ENCOUNTER
Refill Routing Note   Medication(s) are not appropriate for processing by [facility] for the following reason(s):      Medication outside of protocol    [facility] action(s):  Route Care Due:  None identified            Appointments  past 12m or future 3m with PCP    Date Provider   Last Visit   4/13/2023 Tracy Chand MD   Next Visit   9/14/2023 Tracy Chand MD   ED visits in past 90 days: 0        Note composed:3:54 PM 09/12/2023

## 2023-09-12 NOTE — TELEPHONE ENCOUNTER
----- Message from Daisy Olivera sent at 9/12/2023 12:53 PM CDT -----  Contact: MsJoanna Nidia Manny Phone# [phone]  Diabetic or Medical Supplies.  What supplies are needed: cyanocobalamin 1,000 mcg/mL injection  What is the brand name of the supplies: cyanocobalamin 1,000 mcg/mL injection  Is this a refill or new prescription:  Refill  Who prescribed the supplies: Dr. CHANEL Jones  Pharmacy or company name, phone # and location:  [id number]  [address] - [address] [id number] [facility]  [place] [id number]  Phone: [phone] Fax: [phone]  Would the patient like a call back, or a response through their [facility] portal?:  Call

## 2023-09-14 ENCOUNTER — OFFICE VISIT (OUTPATIENT)
Dept: INTERNAL MEDICINE | Facility: CLINIC | Age: 63
End: 2023-09-14
Payer: MEDICARE

## 2023-09-14 VITALS
SYSTOLIC BLOOD PRESSURE: 130 MMHG | HEART RATE: 75 BPM | DIASTOLIC BLOOD PRESSURE: 67 MMHG | HEIGHT: 63 IN | WEIGHT: 190.69 LBS | OXYGEN SATURATION: 98 % | BODY MASS INDEX: 33.79 KG/M2

## 2023-09-14 DIAGNOSIS — E78.2 MIXED HYPERLIPIDEMIA: ICD-10-CM

## 2023-09-14 DIAGNOSIS — Z98.0 INTESTINAL BYPASS AND ANASTOMOSIS STATUS: ICD-10-CM

## 2023-09-14 DIAGNOSIS — F41.1 GAD (GENERALIZED ANXIETY DISORDER): Primary | ICD-10-CM

## 2023-09-14 DIAGNOSIS — Z13.820 ENCOUNTER FOR OSTEOPOROSIS SCREENING IN ASYMPTOMATIC POSTMENOPAUSAL PATIENT: ICD-10-CM

## 2023-09-14 DIAGNOSIS — M54.41 CHRONIC BILATERAL LOW BACK PAIN WITH BILATERAL SCIATICA: ICD-10-CM

## 2023-09-14 DIAGNOSIS — E66.9 CLASS 1 OBESITY WITH SERIOUS COMORBIDITY AND BODY MASS INDEX (BMI) OF 33.0 TO 33.9 IN ADULT, UNSPECIFIED OBESITY TYPE: ICD-10-CM

## 2023-09-14 DIAGNOSIS — Z78.0 ENCOUNTER FOR OSTEOPOROSIS SCREENING IN ASYMPTOMATIC POSTMENOPAUSAL PATIENT: ICD-10-CM

## 2023-09-14 DIAGNOSIS — G89.29 CHRONIC BILATERAL LOW BACK PAIN WITH BILATERAL SCIATICA: ICD-10-CM

## 2023-09-14 DIAGNOSIS — R79.89 OTHER SPECIFIED ABNORMAL FINDINGS OF BLOOD CHEMISTRY: ICD-10-CM

## 2023-09-14 DIAGNOSIS — M54.42 CHRONIC BILATERAL LOW BACK PAIN WITH BILATERAL SCIATICA: ICD-10-CM

## 2023-09-14 DIAGNOSIS — I10 ESSENTIAL HYPERTENSION: ICD-10-CM

## 2023-09-14 DIAGNOSIS — Z98.84 GASTRIC BYPASS STATUS FOR OBESITY: ICD-10-CM

## 2023-09-14 PROCEDURE — 99214 OFFICE O/P EST MOD 30 MIN: CPT | Mod: S$GLB,,, | Performed by: INTERNAL MEDICINE

## 2023-09-14 PROCEDURE — 3044F HG A1C LEVEL LT 7.0%: CPT | Mod: CPTII,S$GLB,, | Performed by: INTERNAL MEDICINE

## 2023-09-14 PROCEDURE — 3044F PR MOST RECENT HEMOGLOBIN A1C LEVEL <7.0%: ICD-10-PCS | Mod: CPTII,S$GLB,, | Performed by: INTERNAL MEDICINE

## 2023-09-14 PROCEDURE — 3075F SYST BP GE 130 - 139MM HG: CPT | Mod: CPTII,S$GLB,, | Performed by: INTERNAL MEDICINE

## 2023-09-14 PROCEDURE — 4010F PR ACE/ARB THEARPY RXD/TAKEN: ICD-10-PCS | Mod: CPTII,S$GLB,, | Performed by: INTERNAL MEDICINE

## 2023-09-14 PROCEDURE — 99999 PR PBB SHADOW E&M-EST. PATIENT-LVL III: CPT | Mod: PBBFAC,,, | Performed by: INTERNAL MEDICINE

## 2023-09-14 PROCEDURE — 4010F ACE/ARB THERAPY RXD/TAKEN: CPT | Mod: CPTII,S$GLB,, | Performed by: INTERNAL MEDICINE

## 2023-09-14 PROCEDURE — 3078F DIAST BP <80 MM HG: CPT | Mod: CPTII,S$GLB,, | Performed by: INTERNAL MEDICINE

## 2023-09-14 PROCEDURE — 1101F PT FALLS ASSESS-DOCD LE1/YR: CPT | Mod: CPTII,S$GLB,, | Performed by: INTERNAL MEDICINE

## 2023-09-14 PROCEDURE — 3078F PR MOST RECENT DIASTOLIC BLOOD PRESSURE < 80 MM HG: ICD-10-PCS | Mod: CPTII,S$GLB,, | Performed by: INTERNAL MEDICINE

## 2023-09-14 PROCEDURE — 3008F BODY MASS INDEX DOCD: CPT | Mod: CPTII,S$GLB,, | Performed by: INTERNAL MEDICINE

## 2023-09-14 PROCEDURE — 3288F PR FALLS RISK ASSESSMENT DOCUMENTED: ICD-10-PCS | Mod: CPTII,S$GLB,, | Performed by: INTERNAL MEDICINE

## 2023-09-14 PROCEDURE — 3075F PR MOST RECENT SYSTOLIC BLOOD PRESS GE 130-139MM HG: ICD-10-PCS | Mod: CPTII,S$GLB,, | Performed by: INTERNAL MEDICINE

## 2023-09-14 PROCEDURE — 99999 PR PBB SHADOW E&M-EST. PATIENT-LVL III: ICD-10-PCS | Mod: PBBFAC,,, | Performed by: INTERNAL MEDICINE

## 2023-09-14 PROCEDURE — 3288F FALL RISK ASSESSMENT DOCD: CPT | Mod: CPTII,S$GLB,, | Performed by: INTERNAL MEDICINE

## 2023-09-14 PROCEDURE — 99214 PR OFFICE/OUTPT VISIT, EST, LEVL IV, 30-39 MIN: ICD-10-PCS | Mod: S$GLB,,, | Performed by: INTERNAL MEDICINE

## 2023-09-14 PROCEDURE — 1101F PR PT FALLS ASSESS DOC 0-1 FALLS W/OUT INJ PAST YR: ICD-10-PCS | Mod: CPTII,S$GLB,, | Performed by: INTERNAL MEDICINE

## 2023-09-14 PROCEDURE — 3008F PR BODY MASS INDEX (BMI) DOCUMENTED: ICD-10-PCS | Mod: CPTII,S$GLB,, | Performed by: INTERNAL MEDICINE

## 2023-09-14 RX ORDER — GABAPENTIN 100 MG/1
100 CAPSULE ORAL 3 TIMES DAILY
Qty: 270 CAPSULE | Refills: 3 | Status: SHIPPED | OUTPATIENT
Start: 2023-09-14 | End: 2023-12-14 | Stop reason: SDUPTHER

## 2023-09-14 RX ORDER — CYANOCOBALAMIN 1000 UG/ML
INJECTION, SOLUTION INTRAMUSCULAR; SUBCUTANEOUS
Qty: 2 ML | Refills: 11 | Status: SHIPPED | OUTPATIENT
Start: 2023-09-14 | End: 2023-12-14 | Stop reason: SDUPTHER

## 2023-09-14 NOTE — PROGRESS NOTES
Subjective:       Patient ID: Cherie Richards is a 63 y.o. female.    Chief Complaint: Follow-up    Presents for follow up.     Gabapentin has been helping her back pain.     Stopped taking statin due to myalgias.       Fibromyaglia she has diffuse muscular tenderness in her back, particularly in shoulder blades. Did not tolerate cymbalta. Follows with rheumatolgoy. Gabapentin is helping.     HTN: well controlled on Amlodipine 2.5 mg daily, HCTZ 25 mg daily, losartan 100 mg daily and metoprolol 25 mg daily.     Liver Hemangioma last CT scan in 2021 showed no liver lesions     Vit D     Gastric Bypass in in 2010. Lost 70 Ibs lowest weight was 130 now at 188     H Pylori     GERD / Hiatal hernia. On PPI.   ANDREW   HLD well controlled on statin.   abnormal uterine cancer cells s/p hysterectomy  Mixed urinary incontinence follows with urogyn   Depression/ Anxiety: on xanax and Wellbutrin.   Pulm nodule. Was having annual CT chest. Las CT in 2021 showed nodule had resolved.      Health Maintenance:  Colon Cancer Screening: colonoscopy done in 2020 with one polyp removed. Due again in 2025   Mammogram: scheduled for 10/16/23   DEXA: order today   HIV: negative 2021   Hep C: negative 2021   Lipids: order today   Vaccines: needs PNA       Follow-up  Pertinent negatives include no abdominal pain, arthralgias, chest pain, chills, coughing, headaches, myalgias, nausea or vomiting.       Review of Systems   Constitutional:  Negative for activity change, appetite change and chills.   HENT:  Negative for ear pain, sinus pressure/congestion and sneezing.    Respiratory:  Negative for cough and shortness of breath.    Cardiovascular:  Negative for chest pain, palpitations and leg swelling.   Gastrointestinal:  Negative for abdominal distention, abdominal pain, constipation, diarrhea, nausea and vomiting.   Genitourinary:  Negative for dysuria and hematuria.   Musculoskeletal:  Negative for arthralgias, back pain and myalgias.    Neurological:  Negative for dizziness and headaches.   Psychiatric/Behavioral:  Negative for agitation. The patient is not nervous/anxious.            Past Medical History:   Diagnosis Date    Abnormal Pap smear     Anemia     history    Anxiety     Cancer     uterine-cancer cells     Colon polyps, next C-scope 2019. 4/22/2014    Dry eyes     Dyspareunia, female 6/24/2020    Essential hypertension, started in her mid 30's 1/18/2017    Family history of malignant neoplasm of pancreas 6/1/2018    Gastric bypass status for obesity 8/21/2012    GERD (gastroesophageal reflux disease)     Helicobacter pylori gastritis, 2008. 8/20/2014    Hemangioma of liver, stable 2010, 2012, right lobe 8/20/2014    Hematuria     History of cosmetic surgeries, tummy tuck 2011. 4/5/2013    History of frquent UTI 6/24/2020    HTN (hypertension) 8/21/2012    120s-130s/80s    Kidney stone     Right sided sciatica 6/1/2018    Sleep apnea     patient does not use the C-PAP mask-cannot tolerate    Ureteral stone 4/8/2014    Urinary incontinence     Urinary retention     Varicose veins of lower extremity with inflammation 4/8/2015    Vertigo     mild    Vitamin D deficiency disease 6/1/2018     Past Surgical History:   Procedure Laterality Date    Abdominoplasty with Liposcuction      APPENDECTOMY      CERVICAL BIOPSY  W/ LOOP ELECTRODE EXCISION      Prior to hysterectomy    CHOLECYSTECTOMY      Laparoscopic    COLONOSCOPY N/A 2/13/2020    Procedure: COLONOSCOPY;  Surgeon: Pb Smith MD;  Location: [facility] ([address] [place] [address]);  Service: Endoscopy;  Laterality: N/A;    ESOPHAGOGASTRODUODENOSCOPY N/A 11/2/2021    Procedure: EGD (ESOPHAGOGASTRODUODENOSCOPY);  Surgeon: Clayton Martínez MD;  Location: [facility] ([address] [place] [address]);  Service: Endoscopy;  Laterality: N/A;  fully vac. 3/15/21 / instr portal -ml    GASTRIC BYPASS      HERNIA REPAIR      HYSTERECTOMY  1980     TVH  both ovaries remain      Patient Active Problem List   Diagnosis    GERD  (gastroesophageal reflux disease), repair of Niessen fundoplication 08-.    Gastric bypass status for obesity, 08-.    Sleep apnea, resolved following gastric bypass surgery. CPAP intolerant.    Colon polyps    Helicobacter pylori gastritis, 2008.    Hemangioma of liver, stable 2010, 2012, right lobe; resolved     Essential hypertension, started in her mid 30's    Vitamin D deficiency disease    Right sided sciatica    Urinary incontinence, mixed    Chronic constipation    Major depressive disorder    Nodule of lower lobe of right lung, spiculated 1.4 cm by CT scan 5/4/2021, resolved 12/2021    Colon wall thickening, distal transverse colon to mid descending colon by CT 5/4/2021    Atherosclerosis of native coronary artery of native heart without angina pectoris by CT scan 5/4/2021    Aortic calcification, by CT scan 5/4/2021    SOB (shortness of breath)    Other chest pain    Calcified granuloma of lung    Moderate episode of recurrent major depressive disorder        Objective:      Physical Exam  Constitutional:       Appearance: Normal appearance.   HENT:      Head: Normocephalic.      Right Ear: Tympanic membrane normal.      Left Ear: Tympanic membrane normal.      Nose: Nose normal.   Cardiovascular:      Rate and Rhythm: Normal rate and regular rhythm.      Pulses: Normal pulses.      Heart sounds: Normal heart sounds.   Pulmonary:      Effort: Pulmonary effort is normal.      Breath sounds: Normal breath sounds.   Abdominal:      General: Abdomen is flat. Bowel sounds are normal.      Palpations: Abdomen is soft.   Musculoskeletal:         General: Normal range of motion.      Cervical back: Normal range of motion and neck supple.   Skin:     General: Skin is warm and dry.   Neurological:      General: No focal deficit present.      Mental Status: She is alert and oriented to person, place, and time.   Psychiatric:         Mood and Affect: Mood normal.         Assessment:       Problem List  Items Addressed This Visit          Cardiac/Vascular    Essential hypertension, started in her mid 30's       Endocrine    Gastric bypass status for obesity, 08-.    Relevant Medications    cyanocobalamin 1,000 mcg/mL injection    Other Relevant Orders    CBC W/ AUTO DIFFERENTIAL    COMPREHENSIVE METABOLIC PANEL    VITAMIN B12     Other Visit Diagnoses       KAILEE (generalized anxiety disorder)    -  Primary    Relevant Orders    TSH    Chronic bilateral low back pain with bilateral sciatica        Class 1 obesity with serious comorbidity and body mass index (BMI) of 33.0 to 33.9 in adult, unspecified obesity type        Mixed hyperlipidemia        Relevant Orders    LIPID PANEL    Other specified abnormal findings of blood chemistry        Relevant Orders    CBC W/ AUTO DIFFERENTIAL    VITAMIN B12    Intestinal bypass and anastomosis status        Relevant Orders    VITAMIN B12    Encounter for osteoporosis screening in asymptomatic postmenopausal patient        Relevant Orders    DXA Bone Density Axial Skeleton 1 or more sites            Plan:         Cherie was seen today for follow-up.    Diagnoses and all orders for this visit:    KAILEE (generalized anxiety disorder)  -     TSH; Future    Gastric bypass status for obesity, 08-.  Intestinal bypass and anastomosis status  -     cyanocobalamin 1,000 mcg/mL injection; INJECT 1 ML (CC) INTRAMUSCULARLY EVERY TWO WEEKS  -     CBC W/ AUTO DIFFERENTIAL; Future  -     COMPREHENSIVE METABOLIC PANEL; Future  -     VITAMIN B12; Future  Cotninue B12 injections and check labs     Essential hypertension, started in her mid 30's  Well controlled on current meds     Chronic bilateral low back pain with bilateral sciatica  Increase gabapentin to TID>   If pain worsens consider pain management referral but would like to hold off on injections for now.     Class 1 obesity with serious comorbidity and body mass index (BMI) of 33.0 to 33.9 in adult, unspecified obesity  type  Trial mounjaro 2.5 mg weekly.  Weight loss would benefit chronic pain and HTN.     Mixed hyperlipidemia  -     LIPID PANEL; Future    Encounter for osteoporosis screening in asymptomatic postmenopausal patient  -     DXA Bone Density Axial Skeleton 1 or more sites; Future    Other orders  -     gabapentin (NEURONTIN) 100 MG capsule; Take 1 capsule (100 mg total) by mouth 3 (three) times daily.  -     tirzepatide 2.5 mg/0.5 mL PnIj; Inject 2.5 mg into the skin every 7 days.                 Tracy Chand MD   Internal Medicine   Primary Care

## 2023-09-18 ENCOUNTER — PATIENT MESSAGE (OUTPATIENT)
Dept: INTERNAL MEDICINE | Facility: CLINIC | Age: 63
End: 2023-09-18
Payer: MEDICARE

## 2023-10-27 ENCOUNTER — OFFICE VISIT (OUTPATIENT)
Dept: UROGYNECOLOGY | Facility: CLINIC | Age: 63
End: 2023-10-27
Payer: MEDICARE

## 2023-10-27 VITALS
DIASTOLIC BLOOD PRESSURE: 65 MMHG | HEART RATE: 77 BPM | WEIGHT: 185.63 LBS | HEIGHT: 63 IN | BODY MASS INDEX: 32.89 KG/M2 | SYSTOLIC BLOOD PRESSURE: 126 MMHG

## 2023-10-27 DIAGNOSIS — M48.061 SPINAL STENOSIS OF LUMBAR REGION WITHOUT NEUROGENIC CLAUDICATION: ICD-10-CM

## 2023-10-27 DIAGNOSIS — N39.0 RECURRENT UTI: Primary | ICD-10-CM

## 2023-10-27 LAB
BACTERIA #/AREA URNS AUTO: ABNORMAL /HPF
BILIRUB SERPL-MCNC: NORMAL MG/DL
BLOOD URINE, POC: NORMAL
CLARITY, POC UA: NORMAL
COLOR, POC UA: NORMAL
GLUCOSE UR QL STRIP: NORMAL
KETONES UR QL STRIP: NORMAL
LEUKOCYTE ESTERASE URINE, POC: POSITIVE
MICROSCOPIC COMMENT: ABNORMAL
NITRITE, POC UA: NORMAL
PH, POC UA: NORMAL
PROTEIN, POC: NORMAL
RBC #/AREA URNS AUTO: 0 /HPF (ref 0–4)
SPECIFIC GRAVITY, POC UA: NORMAL
SQUAMOUS #/AREA URNS AUTO: 1 /HPF
UROBILINOGEN, POC UA: NORMAL
WBC #/AREA URNS AUTO: 43 /HPF (ref 0–5)

## 2023-10-27 PROCEDURE — 1159F PR MEDICATION LIST DOCUMENTED IN MEDICAL RECORD: ICD-10-PCS | Mod: CPTII,S$GLB,, | Performed by: OBSTETRICS & GYNECOLOGY

## 2023-10-27 PROCEDURE — 1101F PT FALLS ASSESS-DOCD LE1/YR: CPT | Mod: CPTII,S$GLB,, | Performed by: OBSTETRICS & GYNECOLOGY

## 2023-10-27 PROCEDURE — 3008F BODY MASS INDEX DOCD: CPT | Mod: CPTII,S$GLB,, | Performed by: OBSTETRICS & GYNECOLOGY

## 2023-10-27 PROCEDURE — 99213 PR OFFICE/OUTPT VISIT, EST, LEVL III, 20-29 MIN: ICD-10-PCS | Mod: S$GLB,,, | Performed by: OBSTETRICS & GYNECOLOGY

## 2023-10-27 PROCEDURE — 87186 SC STD MICRODIL/AGAR DIL: CPT | Performed by: OBSTETRICS & GYNECOLOGY

## 2023-10-27 PROCEDURE — 3044F PR MOST RECENT HEMOGLOBIN A1C LEVEL <7.0%: ICD-10-PCS | Mod: CPTII,S$GLB,, | Performed by: OBSTETRICS & GYNECOLOGY

## 2023-10-27 PROCEDURE — 3074F SYST BP LT 130 MM HG: CPT | Mod: CPTII,S$GLB,, | Performed by: OBSTETRICS & GYNECOLOGY

## 2023-10-27 PROCEDURE — 4010F ACE/ARB THERAPY RXD/TAKEN: CPT | Mod: CPTII,S$GLB,, | Performed by: OBSTETRICS & GYNECOLOGY

## 2023-10-27 PROCEDURE — 3288F PR FALLS RISK ASSESSMENT DOCUMENTED: ICD-10-PCS | Mod: CPTII,S$GLB,, | Performed by: OBSTETRICS & GYNECOLOGY

## 2023-10-27 PROCEDURE — 1159F MED LIST DOCD IN RCRD: CPT | Mod: CPTII,S$GLB,, | Performed by: OBSTETRICS & GYNECOLOGY

## 2023-10-27 PROCEDURE — 3044F HG A1C LEVEL LT 7.0%: CPT | Mod: CPTII,S$GLB,, | Performed by: OBSTETRICS & GYNECOLOGY

## 2023-10-27 PROCEDURE — 1160F PR REVIEW ALL MEDS BY PRESCRIBER/CLIN PHARMACIST DOCUMENTED: ICD-10-PCS | Mod: CPTII,S$GLB,, | Performed by: OBSTETRICS & GYNECOLOGY

## 2023-10-27 PROCEDURE — 87077 CULTURE AEROBIC IDENTIFY: CPT | Performed by: OBSTETRICS & GYNECOLOGY

## 2023-10-27 PROCEDURE — 1160F RVW MEDS BY RX/DR IN RCRD: CPT | Mod: CPTII,S$GLB,, | Performed by: OBSTETRICS & GYNECOLOGY

## 2023-10-27 PROCEDURE — 87088 URINE BACTERIA CULTURE: CPT | Performed by: OBSTETRICS & GYNECOLOGY

## 2023-10-27 PROCEDURE — 99999 PR PBB SHADOW E&M-EST. PATIENT-LVL III: ICD-10-PCS | Mod: PBBFAC,,, | Performed by: OBSTETRICS & GYNECOLOGY

## 2023-10-27 PROCEDURE — 99999 PR PBB SHADOW E&M-EST. PATIENT-LVL III: CPT | Mod: PBBFAC,,, | Performed by: OBSTETRICS & GYNECOLOGY

## 2023-10-27 PROCEDURE — 3074F PR MOST RECENT SYSTOLIC BLOOD PRESSURE < 130 MM HG: ICD-10-PCS | Mod: CPTII,S$GLB,, | Performed by: OBSTETRICS & GYNECOLOGY

## 2023-10-27 PROCEDURE — 3078F PR MOST RECENT DIASTOLIC BLOOD PRESSURE < 80 MM HG: ICD-10-PCS | Mod: CPTII,S$GLB,, | Performed by: OBSTETRICS & GYNECOLOGY

## 2023-10-27 PROCEDURE — 81002 URINALYSIS NONAUTO W/O SCOPE: CPT | Mod: S$GLB,,, | Performed by: OBSTETRICS & GYNECOLOGY

## 2023-10-27 PROCEDURE — 81001 URINALYSIS AUTO W/SCOPE: CPT | Performed by: OBSTETRICS & GYNECOLOGY

## 2023-10-27 PROCEDURE — 3078F DIAST BP <80 MM HG: CPT | Mod: CPTII,S$GLB,, | Performed by: OBSTETRICS & GYNECOLOGY

## 2023-10-27 PROCEDURE — 4010F PR ACE/ARB THEARPY RXD/TAKEN: ICD-10-PCS | Mod: CPTII,S$GLB,, | Performed by: OBSTETRICS & GYNECOLOGY

## 2023-10-27 PROCEDURE — 87086 URINE CULTURE/COLONY COUNT: CPT | Performed by: OBSTETRICS & GYNECOLOGY

## 2023-10-27 PROCEDURE — 3008F PR BODY MASS INDEX (BMI) DOCUMENTED: ICD-10-PCS | Mod: CPTII,S$GLB,, | Performed by: OBSTETRICS & GYNECOLOGY

## 2023-10-27 PROCEDURE — 3288F FALL RISK ASSESSMENT DOCD: CPT | Mod: CPTII,S$GLB,, | Performed by: OBSTETRICS & GYNECOLOGY

## 2023-10-27 PROCEDURE — 1101F PR PT FALLS ASSESS DOC 0-1 FALLS W/OUT INJ PAST YR: ICD-10-PCS | Mod: CPTII,S$GLB,, | Performed by: OBSTETRICS & GYNECOLOGY

## 2023-10-27 PROCEDURE — 99213 OFFICE O/P EST LOW 20 MIN: CPT | Mod: S$GLB,,, | Performed by: OBSTETRICS & GYNECOLOGY

## 2023-10-27 PROCEDURE — 81002 POCT URINE DIPSTICK WITHOUT MICROSCOPE: ICD-10-PCS | Mod: S$GLB,,, | Performed by: OBSTETRICS & GYNECOLOGY

## 2023-10-27 RX ORDER — GINSENG 100 MG
1 CAPSULE ORAL DAILY
Qty: 90 CAPSULE | Refills: 3 | Status: SHIPPED | OUTPATIENT
Start: 2023-10-27 | End: 2024-10-26

## 2023-10-27 RX ORDER — BACLOFEN 10 MG/1
10 TABLET ORAL 3 TIMES DAILY PRN
Qty: 90 TABLET | Refills: 11 | Status: CANCELLED | OUTPATIENT
Start: 2023-10-09 | End: 2024-10-08

## 2023-10-27 NOTE — PROGRESS NOTES
Chief Complaint   Patient presents with    Urinary Incontinence     Stopped antibiotics about a month and a half ago, symptoms returned about 3 weeks ago.    Urinary Frequency    Urinary Urgency        HPI: Patient is a 63 y.o. female  with a h/o recurrent UTI's who was on Macrobid suppression presents today for a follow up visit. Unable to use vaginal estrogen as it causes vulvovaginal irritation. She is currently taking D-Mannose 3 tablets per day.     Symptoms started to recur about three weeks ago. She is noticing recurrence of urinary urgency, frequency and urge incontinence. She may occasionally have dysuria. Symptoms are occurring daily.     Reports a h/o kidney stones.     REVIEW OF SYSTEMS:  A full 14-point ROS was performed and was significant for those  mentioned in the HPI.    The following portions of the patient's history were reviewed and updated as appropriate: allergies, current medications, past medical history, past surgical history and problem list.    PHYSICAL EXAMINATION    Vitals:    10/27/23 0818   BP: 126/65   Pulse: 77      General: Healthy in appearance, Well nourished, Affect Normal, NAD.    Office Visit on 10/27/2023   Component Date Value Ref Range Status    Color, UA 10/27/2023 Light Yellow   Final    WBC, UA 10/27/2023 Positive   Final    Nitrite, UA 10/27/2023 Trace   Final    Protein, POC 10/27/2023 Trace   Final    Glucose, UA 10/27/2023 N   Final    Ketones, UA 10/27/2023 N   Final    Urobilinogen, UA 10/27/2023 N   Final    Bilirubin, POC 10/27/2023 N   Final    Blood, UA 10/27/2023 N   Final    Clarity, UA 10/27/2023 Cloudy   Final        ASSESSMENT & PLAN:  Recurrent UTI  -     POCT URINE DIPSTICK WITHOUT MICROSCOPE  -     Urine culture  -     Urinalysis Microscopic  -     cranberry extract (ELLURA) 200 mg Cap; Take 1 tablet by mouth once daily.  Dispense: 90 capsule; Refill: 3  -     US Retroperitoneal Complete; Future; Expected date: 10/27/2023       62 yo with  recurrent UTI's whose symptoms were controlled well while she was on Macrobid suppression. Discussed the risks of long term Macrobid use again today. Will send her urine for a UA and culture. Will treat based on her culture and determine an alternate medication for suppression. We discussed also starting on a cranberry supplement Ellura which was sent to her pharmacy. She also was recommended to start a probiotic with Lactobacillus crispatus ([employer] found on [employer]).   She has not had recent upper urinary tract imaging and given her h/o renal stones will check a renal ultrasound to determine whether or not she has current stones that may be contributing to her symptoms.     All questions were answered today. The patient was encouraged to contact the office as needed with any additional questions or concerns.     Total time spent on visit was 20 minutes.  This includes face to face time and non-face to face time preparing to see the patient (eg, review of tests), Obtaining and/or reviewing separately obtained history, Documenting clinical information in the electronic or other health record, Independently interpreting resultsand communicating results to the patient/family/caregiver, or Care coordination.    Adriana Waller MD

## 2023-10-30 ENCOUNTER — PATIENT MESSAGE (OUTPATIENT)
Dept: UROGYNECOLOGY | Facility: CLINIC | Age: 63
End: 2023-10-30
Payer: MEDICARE

## 2023-10-30 ENCOUNTER — PATIENT MESSAGE (OUTPATIENT)
Dept: INTERNAL MEDICINE | Facility: CLINIC | Age: 63
End: 2023-10-30
Payer: MEDICARE

## 2023-10-30 DIAGNOSIS — E66.9 CLASS 1 OBESITY WITH SERIOUS COMORBIDITY AND BODY MASS INDEX (BMI) OF 33.0 TO 33.9 IN ADULT, UNSPECIFIED OBESITY TYPE: Primary | ICD-10-CM

## 2023-10-30 DIAGNOSIS — N39.0 RECURRENT UTI: ICD-10-CM

## 2023-10-30 DIAGNOSIS — N39.0 ACUTE UTI: Primary | ICD-10-CM

## 2023-10-30 LAB — BACTERIA UR CULT: ABNORMAL

## 2023-10-30 RX ORDER — CIPROFLOXACIN 500 MG/1
500 TABLET ORAL 2 TIMES DAILY
Qty: 10 TABLET | Refills: 0 | Status: SHIPPED | OUTPATIENT
Start: 2023-10-30 | End: 2023-11-04

## 2023-10-30 RX ORDER — GRANULES FOR ORAL 3 G/1
3 POWDER ORAL WEEKLY
Qty: 36 G | Refills: 0 | Status: SHIPPED | OUTPATIENT
Start: 2023-10-30 | End: 2024-01-28

## 2023-11-14 ENCOUNTER — HOSPITAL ENCOUNTER (OUTPATIENT)
Dept: RADIOLOGY | Facility: HOSPITAL | Age: 63
Discharge: HOME | End: 2023-11-14
Attending: OBSTETRICS & GYNECOLOGY
Payer: MEDICARE

## 2023-11-14 DIAGNOSIS — N39.0 RECURRENT UTI: ICD-10-CM

## 2023-11-14 PROCEDURE — 76770 US RETROPERITONEAL COMPLETE: ICD-10-PCS | Mod: 26,,, | Performed by: RADIOLOGY

## 2023-11-14 PROCEDURE — 76770 US EXAM ABDO BACK WALL COMP: CPT | Mod: TC

## 2023-11-14 PROCEDURE — 76770 US EXAM ABDO BACK WALL COMP: CPT | Mod: 26,,, | Performed by: RADIOLOGY

## 2023-12-07 ENCOUNTER — LAB VISIT (OUTPATIENT)
Dept: LAB | Facility: HOSPITAL | Age: 63
End: 2023-12-07
Payer: MEDICAID

## 2023-12-07 DIAGNOSIS — F41.1 GAD (GENERALIZED ANXIETY DISORDER): ICD-10-CM

## 2023-12-07 DIAGNOSIS — E78.2 MIXED HYPERLIPIDEMIA: ICD-10-CM

## 2023-12-07 DIAGNOSIS — R79.89 OTHER SPECIFIED ABNORMAL FINDINGS OF BLOOD CHEMISTRY: ICD-10-CM

## 2023-12-07 DIAGNOSIS — Z98.0 INTESTINAL BYPASS AND ANASTOMOSIS STATUS: ICD-10-CM

## 2023-12-07 DIAGNOSIS — Z98.84 GASTRIC BYPASS STATUS FOR OBESITY: ICD-10-CM

## 2023-12-07 LAB
ALBUMIN SERPL BCP-MCNC: 4 G/DL (ref 3.5–5.2)
ALP SERPL-CCNC: 83 U/L (ref 55–135)
ALT SERPL W/O P-5'-P-CCNC: 15 U/L (ref 10–44)
ANION GAP SERPL CALC-SCNC: 11 MMOL/L (ref 8–16)
AST SERPL-CCNC: 15 U/L (ref 10–40)
BASOPHILS # BLD AUTO: 0.04 K/UL (ref 0–0.2)
BASOPHILS NFR BLD: 0.7 % (ref 0–1.9)
BILIRUB SERPL-MCNC: 0.4 MG/DL (ref 0.1–1)
BUN SERPL-MCNC: 10 MG/DL (ref 8–23)
CALCIUM SERPL-MCNC: 9.7 MG/DL (ref 8.7–10.5)
CHLORIDE SERPL-SCNC: 98 MMOL/L (ref 95–110)
CHOLEST SERPL-MCNC: 194 MG/DL (ref 120–199)
CHOLEST/HDLC SERPL: 3.3 {RATIO} (ref 2–5)
CO2 SERPL-SCNC: 22 MMOL/L (ref 23–29)
CREAT SERPL-MCNC: 0.8 MG/DL (ref 0.5–1.4)
DIFFERENTIAL METHOD: ABNORMAL
EOSINOPHIL # BLD AUTO: 0.1 K/UL (ref 0–0.5)
EOSINOPHIL NFR BLD: 1.2 % (ref 0–8)
ERYTHROCYTE [DISTWIDTH] IN BLOOD BY AUTOMATED COUNT: 14.6 % (ref 11.5–14.5)
EST. GFR  (NO RACE VARIABLE): >60 ML/MIN/1.73 M^2
GLUCOSE SERPL-MCNC: 82 MG/DL (ref 70–110)
HCT VFR BLD AUTO: 40.8 % (ref 37–48.5)
HDLC SERPL-MCNC: 59 MG/DL (ref 40–75)
HDLC SERPL: 30.4 % (ref 20–50)
HGB BLD-MCNC: 13.7 G/DL (ref 12–16)
IMM GRANULOCYTES # BLD AUTO: 0.02 K/UL (ref 0–0.04)
IMM GRANULOCYTES NFR BLD AUTO: 0.3 % (ref 0–0.5)
LDLC SERPL CALC-MCNC: 118 MG/DL (ref 63–159)
LYMPHOCYTES # BLD AUTO: 1.5 K/UL (ref 1–4.8)
LYMPHOCYTES NFR BLD: 25.6 % (ref 18–48)
MCH RBC QN AUTO: 29 PG (ref 27–31)
MCHC RBC AUTO-ENTMCNC: 33.6 G/DL (ref 32–36)
MCV RBC AUTO: 86 FL (ref 82–98)
MONOCYTES # BLD AUTO: 0.5 K/UL (ref 0.3–1)
MONOCYTES NFR BLD: 9 % (ref 4–15)
NEUTROPHILS # BLD AUTO: 3.8 K/UL (ref 1.8–7.7)
NEUTROPHILS NFR BLD: 63.2 % (ref 38–73)
NONHDLC SERPL-MCNC: 135 MG/DL
NRBC BLD-RTO: 0 /100 WBC
PLATELET # BLD AUTO: 327 K/UL (ref 150–450)
PMV BLD AUTO: 9.9 FL (ref 9.2–12.9)
POTASSIUM SERPL-SCNC: 4.7 MMOL/L (ref 3.5–5.1)
PROT SERPL-MCNC: 7.1 G/DL (ref 6–8.4)
RBC # BLD AUTO: 4.72 M/UL (ref 4–5.4)
SODIUM SERPL-SCNC: 131 MMOL/L (ref 136–145)
TRIGL SERPL-MCNC: 85 MG/DL (ref 30–150)
TSH SERPL DL<=0.005 MIU/L-ACNC: 1.96 UIU/ML (ref 0.4–4)
VIT B12 SERPL-MCNC: 909 PG/ML (ref 210–950)
WBC # BLD AUTO: 6.02 K/UL (ref 3.9–12.7)

## 2023-12-07 PROCEDURE — 80053 COMPREHEN METABOLIC PANEL: CPT | Performed by: INTERNAL MEDICINE

## 2023-12-07 PROCEDURE — 85025 COMPLETE CBC W/AUTO DIFF WBC: CPT | Performed by: INTERNAL MEDICINE

## 2023-12-07 PROCEDURE — 36415 COLL VENOUS BLD VENIPUNCTURE: CPT | Mod: PO | Performed by: INTERNAL MEDICINE

## 2023-12-07 PROCEDURE — 80061 LIPID PANEL: CPT | Performed by: INTERNAL MEDICINE

## 2023-12-07 PROCEDURE — 82607 VITAMIN B-12: CPT | Performed by: INTERNAL MEDICINE

## 2023-12-07 PROCEDURE — 84443 ASSAY THYROID STIM HORMONE: CPT | Performed by: INTERNAL MEDICINE

## 2023-12-12 ENCOUNTER — HOSPITAL ENCOUNTER (OUTPATIENT)
Dept: RADIOLOGY | Facility: HOSPITAL | Age: 63
Discharge: HOME | End: 2023-12-12
Attending: INTERNAL MEDICINE
Payer: MEDICARE

## 2023-12-12 DIAGNOSIS — Z12.31 ENCOUNTER FOR SCREENING MAMMOGRAM FOR MALIGNANT NEOPLASM OF BREAST: ICD-10-CM

## 2023-12-12 PROCEDURE — 77063 BREAST TOMOSYNTHESIS BI: CPT | Mod: 26,,, | Performed by: RADIOLOGY

## 2023-12-12 PROCEDURE — 77067 SCR MAMMO BI INCL CAD: CPT | Mod: TC,PO

## 2023-12-12 PROCEDURE — 77067 SCR MAMMO BI INCL CAD: CPT | Mod: 26,,, | Performed by: RADIOLOGY

## 2023-12-12 PROCEDURE — 77063 MAMMO DIGITAL SCREENING BILAT WITH TOMO: ICD-10-PCS | Mod: 26,,, | Performed by: RADIOLOGY

## 2023-12-12 PROCEDURE — 77067 MAMMO DIGITAL SCREENING BILAT WITH TOMO: ICD-10-PCS | Mod: 26,,, | Performed by: RADIOLOGY

## 2023-12-14 ENCOUNTER — OFFICE VISIT (OUTPATIENT)
Dept: INTERNAL MEDICINE | Facility: CLINIC | Age: 63
End: 2023-12-14
Payer: MEDICARE

## 2023-12-14 VITALS
SYSTOLIC BLOOD PRESSURE: 128 MMHG | WEIGHT: 181 LBS | BODY MASS INDEX: 32.07 KG/M2 | HEIGHT: 63 IN | OXYGEN SATURATION: 98 % | HEART RATE: 62 BPM | DIASTOLIC BLOOD PRESSURE: 68 MMHG

## 2023-12-14 DIAGNOSIS — I10 ESSENTIAL HYPERTENSION: ICD-10-CM

## 2023-12-14 DIAGNOSIS — E87.1 HYPONATREMIA: ICD-10-CM

## 2023-12-14 DIAGNOSIS — G89.29 BILATERAL CHRONIC KNEE PAIN: ICD-10-CM

## 2023-12-14 DIAGNOSIS — H10.13 ALLERGIC CONJUNCTIVITIS OF BOTH EYES: ICD-10-CM

## 2023-12-14 DIAGNOSIS — M25.562 BILATERAL CHRONIC KNEE PAIN: ICD-10-CM

## 2023-12-14 DIAGNOSIS — F41.1 GAD (GENERALIZED ANXIETY DISORDER): ICD-10-CM

## 2023-12-14 DIAGNOSIS — K21.9 GASTROESOPHAGEAL REFLUX DISEASE WITHOUT ESOPHAGITIS: ICD-10-CM

## 2023-12-14 DIAGNOSIS — Z98.84 GASTRIC BYPASS STATUS FOR OBESITY: ICD-10-CM

## 2023-12-14 DIAGNOSIS — Z00.00 ENCOUNTER FOR ANNUAL PHYSICAL EXAM: Primary | ICD-10-CM

## 2023-12-14 DIAGNOSIS — E66.9 CLASS 1 OBESITY WITH SERIOUS COMORBIDITY AND BODY MASS INDEX (BMI) OF 32.0 TO 32.9 IN ADULT, UNSPECIFIED OBESITY TYPE: ICD-10-CM

## 2023-12-14 DIAGNOSIS — M25.561 BILATERAL CHRONIC KNEE PAIN: ICD-10-CM

## 2023-12-14 PROCEDURE — 99999 PR PBB SHADOW E&M-EST. PATIENT-LVL III: ICD-10-PCS | Mod: PBBFAC,,, | Performed by: INTERNAL MEDICINE

## 2023-12-14 PROCEDURE — 99213 OFFICE O/P EST LOW 20 MIN: CPT | Mod: PBBFAC | Performed by: INTERNAL MEDICINE

## 2023-12-14 PROCEDURE — 99999 PR PBB SHADOW E&M-EST. PATIENT-LVL III: CPT | Mod: PBBFAC,,, | Performed by: INTERNAL MEDICINE

## 2023-12-14 RX ORDER — HYDROCHLOROTHIAZIDE 12.5 MG/1
12.5 TABLET ORAL DAILY
Qty: 30 TABLET | Refills: 11 | Status: SHIPPED | OUTPATIENT
Start: 2023-12-14 | End: 2024-12-13

## 2023-12-14 RX ORDER — METOPROLOL SUCCINATE 25 MG/1
25 TABLET, EXTENDED RELEASE ORAL DAILY
Qty: 90 TABLET | Refills: 3 | Status: SHIPPED | OUTPATIENT
Start: 2023-12-14

## 2023-12-14 RX ORDER — PANTOPRAZOLE SODIUM 40 MG/1
40 TABLET, DELAYED RELEASE ORAL 2 TIMES DAILY
Qty: 180 TABLET | Refills: 3 | Status: SHIPPED | OUTPATIENT
Start: 2023-12-14

## 2023-12-14 RX ORDER — LOSARTAN POTASSIUM 100 MG/1
100 TABLET ORAL EVERY MORNING
Qty: 90 TABLET | Refills: 3 | Status: SHIPPED | OUTPATIENT
Start: 2023-12-14

## 2023-12-14 RX ORDER — GABAPENTIN 100 MG/1
200 CAPSULE ORAL 3 TIMES DAILY
Qty: 540 CAPSULE | Refills: 3 | Status: SHIPPED | OUTPATIENT
Start: 2023-12-14 | End: 2024-02-14

## 2023-12-14 RX ORDER — AMLODIPINE BESYLATE 5 MG/1
5 TABLET ORAL DAILY
Qty: 30 TABLET | Refills: 11 | Status: SHIPPED | OUTPATIENT
Start: 2023-12-14 | End: 2024-12-13

## 2023-12-14 RX ORDER — FLUCONAZOLE 150 MG/1
150 TABLET ORAL DAILY
Qty: 1 TABLET | Refills: 4 | Status: SHIPPED | OUTPATIENT
Start: 2023-12-14 | End: 2023-12-18

## 2023-12-14 RX ORDER — BUPROPION HYDROCHLORIDE 100 MG/1
100 TABLET, EXTENDED RELEASE ORAL 2 TIMES DAILY
Qty: 180 TABLET | Refills: 3 | Status: SHIPPED | OUTPATIENT
Start: 2023-12-14 | End: 2024-12-13

## 2023-12-14 RX ORDER — OLOPATADINE HYDROCHLORIDE 1 MG/ML
1 SOLUTION/ DROPS OPHTHALMIC 2 TIMES DAILY
Qty: 5 ML | Refills: 6 | Status: SHIPPED | OUTPATIENT
Start: 2023-12-14

## 2023-12-14 RX ORDER — CYANOCOBALAMIN 1000 UG/ML
INJECTION, SOLUTION INTRAMUSCULAR; SUBCUTANEOUS
Qty: 2 ML | Refills: 11 | Status: SHIPPED | OUTPATIENT
Start: 2023-12-14

## 2023-12-14 NOTE — PROGRESS NOTES
Subjective:       Patient ID: Cherie Richards is a 63 y.o. female.    Chief Complaint: Follow-up    HPI    Sodium slightly low on most recent labs. Na was 131. She is on HCTZ.     We started moujaro in September for weight loss. She is now on 5 mg weekly.     Gabapentin has been helping her back pain. During previous visit we increased to TID from BID.         Fibromyaglia she has diffuse muscular tenderness in her back, particularly in shoulder blades. Did not tolerate cymbalta. Follows with rheumatolgoy. Gabapentin is helping.      HTN: well controlled on Amlodipine 2.5 mg daily, HCTZ 25 mg daily, losartan 100 mg daily and metoprolol 25 mg daily.     Liver Hemangioma last CT scan in 2021 showed no liver lesions      Vit D      Gastric Bypass in in 2010. Lost 70 Ibs lowest weight was 130 now at 188      H Pylori      GERD / Hiatal hernia. On PPI.     ANDREW     HLD Stopped taking statin due to myalgias.     abnormal uterine cancer cells s/p hysterectomy    Mixed urinary incontinence follows with urogyn     Depression/ Anxiety: on xanax and Wellbutrin.     Pulm nodule. Was having annual CT chest. Las CT in 2021 showed nodule had resolved.      Health Maintenance:  Colon Cancer Screening: colonoscopy done in 2020 with one polyp removed. Due again in 2025   Mammogram: Ordered. Needs to schedule.   DEXA: scheduled for 12/21/23    HIV: negative 2021   Hep C: negative 2021   Lipids: order today   Vaccines: needs PNA        Review of Systems   Constitutional:  Negative for activity change, appetite change and chills.   HENT:  Negative for ear pain, sinus pressure/congestion and sneezing.    Respiratory:  Negative for cough and shortness of breath.    Cardiovascular:  Negative for chest pain, palpitations and leg swelling.   Gastrointestinal:  Negative for abdominal distention, abdominal pain, constipation, diarrhea, nausea and vomiting.   Genitourinary:  Negative for dysuria and hematuria.   Musculoskeletal:  Negative for  arthralgias, back pain and myalgias.   Neurological:  Negative for dizziness and headaches.   Psychiatric/Behavioral:  Negative for agitation. The patient is not nervous/anxious.            Past Medical History:   Diagnosis Date    Abnormal Pap smear     Anemia     history    Anxiety     Cancer     uterine-cancer cells     Colon polyps, next C-scope 2019. 4/22/2014    Dry eyes     Dyspareunia, female 6/24/2020    Essential hypertension, started in her mid 30's 1/18/2017    Family history of malignant neoplasm of pancreas 6/1/2018    Gastric bypass status for obesity 8/21/2012    GERD (gastroesophageal reflux disease)     Helicobacter pylori gastritis, 2008. 8/20/2014    Hemangioma of liver, stable 2010, 2012, right lobe 8/20/2014    Hematuria     History of cosmetic surgeries, tummy tuck 2011. 4/5/2013    History of frquent UTI 6/24/2020    HTN (hypertension) 8/21/2012    120s-130s/80s    Kidney stone     Right sided sciatica 6/1/2018    Sleep apnea     patient does not use the C-PAP mask-cannot tolerate    Ureteral stone 4/8/2014    Urinary incontinence     Urinary retention     Varicose veins of lower extremity with inflammation 4/8/2015    Vertigo     mild    Vitamin D deficiency disease 6/1/2018     Past Surgical History:   Procedure Laterality Date    Abdominoplasty with Liposcuction      APPENDECTOMY      CERVICAL BIOPSY  W/ LOOP ELECTRODE EXCISION      Prior to hysterectomy    CHOLECYSTECTOMY      Laparoscopic    COLONOSCOPY N/A 2/13/2020    Procedure: COLONOSCOPY;  Surgeon: Pb Smith MD;  Location: [address]);  Service: Endoscopy;  Laterality: N/A;    ESOPHAGOGASTRODUODENOSCOPY N/A 11/2/2021    Procedure: EGD (ESOPHAGOGASTRODUODENOSCOPY);  Surgeon: Clayton Martínez MD;  Location: [facility] ([address] [place] [address]);  Service: Endoscopy;  Laterality: N/A;  fully vac. 3/15/21 / instr portal -ml    GASTRIC BYPASS      HERNIA REPAIR      HYSTERECTOMY  1980     TVH  both ovaries remain      Patient Active  Problem List   Diagnosis    GERD (gastroesophageal reflux disease), repair of Niessen fundoplication 08-.    Gastric bypass status for obesity, 08-.    Sleep apnea, resolved following gastric bypass surgery. CPAP intolerant.    Colon polyps    Helicobacter pylori gastritis, 2008.    Hemangioma of liver, stable 2010, 2012, right lobe; resolved     Essential hypertension, started in her mid 30's    Vitamin D deficiency disease    Right sided sciatica    Urinary incontinence, mixed    Chronic constipation    Major depressive disorder    Nodule of lower lobe of right lung, spiculated 1.4 cm by CT scan 5/4/2021, resolved 12/2021    Colon wall thickening, distal transverse colon to mid descending colon by CT 5/4/2021    Atherosclerosis of native coronary artery of native heart without angina pectoris by CT scan 5/4/2021    Aortic calcification, by CT scan 5/4/2021    SOB (shortness of breath)    Other chest pain    Calcified granuloma of lung    Moderate episode of recurrent major depressive disorder        Objective:      Physical Exam  Constitutional:       Appearance: Normal appearance.   HENT:      Head: Normocephalic.   Cardiovascular:      Rate and Rhythm: Normal rate and regular rhythm.      Pulses: Normal pulses.      Heart sounds: Normal heart sounds.   Pulmonary:      Effort: Pulmonary effort is normal.      Breath sounds: Normal breath sounds.   Abdominal:      General: Abdomen is flat. Bowel sounds are normal.      Palpations: Abdomen is soft.   Musculoskeletal:         General: Normal range of motion.      Cervical back: Normal range of motion and neck supple.   Skin:     General: Skin is warm and dry.   Neurological:      General: No focal deficit present.      Mental Status: She is alert and oriented to person, place, and time.   Psychiatric:         Mood and Affect: Mood normal.         Assessment:       Problem List Items Addressed This Visit          Cardiac/Vascular    Essential  hypertension, started in her mid 30's    Relevant Medications    losartan (COZAAR) 100 MG tablet    metoprolol succinate (TOPROL-XL) 25 MG 24 hr tablet       Endocrine    Gastric bypass status for obesity, 08-.    Relevant Medications    cyanocobalamin 1,000 mcg/mL injection       GI    GERD (gastroesophageal reflux disease), repair of Niessen fundoplication 08-.    Relevant Medications    pantoprazole (PROTONIX) 40 MG tablet     Other Visit Diagnoses       Encounter for annual physical exam    -  Primary    Class 1 obesity with serious comorbidity and body mass index (BMI) of 32.0 to 32.9 in adult, unspecified obesity type        Relevant Medications    tirzepatide 7.5 mg/0.5 mL PnIj    Bilateral chronic knee pain        Relevant Medications    gabapentin (NEURONTIN) 100 MG capsule    KAILEE (generalized anxiety disorder)        Relevant Medications    buPROPion (WELLBUTRIN SR) 100 MG TBSR 12 hr tablet    Allergic conjunctivitis of both eyes        Relevant Medications    olopatadine (PATANOL) 0.1 % ophthalmic solution    Hyponatremia        Relevant Orders    RENAL FUNCTION PANEL            Plan:         Cherie was seen today for follow-up.    Diagnoses and all orders for this visit:    Encounter for annual physical exam    Class 1 obesity with serious comorbidity and body mass index (BMI) of 32.0 to 32.9 in adult, unspecified obesity type  -     tirzepatide 7.5 mg/0.5 mL PnIj; Inject 7.5 mg into the skin every 7 days.  Increase to 7.5 mg weekly. Doing well on this dose.     Bilateral chronic knee pain  -     gabapentin (NEURONTIN) 100 MG capsule; Take 2 capsules (200 mg total) by mouth 3 (three) times daily.    Essential hypertension  -     losartan (COZAAR) 100 MG tablet; Take 1 tablet (100 mg total) by mouth every morning.  -     metoprolol succinate (TOPROL-XL) 25 MG 24 hr tablet; Take 1 tablet (25 mg total) by mouth once daily.  Well controlled on current meds. Decrease dose as below.     KAILEE  (generalized anxiety disorder)  -     buPROPion (WELLBUTRIN SR) 100 MG TBSR 12 hr tablet; Take 1 tablet (100 mg total) by mouth 2 (two) times daily.    Gastroesophageal reflux disease without esophagitis  -     pantoprazole (PROTONIX) 40 MG tablet; Take 1 tablet (40 mg total) by mouth 2 (two) times daily.    Allergic conjunctivitis of both eyes  -     olopatadine (PATANOL) 0.1 % ophthalmic solution; Place 1 drop into both eyes 2 (two) times daily.    Gastric bypass status for obesity, 08-.  -     cyanocobalamin 1,000 mcg/mL injection; INJECT 1 ML (CC) INTRAMUSCULARLY EVERY TWO WEEKS    Hyponatremia  -     hydroCHLOROthiazide (HYDRODIURIL) 12.5 MG Tab; Take 1 tablet (12.5 mg total) by mouth once daily.  -     RENAL FUNCTION PANEL; Future  Will decrease HCTZ to 12.5 mg and repeat labs in 4 weeks.             Tracy Chand MD   Internal Medicine   Primary Care

## 2023-12-18 DIAGNOSIS — F41.1 GAD (GENERALIZED ANXIETY DISORDER): ICD-10-CM

## 2023-12-18 RX ORDER — ALPRAZOLAM 0.5 MG/1
TABLET ORAL
Qty: 30 TABLET | Refills: 5 | Status: SHIPPED | OUTPATIENT
Start: 2023-12-18

## 2023-12-21 ENCOUNTER — HOSPITAL ENCOUNTER (OUTPATIENT)
Dept: RADIOLOGY | Facility: CLINIC | Age: 63
Discharge: HOME | End: 2023-12-21
Attending: INTERNAL MEDICINE
Payer: MEDICARE

## 2023-12-21 DIAGNOSIS — Z78.0 ENCOUNTER FOR OSTEOPOROSIS SCREENING IN ASYMPTOMATIC POSTMENOPAUSAL PATIENT: ICD-10-CM

## 2023-12-21 DIAGNOSIS — Z13.820 ENCOUNTER FOR OSTEOPOROSIS SCREENING IN ASYMPTOMATIC POSTMENOPAUSAL PATIENT: ICD-10-CM

## 2023-12-21 PROCEDURE — 77080 DXA BONE DENSITY AXIAL: CPT | Mod: 26,,, | Performed by: INTERNAL MEDICINE

## 2023-12-21 PROCEDURE — 77080 DXA BONE DENSITY AXIAL: CPT | Mod: TC

## 2023-12-21 PROCEDURE — 77080 DXA BONE DENSITY AXIAL SKELETON 1 OR MORE SITES: ICD-10-PCS | Mod: 26,,, | Performed by: INTERNAL MEDICINE

## 2023-12-27 ENCOUNTER — PATIENT MESSAGE (OUTPATIENT)
Dept: INTERNAL MEDICINE | Facility: CLINIC | Age: 63
End: 2023-12-27
Payer: MEDICARE

## 2024-01-04 ENCOUNTER — PATIENT MESSAGE (OUTPATIENT)
Dept: INTERNAL MEDICINE | Facility: CLINIC | Age: 64
End: 2024-01-04
Payer: MEDICARE

## 2024-01-08 ENCOUNTER — OFFICE VISIT (OUTPATIENT)
Dept: OTOLARYNGOLOGY | Facility: CLINIC | Age: 64
End: 2024-01-08
Payer: MEDICARE

## 2024-01-08 ENCOUNTER — LAB VISIT (OUTPATIENT)
Dept: LAB | Facility: HOSPITAL | Age: 64
End: 2024-01-08
Attending: OTOLARYNGOLOGY
Payer: MEDICARE

## 2024-01-08 VITALS
SYSTOLIC BLOOD PRESSURE: 147 MMHG | WEIGHT: 171.94 LBS | BODY MASS INDEX: 30.46 KG/M2 | DIASTOLIC BLOOD PRESSURE: 82 MMHG

## 2024-01-08 DIAGNOSIS — R22.1 NECK MASS: Primary | ICD-10-CM

## 2024-01-08 DIAGNOSIS — D17.0 LIPOMA OF NECK: ICD-10-CM

## 2024-01-08 LAB
CREAT SERPL-MCNC: 0.8 MG/DL (ref 0.5–1.4)
EST. GFR  (NO RACE VARIABLE): >60 ML/MIN/1.73 M^2

## 2024-01-08 PROCEDURE — 82565 ASSAY OF CREATININE: CPT | Performed by: OTOLARYNGOLOGY

## 2024-01-08 PROCEDURE — 99214 OFFICE O/P EST MOD 30 MIN: CPT | Mod: S$GLB,,, | Performed by: OTOLARYNGOLOGY

## 2024-01-08 PROCEDURE — 36415 COLL VENOUS BLD VENIPUNCTURE: CPT | Performed by: OTOLARYNGOLOGY

## 2024-01-08 PROCEDURE — 99999 PR PBB SHADOW E&M-EST. PATIENT-LVL IV: CPT | Mod: PBBFAC,,, | Performed by: OTOLARYNGOLOGY

## 2024-01-08 NOTE — PROGRESS NOTES
History of Present Illness:   Cherie Richards is a 63 y.o. year old female evaluated  in the Otolaryngology-Head and Neck Surgery Clinic at [facility] turns for re-evaluation of neck mass.  She feels that this may have some what increased in size.  She denies any pain to the area.  She denies any skin changes or infection or drainage.  She has not had imaging other than a ultrasound on prior or FNA seen in December of 2022 with consistent with lipoma  Prior HPI  Seen for evaluation of neck mass.  Patient reports a lump to the right side the front of the neck that has been present for about 2-3 years.  She has noted slow enlargement of the lump.  She reports that she feels this more in it irritates her.  She also has had some issues swallowing with compressive symptoms but denies any weight loss or odynophagia.  She denies any trauma to the area.  She denies any preceding URI or prior dental work.             Past Medical/Surgical History  Past Medical History:   Diagnosis Date    Abnormal Pap smear     Anemia     history    Anxiety     Cancer     uterine-cancer cells     Colon polyps, next C-scope 2019. 4/22/2014    Dry eyes     Dyspareunia, female 6/24/2020    Essential hypertension, started in her mid 30's 1/18/2017    Family history of malignant neoplasm of pancreas 6/1/2018    Gastric bypass status for obesity 8/21/2012    GERD (gastroesophageal reflux disease)     Helicobacter pylori gastritis, 2008. 8/20/2014    Hemangioma of liver, stable 2010, 2012, right lobe 8/20/2014    Hematuria     History of cosmetic surgeries, tummy tuck 2011. 4/5/2013    History of frquent UTI 6/24/2020    HTN (hypertension) 8/21/2012    120s-130s/80s    Kidney stone     Right sided sciatica 6/1/2018    Sleep apnea     patient does not use the C-PAP mask-cannot tolerate    Ureteral stone 4/8/2014    Urinary incontinence     Urinary retention     Varicose veins of lower extremity with inflammation 4/8/2015    Vertigo      mild    Vitamin D deficiency disease 6/1/2018     Her  has a past surgical history that includes Appendectomy; Abdominoplasty with Liposcuction; Hysterectomy (1980); Cervical biopsy w/ loop electrode excision; Gastric bypass; Hernia repair; Colonoscopy (N/A, 2/13/2020); Esophagogastroduodenoscopy (N/A, 11/2/2021); and Cholecystectomy.     Past Family/Social History  Her family history includes Breast cancer in her maternal aunt; Cancer in her father; Colon cancer in her maternal uncle; Coronary artery disease in her mother; Diabetes in her father; Hearing loss in her maternal grandmother; Heart disease in her mother; Heart failure in her mother; No Known Problems in her brother, maternal grandfather, paternal aunt, paternal grandfather, paternal grandmother, paternal uncle, and sister; Obesity in her father and mother; Stroke in her mother.  She  reports that she has never smoked. She has never used smokeless tobacco. She reports that she does not drink alcohol and does not use drugs.     Medications/Allergies/Immunizations  Her current medication(s) include:   Current Outpatient Medications   Medication Sig Dispense Refill    acetaminophen (TYLENOL) 500 MG tablet Take 1-2 tablets (500-1,000 mg total) by mouth 3 (three) times daily as needed for Pain.  0    albuterol (PROVENTIL/VENTOLIN HFA) 90 mcg/actuation inhaler Inhale 1-2 puffs into the lungs every 4 (four) hours as needed for Shortness of Breath (coughing). Rescue 8.5 g 11    ALPRAZolam (XANAX) 0.5 MG tablet TAKE 1 TABLET(0.5 MG) BY MOUTH EVERY NIGHT AS NEEDED FOR INSOMNIA OR ANXIETY 30 tablet 5    amLODIPine (NORVASC) 5 MG tablet Take 1 tablet (5 mg total) by mouth once daily. 30 tablet 11    baclofen (LIORESAL) 10 MG tablet Take 1 tablet (10 mg total) by mouth 3 (three) times daily as needed (muscle spasm). 90 tablet 11    buPROPion (WELLBUTRIN SR) 100 MG TBSR 12 hr tablet Take 1 tablet (100 mg total) by mouth 2 (two) times daily. 180 tablet 3     "cholecalciferol, vitamin D3, 5,000 unit Tab Take by mouth. 1 Tablet Oral Every day      cranberry extract (ELLURA) 200 mg Cap Take 1 tablet by mouth once daily. 90 capsule 3    cyanocobalamin 1,000 mcg/mL injection INJECT 1 ML (CC) INTRAMUSCULARLY EVERY TWO WEEKS 2 mL 11    cyclobenzaprine (FLEXERIL) 10 MG tablet Take 1 tablet (10 mg total) by mouth every evening. 30 tablet 6    cyclobenzaprine (FLEXERIL) 5 MG tablet Take 1 tablet by mouth in the morning and 2 tablets at bedtime 60 tablet 3    cycloSPORINE (RESTASIS) 0.05 % ophthalmic emulsion Instill 1 drop into both eyes twice daily 180 each 3    diclofenac sodium (VOLTAREN) 1 % Gel Apply 2 g topically 4 (four) times daily as needed (pain). 400 g 3    fosfomycin (MONUROL) 3 gram Pack Take 3 g by mouth once a week. Start after completing the treatment course of medication (cipro). 36 g 0    gabapentin (NEURONTIN) 100 MG capsule Take 2 capsules (200 mg total) by mouth 3 (three) times daily. 540 capsule 3    hydroCHLOROthiazide (HYDRODIURIL) 12.5 MG Tab Take 1 tablet (12.5 mg total) by mouth once daily. 30 tablet 11    losartan (COZAAR) 100 MG tablet Take 1 tablet (100 mg total) by mouth every morning. 90 tablet 3    metoprolol succinate (TOPROL-XL) 25 MG 24 hr tablet Take 1 tablet (25 mg total) by mouth once daily. 90 tablet 3    olopatadine (PATANOL) 0.1 % ophthalmic solution Place 1 drop into both eyes 2 (two) times daily. 5 mL 6    pantoprazole (PROTONIX) 40 MG tablet Take 1 tablet (40 mg total) by mouth 2 (two) times daily. 180 tablet 3    promethazine (PHENERGAN) 25 MG tablet Take 1 tablet (25 mg total) by mouth every 6 (six) hours as needed for Nausea. 15 tablet 0    syringe with needle (BD LUER-CLARIBEL SYRINGE) 3 mL 25 x 1 1/2 " Syrg Use as directed with Cyanocobalamin 10 Syringe 3    tirzepatide 7.5 mg/0.5 mL PnIj Inject 7.5 mg into the skin every 7 days. 12 pen 1    tretinoin (RETIN-A) 0.1 % cream Apply to affected area Topical Every day 45 g 11     No current " facility-administered medications for this visit.        Allergies: Adhesive tape-silicones, Penicillins, Sulfamethoxazole-trimethoprim, Prosed ec [meth-hyos-atrp-m blue-ba-phsal], Pyridium [phenazopyridine], and Cephalexin     Immunizations:   Immunization History   Administered Date(s) Administered    COVID-19, MRNA, LN-S, PF (Pfizer) (Purple Cap) 02/22/2021, 02/22/2021, 03/15/2021, 03/15/2021    Influenza (FLUAD) - Quadrivalent - Adjuvanted - PF *Preferred* (65+) 09/22/2021, 09/11/2023    Influenza - Quadrivalent 10/08/2014, 10/20/2015    Influenza - Quadrivalent - PF *Preferred* (6 months and older) 10/26/2012, 10/24/2013, 10/20/2015, 10/13/2016, 09/16/2017, 09/16/2017, 09/20/2018, 10/14/2019, 09/08/2020, 09/28/2022    Influenza - Trivalent (ADULT) 10/17/2007, 10/20/2008, 10/28/2009, 09/29/2010, 10/14/2011, 10/26/2012, 10/24/2013    Influenza Split 10/17/2007, 10/20/2008, 10/28/2009, 09/29/2010, 10/14/2011, 10/26/2012, 10/26/2012, 10/24/2013, 10/24/2013    Tdap 04/09/2013    Zoster Recombinant 01/23/2020, 08/18/2020         Review of Systems   Constitutional: Negative for fever, weight loss and weight gain.  Skin: Negative for rash, itchiness, dryness  HENT:  As per HPI  Cardiovascular: Negative for chest pain and dyspnea on exertion .   Respiratory: Is not experiencing shortness of breath.   Gastrointestinal: Negative for nausea and vomiting.   Neurological: Negative for headaches.   Lymph/Heme: Negative for lymphadenopathy or easy bruising  Musculoskeletal: Negative for joint or muscle pain  Psychiatric: The patient is not nervous/anxious.        All other systems are negative except for that listed in the HPI.      PHYSICAL EXAM:   Vital Signs:  BP (!) 147/82   Wt 78 kg (171 lb 15.3 oz)   BMI 30.46 kg/m²      General:  Well-developed, well-nourished  Communication and Voice:  Clear pitch and clarity  Hearing: Hearing adequate for verbal communication bilaterally   Inspection:  Normocephalic and atraumatic  without mass or lesion  Palpation:  Facial skeleton intact without bony stepoffs  Parotid Glands:  No mass or tenderness  Facial Strength:  Facial motility symmetric and full bilaterally  Pinna:  External ear intact and fully developed  External canal:  Canal is patent with intact skin  Tympanic Membrane:  Clear and mobile  External nose:  No scar or anatomic deformity  Internal Nose:  Septum intact and midline.  No edema, polyp, or rhinorrhea.  TMJ:  No pain to palpation with full mobility  Oral cavity, Lips, Teeth, and Gums:  Mucosa and teeth intact and viable, No lesions, masses or ulcers  Oropharynx: No erythema or exudate, no masses or ulcerations, non-obstructive tonsils  Nasopharynx:  No mass or lesion with intact mucosa  Hypopharynx:  Not well visualized secondary to gagging  Larynx:  Not well visualized secondary to gagging  Neck, Trachea, Lymphatics:  Midline trachea without mass or lesion, subcutaneous soft proximally 3 cm mass that appears to be deep to the platysma this does move with deglutition no lymphadenopathy  Thyroid:  No mass or nodularity  Eyes: No nystagmus with equal extraocular motion bilaterally  Neuro/Psych/Balance: Patient oriented and appropriate in interaction;  Appropriate mood and affect;  Gait is intact with no imbalance; Cranial nerves I-XII are intact  Respiratory effort:  Equal inspiration and expiration without stridor  Peripheral Vascular:  Warm extremities with equal pulses       RADIOLOGIC REVIEW:    Ultrasound neck 11/25/2022   Impression:     Thyroid nodules as above.  No nodules meet ACR TI-RADS criteria for imaging surveillance or tissue sampling.     Well-circumscribed heterogeneous mass measures up to 3.8 cm, in the right paramedian neck superior to the thyroid.  Appearance not entirely specific, though suggestive of lipoma.  Further evaluation as clinically warranted    ASSESSMENT:   1. Neck mass    2. Lipoma of neck            PLAN:   Benign nature of the mass given  that she feels that this has grown I will obtain CT neck with contrast.  She is leaning towards having this removed.  This is most likely lipoma as suggested by the ultrasound but differential diagnosis also includes thyroglossal duct cyst.  I will call her with CT scan results and move forward with management based on that    I believe that Ms. Richards has a good understanding of the issues involved and I answered all of her questions.     DISCLAIMER: This note was prepared with [employer]Call [email or web address]) voice recognition transcription software. Garbled syntax, mangled pronouns, and other bizarre constructions may be attributed to that software system. While efforts were made to correct any mistakes made by this voice recognition program, some errors and/or omissions may remain in the note that were missed when the note was originally created.

## 2024-01-23 ENCOUNTER — HOSPITAL ENCOUNTER (OUTPATIENT)
Dept: RADIOLOGY | Facility: HOSPITAL | Age: 64
Discharge: HOME | End: 2024-01-23
Attending: INTERNAL MEDICINE
Payer: MEDICARE

## 2024-01-23 ENCOUNTER — OFFICE VISIT (OUTPATIENT)
Dept: INTERNAL MEDICINE | Facility: CLINIC | Age: 64
End: 2024-01-23
Payer: MEDICARE

## 2024-01-23 VITALS
WEIGHT: 175.69 LBS | HEIGHT: 63 IN | BODY MASS INDEX: 31.13 KG/M2 | OXYGEN SATURATION: 98 % | SYSTOLIC BLOOD PRESSURE: 130 MMHG | DIASTOLIC BLOOD PRESSURE: 80 MMHG | HEART RATE: 84 BPM

## 2024-01-23 DIAGNOSIS — M81.0 OSTEOPOROSIS WITHOUT CURRENT PATHOLOGICAL FRACTURE, UNSPECIFIED OSTEOPOROSIS TYPE: Primary | ICD-10-CM

## 2024-01-23 DIAGNOSIS — J84.10 CALCIFIED GRANULOMA OF LUNG: ICD-10-CM

## 2024-01-23 DIAGNOSIS — F33.1 MODERATE EPISODE OF RECURRENT MAJOR DEPRESSIVE DISORDER: ICD-10-CM

## 2024-01-23 DIAGNOSIS — I70.0 AORTIC CALCIFICATION: ICD-10-CM

## 2024-01-23 DIAGNOSIS — M54.16 LUMBAR BACK PAIN WITH RADICULOPATHY AFFECTING LEFT LOWER EXTREMITY: ICD-10-CM

## 2024-01-23 PROCEDURE — 99214 OFFICE O/P EST MOD 30 MIN: CPT | Mod: S$GLB,,, | Performed by: INTERNAL MEDICINE

## 2024-01-23 PROCEDURE — 73502 X-RAY EXAM HIP UNI 2-3 VIEWS: CPT | Mod: 26,LT,, | Performed by: RADIOLOGY

## 2024-01-23 PROCEDURE — 72100 X-RAY EXAM L-S SPINE 2/3 VWS: CPT | Mod: TC

## 2024-01-23 PROCEDURE — 72100 X-RAY EXAM L-S SPINE 2/3 VWS: CPT | Mod: 26,,, | Performed by: RADIOLOGY

## 2024-01-23 PROCEDURE — 73502 X-RAY EXAM HIP UNI 2-3 VIEWS: CPT | Mod: TC,LT

## 2024-01-23 PROCEDURE — 99999 PR PBB SHADOW E&M-EST. PATIENT-LVL V: CPT | Mod: PBBFAC,,, | Performed by: INTERNAL MEDICINE

## 2024-01-23 RX ORDER — CYCLOBENZAPRINE HCL 10 MG
10 TABLET ORAL NIGHTLY
Qty: 30 TABLET | Refills: 6 | Status: SHIPPED | OUTPATIENT
Start: 2024-01-23 | End: 2024-03-11 | Stop reason: SDUPTHER

## 2024-01-23 NOTE — PROGRESS NOTES
Subjective:       Patient ID: Cherie Richards is a 63 y.o. female.    Chief Complaint: Follow-up (Pt states she is coming in for results/)    Having worsening left lower back/hip pain.     Had recent DEXA which showed osteoporosis.     Fibromyaglia she has diffuse muscular tenderness in her back, particularly in shoulder blades. Did not tolerate cymbalta. Follows with rheumatolgoy. Gabapentin is helping.      HTN: well controlled on Amlodipine 2.5 mg araceli, losartan 100 mg daily and metoprolol 25 mg daily. During previous visit we decreased HCTZ to 12.5 mg daily due to hyponatremia. Sodium improved on last labs and BP stable.      Liver Hemangioma last CT scan in 2021 showed no liver lesions      Vit D      Obesity: Gastric Bypass in in 2010. Lost 70 Ibs lowest weight was 130. Gained 50 Ibs back. Now on mounjaro 7.5 mg weekly and doing well. Has  lost about 15 Ibs.      H Pylori      GERD / Hiatal hernia. On PPI.     NADREW     HLD Stopped taking statin due to myalgias.     abnormal uterine cancer cells s/p hysterectomy    Mixed urinary incontinence follows with urogyn     Depression/ Anxiety: on xanax and Wellbutrin.     Pulm nodule. Was having annual CT chest. Las CT in 2021 showed nodule had resolved.      Health Maintenance:  Colon Cancer Screening: colonoscopy done in 2020 with one polyp removed. Due again in 2025   Mammogram: Ordered. Needs to schedule.   DEXA: scheduled for 12/21/23    HIV: negative 2021   Hep C: negative 2021   Lipids: order today   Vaccines: needs PNA        Review of Systems   Constitutional:  Negative for activity change, appetite change and chills.   HENT:  Negative for ear pain, sinus pressure/congestion and sneezing.    Respiratory:  Negative for cough and shortness of breath.    Cardiovascular:  Negative for chest pain, palpitations and leg swelling.   Gastrointestinal:  Negative for abdominal distention, abdominal pain, constipation, diarrhea, nausea and vomiting.   Genitourinary:   Negative for dysuria and hematuria.   Musculoskeletal:  Negative for arthralgias, back pain and myalgias.   Neurological:  Negative for dizziness and headaches.   Psychiatric/Behavioral:  Negative for agitation. The patient is not nervous/anxious.            Past Medical History:   Diagnosis Date    Abnormal Pap smear     Anemia     history    Anxiety     Cancer     uterine-cancer cells     Colon polyps, next C-scope 2019. 4/22/2014    Dry eyes     Dyspareunia, female 6/24/2020    Essential hypertension, started in her mid 30's 1/18/2017    Family history of malignant neoplasm of pancreas 6/1/2018    Gastric bypass status for obesity 8/21/2012    GERD (gastroesophageal reflux disease)     Helicobacter pylori gastritis, 2008. 8/20/2014    Hemangioma of liver, stable 2010, 2012, right lobe 8/20/2014    Hematuria     History of cosmetic surgeries, tummy tuck 2011. 4/5/2013    History of frquent UTI 6/24/2020    HTN (hypertension) 8/21/2012    120s-130s/80s    Kidney stone     Right sided sciatica 6/1/2018    Sleep apnea     patient does not use the C-PAP mask-cannot tolerate    Ureteral stone 4/8/2014    Urinary incontinence     Urinary retention     Varicose veins of lower extremity with inflammation 4/8/2015    Vertigo     mild    Vitamin D deficiency disease 6/1/2018     Past Surgical History:   Procedure Laterality Date    Abdominoplasty with Liposcuction      APPENDECTOMY      CERVICAL BIOPSY  W/ LOOP ELECTRODE EXCISION      Prior to hysterectomy    CHOLECYSTECTOMY      Laparoscopic    COLONOSCOPY N/A 2/13/2020    Procedure: COLONOSCOPY;  Surgeon: Pb Smith MD;  Location: [address]);  Service: Endoscopy;  Laterality: N/A;    ESOPHAGOGASTRODUODENOSCOPY N/A 11/2/2021    Procedure: EGD (ESOPHAGOGASTRODUODENOSCOPY);  Surgeon: Clayton Martínez MD;  Location: [address]);  Service: Endoscopy;  Laterality: N/A;  fully vac. 3/15/21 / instr portal -ml    GASTRIC BYPASS      HERNIA REPAIR       HYSTERECTOMY  1980     TVH  both ovaries remain      Patient Active Problem List   Diagnosis    GERD (gastroesophageal reflux disease), repair of [place] fundoplication 08-.    Gastric bypass status for obesity, 08-.    Sleep apnea, resolved following gastric bypass surgery. CPAP intolerant.    Colon polyps    Helicobacter pylori gastritis, 2008.    Hemangioma of liver, stable 2010, 2012, right lobe; resolved     Essential hypertension, started in her mid 30's    Vitamin D deficiency disease    Right sided sciatica    Urinary incontinence, mixed    Chronic constipation    Major depressive disorder    Nodule of lower lobe of right lung, spiculated 1.4 cm by CT scan 5/4/2021, resolved 12/2021    Colon wall thickening, distal transverse colon to mid descending colon by CT 5/4/2021    Atherosclerosis of native coronary artery of native heart without angina pectoris by CT scan 5/4/2021    Aortic calcification, by CT scan 5/4/2021    SOB (shortness of breath)    Other chest pain    Calcified granuloma of lung    Moderate episode of recurrent major depressive disorder        Objective:      Physical Exam  Constitutional:       Appearance: Normal appearance.   HENT:      Head: Normocephalic.   Cardiovascular:      Rate and Rhythm: Normal rate and regular rhythm.      Pulses: Normal pulses.      Heart sounds: Normal heart sounds.   Pulmonary:      Effort: Pulmonary effort is normal.      Breath sounds: Normal breath sounds.   Abdominal:      General: Abdomen is flat. Bowel sounds are normal.      Palpations: Abdomen is soft.   Musculoskeletal:         General: Normal range of motion.      Cervical back: Normal range of motion and neck supple.   Skin:     General: Skin is warm and dry.   Neurological:      General: No focal deficit present.      Mental Status: She is alert and oriented to person, place, and time.   Psychiatric:         Mood and Affect: Mood normal.         Assessment:       Problem List Items  Addressed This Visit          Psychiatric    Moderate episode of recurrent major depressive disorder       Pulmonary    Calcified granuloma of lung       Cardiac/Vascular    Aortic calcification, by CT scan 5/4/2021     Other Visit Diagnoses       Osteoporosis without current pathological fracture, unspecified osteoporosis type    -  Primary    Relevant Orders    Ambulatory referral/consult to Endocrinology    Lumbar back pain with radiculopathy affecting left lower extremity        Relevant Orders    X-Ray Lumbar Spine 2 Or 3 Views (Completed)    X-Ray Hip 2 or 3 views Left (with Pelvis when performed) (Completed)    Ambulatory referral/consult to Physical/Occupational Therapy    Ambulatory referral/consult to Back & Spine Clinic            Plan:             Osteoporosis without current pathological fracture, unspecified osteoporosis type  -     Ambulatory referral/consult to Endocrinology; Future; Expected date: 01/30/2024  Referral to endo for further treatment plan     Lumbar back pain with radiculopathy affecting left lower extremity  -     X-Ray Lumbar Spine 2 Or 3 Views; Future; Expected date: 01/23/2024  -     X-Ray Hip 2 or 3 views Left (with Pelvis when performed); Future; Expected date: 01/23/2024  -     cyclobenzaprine (FLEXERIL) 10 MG tablet; Take 1 tablet (10 mg total) by mouth every evening.  Dispense: 30 tablet; Refill: 6  -     Ambulatory referral/consult to Physical/Occupational Therapy; Future; Expected date: 01/30/2024  -     Ambulatory referral/consult to Back & Spine Clinic; Future; Expected date: 01/30/2024  Check XRAYS and refer to spine clinic.   Restart PT    Calcified granuloma of lung  Stable on last scan. Has seen pulm     Moderate episode of recurrent major depressive disorder  Doing well on wellbutrin and Xanax     Aortic calcification  Continue BP and lipid control         Tracy Chand MD   Internal Medicine   Primary Care

## 2024-01-24 ENCOUNTER — PATIENT MESSAGE (OUTPATIENT)
Dept: INTERNAL MEDICINE | Facility: CLINIC | Age: 64
End: 2024-01-24
Payer: MEDICARE

## 2024-01-25 DIAGNOSIS — B37.9 YEAST INFECTION: Primary | ICD-10-CM

## 2024-01-25 RX ORDER — FLUCONAZOLE 150 MG/1
150 TABLET ORAL
Qty: 2 TABLET | Refills: 0 | Status: SHIPPED | OUTPATIENT
Start: 2024-01-25 | End: 2024-02-06

## 2024-01-26 DIAGNOSIS — M51.36 DDD (DEGENERATIVE DISC DISEASE), LUMBAR: Primary | ICD-10-CM

## 2024-01-31 DIAGNOSIS — M48.061 SPINAL STENOSIS OF LUMBAR REGION WITHOUT NEUROGENIC CLAUDICATION: ICD-10-CM

## 2024-01-31 RX ORDER — BACLOFEN 10 MG/1
10 TABLET ORAL 3 TIMES DAILY PRN
Qty: 90 TABLET | Refills: 11 | Status: SHIPPED | OUTPATIENT
Start: 2024-01-31 | End: 2025-01-30

## 2024-02-07 ENCOUNTER — PATIENT MESSAGE (OUTPATIENT)
Dept: INTERNAL MEDICINE | Facility: CLINIC | Age: 64
End: 2024-02-07
Payer: MEDICARE

## 2024-02-14 ENCOUNTER — HOSPITAL ENCOUNTER (OUTPATIENT)
Dept: RADIOLOGY | Facility: HOSPITAL | Age: 64
Discharge: HOME | End: 2024-02-14
Attending: REGISTERED NURSE
Payer: MEDICARE

## 2024-02-14 ENCOUNTER — OFFICE VISIT (OUTPATIENT)
Dept: ORTHOPEDICS | Facility: CLINIC | Age: 64
End: 2024-02-14
Payer: MEDICARE

## 2024-02-14 VITALS — BODY MASS INDEX: 31.07 KG/M2 | HEIGHT: 63 IN | WEIGHT: 175.38 LBS

## 2024-02-14 DIAGNOSIS — M54.16 LUMBAR BACK PAIN WITH RADICULOPATHY AFFECTING LEFT LOWER EXTREMITY: ICD-10-CM

## 2024-02-14 DIAGNOSIS — M51.36 DDD (DEGENERATIVE DISC DISEASE), LUMBAR: ICD-10-CM

## 2024-02-14 DIAGNOSIS — G95.9 CERVICAL MYELOPATHY: Primary | ICD-10-CM

## 2024-02-14 DIAGNOSIS — M54.16 LUMBAR RADICULOPATHY, CHRONIC: ICD-10-CM

## 2024-02-14 PROCEDURE — 72110 X-RAY EXAM L-2 SPINE 4/>VWS: CPT | Mod: TC

## 2024-02-14 PROCEDURE — 99204 OFFICE O/P NEW MOD 45 MIN: CPT | Mod: S$GLB,,, | Performed by: PHYSICIAN ASSISTANT

## 2024-02-14 PROCEDURE — 72110 X-RAY EXAM L-2 SPINE 4/>VWS: CPT | Mod: 26,,, | Performed by: RADIOLOGY

## 2024-02-14 PROCEDURE — 99999 PR PBB SHADOW E&M-EST. PATIENT-LVL IV: CPT | Mod: PBBFAC,,, | Performed by: PHYSICIAN ASSISTANT

## 2024-02-14 RX ORDER — GABAPENTIN 300 MG/1
300 CAPSULE ORAL 3 TIMES DAILY
Qty: 90 CAPSULE | Refills: 11 | Status: SHIPPED | OUTPATIENT
Start: 2024-02-14 | End: 2024-04-09

## 2024-02-14 NOTE — H&P (VIEW-ONLY)
DATE: 2/14/2024  PATIENT: Cherie Richards    Supervising Physician: Jimbo Montiel M.D.    CHIEF COMPLAINT: back and bilateral leg pain    HISTORY:  Cherie Richards is a 63 y.o. female here for initial evaluation of low back and L>R posterior leg pain (Back - 6, Leg - 6).  The pain in the left leg is what bothers her most.  The pain has been present for 3 years but has progressively worsened over the last 3 months. The patient describes the pain as aching and sharp.  The pain is worse with sitting, standing and walking and improved by taking tylenol. There is associated numbness and tingling. There is subjective weakness. Prior treatments have included gabapentin, tylenol, physical therapy years ago, 2 ESIs 10 years ago complicated by a CSF leak that required a blood patch, but no surgery.    The patient reports myelopathic symptoms such as handwriting changes or difficulty with buttons/coins/keys. She reports difficult with balance.  She says these symptoms have worsened over the last 6 months. Denies perineal paresthesias, bowel/bladder dysfunction.    PAST MEDICAL/SURGICAL HISTORY:  Past Medical History:   Diagnosis Date    Abnormal Pap smear     Anemia     history    Anxiety     Cancer     uterine-cancer cells     Colon polyps, next C-scope 2019. 4/22/2014    Dry eyes     Dyspareunia, female 6/24/2020    Essential hypertension, started in her mid 30's 1/18/2017    Family history of malignant neoplasm of pancreas 6/1/2018    Gastric bypass status for obesity 8/21/2012    GERD (gastroesophageal reflux disease)     Helicobacter pylori gastritis, 2008. 8/20/2014    Hemangioma of liver, stable 2010, 2012, right lobe 8/20/2014    Hematuria     History of cosmetic surgeries, tumbhupinder bolesck 2011. 4/5/2013    History of frquent UTI 6/24/2020    HTN (hypertension) 8/21/2012    120s-130s/80s    Kidney stone     Right sided sciatica 6/1/2018    Sleep apnea     patient does not use the C-PAP mask-cannot tolerate    Ureteral  stone 4/8/2014    Urinary incontinence     Urinary retention     Varicose veins of lower extremity with inflammation 4/8/2015    Vertigo     mild    Vitamin D deficiency disease 6/1/2018     Past Surgical History:   Procedure Laterality Date    Abdominoplasty with Liposcuction      APPENDECTOMY      CERVICAL BIOPSY  W/ LOOP ELECTRODE EXCISION      Prior to hysterectomy    CHOLECYSTECTOMY      Laparoscopic    COLONOSCOPY N/A 2/13/2020    Procedure: COLONOSCOPY;  Surgeon: Pb Smith MD;  Location: [facility] ENDO (4TH FLR);  Service: Endoscopy;  Laterality: N/A;    ESOPHAGOGASTRODUODENOSCOPY N/A 11/2/2021    Procedure: EGD (ESOPHAGOGASTRODUODENOSCOPY);  Surgeon: Clayton Martínez MD;  Location: [facility] ENDO (4TH FLR);  Service: Endoscopy;  Laterality: N/A;  fully vac. 3/15/21 / instr portal -ml    GASTRIC BYPASS      HERNIA REPAIR      HYSTERECTOMY  1980     TVH  both ovaries remain       Medications:   Current Outpatient Medications on File Prior to Visit   Medication Sig Dispense Refill    acetaminophen (TYLENOL) 500 MG tablet Take 1-2 tablets (500-1,000 mg total) by mouth 3 (three) times daily as needed for Pain.  0    albuterol (PROVENTIL/VENTOLIN HFA) 90 mcg/actuation inhaler Inhale 1-2 puffs into the lungs every 4 (four) hours as needed for Shortness of Breath (coughing). Rescue 8.5 g 11    ALPRAZolam (XANAX) 0.5 MG tablet TAKE 1 TABLET(0.5 MG) BY MOUTH EVERY NIGHT AS NEEDED FOR INSOMNIA OR ANXIETY 30 tablet 5    amLODIPine (NORVASC) 5 MG tablet Take 1 tablet (5 mg total) by mouth once daily. 30 tablet 11    baclofen (LIORESAL) 10 MG tablet Take 1 tablet (10 mg total) by mouth 3 (three) times daily as needed (muscle spasm). 90 tablet 11    buPROPion (WELLBUTRIN SR) 100 MG TBSR 12 hr tablet Take 1 tablet (100 mg total) by mouth 2 (two) times daily. 180 tablet 3    cholecalciferol, vitamin D3, 5,000 unit Tab Take by mouth. 1 Tablet Oral Every day      cranberry extract (ELLURA) 200 mg Cap Take 1 tablet by mouth  "once daily. 90 capsule 3    cyanocobalamin 1,000 mcg/mL injection INJECT 1 ML (CC) INTRAMUSCULARLY EVERY TWO WEEKS 2 mL 11    cyclobenzaprine (FLEXERIL) 10 MG tablet Take 1 tablet (10 mg total) by mouth every evening. 30 tablet 6    cycloSPORINE (RESTASIS) 0.05 % ophthalmic emulsion Instill 1 drop into both eyes twice daily 180 each 3    diclofenac sodium (VOLTAREN) 1 % Gel Apply 2 g topically 4 (four) times daily as needed (pain). 400 g 3    hydroCHLOROthiazide (HYDRODIURIL) 12.5 MG Tab Take 1 tablet (12.5 mg total) by mouth once daily. 30 tablet 11    losartan (COZAAR) 100 MG tablet Take 1 tablet (100 mg total) by mouth every morning. 90 tablet 3    metoprolol succinate (TOPROL-XL) 25 MG 24 hr tablet Take 1 tablet (25 mg total) by mouth once daily. 90 tablet 3    olopatadine (PATANOL) 0.1 % ophthalmic solution Place 1 drop into both eyes 2 (two) times daily. 5 mL 6    pantoprazole (PROTONIX) 40 MG tablet Take 1 tablet (40 mg total) by mouth 2 (two) times daily. 180 tablet 3    promethazine (PHENERGAN) 25 MG tablet Take 1 tablet (25 mg total) by mouth every 6 (six) hours as needed for Nausea. 15 tablet 0    syringe with needle (BD LUER-CLARIBEL SYRINGE) 3 mL 25 x 1 1/2 " Syrg Use as directed with Cyanocobalamin 10 Syringe 3    tirzepatide 10 mg/0.5 mL PnIj Inject 10 mg into the skin every 7 days. 12 pen 1    tretinoin (RETIN-A) 0.1 % cream Apply to affected area Topical Every day 45 g 11    [DISCONTINUED] gabapentin (NEURONTIN) 100 MG capsule Take 2 capsules (200 mg total) by mouth 3 (three) times daily. 540 capsule 3     No current facility-administered medications on file prior to visit.       Social History:   Social History     Socioeconomic History    Marital status:    Occupational History    Occupation:    Tobacco Use    Smoking status: Never    Smokeless tobacco: Never    Tobacco comments:     .  .   Occup:  .     Substance and Sexual Activity    Alcohol use: No    " Drug use: No    Sexual activity: Yes     Partners: Male     Birth control/protection: Surgical   Social History Narrative    Patient is     She and her  own business together.    [place]    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         Feels safe in her home and with spouse.      Social Determinants of Health     Financial Resource Strain: Patient Declined (12/18/2023)    Overall Financial Resource Strain (CARDIA)     Difficulty of Paying Living Expenses: Patient declined   Food Insecurity: Patient Declined (12/18/2023)    Hunger Vital Sign     Worried About Running Out of Food in the Last Year: Patient declined     Ran Out of Food in the Last Year: Patient declined   Transportation Needs: Patient Declined (12/18/2023)    PRAPARE - Transportation     Lack of Transportation (Medical): Patient declined     Lack of Transportation (Non-Medical): Patient declined   Physical Activity: Sufficiently Active (12/18/2023)    Exercise Vital Sign     Days of Exercise per Week: 5 days     Minutes of Exercise per Session: 40 min   Stress: Stress Concern Present (12/18/2023)    [employer] [place] [employer] - Occupational Stress Questionnaire     Feeling of Stress : Very much   Social Connections: Unknown (12/18/2023)    Social Connection and Isolation Panel [NHANES]     Frequency of Communication with Friends and Family: Three times a week     Frequency of Social Gatherings with Friends and Family: Patient declined     Active Member of Clubs or Organizations: Patient declined     Attends Club or Organization Meetings: Patient declined     Marital Status:    Housing Stability: Patient Declined (12/18/2023)    Housing Stability Vital Sign     Unable to Pay for Housing in the Last Year: Patient declined     Number of Places Lived in the Last Year: 1     Unstable Housing in the Last Year: Patient declined       REVIEW OF  "SYSTEMS:  Constitution: Negative. Negative for chills, fever and night sweats.   Cardiovascular: Negative for chest pain and syncope.   Respiratory: Negative for cough and shortness of breath.   Gastrointestinal: See HPI. Negative for nausea/vomiting. Negative for abdominal pain.  Genitourinary: See HPI. Negative for discoloration or dysuria.  Skin: Negative for dry skin, itching and rash.   Hematologic/Lymphatic: Negative for bleeding problem. Does not bruise/bleed easily.   Musculoskeletal: Negative for falls and muscle weakness.   Neurological: See HPI. No seizures.   Endocrine: Negative for polydipsia, polyphagia and polyuria.   Allergic/Immunologic: Negative for hives and persistent infections.     EXAM:  Ht 5' 3" (1.6 m)   Wt 79.5 kg (175 lb 6 oz)   BMI 31.07 kg/m²     General: The patient is a very pleasant 63 y.o. female in no apparent distress, the patient is oriented to person, place and time.  Psych: Normal mood and affect  HEENT: Vision grossly intact, hearing intact to the spoken word.  Lungs: Respirations unlabored.  Gait: Normal station and gait, no difficulty with toe or heel walk.   Skin: Dorsal lumbar skin negative for rashes, lesions, hairy patches and surgical scars. There is mild lumbar tenderness to palpation.  Range of motion: Lumbar range of motion is acceptable.  Spinal Balance: Global saggital and coronal spinal balance acceptable, not significant for scoliosis and kyphosis.  Musculoskeletal: No pain with the range of motion of the bilateral hips. No trochanteric tenderness to palpation.  Vascular: Bilateral lower extremities warm and well perfused, dorsalis pedis pulses 2+ bilaterally.  Neurological: Normal strength and tone in all major motor groups in the bilateral lower extremities. Normal sensation to light touch in the L2-S1 dermatomes bilaterally.  Deep tendon reflexes symmetric 2++ in the bilateral upper and lower extremities.  Negative Babinski bilaterally. Straight leg raise " positive bilaterally.    IMAGING:      Today I personally reviewed AP, Lat and Flex/Ex  upright L-spine films that demonstrate grade I anterolisthesis at L4/5 and L5/S1.        Body mass index is 31.07 kg/m².    Hemoglobin A1C   Date Value Ref Range Status   04/13/2023 5.7 (H) 4.0 - 5.6 % Final     Comment:     ADA Screening Guidelines:  5.7-6.4%  Consistent with prediabetes  >or=6.5%  Consistent with diabetes    High levels of fetal hemoglobin interfere with the HbA1C  assay. Heterozygous hemoglobin variants (HbS, HgC, etc)do  not significantly interfere with this assay.   However, presence of multiple variants may affect accuracy.     07/06/2022 5.7 (H) 4.0 - 5.6 % Final     Comment:     ADA Screening Guidelines:  5.7-6.4%  Consistent with prediabetes  >or=6.5%  Consistent with diabetes    High levels of fetal hemoglobin interfere with the HbA1C  assay. Heterozygous hemoglobin variants (HbS, HgC, etc)do  not significantly interfere with this assay.   However, presence of multiple variants may affect accuracy.     01/25/2022 5.7 (H) 4.0 - 5.6 % Final     Comment:     ADA Screening Guidelines:  5.7-6.4%  Consistent with prediabetes  >or=6.5%  Consistent with diabetes    High levels of fetal hemoglobin interfere with the HbA1C  assay. Heterozygous hemoglobin variants (HbS, HgC, etc)do  not significantly interfere with this assay.   However, presence of multiple variants may affect accuracy.             ASSESSMENT/PLAN:    Diagnoses and all orders for this visit:    Cervical myelopathy  -     X-Ray Cervical Spine AP Lat with Flex Ex; Future  -     MRI Cervical Spine Without Contrast; Future    Lumbar back pain with radiculopathy affecting left lower extremity  -     [facility]ulatory referral[facility]    Lumbar radiculopathy, chronic  -     MRI Lumbar Spine Without Contrast; Future    Other orders  -     gabapentin (NEURONTIN) 300 MG capsule; Take 1 capsule (300 mg total) by mouth 3 (three) times  daily.        Today we discussed at length all of the different treatment options including anti-inflammatories, acetaminophen, rest, ice, heat, physical therapy including strengthening and stretching exercises, home exercises, ROM, aerobic conditioning, aqua therapy, other modalities including ultrasound, massage, and dry needling, epidural steroid injections and finally surgical intervention.      The patient is having signs and symptoms concerning for cervical myelopathy. I would like to get an MRI cervical and lumbar spine. I will also get cervical films. I will call with results.

## 2024-02-14 NOTE — PROGRESS NOTES
DATE: 2/14/2024  PATIENT: Cherie Richards    Supervising Physician: Jimbo Montiel M.D.    CHIEF COMPLAINT: back and bilateral leg pain    HISTORY:  Cherie Richards is a 63 y.o. female here for initial evaluation of low back and L>R posterior leg pain (Back - 6, Leg - 6).  The pain in the left leg is what bothers her most.  The pain has been present for 3 years but has progressively worsened over the last 3 months. The patient describes the pain as aching and sharp.  The pain is worse with sitting, standing and walking and improved by taking tylenol. There is associated numbness and tingling. There is subjective weakness. Prior treatments have included gabapentin, tylenol, physical therapy years ago, 2 ESIs 10 years ago complicated by a CSF leak that required a blood patch, but no surgery.    The patient reports myelopathic symptoms such as handwriting changes or difficulty with buttons/coins/keys. She reports difficult with balance.  She says these symptoms have worsened over the last 6 months. Denies perineal paresthesias, bowel/bladder dysfunction.    PAST MEDICAL/SURGICAL HISTORY:  Past Medical History:   Diagnosis Date    Abnormal Pap smear     Anemia     history    Anxiety     Cancer     uterine-cancer cells     Colon polyps, next C-scope 2019. 4/22/2014    Dry eyes     Dyspareunia, female 6/24/2020    Essential hypertension, started in her mid 30's 1/18/2017    Family history of malignant neoplasm of pancreas 6/1/2018    Gastric bypass status for obesity 8/21/2012    GERD (gastroesophageal reflux disease)     Helicobacter pylori gastritis, 2008. 8/20/2014    Hemangioma of liver, stable 2010, 2012, right lobe 8/20/2014    Hematuria     History of cosmetic surgeries, tumbhupinder bolesck 2011. 4/5/2013    History of frquent UTI 6/24/2020    HTN (hypertension) 8/21/2012    120s-130s/80s    Kidney stone     Right sided sciatica 6/1/2018    Sleep apnea     patient does not use the C-PAP mask-cannot tolerate    Ureteral  stone 4/8/2014    Urinary incontinence     Urinary retention     Varicose veins of lower extremity with inflammation 4/8/2015    Vertigo     mild    Vitamin D deficiency disease 6/1/2018     Past Surgical History:   Procedure Laterality Date    Abdominoplasty with Liposcuction      APPENDECTOMY      CERVICAL BIOPSY  W/ LOOP ELECTRODE EXCISION      Prior to hysterectomy    CHOLECYSTECTOMY      Laparoscopic    COLONOSCOPY N/A 2/13/2020    Procedure: COLONOSCOPY;  Surgeon: Pb Smith MD;  Location: [facility] ENDO (4TH FLR);  Service: Endoscopy;  Laterality: N/A;    ESOPHAGOGASTRODUODENOSCOPY N/A 11/2/2021    Procedure: EGD (ESOPHAGOGASTRODUODENOSCOPY);  Surgeon: Clayton Martínez MD;  Location: [facility] ENDO (4TH FLR);  Service: Endoscopy;  Laterality: N/A;  fully vac. 3/15/21 / instr portal -ml    GASTRIC BYPASS      HERNIA REPAIR      HYSTERECTOMY  1980     TVH  both ovaries remain       Medications:   Current Outpatient Medications on File Prior to Visit   Medication Sig Dispense Refill    acetaminophen (TYLENOL) 500 MG tablet Take 1-2 tablets (500-1,000 mg total) by mouth 3 (three) times daily as needed for Pain.  0    albuterol (PROVENTIL/VENTOLIN HFA) 90 mcg/actuation inhaler Inhale 1-2 puffs into the lungs every 4 (four) hours as needed for Shortness of Breath (coughing). Rescue 8.5 g 11    ALPRAZolam (XANAX) 0.5 MG tablet TAKE 1 TABLET(0.5 MG) BY MOUTH EVERY NIGHT AS NEEDED FOR INSOMNIA OR ANXIETY 30 tablet 5    amLODIPine (NORVASC) 5 MG tablet Take 1 tablet (5 mg total) by mouth once daily. 30 tablet 11    baclofen (LIORESAL) 10 MG tablet Take 1 tablet (10 mg total) by mouth 3 (three) times daily as needed (muscle spasm). 90 tablet 11    buPROPion (WELLBUTRIN SR) 100 MG TBSR 12 hr tablet Take 1 tablet (100 mg total) by mouth 2 (two) times daily. 180 tablet 3    cholecalciferol, vitamin D3, 5,000 unit Tab Take by mouth. 1 Tablet Oral Every day      cranberry extract (ELLURA) 200 mg Cap Take 1 tablet by mouth  "once daily. 90 capsule 3    cyanocobalamin 1,000 mcg/mL injection INJECT 1 ML (CC) INTRAMUSCULARLY EVERY TWO WEEKS 2 mL 11    cyclobenzaprine (FLEXERIL) 10 MG tablet Take 1 tablet (10 mg total) by mouth every evening. 30 tablet 6    cycloSPORINE (RESTASIS) 0.05 % ophthalmic emulsion Instill 1 drop into both eyes twice daily 180 each 3    diclofenac sodium (VOLTAREN) 1 % Gel Apply 2 g topically 4 (four) times daily as needed (pain). 400 g 3    hydroCHLOROthiazide (HYDRODIURIL) 12.5 MG Tab Take 1 tablet (12.5 mg total) by mouth once daily. 30 tablet 11    losartan (COZAAR) 100 MG tablet Take 1 tablet (100 mg total) by mouth every morning. 90 tablet 3    metoprolol succinate (TOPROL-XL) 25 MG 24 hr tablet Take 1 tablet (25 mg total) by mouth once daily. 90 tablet 3    olopatadine (PATANOL) 0.1 % ophthalmic solution Place 1 drop into both eyes 2 (two) times daily. 5 mL 6    pantoprazole (PROTONIX) 40 MG tablet Take 1 tablet (40 mg total) by mouth 2 (two) times daily. 180 tablet 3    promethazine (PHENERGAN) 25 MG tablet Take 1 tablet (25 mg total) by mouth every 6 (six) hours as needed for Nausea. 15 tablet 0    syringe with needle (BD LUER-CLARIBEL SYRINGE) 3 mL 25 x 1 1/2 " Syrg Use as directed with Cyanocobalamin 10 Syringe 3    tirzepatide 10 mg/0.5 mL PnIj Inject 10 mg into the skin every 7 days. 12 pen 1    tretinoin (RETIN-A) 0.1 % cream Apply to affected area Topical Every day 45 g 11    [DISCONTINUED] gabapentin (NEURONTIN) 100 MG capsule Take 2 capsules (200 mg total) by mouth 3 (three) times daily. 540 capsule 3     No current facility-administered medications on file prior to visit.       Social History:   Social History     Socioeconomic History    Marital status:    Occupational History    Occupation:    Tobacco Use    Smoking status: Never    Smokeless tobacco: Never    Tobacco comments:     .  .   Occup:  .     Substance and Sexual Activity    Alcohol use: No    " Drug use: No    Sexual activity: Yes     Partners: Male     Birth control/protection: Surgical   Social History Narrative    Patient is     She and her  own business together.    [place]    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         Feels safe in her home and with spouse.      Social Determinants of Health     Financial Resource Strain: Patient Declined (12/18/2023)    Overall Financial Resource Strain (CARDIA)     Difficulty of Paying Living Expenses: Patient declined   Food Insecurity: Patient Declined (12/18/2023)    Hunger Vital Sign     Worried About Running Out of Food in the Last Year: Patient declined     Ran Out of Food in the Last Year: Patient declined   Transportation Needs: Patient Declined (12/18/2023)    PRAPARE - Transportation     Lack of Transportation (Medical): Patient declined     Lack of Transportation (Non-Medical): Patient declined   Physical Activity: Sufficiently Active (12/18/2023)    Exercise Vital Sign     Days of Exercise per Week: 5 days     Minutes of Exercise per Session: 40 min   Stress: Stress Concern Present (12/18/2023)    [employer] - Occupational Stress Questionnaire     Feeling of Stress : Very much   Social Connections: Unknown (12/18/2023)    Social Connection and Isolation Panel [NHANES]     Frequency of Communication with Friends and Family: Three times a week     Frequency of Social Gatherings with Friends and Family: Patient declined     Active Member of Clubs or Organizations: Patient declined     Attends Club or Organization Meetings: Patient declined     Marital Status:    Housing Stability: Patient Declined (12/18/2023)    Housing Stability Vital Sign     Unable to Pay for Housing in the Last Year: Patient declined     Number of Places Lived in the Last Year: 1     Unstable Housing in the Last Year: Patient declined       REVIEW OF  "SYSTEMS:  Constitution: Negative. Negative for chills, fever and night sweats.   Cardiovascular: Negative for chest pain and syncope.   Respiratory: Negative for cough and shortness of breath.   Gastrointestinal: See HPI. Negative for nausea/vomiting. Negative for abdominal pain.  Genitourinary: See HPI. Negative for discoloration or dysuria.  Skin: Negative for dry skin, itching and rash.   Hematologic/Lymphatic: Negative for bleeding problem. Does not bruise/bleed easily.   Musculoskeletal: Negative for falls and muscle weakness.   Neurological: See HPI. No seizures.   Endocrine: Negative for polydipsia, polyphagia and polyuria.   Allergic/Immunologic: Negative for hives and persistent infections.     EXAM:  Ht 5' 3" (1.6 m)   Wt 79.5 kg (175 lb 6 oz)   BMI 31.07 kg/m²     General: The patient is a very pleasant 63 y.o. female in no apparent distress, the patient is oriented to person, place and time.  Psych: Normal mood and affect  HEENT: Vision grossly intact, hearing intact to the spoken word.  Lungs: Respirations unlabored.  Gait: Normal station and gait, no difficulty with toe or heel walk.   Skin: Dorsal lumbar skin negative for rashes, lesions, hairy patches and surgical scars. There is mild lumbar tenderness to palpation.  Range of motion: Lumbar range of motion is acceptable.  Spinal Balance: Global saggital and coronal spinal balance acceptable, not significant for scoliosis and kyphosis.  Musculoskeletal: No pain with the range of motion of the bilateral hips. No trochanteric tenderness to palpation.  Vascular: Bilateral lower extremities warm and well perfused, dorsalis pedis pulses 2+ bilaterally.  Neurological: Normal strength and tone in all major motor groups in the bilateral lower extremities. Normal sensation to light touch in the L2-S1 dermatomes bilaterally.  Deep tendon reflexes symmetric 2++ in the bilateral upper and lower extremities.  Negative Babinski bilaterally. Straight leg raise " positive bilaterally.    IMAGING:      Today I personally reviewed AP, Lat and Flex/Ex  upright L-spine films that demonstrate grade I anterolisthesis at L4/5 and L5/S1.        Body mass index is 31.07 kg/m².    Hemoglobin A1C   Date Value Ref Range Status   04/13/2023 5.7 (H) 4.0 - 5.6 % Final     Comment:     ADA Screening Guidelines:  5.7-6.4%  Consistent with prediabetes  >or=6.5%  Consistent with diabetes    High levels of fetal hemoglobin interfere with the HbA1C  assay. Heterozygous hemoglobin variants (HbS, HgC, etc)do  not significantly interfere with this assay.   However, presence of multiple variants may affect accuracy.     07/06/2022 5.7 (H) 4.0 - 5.6 % Final     Comment:     ADA Screening Guidelines:  5.7-6.4%  Consistent with prediabetes  >or=6.5%  Consistent with diabetes    High levels of fetal hemoglobin interfere with the HbA1C  assay. Heterozygous hemoglobin variants (HbS, HgC, etc)do  not significantly interfere with this assay.   However, presence of multiple variants may affect accuracy.     01/25/2022 5.7 (H) 4.0 - 5.6 % Final     Comment:     ADA Screening Guidelines:  5.7-6.4%  Consistent with prediabetes  >or=6.5%  Consistent with diabetes    High levels of fetal hemoglobin interfere with the HbA1C  assay. Heterozygous hemoglobin variants (HbS, HgC, etc)do  not significantly interfere with this assay.   However, presence of multiple variants may affect accuracy.             ASSESSMENT/PLAN:    Diagnoses and all orders for this visit:    Cervical myelopathy  -     X-Ray Cervical Spine AP Lat with Flex Ex; Future  -     MRI Cervical Spine Without Contrast; Future    Lumbar back pain with radiculopathy affecting left lower extremity  -     Ambulatory referral[facility]consult to [facility]    Lumbar radiculopathy, chronic  -     MRI Lumbar Spine Without Contrast; Future    Other orders  -     gabapentin (NEURONTIN) 300 MG capsule; Take 1 capsule (300 mg total) by mouth 3 (three) times  daily.        Today we discussed at length all of the different treatment options including anti-inflammatories, acetaminophen, rest, ice, heat, physical therapy including strengthening and stretching exercises, home exercises, ROM, aerobic conditioning, aqua therapy, other modalities including ultrasound, massage, and dry needling, epidural steroid injections and finally surgical intervention.      The patient is having signs and symptoms concerning for cervical myelopathy. I would like to get an MRI cervical and lumbar spine. I will also get cervical films. I will call with results.

## 2024-02-26 ENCOUNTER — CLINICAL SUPPORT (OUTPATIENT)
Dept: REHABILITATION | Facility: HOSPITAL | Age: 64
End: 2024-02-26
Payer: MEDICARE

## 2024-02-26 ENCOUNTER — PATIENT MESSAGE (OUTPATIENT)
Dept: INTERNAL MEDICINE | Facility: CLINIC | Age: 64
End: 2024-02-26
Payer: MEDICARE

## 2024-02-26 ENCOUNTER — HOSPITAL ENCOUNTER (OUTPATIENT)
Dept: RADIOLOGY | Facility: HOSPITAL | Age: 64
Discharge: HOME | End: 2024-02-26
Attending: PHYSICIAN ASSISTANT
Payer: MEDICARE

## 2024-02-26 ENCOUNTER — TELEPHONE (OUTPATIENT)
Dept: INTERNAL MEDICINE | Facility: CLINIC | Age: 64
End: 2024-02-26
Payer: MEDICARE

## 2024-02-26 DIAGNOSIS — G95.9 CERVICAL MYELOPATHY: ICD-10-CM

## 2024-02-26 DIAGNOSIS — M53.86 DECREASED RANGE OF MOTION OF LUMBAR SPINE: ICD-10-CM

## 2024-02-26 DIAGNOSIS — M54.16 LUMBAR BACK PAIN WITH RADICULOPATHY AFFECTING LEFT LOWER EXTREMITY: ICD-10-CM

## 2024-02-26 DIAGNOSIS — M62.81 WEAKNESS OF TRUNK MUSCULATURE: Primary | ICD-10-CM

## 2024-02-26 PROCEDURE — 72050 X-RAY EXAM NECK SPINE 4/5VWS: CPT | Mod: 26,,, | Performed by: RADIOLOGY

## 2024-02-26 PROCEDURE — 97112 NEUROMUSCULAR REEDUCATION: CPT | Mod: PN

## 2024-02-26 PROCEDURE — 72050 X-RAY EXAM NECK SPINE 4/5VWS: CPT | Mod: TC,FY,PO

## 2024-02-26 PROCEDURE — 97161 PT EVAL LOW COMPLEX 20 MIN: CPT | Mod: PN

## 2024-02-26 PROCEDURE — 97140 MANUAL THERAPY 1/> REGIONS: CPT | Mod: PN

## 2024-02-26 NOTE — PLAN OF CARE
[facility]  Physical Therapy Initial Evaluation    Name: Cherie Richards  Clinic Number: [id number]    Therapy Diagnosis:   Encounter Diagnoses   Name Primary?    Lumbar back pain with radiculopathy affecting left lower extremity     Weakness of trunk musculature Yes    Decreased range of motion of lumbar spine      Physician: Tracy Chand MD    Physician Orders: PT Eval and Treat   Medical Diagnosis from Referral: M54.16 (ICD-10-CM) - Lumbar back pain with radiculopathy affecting left lower extremity     Evaluation Date: 2/26/2024  Plan of Care Expiration: 5/25/24    Authorization Period Expiration: 12/31/24  Visit # / Visits authorized: 1/ 1      Time In: 0930  Time Out: 1030    Total Billable Time: 60 minutes    Precautions: Standard    Subjective   Date of onset: 6 months (sub-acute on chronic)    History of current condition:     Cherie  is a 63 year old female presenting with c/o low back pain with left lower extremity radiculopathy. She reports hip arthritis.  She reports an insidious onset 6 months ago that is worsening. She states she has back problems for a long, long time.  She states by the afternoon she can't move at all. She states she can't stand, lie or sit. She does reports injections years ago resulting in meningitis. The patient denies a history of falls or use of an assistive device.  She states she also cares for her mother. She states she has fallen due to clumsiness. Her goal is for the back to quit hurting and be able to play with grandkids and do all activities.      Medical History:   Past Medical History:   Diagnosis Date    Abnormal Pap smear     Anemia     history    Anxiety     Cancer     uterine-cancer cells     Colon polyps, next C-scope 2019. 4/22/2014    Dry eyes     Dyspareunia, female 6/24/2020    Essential hypertension, started in her mid 30's 1/18/2017    Family history of malignant neoplasm of pancreas 6/1/2018    Gastric bypass status for  obesity 8/21/2012    GERD (gastroesophageal reflux disease)     Helicobacter pylori gastritis, 2008. 8/20/2014    Hemangioma of liver, stable 2010, 2012, right lobe 8/20/2014    Hematuria     History of cosmetic surgeries, tummy tuck 2011. 4/5/2013    History of frquent UTI 6/24/2020    HTN (hypertension) 8/21/2012    120s-130s/80s    Kidney stone     Right sided sciatica 6/1/2018    Sleep apnea     patient does not use the C-PAP mask-cannot tolerate    Ureteral stone 4/8/2014    Urinary incontinence     Urinary retention     Varicose veins of lower extremity with inflammation 4/8/2015    Vertigo     mild    Vitamin D deficiency disease 6/1/2018       Surgical History:   Cherie Richards  has a past surgical history that includes Appendectomy; Abdominoplasty with Liposcuction; Hysterectomy (1980); Cervical biopsy w/ loop electrode excision; Gastric bypass; Hernia repair; Colonoscopy (N/A, 2/13/2020); Esophagogastroduodenoscopy (N/A, 11/2/2021); and Cholecystectomy.    Medications:   Cherie has a current medication list which includes the following prescription(s): acetaminophen, albuterol, alprazolam, amlodipine, baclofen, bupropion, cholecalciferol (vitamin d3), cranberry extract, cyanocobalamin, cyclobenzaprine, cyclosporine, diclofenac sodium, gabapentin, hydrochlorothiazide, losartan, metoprolol succinate, olopatadine, pantoprazole, promethazine, syringe with needle, tirzepatide, and tretinoin.    Allergies:   Review of patient's allergies indicates:   Allergen Reactions    Adhesive tape-silicones Other (See Comments)     Other reaction(s): BLISTERS    Penicillins Hives and Itching    Sulfamethoxazole-trimethoprim Hives and Itching    Prosed ec [meth-hyos-atrp-m blue-ba-phsal] Hives, Itching and Swelling    Pyridium [phenazopyridine]     Cephalexin Nausea And Vomiting and Other (See Comments)     Dehydration        Imaging: recent x-rays reveal: Impression:     Degenerative change as above.     Please note  "there is a transition type lumbosacral segment.  No endplate ir further discussed above, differs from that of the prior report.  Correlation with imaging suggested prior to any intervention.       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       Objective     Observation: Pt presents ambulating with guarded gait, decreased lumbopelvic mobility.     Palpation: mild tenderness, left QL, lumbar paraspinals, piriformis      Range of Motion/Strength:      Lumbar AROM: Degrees   Flexion 30   Extension 0   Right side bending 5   Left side bending 5   Lumbar quadrant reveals: increase in discomfort in all planes    AROM: Bilateral LE: Grossly WFL  MMT:  Right LE: 4-  Left LE: 4-  Abdominal Strength: 3+    Mobility: L/S p/a grade 1+  Flexibility: hip flexor length: fair      Hamstring Length: fair    Bed Mobility:Independent  Transfers: Independent    Special Tests:   -LLD:negative  -Slump: + left  -SLR: + left  -Hip scour: negative  -Kristen's: Negative  -SIJ gapping and compression: Negative    FOTO: in media    TREATMENT     Treatment Time In: 0900  Treatment Time Out: 0930    Total Treatment time separate from Evaluation: 30 minutes      Cherie received neuromuscular re-education activities to improve: Coordination, Kinesthetic, Sense, Proprioception, and Posture for 15 minutes. The following activities were included:    LTR x 20  Piriformis stretch 20 sec x 4  PPT x 20  TrA recruitment 2 x 10  Sciatic nerve glide x 20      Cherie received " the following manual therapy techniques:  were applied to the: lumbar spine/ left hip for 10 minutes, including:  Hip/sacral distraction grad 2-3      Education provided:   - HEP compliance    Written Home Exercises Provided: yes.    Exercises were reviewed and Cherie was able to demonstrate them prior to the end of the session.  Cherie demonstrated good  understanding of the education provided.     See EMR under Patient Instructions for exercises provided 2/26/2024.    Assessment     Pt presents with signs and symptoms consistent with referring diagnosis. Evaluation has determined a decrease in functional status and subjective and objective deficits that can be addressed by physical therapy intervention. Pt demonstrates pain limiting functional activities. Decreased flexibility and strength limiting normal movement patterns. Decreased segmental motion. Decreased postural strength and awareness. Positive special testing. Decreased participation in functional and recreational activities. Subjective and objective measures are addressed by goals in the plan of care.  Patient/family are involved in the development of these goals. Patient/family are educated about current injury and treatment.       Plan of care was dicussed with patient. Pt will benefit from skilled outpatient Physical Therapy to address the deficits stated above and in the chart below, provide pt/family education, and to maximize pt's level of independence. Pt's spiritual, cultural and educational needs considered and patient is agreeable to the plan of care and goals as stated below:     Pt prognosis is Good.  Anticipated Barriers for therapy: none    Medical Necessity is demonstrated by the following  History  Co-morbidities and personal factors that may impact the plan of care Co-morbidities:   Abnormal Pap smear    Anemia    history   Anxiety    Cancer    uterine-cancer cells    Colon polyps, next C-scope 2019.    Dry eyes    Dyspareunia, female     Essential hypertension, started in her mid 30's    Family history of malignant neoplasm of pancreas    Gastric bypass status for obesity    GERD (gastroesophageal reflux disease)    Helicobacter pylori gastritis, 2008.    Hemangioma of liver, stable 2010, 2012, right lobe    Hematuria    History of cosmetic surgeries, tummy tuck 2011.    History of frquent UTI    HTN (hypertension)    120s-130s/80s   Kidney stone    Right sided sciatica    Sleep apnea    patient does not use the C-PAP mask-cannot tolerate   Ureteral stone    Urinary incontinence    Urinary retention    Varicose veins of lower extremity with inflammation    Vertigo    mild   Vitamin D deficiency disease        Personal Factors:   no deficits     low   Examination  Body Structures and Functions, activity limitations and participation restrictions that may impact the plan of care Body Regions:   back  lower extremities    Body Systems:    gross symmetry  ROM  strength  gait  transitions    Participation Restrictions:   Housework, work    Activity limitations:   Learning and applying knowledge  no deficits    General Tasks and Commands  no deficits    Communication  no deficits    Mobility  lifting and carrying objects  walking    Self care  no deficits    Domestic Life  shopping  cooking  doing house work (cleaning house, washing dishes, laundry)  assisting others    Interactions/Relationships  no deficits    Life Areas  no deficits    Community and Social Life  community life         low   Clinical Presentation stable and uncomplicated low   Decision Making/ Complexity Score: low     Goals:    Short Term Goals (4 Weeks):     1.Pt to increase strength by a 1/2 grade of muscles test to allow for improvement in functional activities such as performing chores.  2.Pt to improve range of motion by 25% to allow for improved functional mobility to allow for improvement in IADLs.   3.Pt to report compliance with HEP and demonstrate proper exercise technique  to PT to show competence with self management of condition.  4.Decrease pain by 25% during functional activities.    Long Term Goals (12 Weeks):     1. Increase ROM to allow improved joint biomechanics during functional activities.   2.Increase trunk and lower extremity strength to within normal limits during functional activities.   3. Independent with home exercise program.   4. Full return to functional activities with manageable complaints.  5. Patient to demonstrate improved posture and body mechanics.  6. Decrease pain by 75% during functional activities.    Plan     Plan of care Certification: 2/26/2024 to 5/26/24.    Recommended Treatment Plan: 2 times per week for 12 weeks with treatments to consist of:  Neuromuscular and postural re-education,  training, therapeutic exercise, therapeutic activities,balance training, gait training, manual therapy, soft tissue mobilization, ROM exercises, Cardiovascular,  Postural stabilization, manual traction, spinal mobilization, moist heat, cryotherapy, electrical stimulation, ultrasound, home exercise education and planning.    Momo Javed, PT

## 2024-02-26 NOTE — TELEPHONE ENCOUNTER
Spoke to pt who confirmed the need for a PA for this medication.  Spoke to pharmacy who stated Mounjaro is on back order.  Initiated PA- [id number] - Case ID #: [id number] via [facility].      Reviewed insurance info to get BIN and PCN info.    Keri mcguire/ [facility] called almost immediately to clarify the number of pens requested for the month which is 4 per month total of 12 for 90 day supply and she said the medication is approved until Dec 2024.    Awaiting fax confirmation.  Notified pt she should get a letter also.

## 2024-02-26 NOTE — PROGRESS NOTES
[facility]  Physical Therapy Initial Evaluation    Name: Cherie Richards  Clinic Number: [id number]    Therapy Diagnosis:   Encounter Diagnoses   Name Primary?    Lumbar back pain with radiculopathy affecting left lower extremity     Weakness of trunk musculature Yes    Decreased range of motion of lumbar spine      Physician: Tracy Chand MD    Physician Orders: PT Eval and Treat   Medical Diagnosis from Referral: M54.16 (ICD-10-CM) - Lumbar back pain with radiculopathy affecting left lower extremity     Evaluation Date: 2/26/2024  Plan of Care Expiration: 5/25/24    Authorization Period Expiration: 12/31/24  Visit # / Visits authorized: 1/ 1      Time In: 0930  Time Out: 1030    Total Billable Time: 60 minutes    Precautions: Standard    Subjective   Date of onset: 6 months (sub-acute on chronic)    History of current condition:     Cherie  is a 63 year old female presenting with c/o low back pain with left lower extremity radiculopathy. She reports hip arthritis.  She reports an insidious onset 6 months ago that is worsening. She states she has back problems for a long, long time.  She states by the afternoon she can't move at all. She states she can't stand, lie or sit. She does reports injections years ago resulting in meningitis. The patient denies a history of falls or use of an assistive device.  She states she also cares for her mother. She states she has fallen due to clumsiness. Her goal is for the back to quit hurting and be able to play with grandkids and do all activities.      Medical History:   Past Medical History:   Diagnosis Date    Abnormal Pap smear     Anemia     history    Anxiety     Cancer     uterine-cancer cells     Colon polyps, next C-scope 2019. 4/22/2014    Dry eyes     Dyspareunia, female 6/24/2020    Essential hypertension, started in her mid 30's 1/18/2017    Family history of malignant neoplasm of pancreas 6/1/2018    Gastric bypass status for  obesity 8/21/2012    GERD (gastroesophageal reflux disease)     Helicobacter pylori gastritis, 2008. 8/20/2014    Hemangioma of liver, stable 2010, 2012, right lobe 8/20/2014    Hematuria     History of cosmetic surgeries, tummy tuck 2011. 4/5/2013    History of frquent UTI 6/24/2020    HTN (hypertension) 8/21/2012    120s-130s/80s    Kidney stone     Right sided sciatica 6/1/2018    Sleep apnea     patient does not use the C-PAP mask-cannot tolerate    Ureteral stone 4/8/2014    Urinary incontinence     Urinary retention     Varicose veins of lower extremity with inflammation 4/8/2015    Vertigo     mild    Vitamin D deficiency disease 6/1/2018       Surgical History:   Cherie Richards  has a past surgical history that includes Appendectomy; Abdominoplasty with Liposcuction; Hysterectomy (1980); Cervical biopsy w/ loop electrode excision; Gastric bypass; Hernia repair; Colonoscopy (N/A, 2/13/2020); Esophagogastroduodenoscopy (N/A, 11/2/2021); and Cholecystectomy.    Medications:   Cherie has a current medication list which includes the following prescription(s): acetaminophen, albuterol, alprazolam, amlodipine, baclofen, bupropion, cholecalciferol (vitamin d3), cranberry extract, cyanocobalamin, cyclobenzaprine, cyclosporine, diclofenac sodium, gabapentin, hydrochlorothiazide, losartan, metoprolol succinate, olopatadine, pantoprazole, promethazine, syringe with needle, tirzepatide, and tretinoin.    Allergies:   Review of patient's allergies indicates:   Allergen Reactions    Adhesive tape-silicones Other (See Comments)     Other reaction(s): BLISTERS    Penicillins Hives and Itching    Sulfamethoxazole-trimethoprim Hives and Itching    Prosed ec [meth-hyos-atrp-m blue-ba-phsal] Hives, Itching and Swelling    Pyridium [phenazopyridine]     Cephalexin Nausea And Vomiting and Other (See Comments)     Dehydration        Imaging: recent x-rays reveal: Impression:     Degenerative change as above.     Please note  "there is a transition type lumbosacral segment.  No endplate ir further discussed above, differs from that of the prior report.  Correlation with imaging suggested prior to any intervention.       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       Objective     Observation: Pt presents ambulating with guarded gait, decreased lumbopelvic mobility.     Palpation: mild tenderness, left QL, lumbar paraspinals, piriformis      Range of Motion/Strength:      Lumbar AROM: Degrees   Flexion 30   Extension 0   Right side bending 5   Left side bending 5   Lumbar quadrant reveals: increase in discomfort in all planes    AROM: Bilateral LE: Grossly WFL  MMT:  Right LE: 4-  Left LE: 4-  Abdominal Strength: 3+    Mobility: L/S p/a grade 1+  Flexibility: hip flexor length: fair      Hamstring Length: fair    Bed Mobility:Independent  Transfers: Independent    Special Tests:   -LLD:negative  -Slump: + left  -SLR: + left  -Hip scour: negative  -Kristen's: Negative  -SIJ gapping and compression: Negative    FOTO: in media    TREATMENT     Treatment Time In: 0900  Treatment Time Out: 0930    Total Treatment time separate from Evaluation: 30 minutes      Cherie received neuromuscular re-education activities to improve: Coordination, Kinesthetic, Sense, Proprioception, and Posture for 15 minutes. The following activities were included:    LTR x 20  Piriformis stretch 20 sec x 4  PPT x 20  TrA recruitment 2 x 10  Sciatic nerve glide x 20      Cherie received " the following manual therapy techniques:  were applied to the: lumbar spine/ left hip for 10 minutes, including:  Hip/sacral distraction grad 2-3      Education provided:   - HEP compliance    Written Home Exercises Provided: yes.    Exercises were reviewed and Cherie was able to demonstrate them prior to the end of the session.  Cherie demonstrated good  understanding of the education provided.     See EMR under Patient Instructions for exercises provided 2/26/2024.    Assessment     Pt presents with signs and symptoms consistent with referring diagnosis. Evaluation has determined a decrease in functional status and subjective and objective deficits that can be addressed by physical therapy intervention. Pt demonstrates pain limiting functional activities. Decreased flexibility and strength limiting normal movement patterns. Decreased segmental motion. Decreased postural strength and awareness. Positive special testing. Decreased participation in functional and recreational activities. Subjective and objective measures are addressed by goals in the plan of care.  Patient/family are involved in the development of these goals. Patient/family are educated about current injury and treatment.       Plan of care was dicussed with patient. Pt will benefit from skilled outpatient Physical Therapy to address the deficits stated above and in the chart below, provide pt/family education, and to maximize pt's level of independence. Pt's spiritual, cultural and educational needs considered and patient is agreeable to the plan of care and goals as stated below:     Pt prognosis is Good.  Anticipated Barriers for therapy: none    Medical Necessity is demonstrated by the following  History  Co-morbidities and personal factors that may impact the plan of care Co-morbidities:   Abnormal Pap smear    Anemia    history   Anxiety    Cancer    uterine-cancer cells    Colon polyps, next C-scope 2019.    Dry eyes    Dyspareunia, female     Essential hypertension, started in her mid 30's    Family history of malignant neoplasm of pancreas    Gastric bypass status for obesity    GERD (gastroesophageal reflux disease)    Helicobacter pylori gastritis, 2008.    Hemangioma of liver, stable 2010, 2012, right lobe    Hematuria    History of cosmetic surgeries, tummy tuck 2011.    History of frquent UTI    HTN (hypertension)    120s-130s/80s   Kidney stone    Right sided sciatica    Sleep apnea    patient does not use the C-PAP mask-cannot tolerate   Ureteral stone    Urinary incontinence    Urinary retention    Varicose veins of lower extremity with inflammation    Vertigo    mild   Vitamin D deficiency disease        Personal Factors:   no deficits     low   Examination  Body Structures and Functions, activity limitations and participation restrictions that may impact the plan of care Body Regions:   back  lower extremities    Body Systems:    gross symmetry  ROM  strength  gait  transitions    Participation Restrictions:   Housework, work    Activity limitations:   Learning and applying knowledge  no deficits    General Tasks and Commands  no deficits    Communication  no deficits    Mobility  lifting and carrying objects  walking    Self care  no deficits    Domestic Life  shopping  cooking  doing house work (cleaning house, washing dishes, laundry)  assisting others    Interactions/Relationships  no deficits    Life Areas  no deficits    Community and Social Life  community life         low   Clinical Presentation stable and uncomplicated low   Decision Making/ Complexity Score: low     Goals:    Short Term Goals (4 Weeks):     1.Pt to increase strength by a 1/2 grade of muscles test to allow for improvement in functional activities such as performing chores.  2.Pt to improve range of motion by 25% to allow for improved functional mobility to allow for improvement in IADLs.   3.Pt to report compliance with HEP and demonstrate proper exercise technique  to PT to show competence with self management of condition.  4.Decrease pain by 25% during functional activities.    Long Term Goals (12 Weeks):     1. Increase ROM to allow improved joint biomechanics during functional activities.   2.Increase trunk and lower extremity strength to within normal limits during functional activities.   3. Independent with home exercise program.   4. Full return to functional activities with manageable complaints.  5. Patient to demonstrate improved posture and body mechanics.  6. Decrease pain by 75% during functional activities.    Plan     Plan of care Certification: 2/26/2024 to 5/26/24.    Recommended Treatment Plan: 2 times per week for 12 weeks with treatments to consist of:  Neuromuscular and postural re-education,  training, therapeutic exercise, therapeutic activities,balance training, gait training, manual therapy, soft tissue mobilization, ROM exercises, Cardiovascular,  Postural stabilization, manual traction, spinal mobilization, moist heat, cryotherapy, electrical stimulation, ultrasound, home exercise education and planning.    Momo Javed, PT

## 2024-03-02 ENCOUNTER — HOSPITAL ENCOUNTER (OUTPATIENT)
Dept: RADIOLOGY | Facility: HOSPITAL | Age: 64
Discharge: HOME | End: 2024-03-02
Attending: PHYSICIAN ASSISTANT
Payer: MEDICARE

## 2024-03-02 DIAGNOSIS — M54.16 LUMBAR RADICULOPATHY, CHRONIC: ICD-10-CM

## 2024-03-02 DIAGNOSIS — G95.9 CERVICAL MYELOPATHY: ICD-10-CM

## 2024-03-02 PROCEDURE — 72148 MRI LUMBAR SPINE W/O DYE: CPT | Mod: TC

## 2024-03-02 PROCEDURE — 72141 MRI NECK SPINE W/O DYE: CPT | Mod: TC

## 2024-03-02 PROCEDURE — 72141 MRI NECK SPINE W/O DYE: CPT | Mod: 26,,, | Performed by: RADIOLOGY

## 2024-03-02 PROCEDURE — 72148 MRI LUMBAR SPINE W/O DYE: CPT | Mod: 26,,, | Performed by: RADIOLOGY

## 2024-03-05 ENCOUNTER — PATIENT MESSAGE (OUTPATIENT)
Dept: ORTHOPEDICS | Facility: CLINIC | Age: 64
End: 2024-03-05

## 2024-03-05 ENCOUNTER — OFFICE VISIT (OUTPATIENT)
Dept: ORTHOPEDICS | Facility: CLINIC | Age: 64
End: 2024-03-05
Payer: MEDICARE

## 2024-03-05 ENCOUNTER — TELEPHONE (OUTPATIENT)
Dept: PAIN MEDICINE | Facility: CLINIC | Age: 64
End: 2024-03-05
Payer: MEDICARE

## 2024-03-05 DIAGNOSIS — G95.9 CERVICAL MYELOPATHY: Primary | ICD-10-CM

## 2024-03-05 DIAGNOSIS — M54.16 LUMBAR RADICULOPATHY: Primary | ICD-10-CM

## 2024-03-05 DIAGNOSIS — M54.16 LUMBAR RADICULOPATHY, CHRONIC: Primary | ICD-10-CM

## 2024-03-05 PROCEDURE — 99442 PR PHYSICIAN TELEPHONE EVALUATION 11-20 MIN: CPT | Mod: 95,,, | Performed by: PHYSICIAN ASSISTANT

## 2024-03-05 NOTE — TELEPHONE ENCOUNTER
----- Message from Demetria Torres PA-C sent at 3/5/2024  8:42 AM CST -----  Regarding: Order for EMMA FLORES    Patient Name: EMMA FLORES([id number])  Sex: Female  : 1960      PCP: EMEKA KABA    Center: [facility]     Types of orders made on 2024: Procedure Request    Order Date:3/5/2024  Ordering User:DEMETRIA TORRES [[id number]]  Encounter Provider:Demetria Torres PA-C [[id number]]  A  uthorizing Provider: Demetria Torres PA-C [[id number]]  Supervising Provider:FRIDA MELVIN [[id number]]  Type of Supervision:Supervision Required  Department:[facility][[id number]]    Common Order Information  Procedure -> Transforaminal Injection (Specify level and laterality) Cmt:             bilateral L5/S1    Order Specific Information  Order: Procedure Order to Pain Management [Custom: [id number]]  Order #:     [id number]       [id number]: 1 FUTURE    Priority: Routine  Class: Clinic Performed    Future Order Information      Expires on:2025            Expected by:2024                   Associated Diagnoses      M54.16 Lumbar radiculopathy, chronic      Facility Name: -> [facility]         Follow-up: -> 2 weeks           Priority: Routine  Class: Clinic Performed    Future Order Information        Expires on:2025            Expected by:2024                   Associated Diagnoses      M54.16 Lumbar radiculopathy, chronic      Procedure -> Transforaminal Injection (Specify level and laterality) Cmt:                 bilateral L5/S1        Facility Name: -> [facility]         Follow-up: -> 2 weeks

## 2024-03-05 NOTE — PROGRESS NOTES
Established Patient - Audio Only Telehealth Visit     The patient location is: [place]  The chief complaint leading to consultation is: MRI results  Visit type: Virtual visit with audio only (telephone)  Total time spent with patient: 20 minutes       The reason for the audio only service rather than synchronous audio and video virtual visit was related to technical difficulties or patient preference/necessity.     Each patient to whom I provide medical services by telemedicine is:  (1) informed of the relationship between the physician and patient and the respective role of any other health care provider with respect to management of the patient; and (2) notified that they may decline to receive medical services by telemedicine and may withdraw from such care at any time. Patient verbally consented to receive this service via voice-only telephone call.       HPI: the patient presents for MRI results.    MRI cervical and lumbar spine were reviewed today.  There is moderate stenosis at C5/6.  There are bilateral facet effusions at L5/S1.       Assessment and plan:      Today we discussed at length all of the different treatment options including anti-inflammatories, acetaminophen, rest, ice, heat, physical therapy including strengthening and stretching exercises, home exercises, ROM, aerobic conditioning, aqua therapy, other modalities including ultrasound, massage, and dry needling, epidural steroid injections and finally surgical intervention.      She is hesitant to do another injection given her history. I do think she is a candidate for acdf given her myelopathic symptoms.  She would like to schedule surgery in November. I will get her a date.                   This service was not originating from a related E/M service provided within the previous 7 days nor will  to an E/M service or procedure within the next 24 hours or my soonest available appointment.  Prevailing standard of care was able to be met in  this audio-only visit.

## 2024-03-07 ENCOUNTER — CLINICAL SUPPORT (OUTPATIENT)
Dept: REHABILITATION | Facility: HOSPITAL | Age: 64
End: 2024-03-07
Payer: MEDICARE

## 2024-03-07 DIAGNOSIS — M53.86 DECREASED RANGE OF MOTION OF LUMBAR SPINE: ICD-10-CM

## 2024-03-07 DIAGNOSIS — M62.81 WEAKNESS OF TRUNK MUSCULATURE: Primary | ICD-10-CM

## 2024-03-07 PROCEDURE — 97112 NEUROMUSCULAR REEDUCATION: CPT | Mod: PN,CQ

## 2024-03-07 PROCEDURE — 97530 THERAPEUTIC ACTIVITIES: CPT | Mod: PN,CQ

## 2024-03-07 NOTE — PROGRESS NOTES
"  Physical Therapy Daily Treatment Note     Name: Cherie Richards  Clinic Number: [id number]    Therapy Diagnosis:   Encounter Diagnoses   Name Primary?    Weakness of trunk musculature Yes    Decreased range of motion of lumbar spine      Physician: Tracy Chand MD    Visit Date: 3/7/2024    Physician Orders: PT Eval and Treat   Medical Diagnosis from Referral: M54.16 (ICD-10-CM) - Lumbar back pain with radiculopathy affecting left lower extremity      Evaluation Date: 2/26/2024  Plan of Care Expiration: 5/25/24     Authorization Period Expiration: 12/31/24  Visit # / Visits authorized: 1/ 1        Time In:  0900AM  Time Out: 1000AM     Total Billable Time: 30 minutes     Precautions: Standard    Subjective     Pt reports: her back is not good. It's more of the leg than the back. Reorts shooting pain down the leg all the way to the toes.    She was compliant with home exercise program.  Response to previous treatment: initial eval  Functional change: ongoing    Pain: 9/10  Location: left lumbar, left hip, left LE     Objective     Cherie received therapeutic exercises to develop strength for 10 minutes including:  HL Hip Add w/ ball 2x10, 3"  Supine Clamshells YTB 2x10    Cherie participated in neuromuscular re-education activities to improve: Muscles Re-Education, Coordination, Kinesthetic, and Proprioception for 30 minutes. The following activities were included:  LTR x 20  DKTC 2x10  Piriformis stretch 20 sec x 4  PPT x 20  TrA recruitment 2 x 10  Seated Sciatic nerve glide x 20    Cherie participated in dynamic functional therapeutic activities to improve functional performance for 10  minutes, including:  Patient Education  NuStep 8 minutes    Cherie received hot pack for 10 minutes to low back in supine.      Home Exercises Provided and Patient Education Provided     Education provided:   Cont to perform HEP as provided.     Written Home Exercises Provided: Patient instructed to cont prior " HEP.  Exercises were reviewed and Cherie was able to demonstrate them prior to the end of the session.  Cherie demonstrated good  understanding of the education provided.     See EMR under Patient Instructions for exercises provided prior visit.    Assessment     Pt tolerated the above tx session well without c/o pain. Pt reported her back is not good. It's more of the leg than the back. Reorts shooting pain down the leg all the way to the toes. 9/10 pain rating at beginning of session. TherEx presented an appropriate challenge with no adverse effects reported. Verbal and tactile cueing required throughout for proper form and technique. No numerical value given for pain rating at end of session. Encouraged pt to continue HEP between sessions for optimal therapeutic gains. Will continue working towards established PT goals in future sessions.    Cherie Is progressing well towards her goals.   Pt prognosis is Good.     Pt will continue to benefit from skilled outpatient physical therapy to address the deficits listed in the problem list box on initial evaluation, provide pt/family education and to maximize pt's level of independence in the home and community environment.     Pt's spiritual, cultural and educational needs considered and pt agreeable to plan of care and goals.    Anticipated barriers to physical therapy: none    Goals: Short Term Goals (4 Weeks):      1.Pt to increase strength by a 1/2 grade of muscles test to allow for improvement in functional activities such as performing chores.  2.Pt to improve range of motion by 25% to allow for improved functional mobility to allow for improvement in IADLs.   3.Pt to report compliance with HEP and demonstrate proper exercise technique to PT to show competence with self management of condition.  4.Decrease pain by 25% during functional activities.     Long Term Goals (12 Weeks):      1. Increase ROM to allow improved joint biomechanics during functional  activities.   2.Increase trunk and lower extremity strength to within normal limits during functional activities.   3. Independent with home exercise program.   4. Full return to functional activities with manageable complaints.  5. Patient to demonstrate improved posture and body mechanics.  6. Decrease pain by 75% during functional activities.    Plan     Certification date:     Cont skilled PT session towards PT and patient's goals.    Sulema Mendez, PTA   03/07/2024

## 2024-03-08 ENCOUNTER — TELEPHONE (OUTPATIENT)
Dept: PAIN MEDICINE | Facility: CLINIC | Age: 64
End: 2024-03-08
Payer: MEDICARE

## 2024-03-11 ENCOUNTER — PATIENT MESSAGE (OUTPATIENT)
Dept: ADMINISTRATIVE | Facility: OTHER | Age: 64
End: 2024-03-11
Payer: MEDICARE

## 2024-03-11 RX ORDER — CYCLOBENZAPRINE HCL 10 MG
10 TABLET ORAL NIGHTLY
Qty: 30 TABLET | Refills: 6 | Status: SHIPPED | OUTPATIENT
Start: 2024-03-11

## 2024-03-11 NOTE — TELEPHONE ENCOUNTER
Care Due:                  Date            Visit Type   Department     Provider  --------------------------------------------------------------------------------                                EP -                              PRIMARY      [facility] INTERNAL  Last Visit: 01-      CARE ([facility])   MEDICINE       Tracy Chand                              [place]                              ANNUAL                              CHECKUP/PHY  [facility] INTERNAL  Next Visit: 03-      [facility]       Tracy Chand                                                            Last  Test          Frequency    Reason                     Performed    Due Date  --------------------------------------------------------------------------------    HBA1C.......  6 months...  tirzepatide..............  04-   10-    [facility] Embedded Care Due Messages. Reference number: [id number].   3/11/2024 8:09:17 AM CDT

## 2024-03-14 ENCOUNTER — HOSPITAL ENCOUNTER (EMERGENCY)
Facility: HOSPITAL | Age: 64
Discharge: HOME | End: 2024-03-14
Attending: EMERGENCY MEDICINE
Payer: MEDICARE

## 2024-03-14 ENCOUNTER — PATIENT MESSAGE (OUTPATIENT)
Dept: REHABILITATION | Facility: HOSPITAL | Age: 64
End: 2024-03-14
Payer: MEDICARE

## 2024-03-14 VITALS
TEMPERATURE: 98 F | HEART RATE: 103 BPM | WEIGHT: 170 LBS | OXYGEN SATURATION: 98 % | HEIGHT: 63 IN | BODY MASS INDEX: 30.12 KG/M2 | DIASTOLIC BLOOD PRESSURE: 86 MMHG | SYSTOLIC BLOOD PRESSURE: 143 MMHG | RESPIRATION RATE: 16 BRPM

## 2024-03-14 DIAGNOSIS — M54.32 SCIATICA OF LEFT SIDE: Primary | ICD-10-CM

## 2024-03-14 PROCEDURE — 96372 THER/PROPH/DIAG INJ SC/IM: CPT | Performed by: EMERGENCY MEDICINE

## 2024-03-14 PROCEDURE — 99284 EMERGENCY DEPT VISIT MOD MDM: CPT | Mod: 25,ER

## 2024-03-14 PROCEDURE — 63600175 PHARM REV CODE 636 W HCPCS: Mod: ER | Performed by: EMERGENCY MEDICINE

## 2024-03-14 RX ORDER — TRAMADOL HYDROCHLORIDE 50 MG/1
50 TABLET ORAL EVERY 6 HOURS PRN
Qty: 12 TABLET | Refills: 0 | Status: SHIPPED | OUTPATIENT
Start: 2024-03-14 | End: 2024-03-17

## 2024-03-14 RX ORDER — DIAZEPAM 5 MG/1
5 TABLET ORAL
Status: DISCONTINUED | OUTPATIENT
Start: 2024-03-14 | End: 2024-03-14

## 2024-03-14 RX ORDER — KETOROLAC TROMETHAMINE 30 MG/ML
15 INJECTION, SOLUTION INTRAMUSCULAR; INTRAVENOUS
Status: COMPLETED | OUTPATIENT
Start: 2024-03-14 | End: 2024-03-14

## 2024-03-14 RX ORDER — ONDANSETRON 4 MG/1
4 TABLET, ORALLY DISINTEGRATING ORAL EVERY 8 HOURS PRN
Qty: 12 TABLET | Refills: 0 | Status: SHIPPED | OUTPATIENT
Start: 2024-03-14 | End: 2024-03-22 | Stop reason: SDUPTHER

## 2024-03-14 RX ORDER — ONDANSETRON HYDROCHLORIDE 2 MG/ML
4 INJECTION, SOLUTION INTRAVENOUS
Status: COMPLETED | OUTPATIENT
Start: 2024-03-14 | End: 2024-03-14

## 2024-03-14 RX ORDER — DIAZEPAM 10 MG/2ML
2.5 INJECTION INTRAMUSCULAR
Status: COMPLETED | OUTPATIENT
Start: 2024-03-14 | End: 2024-03-14

## 2024-03-14 RX ADMIN — KETOROLAC TROMETHAMINE 15 MG: 30 INJECTION, SOLUTION INTRAMUSCULAR at 08:03

## 2024-03-14 RX ADMIN — ONDANSETRON 4 MG: 2 INJECTION INTRAMUSCULAR; INTRAVENOUS at 08:03

## 2024-03-14 RX ADMIN — DIAZEPAM 2.5 MG: 10 INJECTION, SOLUTION INTRAMUSCULAR; INTRAVENOUS at 08:03

## 2024-03-14 NOTE — ED PROVIDER NOTES
Encounter Date: 3/14/2024    SCRIBE #1 NOTE: I, Maldonado Gonsales Do, am scribing for, and in the presence of,  Carissa Bazan MD. I have scribed the following portions of the note - Other sections scribed: HPI, ROS, PE.       History     Chief Complaint   Patient presents with    Hip Pain     Complains of chronic L hip pain. States she is scheduled for a pain injection tomorrow but needs some relief before then.      Cherie Richards is a 63 y.o. female, with a past medical history of HTN, HLD, sciatica, cervical myelopathy, lumbar rediculopathy, DDD, fibromyalgia, and osteoarthritis, who presents to the ED with worsening chronic left hip pain onset 1 week ago. Patient reports associated nausea secondary to the pain. She notes left hip pain radiates to her left buttocks, left ankle, lower back, and sometimes to her left groin when particularly strong (not currently present). Patient attempted treatment with flexeril and baclofen without relief. She also took 3 extra strength tylenol every 4 hours yesterday without relief. She has been taking Aleve. Patient reports symptoms are exacerbated with sitting, laying flat, and standing. No other exacerbating or alleviating factors. Patient denies abdominal pain, or other associated symptoms.     Per chart review 06/10  Scheduled DISCECTOMY, SPINE, CERVICAL, ANTERIOR APPROACH, WITH FUSION C5/6    03/15  Scheduled B/L L5-S1 TFESI        The history is provided by the patient. No  was used.     Review of patient's allergies indicates:   Allergen Reactions    Adhesive tape-silicones Other (See Comments)     Other reaction(s): BLISTERS    Penicillins Hives and Itching    Sulfamethoxazole-trimethoprim Hives and Itching    Prosed ec [meth-hyos-atrp-m blue-ba-phsal] Hives, Itching and Swelling    Pyridium [phenazopyridine]     Cephalexin Nausea And Vomiting and Other (See Comments)     Dehydration     Past Medical History:   Diagnosis Date    Abnormal Pap smear      Anemia     history    Anxiety     Cancer     uterine-cancer cells     Colon polyps, next C-scope 2019. 4/22/2014    Dry eyes     Dyspareunia, female 6/24/2020    Essential hypertension, started in her mid 30's 1/18/2017    Family history of malignant neoplasm of pancreas 6/1/2018    Gastric bypass status for obesity 8/21/2012    GERD (gastroesophageal reflux disease)     Helicobacter pylori gastritis, 2008. 8/20/2014    Hemangioma of liver, stable 2010, 2012, right lobe 8/20/2014    Hematuria     History of cosmetic surgeries, [place] 2011. 4/5/2013    History of frquent UTI 6/24/2020    HTN (hypertension) 8/21/2012    120s-130s/80s    Kidney stone     Right sided sciatica 6/1/2018    Sleep apnea     patient does not use the C-PAP mask-cannot tolerate    Ureteral stone 4/8/2014    Urinary incontinence     Urinary retention     Varicose veins of lower extremity with inflammation 4/8/2015    Vertigo     mild    Vitamin D deficiency disease 6/1/2018     Past Surgical History:   Procedure Laterality Date    Abdominoplasty with Liposcuction      APPENDECTOMY      CERVICAL BIOPSY  W/ LOOP ELECTRODE EXCISION      Prior to hysterectomy    CHOLECYSTECTOMY      Laparoscopic    COLONOSCOPY N/A 2/13/2020    Procedure: COLONOSCOPY;  Surgeon: Pb Smith MD;  [facility]  Service: Endoscopy;  Laterality: N/A;    ESOPHAGOGASTRODUODENOSCOPY N/A 11/2/2021    Procedure: EGD (ESOPHAGOGASTRODUODENOSCOPY);  Surgeon: Clayton Martínez MD;  [facility]  Service: Endoscopy;  Laterality: N/A;  fully vac. 3/15/21 / instr portal -ml    GASTRIC BYPASS      HERNIA REPAIR      HYSTERECTOMY  1980     TVH  both ovaries remain     Family History   Problem Relation Age of Onset    Heart disease Mother     Stroke Mother     Coronary artery disease Mother     Heart failure Mother     Obesity Mother     Cancer Father         pancreatic cancer    Diabetes Father     Obesity Father     No Known Problems  Sister     No Known Problems Brother     Hearing loss Maternal Grandmother     No Known Problems Maternal Grandfather     No Known Problems Paternal Grandmother     No Known Problems Paternal Grandfather     Breast cancer Maternal Aunt     Colon cancer Maternal Uncle     No Known Problems Paternal Aunt     No Known Problems Paternal Uncle     Ovarian cancer Neg Hx     Anesthesia problems Neg Hx     Amblyopia Neg Hx     Blindness Neg Hx     Cataracts Neg Hx     Glaucoma Neg Hx     Hypertension Neg Hx     Macular degeneration Neg Hx     Retinal detachment Neg Hx     Strabismus Neg Hx     Thyroid disease Neg Hx      Social History     Tobacco Use    Smoking status: Never    Smokeless tobacco: Never    Tobacco comments:     .  .   Occup:  .     Substance Use Topics    Alcohol use: No    Drug use: No     Review of Systems   Constitutional:  Negative for appetite change.   HENT:  Negative for voice change.    Respiratory:  Negative for shortness of breath.    Cardiovascular:  Negative for chest pain and leg swelling.   Gastrointestinal:  Positive for nausea. Negative for abdominal pain and vomiting.   Genitourinary:  Negative for dysuria, pelvic pain and urgency.   Musculoskeletal:  Positive for arthralgias and back pain.   Skin:  Negative for wound.   Neurological:  Negative for dizziness, weakness and numbness.   All other systems reviewed and are negative.      Physical Exam     Initial Vitals [24 0727]   BP Pulse Resp Temp SpO2   (!) 143/86 103 16 97.8 °F (36.6 °C) 98 %      MAP       --         Physical Exam    Nursing note and vitals reviewed.  Constitutional: She appears well-developed and well-nourished. No distress.   HENT:   Head: Normocephalic.   Mouth/Throat: Oropharynx is clear and moist.   Eyes: Conjunctivae and EOM are normal. Pupils are equal, round, and reactive to light. Right eye exhibits no discharge. Left eye exhibits no discharge. No scleral icterus.   Neck:   Normal  range of motion.  Cardiovascular:  Regular rhythm.     Exam reveals no decreased pulses.       No murmur heard.  Pulmonary/Chest: No respiratory distress. She has no wheezes. She has no rhonchi. She has no rales.   Abdominal: Abdomen is soft. She exhibits no distension. There is no abdominal tenderness. There is no rebound and no guarding.   Musculoskeletal:         General: No tenderness. Normal range of motion.      Cervical back: Normal range of motion. No tenderness or bony tenderness.      Thoracic back: No tenderness or bony tenderness.      Lumbar back: No tenderness or bony tenderness.      Comments: Positive straight leg test.     Neurological: She is alert.   Skin: Skin is warm and dry.   Psychiatric: She has a normal mood and affect.         ED Course   Procedures  Labs Reviewed - No data to display       Imaging Results    None          Medications   ketorolac injection 15 mg (15 mg Intramuscular Given 3/14/24 0811)   ondansetron injection 4 mg (4 mg Intramuscular Given 3/14/24 0811)   diazePAM injection 2.5 mg (2.5 mg Intramuscular Given 3/14/24 0818)     Medical Decision Making  This is an urgent evaluation of a 63-year-old woman presenting to the emergency department today secondary to left lower back pain extending into her left lower leg.  Differential diagnoses included sciatica, musculoskeletal strain, sprain, fracture, dislocation, amongst others.  On physical examination, patient was in no acute distress.  Her heart and lung sounds were within normal limits an abdomen was completely soft, nontender, nondistended.  She had no midline tenderness to palpation, step-offs, nor crepitus.  She had a positive straight raise test on the left.  Patient was treated symptomatically in the emergency department however I avoided steroids given patient stated she has an injection scheduled tomorrow with pain management.  I will discharge her to home with a very short course of tramadol as well as Zofran for  nausea.  Patient has been advised to additionally call and discuss her symptoms with her orthopedist as she stated that she has surgery scheduled with them in June.  She voiced understanding of this plan and was discharged to home in stable condition, ambulating from the emergency department unassisted.    Amount and/or Complexity of Data Reviewed  External Data Reviewed: notes.     Details: See HPI.    Risk  Prescription drug management.            Scribe Attestation:   Scribe #1: I performed the above scribed service and the documentation accurately describes the services I performed. I attest to the accuracy of the note.              I, Carissa Juares , personally performed the services described in this documentation.  All medical record entries made by the scribe were at my direction and in my presence.  I have reviewed the chart and agree that the record reflects my personal performance and is accurate and complete.                   Clinical Impression:  Final diagnoses:  [M54.32] Sciatica of left side (Primary)          ED Disposition Condition    Discharge Stable          ED Prescriptions       Medication Sig Dispense Start Date End Date Auth. Provider    ondansetron (ZOFRAN-ODT) 4 MG TbDL Dissolve 1 tablet (4 mg total) on the tongue every 8 (eight) hours as needed. 12 tablet 3/14/2024 -- Carissa Juares MD    traMADoL (ULTRAM) 50 mg tablet Take 1 tablet (50 mg total) by mouth every 6 (six) hours as needed for Pain. Do not drink alcohol or drive while taking this medication.  Do not take with any other sedating medications. 12 tablet 3/14/2024 3/17/2024 Carissa Juares MD          Follow-up Information       Follow up With Specialties Details Why Contact Info    Glendy Banks MD Pain Medicine In 1 day as scheduled [address]  [facility] [address]  [facility] [address]  [phone]               Carissa Juares MD  03/14/24 0911

## 2024-03-14 NOTE — PRE-PROCEDURE INSTRUCTIONS
Patient reviewed on 3/14/2024.  Okay to proceed at [facility]. The following pre-procedure instructions and arrival time have been reviewed with patient via phone and sent to patient portal for review.  Patient verbalized an understanding.  Pt to be accompanied by MarinaKyle day of procedure as responsible .    Dear Cherie ,     You are scheduled for a pain procedure on 3/15/24 with Dr. Banks, please report to [facility] [address] [facility].     Please park in the lot in front of the building and enter at the main entrance. Proceed to the second floor for registration.     Arrival time: 9:00 am     Anesthesia fasting instructions:   IV sedation. You should not eat for 8 hours and can only drink clear liquids (water or black coffee without cream/sugar) up until 2 hours before your scheduled time.  You CANNOT drive yourself and must have a .     *Refrain from drinking any alcohol  *Please insure that you have pre-planned your ride home IF YOU ARE to have sedation of any kind You CANNOT drive yourself home.    *You may not leave alone in an uber, taxi, etc. IF YOU HAVE received sedating medications by mouth or by vein  *You must be discharged to a responsible adult.   *Please remain under adult supervision for 24 hours if you had any form of sedation.   If possible, please have your visitor &/or ride home stay during your visit.   The surgeon should speak with your visitors after your procedure.  All visitors must be 18 years of age or older. Please limit your visitors to max of 2 people.        *You should take any medications that you routinely take for blood pressure, heart medications, thyroid, cholesterol, etc with small sips of water.   *If you are a diabetic, do not take your medication if you will be fasting, but bring it with you.   *Please plan on being here for roughly 2 hours.   *HOLD vitamins and supplements the morning of your procedure.  *HOLD any Anticoagulants (ex.  Aspirin, goody or BC powder, Coumadin, Eliquis, Heparin, Plavix, Lovenox, Xarelto, etc.) for a total of 5 days  *HOLD any non-insulin injections until after procedure (Ozempic, Mounjaro, Trulicity, Victoza, Byetta, Wegovy, Adlyxin, etc) (you should have been instructed to hold for a 7 day period)     Shower the night before and the morning of your procedure with antibacterial soap (ex. Dial)   Please do not use antibacterial soap to wash your face. Use your regular face soap (ex. Dial)  Do not apply any products to your skin nor your hair after you shower the morning of your procedure.         Products include: lotions, oils, ointments, creams, gels, powders, lotion, deodorant, make-up, perfume/cologne, after shave and sunscreen.     Wear loose, comfortable clothing.  No jewelry. No contacts. Bring glasses if necessary.  If you have dentures, please bring a case.  Please leave all jewelry and valuables at home.  Wear loose comfortable clothes (preferably an button up shirt), not all patients are placed in a gown      ---If you start to feel sick (fever, chills, coughing, sore throat, etc) or start on or have been taking any antibiotics, please contact your doctor's office at [phone] your procedure would have to be rescheduled.      Please reply to this message as receipt of delivery.     Thanks,  Catina, LPN [facility]  Pre-Admit Testing

## 2024-03-14 NOTE — DISCHARGE INSTRUCTIONS

## 2024-03-15 ENCOUNTER — HOSPITAL ENCOUNTER (OUTPATIENT)
Facility: HOSPITAL | Age: 64
Discharge: HOME | End: 2024-03-15
Attending: STUDENT IN AN ORGANIZED HEALTH CARE EDUCATION/TRAINING PROGRAM | Admitting: STUDENT IN AN ORGANIZED HEALTH CARE EDUCATION/TRAINING PROGRAM
Payer: MEDICARE

## 2024-03-15 VITALS
HEIGHT: 63 IN | OXYGEN SATURATION: 99 % | SYSTOLIC BLOOD PRESSURE: 151 MMHG | RESPIRATION RATE: 20 BRPM | TEMPERATURE: 98 F | HEART RATE: 76 BPM | BODY MASS INDEX: 29.23 KG/M2 | WEIGHT: 165 LBS | DIASTOLIC BLOOD PRESSURE: 64 MMHG

## 2024-03-15 DIAGNOSIS — M54.16 LUMBAR RADICULOPATHY: Primary | ICD-10-CM

## 2024-03-15 DIAGNOSIS — G89.29 CHRONIC PAIN: ICD-10-CM

## 2024-03-15 PROCEDURE — 63600175 PHARM REV CODE 636 W HCPCS: Performed by: STUDENT IN AN ORGANIZED HEALTH CARE EDUCATION/TRAINING PROGRAM

## 2024-03-15 PROCEDURE — 64483 NJX AA&/STRD TFRM EPI L/S 1: CPT | Mod: 50,,, | Performed by: STUDENT IN AN ORGANIZED HEALTH CARE EDUCATION/TRAINING PROGRAM

## 2024-03-15 PROCEDURE — 64483 NJX AA&/STRD TFRM EPI L/S 1: CPT | Mod: 50 | Performed by: STUDENT IN AN ORGANIZED HEALTH CARE EDUCATION/TRAINING PROGRAM

## 2024-03-15 PROCEDURE — 99152 MOD SED SAME PHYS/QHP 5/>YRS: CPT | Performed by: STUDENT IN AN ORGANIZED HEALTH CARE EDUCATION/TRAINING PROGRAM

## 2024-03-15 PROCEDURE — 25000003 PHARM REV CODE 250: Performed by: STUDENT IN AN ORGANIZED HEALTH CARE EDUCATION/TRAINING PROGRAM

## 2024-03-15 PROCEDURE — 25500020 PHARM REV CODE 255: Performed by: STUDENT IN AN ORGANIZED HEALTH CARE EDUCATION/TRAINING PROGRAM

## 2024-03-15 RX ORDER — DEXAMETHASONE SODIUM PHOSPHATE 10 MG/ML
INJECTION INTRAMUSCULAR; INTRAVENOUS
Status: DISCONTINUED | OUTPATIENT
Start: 2024-03-15 | End: 2024-03-15 | Stop reason: HOSPADM

## 2024-03-15 RX ORDER — MIDAZOLAM HYDROCHLORIDE 1 MG/ML
INJECTION INTRAMUSCULAR; INTRAVENOUS
Status: DISCONTINUED | OUTPATIENT
Start: 2024-03-15 | End: 2024-03-15 | Stop reason: HOSPADM

## 2024-03-15 RX ORDER — FENTANYL CITRATE 50 UG/ML
INJECTION, SOLUTION INTRAMUSCULAR; INTRAVENOUS
Status: DISCONTINUED | OUTPATIENT
Start: 2024-03-15 | End: 2024-03-15 | Stop reason: HOSPADM

## 2024-03-15 RX ORDER — LIDOCAINE HYDROCHLORIDE 10 MG/ML
INJECTION, SOLUTION EPIDURAL; INFILTRATION; INTRACAUDAL; PERINEURAL
Status: DISCONTINUED | OUTPATIENT
Start: 2024-03-15 | End: 2024-03-15 | Stop reason: HOSPADM

## 2024-03-15 RX ORDER — LIDOCAINE HYDROCHLORIDE 10 MG/ML
1 INJECTION, SOLUTION EPIDURAL; INFILTRATION; INTRACAUDAL; PERINEURAL ONCE
Status: DISCONTINUED | OUTPATIENT
Start: 2024-03-15 | End: 2024-03-15 | Stop reason: HOSPADM

## 2024-03-15 RX ORDER — SODIUM CHLORIDE 9 MG/ML
500 INJECTION, SOLUTION INTRAVENOUS CONTINUOUS
Status: DISCONTINUED | OUTPATIENT
Start: 2024-03-15 | End: 2024-03-15 | Stop reason: HOSPADM

## 2024-03-15 RX ORDER — LIDOCAINE HYDROCHLORIDE 20 MG/ML
INJECTION, SOLUTION EPIDURAL; INFILTRATION; INTRACAUDAL; PERINEURAL
Status: DISCONTINUED | OUTPATIENT
Start: 2024-03-15 | End: 2024-03-15 | Stop reason: HOSPADM

## 2024-03-15 NOTE — OP NOTE
Lumbar Transforaminal Epidural Steroid Injection under Fluoroscopic Guidance    The procedure, risks, benefits, and options were discussed with the patient. There are no contraindications to the procedure. The patent expressed understanding and agreed to the procedure. Informed written consent was obtained prior to the start of the procedure and can be found in the patient's chart.    PATIENT NAME: Cherie Richards   MRN: [id number]     DATE OF PROCEDURE: 03/15/2024    PROCEDURE:  Bilateral  L5/S1 Lumbar Transforaminal Epidural Steroid Injection under Fluoroscopic Guidance    PRE-OP DIAGNOSIS: Lumbar radiculopathy [M54.16] Lumbar radiculopathy [M54.16]    POST-OP DIAGNOSIS: Same    PHYSICIAN: Glendy Banks MD    ASSISTANTS: None     MEDICATIONS INJECTED: Preservative-free Decadron 10mg with 5cc of Lidocaine 1% MPF     LOCAL ANESTHETIC INJECTED: Xylocaine 2%     SEDATION: Versed 2mg and Fentanyl 50mcg                                                                                                                                                                                     Conscious sedation ordered by M.D. Patient re-evaluation prior to administration of conscious sedation. No changes noted in patient's status from initial evaluation. The patient's vital signs were monitored by RN and patient remained hemodynamically stable throughout the procedure.    Event Time In   Sedation Start 1043   Sedation End         1057    ESTIMATED BLOOD LOSS: None    COMPLICATIONS: None    TECHNIQUE: Time-out was performed to identify the patient and procedure to be performed. With the patient laying in a prone position, the surgical area was prepped and draped in the usual sterile fashion using ChloraPrep and a fenestrated drape.The levels were determined under fluoroscopy guidance. Skin anesthesia was achieved by injecting Lidocaine 2% over the injection sites. The transforaminal spaces were then approached with a 22 gauge, 5 inch  spinal quinke needle that was introduced under fluoroscopic guidance in the AP and Lateral views. Once the needle tip was in the area of the transforaminal space, and there was no blood, CSF or paraesthesias, contrast dye Omnipaque (240mg/mL) was injected to confirm placement and there was no vascular runoff. Fluoroscopic imaging in the AP and lateral views revealed a clear outline of the spinal nerve with proximal spread of agent through the neural foramen into the epidural space. 3 mL of the medication mixture listed above was injected slowly at each site. Displacement of the radio opaque contrast after injection of the medication confirmed that the medication went into the area of the transforaminal spaces. The needles were removed and bleeding was nil. A sterile dressing was applied. No specimens collected. The patient tolerated the procedure well.     The patient was monitored after the procedure in the recovery area. They were given post-procedure and discharge instructions to follow at home. The patient was discharged in a stable condition.    Glendy Banks MD

## 2024-03-15 NOTE — DISCHARGE SUMMARY
Discharge Note  Short Stay      SUMMARY     Admit Date: 3/15/2024    Attending Physician: Glendy Banks MD PhD    Discharge Physician: Glendy Bakns      Discharge Date: 3/15/2024 10:56 AM    Procedure(s) (LRB):  B/L L5-S1 TFESI (Bilateral)    Final Diagnosis: Lumbar radiculopathy [M54.16]    Disposition: Home or self care    Patient Instructions:   Current Discharge Medication List        CONTINUE these medications which have NOT CHANGED    Details   acetaminophen (TYLENOL) 500 MG tablet Take 1-2 tablets (500-1,000 mg total) by mouth 3 (three) times daily as needed for Pain.  Refills: 0      ALPRAZolam (XANAX) 0.5 MG tablet TAKE 1 TABLET(0.5 MG) BY MOUTH EVERY NIGHT AS NEEDED FOR INSOMNIA OR ANXIETY  Qty: 30 tablet, Refills: 5    Associated Diagnoses: KAILEE (generalized anxiety disorder)      amLODIPine (NORVASC) 5 MG tablet Take 1 tablet (5 mg total) by mouth once daily.  Qty: 30 tablet, Refills: 11    Comments: .      baclofen (LIORESAL) 10 MG tablet Take 1 tablet (10 mg total) by mouth 3 (three) times daily as needed (muscle spasm).  Qty: 90 tablet, Refills: 11    Associated Diagnoses: Spinal stenosis of lumbar region without neurogenic claudication      buPROPion (WELLBUTRIN SR) 100 MG TBSR 12 hr tablet Take 1 tablet (100 mg total) by mouth 2 (two) times daily.  Qty: 180 tablet, Refills: 3    Associated Diagnoses: KAILEE (generalized anxiety disorder)      cholecalciferol, vitamin D3, 5,000 unit Tab Take by mouth. 1 Tablet Oral Every day      cranberry extract (ELLURA) 200 mg Cap Take 1 tablet by mouth once daily.  Qty: 90 capsule, Refills: 3    Associated Diagnoses: Recurrent UTI      cyclobenzaprine (FLEXERIL) 10 MG tablet Take 1 tablet (10 mg total) by mouth every evening.  Qty: 30 tablet, Refills: 6      gabapentin (NEURONTIN) 300 MG capsule Take 1 capsule (300 mg total) by mouth 3 (three) times daily.  Qty: 90 capsule, Refills: 11      hydroCHLOROthiazide (HYDRODIURIL) 12.5 MG Tab Take 1 tablet (12.5 mg total) by  mouth once daily.  Qty: 30 tablet, Refills: 11    Comments: .      losartan (COZAAR) 100 MG tablet Take 1 tablet (100 mg total) by mouth every morning.  Qty: 90 tablet, Refills: 3    Comments: .  Associated Diagnoses: Essential hypertension      metoprolol succinate (TOPROL-XL) 25 MG 24 hr tablet Take 1 tablet (25 mg total) by mouth once daily.  Qty: 90 tablet, Refills: 3    Comments: .  Associated Diagnoses: Essential hypertension      ondansetron (ZOFRAN-ODT) 4 MG TbDL Dissolve 1 tablet (4 mg total) on the tongue every 8 (eight) hours as needed.  Qty: 12 tablet, Refills: 0      pantoprazole (PROTONIX) 40 MG tablet Take 1 tablet (40 mg total) by mouth 2 (two) times daily.  Qty: 180 tablet, Refills: 3    Associated Diagnoses: Gastroesophageal reflux disease without esophagitis      traMADoL (ULTRAM) 50 mg tablet Take 1 tablet (50 mg total) by mouth every 6 (six) hours as needed for Pain. Do not drink alcohol or drive while taking this medication.  Do not take with any other sedating medications.  Qty: 12 tablet, Refills: 0    Comments: Quantity prescribed more than 7 day supply? No  Associated Diagnoses: Sciatica of left side      albuterol (PROVENTIL/VENTOLIN HFA) 90 mcg/actuation inhaler Inhale 1-2 puffs into the lungs every 4 (four) hours as needed for Shortness of Breath (coughing). Rescue  Qty: 8.5 g, Refills: 11    Associated Diagnoses: LOREDO (dyspnea on exertion)      cyanocobalamin 1,000 mcg/mL injection INJECT 1 ML (CC) INTRAMUSCULARLY EVERY TWO WEEKS  Qty: 2 mL, Refills: 11    Associated Diagnoses: Gastric bypass status for obesity      cycloSPORINE (RESTASIS) 0.05 % ophthalmic emulsion Instill 1 drop into both eyes twice daily  Qty: 180 each, Refills: 3    Comments: GHB:      diclofenac sodium (VOLTAREN) 1 % Gel Apply 2 g topically 4 (four) times daily as needed (pain).  Qty: 400 g, Refills: 3    Associated Diagnoses: Chronic bilateral low back pain with bilateral sciatica      olopatadine (PATANOL) 0.1 %  "ophthalmic solution Place 1 drop into both eyes 2 (two) times daily.  Qty: 5 mL, Refills: 6    Associated Diagnoses: Allergic conjunctivitis of both eyes      promethazine (PHENERGAN) 25 MG tablet Take 1 tablet (25 mg total) by mouth every 6 (six) hours as needed for Nausea.  Qty: 15 tablet, Refills: 0      syringe with needle (BD LUER-CLARIBEL SYRINGE) 3 mL 25 x 1 1/2 " Syrg Use as directed with Cyanocobalamin  Qty: 10 Syringe, Refills: 3      tirzepatide 10 mg/0.5 mL PnIj Inject 10 mg into the skin every 7 days.  Qty: 12 pen , Refills: 1      tretinoin (RETIN-A) 0.1 % cream Apply to affected area Topical Every day  Qty: 45 g, Refills: 11                 Discharge Diagnosis: Lumbar radiculopathy [M54.16]  Condition on Discharge: Stable with no complications to procedure   Diet on Discharge: Same as before.  Activity: as per instruction sheet.  Discharge to: Home with a responsible adult.  Follow up: 2-4 weeks       Please call my office or pager at [phone] if experienced any weakness or loss of sensation, fever > 101.5, pain uncontrolled with oral medications, persistent nausea/vomiting/or diarrhea, redness or drainage from the incisions, or any other worrisome concerns. If physician on call was not reached or could not communicate with our office for any reason please go to the nearest emergency department      Glendy Banks MD PhD  "

## 2024-03-15 NOTE — DISCHARGE INSTRUCTIONS
Home Care Instructions Pain Management:     1.  DIET:     You may resume your normal diet today.     2.  BATHING:     You may shower with luke warm water.     3.  DRESSING:     You may remove your bandage today.     4.  ACTIVITY LEVEL:       You may resume your normal activities 24 hours after your procedure.     5.  MEDICATIONS:     You may resume your normal medications today.     6.  SPECIAL INSTRUCTIONS:     No heat to the injection site for 24 hours including bath or shower, heating pad, moist heat or hot tubs.     Use an ice pack to the injection site for any pain or discomfort.  Apply ice packs for 20 minute intervals as needed.     If you have received any sedatives by mouth today, you can not drive for 12 hours.     If you have received sedation through an IV, you can not drive for 24 hours.     PLEASE CALL YOUR DOCTOR FOR THE FOLLOWIN.  Redness or swelling around the injection site.  2.  Fever of 101 degrees.  3.  Drainage (pus) from the injection site.  4.  For any continuous bleeding (some dried blood over the incision is normal.)     FOR EMERGENCIES:     If any unusual problems or difficulties occur during clinic hours, call [phone] or dial [phone].     Follow up with with your physician in 2-3 weeks.

## 2024-03-16 NOTE — DISCHARGE SUMMARY
Discharge Note  Short Stay      SUMMARY     Admit Date: 3/15/2024    Attending Physician: Glendy Banks MD PhD    Discharge Physician: Glendy Banks      Discharge Date: 3/15/2024 10:57AM    Procedure(s) (LRB):  B/L L5-S1 TFESI (Bilateral)    Final Diagnosis: Lumbar radiculopathy [M54.16]    Disposition: Home or self care    Patient Instructions:   Discharge Medication List as of 3/15/2024 10:57 AM        CONTINUE these medications which have NOT CHANGED    Details   acetaminophen (TYLENOL) 500 MG tablet Take 1-2 tablets (500-1,000 mg total) by mouth 3 (three) times daily as needed for Pain., Starting Fri 12/17/2021, OTC      ALPRAZolam (XANAX) 0.5 MG tablet TAKE 1 TABLET(0.5 MG) BY MOUTH EVERY NIGHT AS NEEDED FOR INSOMNIA OR ANXIETY, Normal      amLODIPine (NORVASC) 5 MG tablet Take 1 tablet (5 mg total) by mouth once daily., Starting Thu 12/14/2023, Until Fri 12/13/2024, Normal      baclofen (LIORESAL) 10 MG tablet Take 1 tablet (10 mg total) by mouth 3 (three) times daily as needed (muscle spasm)., Starting Wed 1/31/2024, Until Thu 1/30/2025 at 2359, Normal      buPROPion (WELLBUTRIN SR) 100 MG TBSR 12 hr tablet Take 1 tablet (100 mg total) by mouth 2 (two) times daily., Starting Thu 12/14/2023, Until Fri 12/13/2024, Normal      cholecalciferol, vitamin D3, 5,000 unit Tab Take by mouth. 1 Tablet Oral Every day, Until Discontinued, Historical Med      cranberry extract (ELLURA) 200 mg Cap Take 1 tablet by mouth once daily., Starting Fri 10/27/2023, Until Sat 10/26/2024, Normal      cyclobenzaprine (FLEXERIL) 10 MG tablet Take 1 tablet (10 mg total) by mouth every evening., Starting Mon 3/11/2024, Normal      gabapentin (NEURONTIN) 300 MG capsule Take 1 capsule (300 mg total) by mouth 3 (three) times daily., Starting Wed 2/14/2024, Until Thu 2/13/2025, Normal      hydroCHLOROthiazide (HYDRODIURIL) 12.5 MG Tab Take 1 tablet (12.5 mg total) by mouth once daily., Starting Thu 12/14/2023, Until Fri 12/13/2024, Normal     "  losartan (COZAAR) 100 MG tablet Take 1 tablet (100 mg total) by mouth every morning., Starting Thu 12/14/2023, Normal      metoprolol succinate (TOPROL-XL) 25 MG 24 hr tablet Take 1 tablet (25 mg total) by mouth once daily., Starting Thu 12/14/2023, Normal      ondansetron (ZOFRAN-ODT) 4 MG TbDL Dissolve 1 tablet (4 mg total) on the tongue every 8 (eight) hours as needed., Starting Thu 3/14/2024, Normal      pantoprazole (PROTONIX) 40 MG tablet Take 1 tablet (40 mg total) by mouth 2 (two) times daily., Starting Thu 12/14/2023, Normal      traMADoL (ULTRAM) 50 mg tablet Take 1 tablet (50 mg total) by mouth every 6 (six) hours as needed for Pain. Do not drink alcohol or drive while taking this medication.  Do not take with any other sedating medications., Starting Thu 3/14/2024, Until Sun 3/17/2024 at 2359, Normal      albuterol (PROVENTIL/VENTOLIN HFA) 90 mcg/actuation inhaler Inhale 1-2 puffs into the lungs every 4 (four) hours as needed for Shortness of Breath (coughing). Rescue, Starting Thu 7/20/2023, Until Fri 7/19/2024 at 2359, Normal      cyanocobalamin 1,000 mcg/mL injection INJECT 1 ML (CC) INTRAMUSCULARLY EVERY TWO WEEKS, Normal      cycloSPORINE (RESTASIS) 0.05 % ophthalmic emulsion Instill 1 drop into both eyes twice daily, Starting Thu 4/28/2022, Normal      diclofenac sodium (VOLTAREN) 1 % Gel Apply 2 g topically 4 (four) times daily as needed (pain)., Starting Thu 1/5/2023, Normal      olopatadine (PATANOL) 0.1 % ophthalmic solution Place 1 drop into both eyes 2 (two) times daily., Starting Thu 12/14/2023, Normal      promethazine (PHENERGAN) 25 MG tablet Take 1 tablet (25 mg total) by mouth every 6 (six) hours as needed for Nausea., Starting Fri 10/28/2022, Print      syringe with needle (BD LUER-CLARIBEL SYRINGE) 3 mL 25 x 1 1/2 " Syrg Use as directed with Cyanocobalamin, Starting Wed 6/19/2019, Normal      tirzepatide 10 mg/0.5 mL PnIj Inject 10 mg into the skin every 7 days., Starting Wed 2/7/2024, " Until Mon 8/5/2024, Normal      tretinoin (RETIN-A) 0.1 % cream Apply to affected area Topical Every day, Normal                 Discharge Diagnosis: Lumbar radiculopathy [M54.16]  Condition on Discharge: Stable with no complications to procedure   Diet on Discharge: Same as before.  Activity: as per instruction sheet.  Discharge to: Home with a responsible adult.  Follow up: 2-4 weeks       Please call my office or pager at [phone] if experienced any weakness or loss of sensation, fever > 101.5, pain uncontrolled with oral medications, persistent nausea/vomiting/or diarrhea, redness or drainage from the incisions, or any other worrisome concerns. If physician on call was not reached or could not communicate with our office for any reason please go to the nearest emergency department      Glendy Banks MD PhD

## 2024-03-18 ENCOUNTER — PATIENT MESSAGE (OUTPATIENT)
Dept: ORTHOPEDICS | Facility: CLINIC | Age: 64
End: 2024-03-18
Payer: MEDICARE

## 2024-03-19 ENCOUNTER — HOSPITAL ENCOUNTER (EMERGENCY)
Facility: HOSPITAL | Age: 64
Discharge: HOME | End: 2024-03-19
Attending: EMERGENCY MEDICINE
Payer: MEDICARE

## 2024-03-19 VITALS
TEMPERATURE: 98 F | SYSTOLIC BLOOD PRESSURE: 138 MMHG | HEART RATE: 108 BPM | WEIGHT: 165 LBS | DIASTOLIC BLOOD PRESSURE: 76 MMHG | BODY MASS INDEX: 29.23 KG/M2 | HEIGHT: 63 IN | RESPIRATION RATE: 17 BRPM | OXYGEN SATURATION: 96 %

## 2024-03-19 DIAGNOSIS — M54.2 NECK PAIN: ICD-10-CM

## 2024-03-19 DIAGNOSIS — N39.0 URINARY TRACT INFECTION WITHOUT HEMATURIA, SITE UNSPECIFIED: Primary | ICD-10-CM

## 2024-03-19 DIAGNOSIS — R20.0 NUMBNESS: ICD-10-CM

## 2024-03-19 LAB
ALBUMIN SERPL BCP-MCNC: 4.1 G/DL (ref 3.5–5.2)
ALP SERPL-CCNC: 115 U/L (ref 55–135)
ALT SERPL W/O P-5'-P-CCNC: 25 U/L (ref 10–44)
ANION GAP SERPL CALC-SCNC: 9 MMOL/L (ref 8–16)
AST SERPL-CCNC: 19 U/L (ref 10–40)
BACTERIA #/AREA URNS AUTO: ABNORMAL /HPF
BASOPHILS # BLD AUTO: 0.04 K/UL (ref 0–0.2)
BASOPHILS NFR BLD: 0.4 % (ref 0–1.9)
BILIRUB SERPL-MCNC: 0.4 MG/DL (ref 0.1–1)
BILIRUB UR QL STRIP: NEGATIVE
BUN SERPL-MCNC: 12 MG/DL (ref 8–23)
CALCIUM SERPL-MCNC: 10 MG/DL (ref 8.7–10.5)
CHLORIDE SERPL-SCNC: 101 MMOL/L (ref 95–110)
CHOLEST SERPL-MCNC: 209 MG/DL (ref 120–199)
CHOLEST/HDLC SERPL: 2.9 {RATIO} (ref 2–5)
CLARITY UR REFRACT.AUTO: CLEAR
CO2 SERPL-SCNC: 24 MMOL/L (ref 23–29)
COLOR UR AUTO: YELLOW
CREAT SERPL-MCNC: 0.7 MG/DL (ref 0.5–1.4)
CREAT SERPL-MCNC: 0.8 MG/DL (ref 0.5–1.4)
DIFFERENTIAL METHOD BLD: ABNORMAL
EOSINOPHIL # BLD AUTO: 0.1 K/UL (ref 0–0.5)
EOSINOPHIL NFR BLD: 0.5 % (ref 0–8)
ERYTHROCYTE [DISTWIDTH] IN BLOOD BY AUTOMATED COUNT: 14.7 % (ref 11.5–14.5)
EST. GFR  (NO RACE VARIABLE): >60 ML/MIN/1.73 M^2
GLUCOSE SERPL-MCNC: 100 MG/DL (ref 70–110)
GLUCOSE UR QL STRIP: NEGATIVE
HCT VFR BLD AUTO: 41.7 % (ref 37–48.5)
HCV AB SERPL QL IA: NORMAL
HDLC SERPL-MCNC: 71 MG/DL (ref 40–75)
HDLC SERPL: 34 % (ref 20–50)
HGB BLD-MCNC: 13.9 G/DL (ref 12–16)
HGB UR QL STRIP: NEGATIVE
HIV 1+2 AB+HIV1 P24 AG SERPL QL IA: NORMAL
IMM GRANULOCYTES # BLD AUTO: 0.02 K/UL (ref 0–0.04)
IMM GRANULOCYTES NFR BLD AUTO: 0.2 % (ref 0–0.5)
INR PPP: 1 (ref 0.8–1.2)
KETONES UR QL STRIP: NEGATIVE
LDLC SERPL CALC-MCNC: 123 MG/DL (ref 63–159)
LEUKOCYTE ESTERASE UR QL STRIP: ABNORMAL
LYMPHOCYTES # BLD AUTO: 1.2 K/UL (ref 1–4.8)
LYMPHOCYTES NFR BLD: 12 % (ref 18–48)
MCH RBC QN AUTO: 29.4 PG (ref 27–31)
MCHC RBC AUTO-ENTMCNC: 33.3 G/DL (ref 32–36)
MCV RBC AUTO: 88 FL (ref 82–98)
MICROSCOPIC COMMENT: ABNORMAL
MONOCYTES # BLD AUTO: 0.5 K/UL (ref 0.3–1)
MONOCYTES NFR BLD: 5.2 % (ref 4–15)
NEUTROPHILS # BLD AUTO: 7.9 K/UL (ref 1.8–7.7)
NEUTROPHILS NFR BLD: 81.7 % (ref 38–73)
NITRITE UR QL STRIP: POSITIVE
NONHDLC SERPL-MCNC: 138 MG/DL
NRBC BLD-RTO: 0 /100 WBC
OHS QRS DURATION: 70 MS
OHS QRS DURATION: 72 MS
OHS QTC CALCULATION: 444 MS
OHS QTC CALCULATION: 455 MS
PH UR STRIP: 8 [PH] (ref 5–8)
PLATELET # BLD AUTO: 361 K/UL (ref 150–450)
PMV BLD AUTO: 9 FL (ref 9.2–12.9)
POC PTINR: 1.2 (ref 0.9–1.2)
POC PTWBT: 14.6 SEC (ref 9.7–14.3)
POCT GLUCOSE: 100 MG/DL (ref 70–110)
POTASSIUM SERPL-SCNC: 4.1 MMOL/L (ref 3.5–5.1)
PROT SERPL-MCNC: 7.5 G/DL (ref 6–8.4)
PROT UR QL STRIP: NEGATIVE
PROTHROMBIN TIME: 10.9 SEC (ref 9–12.5)
RBC # BLD AUTO: 4.72 M/UL (ref 4–5.4)
RBC #/AREA URNS AUTO: 1 /HPF (ref 0–4)
SAMPLE: ABNORMAL
SAMPLE: NORMAL
SODIUM SERPL-SCNC: 134 MMOL/L (ref 136–145)
SP GR UR STRIP: >1.03 (ref 1–1.03)
TRIGL SERPL-MCNC: 75 MG/DL (ref 30–150)
TSH SERPL DL<=0.005 MIU/L-ACNC: 1.22 UIU/ML (ref 0.4–4)
URN SPEC COLLECT METH UR: ABNORMAL
WBC # BLD AUTO: 9.66 K/UL (ref 3.9–12.7)
WBC #/AREA URNS AUTO: 8 /HPF (ref 0–5)

## 2024-03-19 PROCEDURE — 99900035 HC TECH TIME PER 15 MIN (STAT)

## 2024-03-19 PROCEDURE — 80053 COMPREHEN METABOLIC PANEL: CPT

## 2024-03-19 PROCEDURE — 96361 HYDRATE IV INFUSION ADD-ON: CPT

## 2024-03-19 PROCEDURE — 96374 THER/PROPH/DIAG INJ IV PUSH: CPT | Mod: 59

## 2024-03-19 PROCEDURE — 25000003 PHARM REV CODE 250: Performed by: STUDENT IN AN ORGANIZED HEALTH CARE EDUCATION/TRAINING PROGRAM

## 2024-03-19 PROCEDURE — 87389 HIV-1 AG W/HIV-1&-2 AB AG IA: CPT | Performed by: PHYSICIAN ASSISTANT

## 2024-03-19 PROCEDURE — 84443 ASSAY THYROID STIM HORMONE: CPT

## 2024-03-19 PROCEDURE — 85610 PROTHROMBIN TIME: CPT

## 2024-03-19 PROCEDURE — 85610 PROTHROMBIN TIME: CPT | Mod: 91

## 2024-03-19 PROCEDURE — 94761 N-INVAS EAR/PLS OXIMETRY MLT: CPT

## 2024-03-19 PROCEDURE — 82565 ASSAY OF CREATININE: CPT

## 2024-03-19 PROCEDURE — 99284 EMERGENCY DEPT VISIT MOD MDM: CPT | Mod: GC,,, | Performed by: PSYCHIATRY & NEUROLOGY

## 2024-03-19 PROCEDURE — 25000003 PHARM REV CODE 250: Performed by: EMERGENCY MEDICINE

## 2024-03-19 PROCEDURE — 63600175 PHARM REV CODE 636 W HCPCS: Performed by: EMERGENCY MEDICINE

## 2024-03-19 PROCEDURE — 82962 GLUCOSE BLOOD TEST: CPT

## 2024-03-19 PROCEDURE — 96360 HYDRATION IV INFUSION INIT: CPT

## 2024-03-19 PROCEDURE — 80061 LIPID PANEL: CPT

## 2024-03-19 PROCEDURE — 93010 ELECTROCARDIOGRAM REPORT: CPT | Mod: ,,, | Performed by: INTERNAL MEDICINE

## 2024-03-19 PROCEDURE — 99284 EMERGENCY DEPT VISIT MOD MDM: CPT | Mod: 25

## 2024-03-19 PROCEDURE — 81001 URINALYSIS AUTO W/SCOPE: CPT | Performed by: EMERGENCY MEDICINE

## 2024-03-19 PROCEDURE — 86803 HEPATITIS C AB TEST: CPT | Performed by: PHYSICIAN ASSISTANT

## 2024-03-19 PROCEDURE — 96375 TX/PRO/DX INJ NEW DRUG ADDON: CPT

## 2024-03-19 PROCEDURE — 25500020 PHARM REV CODE 255: Performed by: EMERGENCY MEDICINE

## 2024-03-19 PROCEDURE — 85025 COMPLETE CBC W/AUTO DIFF WBC: CPT

## 2024-03-19 PROCEDURE — 93005 ELECTROCARDIOGRAM TRACING: CPT

## 2024-03-19 RX ORDER — ONDANSETRON 4 MG/1
4 TABLET, FILM COATED ORAL EVERY 8 HOURS PRN
Qty: 12 TABLET | Refills: 0 | Status: SHIPPED | OUTPATIENT
Start: 2024-03-19 | End: 2024-03-22

## 2024-03-19 RX ORDER — CIPROFLOXACIN 500 MG/1
500 TABLET ORAL 2 TIMES DAILY
Qty: 14 TABLET | Refills: 0 | Status: SHIPPED | OUTPATIENT
Start: 2024-03-19 | End: 2024-03-26

## 2024-03-19 RX ORDER — HYDROCODONE BITARTRATE AND ACETAMINOPHEN 5; 325 MG/1; MG/1
1 TABLET ORAL
Status: COMPLETED | OUTPATIENT
Start: 2024-03-19 | End: 2024-03-19

## 2024-03-19 RX ORDER — KETOROLAC TROMETHAMINE 30 MG/ML
10 INJECTION, SOLUTION INTRAMUSCULAR; INTRAVENOUS
Status: COMPLETED | OUTPATIENT
Start: 2024-03-19 | End: 2024-03-19

## 2024-03-19 RX ORDER — LORAZEPAM 2 MG/ML
1 INJECTION INTRAMUSCULAR
Status: COMPLETED | OUTPATIENT
Start: 2024-03-19 | End: 2024-03-19

## 2024-03-19 RX ORDER — METOPROLOL SUCCINATE 25 MG/1
25 TABLET, EXTENDED RELEASE ORAL ONCE
Status: DISCONTINUED | OUTPATIENT
Start: 2024-03-19 | End: 2024-03-19

## 2024-03-19 RX ORDER — METOPROLOL SUCCINATE 25 MG/1
25 TABLET, EXTENDED RELEASE ORAL DAILY
Status: DISCONTINUED | OUTPATIENT
Start: 2024-03-20 | End: 2024-03-19

## 2024-03-19 RX ORDER — ONDANSETRON 4 MG/1
4 TABLET, ORALLY DISINTEGRATING ORAL
Status: COMPLETED | OUTPATIENT
Start: 2024-03-19 | End: 2024-03-19

## 2024-03-19 RX ORDER — CIPROFLOXACIN 500 MG/1
500 TABLET ORAL
Status: COMPLETED | OUTPATIENT
Start: 2024-03-19 | End: 2024-03-19

## 2024-03-19 RX ORDER — HYDROCODONE BITARTRATE AND ACETAMINOPHEN 5; 325 MG/1; MG/1
1 TABLET ORAL EVERY 6 HOURS PRN
Qty: 12 TABLET | Refills: 0 | Status: SHIPPED | OUTPATIENT
Start: 2024-03-19 | End: 2024-03-22 | Stop reason: SDUPTHER

## 2024-03-19 RX ADMIN — IOHEXOL 100 ML: 350 INJECTION, SOLUTION INTRAVENOUS at 10:03

## 2024-03-19 RX ADMIN — SODIUM CHLORIDE 500 ML: 9 INJECTION, SOLUTION INTRAVENOUS at 02:03

## 2024-03-19 RX ADMIN — LORAZEPAM 1 MG: 2 INJECTION INTRAMUSCULAR; INTRAVENOUS at 01:03

## 2024-03-19 RX ADMIN — CIPROFLOXACIN 500 MG: 500 TABLET ORAL at 02:03

## 2024-03-19 RX ADMIN — ONDANSETRON 4 MG: 4 TABLET, ORALLY DISINTEGRATING ORAL at 04:03

## 2024-03-19 RX ADMIN — HYDROCODONE BITARTRATE AND ACETAMINOPHEN 1 TABLET: 5; 325 TABLET ORAL at 02:03

## 2024-03-19 RX ADMIN — KETOROLAC TROMETHAMINE 10 MG: 30 INJECTION, SOLUTION INTRAMUSCULAR; INTRAVENOUS at 02:03

## 2024-03-19 NOTE — ASSESSMENT & PLAN NOTE
Acute stroke code activated for 63 year old woman presenting with subacute onset (3/18/2024 3-6 PM), primarily left sided numbness but encompassing variable bilateral/unilateral distributions. Vitals notable for hypertensive emergency. NIHSS 1. CTA Stroke Multiphase unremarkable. Out of window/suspicion for stroke mimic; not a candidate for acute thrombolytics/endovascular intervention.     Recommend MRI Brain WO Contrast to rule out parenchymal changes concerning for acute stroke. Further recommendations to follow.

## 2024-03-19 NOTE — FIRST PROVIDER EVALUATION
"Medical screening examination initiated.  I have conducted a focused provider triage encounter, findings are as follows:    Brief history of present illness:  63-year-old female presenting with left-sided facial/body numbness onset 6:00 a.m. this morning.    Vitals:    03/19/24 0922   BP: (!) 193/86   BP Location: Left arm   Patient Position: Sitting   Resp: 18   Temp: 98.1 °F (36.7 °C)   TempSrc: Oral   SpO2: 97%   Weight: 74.8 kg (165 lb)   Height: 5' 3" (1.6 m)       Pertinent physical exam: Decreased sensation to left face, upper extremity, and lower extremity. No weakness.    Brief workup plan:  Due to focal neurological deficits within 4.5 hours, stroke code initiated an orders placed. Will moved to ED bed.    Preliminary workup initiated; this workup will be continued and followed by the physician or advanced practice provider that is assigned to the patient when roomed.  "

## 2024-03-19 NOTE — DISCHARGE INSTRUCTIONS
You were evaluated for numbness and pain. Your MRI was negative for signs of an acute stroke.     I recommend follow up with your regular doctor for re-evaluation.    Use heating pads to your neck. Continue gabapentin, tylenol, flexeril for pain.    I have prescribed ciprofloxacin for a urinary infection.    I have prescribed norco, an opiate pain medication for severe pain. Do not drive, drink alcohol, take other sedating medications while taking opiate pain medication. It is also constipating, so please take a  stool softener while taking this medication.      Seek immediate medical attention if you have new or worsening symptoms, or for any other concern.

## 2024-03-19 NOTE — HPI
"Stroke code activated by ED provider for bilateral upper/lower extremity numbness in Ms Richards, a 63 year old woman with history of cervical/lumbar degenerative changes, cholecystectomy, appendectomy, GERD, hypertension, sciatica (right), sleep apnea not on CPAP, and frequent UTIs.     LKN/Symptom onset 3/18/2024 between 3-6 PM. Reports developing numbness in series: bilateral feet -> left leg -> bilateral hands. Currently feels left sided numbness, approximately 60-70% in comparison to her right side. Later, she notes her right upper extremity also feels strange. History notable for self reported "mini-stroke" 10 years prior but patient cannot recall symptoms. She also mentions a previous epidural injection which resulted in neck weakness/bilateral shoulder numbness and required a blood patch.  "

## 2024-03-19 NOTE — ED TRIAGE NOTES
Patient identifiers for Cherie Richards 63 y.o. female checked and correct.  Chief Complaint   Patient presents with    multiple complaints     Pt. Woke up this AM with numbness to b/l arms and legs and whole face with headache.     Past Medical History:   Diagnosis Date    Abnormal Pap smear     Anemia     history    Anxiety     Cancer     uterine-cancer cells     Colon polyps, next C-scope 2019. 4/22/2014    Dry eyes     Dyspareunia, female 6/24/2020    Essential hypertension, started in her mid 30's 1/18/2017    Family history of malignant neoplasm of pancreas 6/1/2018    Gastric bypass status for obesity 8/21/2012    GERD (gastroesophageal reflux disease)     Helicobacter pylori gastritis, 2008. 8/20/2014    Hemangioma of liver, stable 2010, 2012, right lobe 8/20/2014    Hematuria     History of cosmetic surgeries, tummy tuck 2011. 4/5/2013    History of frquent UTI 6/24/2020    HTN (hypertension) 8/21/2012    120s-130s/80s    Kidney stone     Right sided sciatica 6/1/2018    Sleep apnea     patient does not use the C-PAP mask-cannot tolerate    Ureteral stone 4/8/2014    Urinary incontinence     Urinary retention     Varicose veins of lower extremity with inflammation 4/8/2015    Vertigo     mild    Vitamin D deficiency disease 6/1/2018     Allergies reported:   Review of patient's allergies indicates:   Allergen Reactions    Adhesive tape-silicones Other (See Comments)     Other reaction(s): BLISTERS    Penicillins Hives and Itching    Sulfamethoxazole-trimethoprim Hives and Itching    Prosed ec [meth-hyos-atrp-m blue-ba-phsal] Hives, Itching and Swelling    Pyridium [phenazopyridine]     Cephalexin Nausea And Vomiting and Other (See Comments)     Dehydration         LOC: Patient is awake, alert, and aware of environment with an appropriate affect. Patient is oriented x 4 and speaking appropriately.  APPEARANCE: Patient resting comfortably and in no acute distress. Patient is clean and well groomed, patient's  clothing is properly fastened.  HEENT: reports a headache  SKIN: The skin is warm and dry. Patient has normal skin turgor and moist mucus membranes. Pt reports clammy hands  MUSKULOSKELETAL: Patient is moving all extremities well, no obvious deformities noted. Pulses intact.   RESPIRATORY: Airway is open and patent. Respirations are spontaneous and non-labored with normal effort and rate.  CARDIAC: Patient has a normal rate and rhythm. Normal sinus on cardiac monitor. No peripheral edema noted.   ABDOMEN: No distention noted. Soft and non-tender upon palpation.  NEUROLOGICAL: , PERRL. Facial expression is symmetrical. Hand grasps are equal bilaterally. Normal sensation in all extremities when touched with finger. Bilateral arm and leg numbness/tingling.

## 2024-03-19 NOTE — PROGRESS NOTES
Progress Note    Received patient at signout.    64 y/o F here with multiple complaints, pending reassessment.    -Reassessed after meds, headache remains, mild improvement. Exam remains stable, moving all ext.  -She was able to ambulate okay  -Abx, pain meds, and nausea meds for home.  -F/U with pain doctor and PCP. All questions answered, strict RTER precautions given, DC home.

## 2024-03-19 NOTE — CONSULTS
[facility] - Emergency Dept  Vascular Neurology  [facility]  Consult Note    Inpatient consult to Vascular (Stroke) Neurology  Consult performed by: Daniel Carter MD  Consult ordered by: Nilam Booth PA-C        Assessment/Plan:     Patient is a 63 y.o. year old female with:    Numbness  Acute stroke code activated for 63 year old woman presenting with subacute onset (3/18/2024 3-6 PM), primarily left sided numbness but encompassing variable bilateral/unilateral distributions. Vitals notable for hypertensive emergency. NIHSS 1. CTA Stroke Multiphase unremarkable. Out of window/suspicion for stroke mimic; not a candidate for acute thrombolytics/endovascular intervention.     Recommend MRI Brain WO Contrast to rule out parenchymal changes concerning for acute stroke. Further recommendations to follow.         STROKE DOCUMENTATION     Acute Stroke Times   Last Known Normal Date: 03/18/24  Last Known Normal Time:  (Between 3-6 pm)  Unknown Normal Time: Unknown Time  Symptom Onset Date: 03/18/24  Symptom Onset Time:  (Between 3-6 pm)  Stroke Team Called Date: 03/19/24  Stroke Team Called Time: 1030  Stroke Team Arrival Date: 03/19/24  Stroke Team Arrival Time: 1034  CT Interpretation Time: 1045  Thrombolytic Therapy Recommended: No  CTA Interpretation Time: 1050  Thrombectomy Recommended: No    NIH Scale:  1a. Level of Consciousness: 0-->Alert, keenly responsive  1b. LOC Questions: 0-->Answers both questions correctly  1c. LOC Commands: 0-->Performs both tasks correctly  2. Best Gaze: 0-->Normal  3. Visual: 0-->No visual loss  4. Facial Palsy: 0-->Normal symmetrical movements  5a. Motor Arm, Left: 0-->No drift, limb holds 90 (or 45) degrees for full 10 secs  5b. Motor Arm, Right: 0-->No drift, limb holds 90 (or 45) degrees for full 10 secs  6a. Motor Leg, Left: 0-->No drift, leg holds 30 degree position for full 5 secs  6b. Motor Leg, Right: 0-->No drift, leg holds 30 degree position for full  "5 secs  7. Limb Ataxia: 0-->Absent  8. Sensory: 1-->Mild-to-moderate sensory loss, patient feels pinprick is less sharp or is dull on the affected side, or there is a loss of superficial pain with pinprick, but patient is aware of being touched  9. Best Language: 0-->No aphasia, normal  10. Dysarthria: 0-->Normal  11. Extinction and Inattention (formerly Neglect): 0-->No abnormality  Total (NIH Stroke Scale): 1    Modified Glascock    Kerri Coma Scale:15   ABCD2 Score:    [id number] Score:   HAS -BLED Score:   ICH Score:   Hunt & Bhat Classification:       Thrombolysis Candidate? No, Strong suspicion for stroke mimic or alternative diagnosis     Delays to Thrombolysis?  Not Applicable    Interventional Revascularization Candidate?   Is the patient eligible for mechanical endovascular reperfusion (RANDEE)?  No; No large vessel occlusion identified on imaging     Delays to Thrombectomy? Not Applicable    Hemorrhagic change of an Ischemic Stroke: Does this patient have an ischemic stroke with hemorrhagic changes? No     Subjective:     History of Present Illness:  Stroke code activated by ED provider for bilateral upper/lower extremity numbness in Ms Richards, a 63 year old woman with history of cervical/lumbar degenerative changes, cholecystectomy, appendectomy, GERD, hypertension, sciatica (right), sleep apnea not on CPAP, and frequent UTIs.     LKN/Symptom onset 3/18/2024 between 3-6 PM. Reports developing numbness in series: bilateral feet -> left leg -> bilateral hands. Currently feels left sided numbness, approximately 60-70% in comparison to her right side. Later, she notes her right upper extremity also feels strange. History notable for self reported "mini-stroke" 10 years prior but patient cannot recall symptoms. She also mentions a previous epidural injection which resulted in neck weakness/bilateral shoulder numbness and required a blood patch.          Past Medical History:   Diagnosis Date    Abnormal Pap " smear     Anemia     history    Anxiety     Cancer     uterine-cancer cells     Colon polyps, next C-scope 2019. 4/22/2014    Dry eyes     Dyspareunia, female 6/24/2020    Essential hypertension, started in her mid 30's 1/18/2017    Family history of malignant neoplasm of pancreas 6/1/2018    Gastric bypass status for obesity 8/21/2012    GERD (gastroesophageal reflux disease)     Helicobacter pylori gastritis, 2008. 8/20/2014    Hemangioma of liver, stable 2010, 2012, right lobe 8/20/2014    Hematuria     History of cosmetic surgeries, tummy tuck 2011. 4/5/2013    History of frquent UTI 6/24/2020    HTN (hypertension) 8/21/2012    120s-130s/80s    Kidney stone     Right sided sciatica 6/1/2018    Sleep apnea     patient does not use the C-PAP mask-cannot tolerate    Ureteral stone 4/8/2014    Urinary incontinence     Urinary retention     Varicose veins of lower extremity with inflammation 4/8/2015    Vertigo     mild    Vitamin D deficiency disease 6/1/2018     Past Surgical History:   Procedure Laterality Date    Abdominoplasty with Liposcuction      APPENDECTOMY      CERVICAL BIOPSY  W/ LOOP ELECTRODE EXCISION      Prior to hysterectomy    CHOLECYSTECTOMY      Laparoscopic    COLONOSCOPY N/A 2/13/2020    Procedure: COLONOSCOPY;  Surgeon: Pb Smith MD;  Location: [address]);  Service: Endoscopy;  Laterality: N/A;    EPIDURAL STEROID INJECTION INTO LUMBAR SPINE Bilateral 3/15/2024    Procedure: B/L L5-S1 TFESI;  Surgeon: Glendy Banks MD;  Location: [facility];  Service: Pain Management;  Laterality: Bilateral;  20 mins    ESOPHAGOGASTRODUODENOSCOPY N/A 11/2/2021    Procedure: EGD (ESOPHAGOGASTRODUODENOSCOPY);  Surgeon: Clayton Martínez MD;  Location: [address]);  Service: Endoscopy;  Laterality: N/A;  fully vac. 3/15/21 / instr portal -ml    GASTRIC BYPASS      HERNIA REPAIR      HYSTERECTOMY  1980     TVH  both ovaries remain     Social History     Tobacco Use    Smoking  status: Never    Smokeless tobacco: Never    Tobacco comments:     .  .   Occup:  .     Substance Use Topics    Alcohol use: No    Drug use: No     Review of patient's allergies indicates:   Allergen Reactions    Adhesive tape-silicones Other (See Comments)     Other reaction(s): BLISTERS    Penicillins Hives and Itching    Sulfamethoxazole-trimethoprim Hives and Itching    Prosed ec [meth-hyos-atrp-m blue-ba-phsal] Hives, Itching and Swelling    Pyridium [phenazopyridine]     Cephalexin Nausea And Vomiting and Other (See Comments)     Dehydration       Medications: I have reviewed the current medication administration record.    (Not in a hospital admission)      Review of Systems   Neurological:  Positive for numbness. Negative for speech difficulty.     Objective:     Vital Signs (Most Recent):  Temp: 98.3 °F (36.8 °C) (24 1046)  Pulse: 108 (24 1046)  Resp: 16 (24 1046)  BP: (!) 212/82 (24 1046)  SpO2: 98 % (24 1046)    Vital Signs Range (Last 24H):  Temp:  [98.1 °F (36.7 °C)-98.3 °F (36.8 °C)]   Pulse:  [108]   Resp:  [16-18]   BP: (193-212)/(82-86)   SpO2:  [97 %-98 %]        Physical Exam  Vitals and nursing note reviewed.   Constitutional:       General: She is not in acute distress.     Appearance: She is not ill-appearing or toxic-appearing.   Pulmonary:      Effort: Pulmonary effort is normal. No respiratory distress.   Neurological:      Mental Status: She is alert.              Neurological Exam:   LOC: alert  Attention Span: Good   Language: No aphasia  Articulation: No dysarthria  Orientation: Person, Place, Time   Visual Fields: Full  EOM (CN III, IV, VI): Full/intact  Pupils (CN II, III): PERRL  Facial Sensation (CN V): Normal  Facial Movement (CN VII): Symmetric facial expression    Motor: 4 extremities anti-gravity; left lower extremity limited by pain. Semi-rhythmic, high amplitude movement in bilateral upper extremities, right upper extremity  "abnormal movement worsens with distraction.   Cerebellum: No evidence of appendicular or axial ataxia  Sensation: Lito-hypoesthesia left      Laboratory:  CMP:   Recent Labs   Lab 03/19/24  1031   CALCIUM 10.0   ALBUMIN 4.1   PROT 7.5   *   K 4.1   CO2 24      BUN 12   CREATININE 0.8   ALKPHOS 115   ALT 25   AST 19   BILITOT 0.4     CBC:   Recent Labs   Lab 03/19/24  1031   WBC 9.66   RBC 4.72   HGB 13.9   HCT 41.7      MCV 88   MCH 29.4   MCHC 33.3     Lipid Panel:   Recent Labs   Lab 03/19/24  1031   CHOL 209*   LDLCALC 123.0   HDL 71   TRIG 75     Coagulation:   Recent Labs   Lab 03/19/24  1033   INR 1.2     Hgb A1C: No results for input(s): "HGBA1C" in the last 168 hours.  TSH: No results for input(s): "TSH" in the last 168 hours.    Diagnostic Results:      Brain/Vessel imaging:  CTA STROKE MULTI-PHASE 03/19/2024    Narrative  EXAMINATION:  CTA STROKE MULTI-PHASE    CLINICAL HISTORY:  Neuro deficit, acute, stroke suspected;    TECHNIQUE:  5 mm axial images of the head pre and post contrast with 0.625 mm axial CTA images of the head neck post-contrast.  Coronal and sagittal MPR and MIP imaging was performed 100 ml of Omnipaque 350 contrast was injected intravenously    COMPARISON:  MRI brain 05/22/2021 and MRA head 05/17/2019    FINDINGS:  CT head: No evidence for acute intracranial hemorrhage or sulcal effacement. Slight prominent extra-axial space overlying the frontal lobes bilaterally likely developmental variant. There is no significant mass effect or midline shift. Ventricles normal in size without hydrocephalus.    CTA head: Bilateral distal cervical, petrous, cavernous and supraclinoid segments of the ICAs are patent without significant focal stenosis or proximal large vessel occlusion.. The anterior and middle cerebral arteries are patent without significant focal stenosis or large vessel occlusion.    Posterior circulation: Distal vertebral arteries, basilar artery and posterior " cerebral arteries are patent with hypoplastic or absent left P1 segment PCA and essentially persistent fetal circulation left PCA via left posterior communicating artery    CTA neck: Mild atherosclerotic plaquing of the arch and origin great vessels without significant focal stenosis. Slight ectasia of the ascending aorta measuring approximately 3.9 cm.  Vertebral artery origins from the subclavian arteries are within normal limits without significant focal stenosis. Vertebral arteries are patent throughout their course without focal stenosis or occlusion.    Right carotid: The right common carotid artery, carotid bifurcation and extracranial portions of the internal carotid artery are patent without significant focal stenosis.    Left carotid: The left common carotid artery, carotid bifurcation and extracranial portions of the internal carotid arteries are patent without significant focal stenosis.    There is atherosclerotic plaquing of the carotid bifurcations and proximal ICAs with less than 50% proximal ICA stenosis by NASCET criteria    Pharynx/larynx: Evaluation distorted by scatter artifact from dental metal and motion allowing for artifacts the nasopharynx, oropharynx, hypopharynx larynx and proximal trachea are grossly free of focal lesions    Oral cavity and the buccal space     distorted by dental metal.    Glands: No focal parotid, submandibular or thyroid lesion    No evidence for adenopathy throughout the neck by size criteria.    Degenerative changes cervical spine with grade 1 anterolisthesis of C3 on C4 and C4 on C5.  Allowing for degenerative changes there is no evidence for acute fracture of the cervical spine    Impression  CTA head: Unremarkable CTA of the head specifically without evidence for proximal significant stenosis or proximal large vessel occlusion    There is developmental variant with absent or hypoplastic left P1 segment of the PCA and essentially persistent fetal circulation of  the left PCA via left posterior communicating artery.    CTA neck: Atherosclerotic plaquing of the carotid bifurcations and proximal ICAs with less than 50% proximal ICA stenosis by NASCET criteria.    CT head: No evidence for acute intracranial hemorrhage or sulcal effacement to suggest large territory recent infarction    Clinical correlation and further evaluation with MRI as warranted if patient compatible..      Electronically signed by: Gino Rankin DO  Date:    03/19/2024  Time:    10:53      Cardiac Evaluation:   Transthoracic echo (TTE) complete 12/09/2021    Interpretation Summary  · The left ventricle is normal in size with normal systolic function. The estimated ejection fraction is 65%.  · Normal right ventricular size with normal right ventricular systolic function.  · Indeterminate left ventricular diastolic function.  · The estimated PA systolic pressure is 27 mmHg.  · Normal central venous pressure (3 mmHg).        Daniel Carter MD  [facility]  Department of Vascular Neurology   [facility] - Emergency Dept

## 2024-03-21 ENCOUNTER — CLINICAL SUPPORT (OUTPATIENT)
Dept: REHABILITATION | Facility: HOSPITAL | Age: 64
End: 2024-03-21
Payer: MEDICARE

## 2024-03-21 DIAGNOSIS — M62.81 WEAKNESS OF TRUNK MUSCULATURE: Primary | ICD-10-CM

## 2024-03-21 DIAGNOSIS — M53.86 DECREASED RANGE OF MOTION OF LUMBAR SPINE: ICD-10-CM

## 2024-03-21 PROCEDURE — 97530 THERAPEUTIC ACTIVITIES: CPT | Mod: PN

## 2024-03-21 PROCEDURE — 97112 NEUROMUSCULAR REEDUCATION: CPT | Mod: PN

## 2024-03-21 PROCEDURE — 97110 THERAPEUTIC EXERCISES: CPT | Mod: PN

## 2024-03-21 NOTE — PROGRESS NOTES
"  Physical Therapy Progress Note     Name: Cherie Richards  Clinic Number: [id number]    Therapy Diagnosis:   Encounter Diagnoses   Name Primary?    Weakness of trunk musculature Yes    Decreased range of motion of lumbar spine      Physician: Tracy Chand MD    Visit Date: 3/21/2024    Physician Orders: PT Eval and Treat   Medical Diagnosis from Referral: M54.16 (ICD-10-CM) - Lumbar back pain with radiculopathy affecting left lower extremity      Evaluation Date: 2/26/2024  Plan of Care Expiration: 5/25/24     Authorization Period Expiration: 12/31/24  Visit # / Visits authorized: 1/ 1        Time In:  0900  Time Out: 1001     Total Billable Time: 55 minutes     Precautions: Standard    Subjective     Pt reports: her pain has worsened since her epidural.     She was compliant with home exercise program.  Response to previous treatment: initial eval  Functional change: ongoing    Pain: 9/10  Location: left lumbar, left hip, left LE     Objective     Palpation: mild tenderness, left QL, lumbar paraspinals, piriformis     Range of Motion/Strength:       Lumbar AROM: Degrees   Flexion 30   Extension 0   Right side bending 5   Left side bending 5   Lumbar quadrant reveals: increase in discomfort in all planes     Cherie received therapeutic exercises to develop strength for 20 minutes including:    HL Hip Add w/ ball 2x10, 3"  Supine Clamshells YTB 2x10  Sidelying clam x 20  Manual hip/sacaral distraction, long arc    Cherie participated in neuromuscular re-education activities to improve: Muscles Re-Education, Coordination, Kinesthetic, and Proprioception for 25 minutes. The following activities were included:  LTR x 20  Piriformis stretch 20 sec x 4  PPT x 20  TrA recruitment 2 x 10  Seated Sciatic nerve glide x 20  Ball squeeze 2 x 10  Hip fall out x 20    Cherie participated in dynamic functional therapeutic activities to improve functional performance for 10  minutes, including:    NuStep 10 " minutes    Cherie received hot pack for 10 minutes to low back in supine.      Home Exercises Provided and Patient Education Provided     Education provided:   Cont to perform HEP as provided.     Written Home Exercises Provided: Patient instructed to cont prior HEP.  Exercises were reviewed and Cherie was able to demonstrate them prior to the end of the session.  Cherie demonstrated good  understanding of the education provided.     See EMR under Patient Instructions for exercises provided prior visit.    Assessment     Pt presents ambulating with guarded gait, decreased lumbopelvic mobility.  Requires min cueing with pelvic mobility and abdominal response with supine therex.  No c/o increased discomfort with prescribed activities.  Good response to initiation of lumbar stabilization therex.      Cherie Is progressing well towards her goals.   Pt prognosis is Good.     Pt will continue to benefit from skilled outpatient physical therapy to address the deficits listed in the problem list box on initial evaluation, provide pt/family education and to maximize pt's level of independence in the home and community environment.     Pt's spiritual, cultural and educational needs considered and pt agreeable to plan of care and goals.    Anticipated barriers to physical therapy: none    Goals: Short Term Goals (4 Weeks):  Updated 3/21/24  not MET     1.Pt to increase strength by a 1/2 grade of muscles test to allow for improvement in functional activities such as performing chores.  2.Pt to improve range of motion by 25% to allow for improved functional mobility to allow for improvement in IADLs.   3.Pt to report compliance with HEP and demonstrate proper exercise technique to PT to show competence with self management of condition.  4.Decrease pain by 25% during functional activities.     Long Term Goals (12 Weeks):      1. Increase ROM to allow improved joint biomechanics during functional activities.   2.Increase  trunk and lower extremity strength to within normal limits during functional activities.   3. Independent with home exercise program.   4. Full return to functional activities with manageable complaints.  5. Patient to demonstrate improved posture and body mechanics.  6. Decrease pain by 75% during functional activities.    Plan     Cont skilled PT session towards PT and patient's goals.    Momo Javed, PT   03/21/2024

## 2024-03-21 NOTE — PATIENT INSTRUCTIONS
"  Physical Therapy daily treatment Note     Name: Cherie Richards  Clinic Number: [id number]    Therapy Diagnosis:   Encounter Diagnoses   Name Primary?    Weakness of trunk musculature Yes    Decreased range of motion of lumbar spine      Physician: Tracy Chand MD    Visit Date: 3/21/2024    Physician Orders: PT Eval and Treat   Medical Diagnosis from Referral: M54.16 (ICD-10-CM) - Lumbar back pain with radiculopathy affecting left lower extremity      Evaluation Date: 2/26/2024  Plan of Care Expiration: 5/25/24     Authorization Period Expiration: 12/31/24  Visit # / Visits authorized: 1/ 1        Time In:  0900AM  Time Out: 1000AM     Total Billable Time: 30 minutes     Precautions: Standard    Subjective     Pt reports: her back is not good. It's more of the leg than the back. Reorts shooting pain down the leg all the way to the toes.    She was compliant with home exercise program.  Response to previous treatment: initial eval  Functional change: ongoing    Pain: 9/10  Location: left lumbar, left hip, left LE     Objective     Cherie received therapeutic exercises to develop strength for 10 minutes including:  HL Hip Add w/ ball 2x10, 3"  Supine Clamshells YTB 2x10    Cherie participated in neuromuscular re-education activities to improve: Muscles Re-Education, Coordination, Kinesthetic, and Proprioception for 30 minutes. The following activities were included:  LTR x 20  DKTC 2x10  Piriformis stretch 20 sec x 4  PPT x 20  TrA recruitment 2 x 10  Seated Sciatic nerve glide x 20    Cherie participated in dynamic functional therapeutic activities to improve functional performance for 10  minutes, including:  Patient Education  NuStep 8 minutes    Cherie received hot pack for 10 minutes to low back in supine.      Home Exercises Provided and Patient Education Provided     Education provided:   Cont to perform HEP as provided.     Written Home Exercises Provided: Patient instructed to cont prior " HEP.  Exercises were reviewed and Cherie was able to demonstrate them prior to the end of the session.  Cherie demonstrated good  understanding of the education provided.     See EMR under Patient Instructions for exercises provided prior visit.    Assessment     Pt tolerated the above tx session well without c/o pain. Pt reported her back is not good. It's more of the leg than the back. Reorts shooting pain down the leg all the way to the toes. 9/10 pain rating at beginning of session. TherEx presented an appropriate challenge with no adverse effects reported. Verbal and tactile cueing required throughout for proper form and technique. No numerical value given for pain rating at end of session. Encouraged pt to continue HEP between sessions for optimal therapeutic gains. Will continue working towards established PT goals in future sessions.    Cherie Is progressing well towards her goals.   Pt prognosis is Good.     Pt will continue to benefit from skilled outpatient physical therapy to address the deficits listed in the problem list box on initial evaluation, provide pt/family education and to maximize pt's level of independence in the home and community environment.     Pt's spiritual, cultural and educational needs considered and pt agreeable to plan of care and goals.    Anticipated barriers to physical therapy: none    Goals: Short Term Goals (4 Weeks):      1.Pt to increase strength by a 1/2 grade of muscles test to allow for improvement in functional activities such as performing chores.  2.Pt to improve range of motion by 25% to allow for improved functional mobility to allow for improvement in IADLs.   3.Pt to report compliance with HEP and demonstrate proper exercise technique to PT to show competence with self management of condition.  4.Decrease pain by 25% during functional activities.     Long Term Goals (12 Weeks):      1. Increase ROM to allow improved joint biomechanics during functional  activities.   2.Increase trunk and lower extremity strength to within normal limits during functional activities.   3. Independent with home exercise program.   4. Full return to functional activities with manageable complaints.  5. Patient to demonstrate improved posture and body mechanics.  6. Decrease pain by 75% during functional activities.    Plan     Certification date:     Cont skilled PT session towards PT and patient's goals.    Momo Javed, PT   03/21/2024

## 2024-03-22 ENCOUNTER — PATIENT MESSAGE (OUTPATIENT)
Dept: INTERNAL MEDICINE | Facility: CLINIC | Age: 64
End: 2024-03-22

## 2024-03-22 ENCOUNTER — OFFICE VISIT (OUTPATIENT)
Dept: INTERNAL MEDICINE | Facility: CLINIC | Age: 64
End: 2024-03-22
Payer: MEDICARE

## 2024-03-22 VITALS
HEIGHT: 63 IN | OXYGEN SATURATION: 97 % | BODY MASS INDEX: 30.54 KG/M2 | SYSTOLIC BLOOD PRESSURE: 138 MMHG | WEIGHT: 172.38 LBS | HEART RATE: 86 BPM | DIASTOLIC BLOOD PRESSURE: 84 MMHG

## 2024-03-22 DIAGNOSIS — I10 ESSENTIAL HYPERTENSION: ICD-10-CM

## 2024-03-22 DIAGNOSIS — M54.16 LUMBAR BACK PAIN WITH RADICULOPATHY AFFECTING LEFT LOWER EXTREMITY: ICD-10-CM

## 2024-03-22 DIAGNOSIS — Z00.00 ANNUAL PHYSICAL EXAM: Primary | ICD-10-CM

## 2024-03-22 DIAGNOSIS — M54.2 NECK PAIN: ICD-10-CM

## 2024-03-22 DIAGNOSIS — M81.0 OSTEOPOROSIS WITHOUT CURRENT PATHOLOGICAL FRACTURE, UNSPECIFIED OSTEOPOROSIS TYPE: ICD-10-CM

## 2024-03-22 PROCEDURE — 4010F ACE/ARB THERAPY RXD/TAKEN: CPT | Mod: CPTII,S$GLB,, | Performed by: INTERNAL MEDICINE

## 2024-03-22 PROCEDURE — 3079F DIAST BP 80-89 MM HG: CPT | Mod: CPTII,S$GLB,, | Performed by: INTERNAL MEDICINE

## 2024-03-22 PROCEDURE — 99999 PR PBB SHADOW E&M-EST. PATIENT-LVL III: CPT | Mod: PBBFAC,,, | Performed by: INTERNAL MEDICINE

## 2024-03-22 PROCEDURE — 1101F PT FALLS ASSESS-DOCD LE1/YR: CPT | Mod: CPTII,S$GLB,, | Performed by: INTERNAL MEDICINE

## 2024-03-22 PROCEDURE — 3008F BODY MASS INDEX DOCD: CPT | Mod: CPTII,S$GLB,, | Performed by: INTERNAL MEDICINE

## 2024-03-22 PROCEDURE — 3075F SYST BP GE 130 - 139MM HG: CPT | Mod: CPTII,S$GLB,, | Performed by: INTERNAL MEDICINE

## 2024-03-22 PROCEDURE — 3288F FALL RISK ASSESSMENT DOCD: CPT | Mod: CPTII,S$GLB,, | Performed by: INTERNAL MEDICINE

## 2024-03-22 PROCEDURE — 1125F AMNT PAIN NOTED PAIN PRSNT: CPT | Mod: CPTII,S$GLB,, | Performed by: INTERNAL MEDICINE

## 2024-03-22 PROCEDURE — 99214 OFFICE O/P EST MOD 30 MIN: CPT | Mod: S$GLB,,, | Performed by: INTERNAL MEDICINE

## 2024-03-22 RX ORDER — FLUCONAZOLE 150 MG/1
150 TABLET ORAL DAILY
Qty: 1 TABLET | Refills: 0 | Status: SHIPPED | OUTPATIENT
Start: 2024-03-22 | End: 2024-03-23

## 2024-03-22 RX ORDER — ONDANSETRON 4 MG/1
4 TABLET, ORALLY DISINTEGRATING ORAL EVERY 8 HOURS PRN
Qty: 30 TABLET | Refills: 1 | Status: SHIPPED | OUTPATIENT
Start: 2024-03-22 | End: 2024-06-04 | Stop reason: SDUPTHER

## 2024-03-22 RX ORDER — HYDROCODONE BITARTRATE AND ACETAMINOPHEN 5; 325 MG/1; MG/1
1 TABLET ORAL EVERY 12 HOURS PRN
Qty: 12 TABLET | Refills: 0 | Status: SHIPPED | OUTPATIENT
Start: 2024-03-22 | End: 2024-04-10 | Stop reason: SDUPTHER

## 2024-03-22 RX ORDER — HYDROCODONE BITARTRATE AND ACETAMINOPHEN 5; 325 MG/1; MG/1
1 TABLET ORAL EVERY 12 HOURS PRN
Qty: 12 TABLET | Refills: 0 | Status: CANCELLED | OUTPATIENT
Start: 2024-03-22 | End: 2024-03-29

## 2024-03-27 ENCOUNTER — HOSPITAL ENCOUNTER (EMERGENCY)
Facility: HOSPITAL | Age: 64
Discharge: HOME | End: 2024-03-27
Attending: EMERGENCY MEDICINE
Payer: MEDICARE

## 2024-03-27 ENCOUNTER — PATIENT MESSAGE (OUTPATIENT)
Dept: REHABILITATION | Facility: HOSPITAL | Age: 64
End: 2024-03-27
Payer: MEDICARE

## 2024-03-27 VITALS
SYSTOLIC BLOOD PRESSURE: 129 MMHG | HEART RATE: 78 BPM | OXYGEN SATURATION: 98 % | TEMPERATURE: 98 F | RESPIRATION RATE: 16 BRPM | DIASTOLIC BLOOD PRESSURE: 74 MMHG | WEIGHT: 165 LBS | HEIGHT: 63 IN | BODY MASS INDEX: 29.23 KG/M2

## 2024-03-27 DIAGNOSIS — R07.9 CHEST PAIN: ICD-10-CM

## 2024-03-27 DIAGNOSIS — R11.2 NAUSEA AND VOMITING, UNSPECIFIED VOMITING TYPE: Primary | ICD-10-CM

## 2024-03-27 DIAGNOSIS — M25.572 LEFT ANKLE PAIN: ICD-10-CM

## 2024-03-27 LAB
ALBUMIN SERPL-MCNC: 4.2 G/DL (ref 3.3–5.5)
ALBUMIN SERPL-MCNC: 4.4 G/DL (ref 3.3–5.5)
ALP SERPL-CCNC: 130 U/L (ref 42–141)
ALP SERPL-CCNC: 132 U/L (ref 42–141)
BILIRUB SERPL-MCNC: 0.7 MG/DL (ref 0.2–1.6)
BILIRUB SERPL-MCNC: 0.7 MG/DL (ref 0.2–1.6)
BILIRUBIN, POC UA: NEGATIVE
BLOOD, POC UA: NEGATIVE
BUN SERPL-MCNC: 12 MG/DL (ref 7–22)
CALCIUM SERPL-MCNC: 10.6 MG/DL (ref 8–10.3)
CHLORIDE SERPL-SCNC: 97 MMOL/L (ref 98–108)
CLARITY, POC UA: CLEAR
COLOR, POC UA: YELLOW
CREAT SERPL-MCNC: 0.7 MG/DL (ref 0.6–1.2)
GLUCOSE SERPL-MCNC: 106 MG/DL (ref 73–118)
GLUCOSE, POC UA: NEGATIVE
HCT, POC: NORMAL
HGB, POC: NORMAL (ref 14–18)
KETONES, POC UA: ABNORMAL
LEUKOCYTE EST, POC UA: ABNORMAL
MCH, POC: NORMAL
MCHC, POC: NORMAL
MCV, POC: NORMAL
MPV, POC: NORMAL
NITRITE, POC UA: NEGATIVE
PH UR STRIP: 7 [PH]
POC ALT (SGPT): 39 U/L (ref 10–47)
POC ALT (SGPT): 41 U/L (ref 10–47)
POC AMYLASE: 15 U/L (ref 14–97)
POC AST (SGOT): 20 U/L (ref 11–38)
POC AST (SGOT): 24 U/L (ref 11–38)
POC CARDIAC TROPONIN I: 0 NG/ML (ref 0–0.08)
POC CARDIAC TROPONIN I: 0 NG/ML (ref 0–0.08)
POC GGT: 181 U/L (ref 5–65)
POC PLATELET COUNT: NORMAL
POC TCO2: 22 MMOL/L (ref 18–33)
POTASSIUM BLD-SCNC: 3.7 MMOL/L (ref 3.6–5.1)
PROTEIN, POC UA: NEGATIVE
PROTEIN, POC: 7.9 G/DL (ref 6.4–8.1)
PROTEIN, POC: 8 G/DL (ref 6.4–8.1)
RBC, POC: NORMAL
RDW, POC: NORMAL
SAMPLE: NORMAL
SAMPLE: NORMAL
SODIUM BLD-SCNC: 134 MMOL/L (ref 128–145)
SPECIFIC GRAVITY, POC UA: >=1.03
UROBILINOGEN, POC UA: 0.2 E.U./DL
WBC, POC: NORMAL

## 2024-03-27 PROCEDURE — 82150 ASSAY OF AMYLASE: CPT | Mod: ER

## 2024-03-27 PROCEDURE — 63600175 PHARM REV CODE 636 W HCPCS: Mod: ER | Performed by: EMERGENCY MEDICINE

## 2024-03-27 PROCEDURE — 82040 ASSAY OF SERUM ALBUMIN: CPT | Mod: 59,ER

## 2024-03-27 PROCEDURE — 96361 HYDRATE IV INFUSION ADD-ON: CPT | Mod: ER

## 2024-03-27 PROCEDURE — 93010 ELECTROCARDIOGRAM REPORT: CPT | Mod: ,,, | Performed by: INTERNAL MEDICINE

## 2024-03-27 PROCEDURE — 87086 URINE CULTURE/COLONY COUNT: CPT | Performed by: EMERGENCY MEDICINE

## 2024-03-27 PROCEDURE — 96375 TX/PRO/DX INJ NEW DRUG ADDON: CPT | Mod: ER

## 2024-03-27 PROCEDURE — 93005 ELECTROCARDIOGRAM TRACING: CPT | Mod: ER

## 2024-03-27 PROCEDURE — 85025 COMPLETE CBC W/AUTO DIFF WBC: CPT | Mod: ER

## 2024-03-27 PROCEDURE — 96366 THER/PROPH/DIAG IV INF ADDON: CPT | Mod: ER

## 2024-03-27 PROCEDURE — 96365 THER/PROPH/DIAG IV INF INIT: CPT | Mod: 59,ER

## 2024-03-27 PROCEDURE — 99285 EMERGENCY DEPT VISIT HI MDM: CPT | Mod: 25,ER

## 2024-03-27 PROCEDURE — 25000003 PHARM REV CODE 250: Mod: ER | Performed by: EMERGENCY MEDICINE

## 2024-03-27 PROCEDURE — 25500020 PHARM REV CODE 255: Mod: ER | Performed by: EMERGENCY MEDICINE

## 2024-03-27 PROCEDURE — 84484 ASSAY OF TROPONIN QUANT: CPT | Mod: ER

## 2024-03-27 PROCEDURE — 80053 COMPREHEN METABOLIC PANEL: CPT | Mod: ER

## 2024-03-27 RX ORDER — PREDNISONE 20 MG/1
TABLET ORAL
Qty: 6 TABLET | Refills: 0 | Status: SHIPPED | OUTPATIENT
Start: 2024-03-27

## 2024-03-27 RX ORDER — ONDANSETRON HYDROCHLORIDE 2 MG/ML
4 INJECTION, SOLUTION INTRAVENOUS
Status: COMPLETED | OUTPATIENT
Start: 2024-03-27 | End: 2024-03-27

## 2024-03-27 RX ORDER — PROMETHAZINE HYDROCHLORIDE 12.5 MG/1
12.5 TABLET ORAL EVERY 6 HOURS PRN
Qty: 20 TABLET | Refills: 0 | Status: SHIPPED | OUTPATIENT
Start: 2024-03-27 | End: 2024-06-04

## 2024-03-27 RX ORDER — METHOCARBAMOL 500 MG/1
1000 TABLET, FILM COATED ORAL 3 TIMES DAILY
Qty: 30 TABLET | Refills: 0 | Status: SHIPPED | OUTPATIENT
Start: 2024-03-27 | End: 2024-04-01

## 2024-03-27 RX ADMIN — SODIUM CHLORIDE 1000 ML: 9 INJECTION, SOLUTION INTRAVENOUS at 08:03

## 2024-03-27 RX ADMIN — ONDANSETRON 4 MG: 2 INJECTION INTRAMUSCULAR; INTRAVENOUS at 08:03

## 2024-03-27 RX ADMIN — PROMETHAZINE HYDROCHLORIDE 12.5 MG: 25 INJECTION INTRAMUSCULAR; INTRAVENOUS at 10:03

## 2024-03-27 RX ADMIN — IOHEXOL 75 ML: 350 INJECTION, SOLUTION INTRAVENOUS at 09:03

## 2024-03-27 NOTE — ED PROVIDER NOTES
"Encounter Date: 3/27/2024    SCRIBE #1 NOTE: I, Yeimi Doty, am scribing for, and in the presence of,  Kaity Mohamud MD. I have scribed the following portions of the note - Other sections scribed: HPI,ROS,PE,MDM.       History     Chief Complaint   Patient presents with    Vomiting    Nausea     X 2 days     This is a 63 y.o. female, with a PMHx of GERD,HTN, who presents to the ED with complaint of worsening nausea/vomiting that began 2 days ago. Patient further reports she "felt like" she was constipated. She took a Fleet Enema with relief. Patient notes LBM was 3 days ago which is normal for her. Patient further notes L ankle pain, intermittent chest pain and intermittent L arm numbness that began a few weeks ago. Patient reports she takes Mounjaro/Tirzepatide and hydrocodone. Patient denies taking any medication today due to being unable to keep anything down. There are no other exacerbating or alleviating factors. Patient further notes past ED visit on 3/19/24 for symptoms of left sided numbness. Per chart review, patient had imaging and labs done and was discharged home with antibiotics and pain medication. Patient denies associated fever, diarrhea, or other associated symptoms.       The history is provided by the patient. No  was used.     Review of patient's allergies indicates:   Allergen Reactions    Adhesive tape-silicones Other (See Comments)     Other reaction(s): BLISTERS    Penicillins Hives and Itching    Sulfamethoxazole-trimethoprim Hives and Itching    Prosed ec [meth-hyos-atrp-m blue-ba-phsal] Hives, Itching and Swelling    Pyridium [phenazopyridine]     Cephalexin Nausea And Vomiting and Other (See Comments)     Dehydration     Past Medical History:   Diagnosis Date    Abnormal Pap smear     Anemia     history    Anxiety     Cancer     uterine-cancer cells     Colon polyps, next C-scope 2019. 4/22/2014    Dry eyes     Dyspareunia, female 6/24/2020    Essential " hypertension, started in her mid 30's 1/18/2017    Family history of malignant neoplasm of pancreas 6/1/2018    Gastric bypass status for obesity 8/21/2012    GERD (gastroesophageal reflux disease)     Helicobacter pylori gastritis, 2008. 8/20/2014    Hemangioma of liver, stable 2010, 2012, right lobe 8/20/2014    Hematuria     History of cosmetic surgeries, tummy tuck 2011. 4/5/2013    History of frquent UTI 6/24/2020    HTN (hypertension) 8/21/2012    120s-130s/80s    Kidney stone     Right sided sciatica 6/1/2018    Sleep apnea     patient does not use the C-PAP mask-cannot tolerate    Ureteral stone 4/8/2014    Urinary incontinence     Urinary retention     Varicose veins of lower extremity with inflammation 4/8/2015    Vertigo     mild    Vitamin D deficiency disease 6/1/2018     Past Surgical History:   Procedure Laterality Date    Abdominoplasty with Liposcuction      APPENDECTOMY      CERVICAL BIOPSY  W/ LOOP ELECTRODE EXCISION      Prior to hysterectomy    CHOLECYSTECTOMY      Laparoscopic    COLONOSCOPY N/A 2/13/2020    Procedure: COLONOSCOPY;  Surgeon: Pb Smith MD;  Location: [facility] ([address] [place] [address]);  Service: Endoscopy;  Laterality: N/A;    EPIDURAL STEROID INJECTION INTO LUMBAR SPINE Bilateral 3/15/2024    Procedure: B/L L5-S1 TFESI;  Surgeon: Glendy Banks MD;  Location: [facility]IN [facility];  Service: Pain Management;  Laterality: Bilateral;  20 mins    ESOPHAGOGASTRODUODENOSCOPY N/A 11/2/2021    Procedure: EGD (ESOPHAGOGASTRODUODENOSCOPY);  Surgeon: Clayton Martínez MD;  [facility]  Service: Endoscopy;  Laterality: N/A;  fully vac. 3/15/21 / instr portal -ml    GASTRIC BYPASS      HERNIA REPAIR      HYSTERECTOMY  1980     TVH  both ovaries remain     Family History   Problem Relation Age of Onset    Heart disease Mother     Stroke Mother     Coronary artery disease Mother     Heart failure Mother     Obesity Mother     Cancer Father         pancreatic cancer    Diabetes  Father     Obesity Father     No Known Problems Sister     No Known Problems Brother     Hearing loss Maternal Grandmother     No Known Problems Maternal Grandfather     No Known Problems Paternal Grandmother     No Known Problems Paternal Grandfather     Breast cancer Maternal Aunt     Colon cancer Maternal Uncle     No Known Problems Paternal Aunt     No Known Problems Paternal Uncle     Ovarian cancer Neg Hx     Anesthesia problems Neg Hx     Amblyopia Neg Hx     Blindness Neg Hx     Cataracts Neg Hx     Glaucoma Neg Hx     Hypertension Neg Hx     Macular degeneration Neg Hx     Retinal detachment Neg Hx     Strabismus Neg Hx     Thyroid disease Neg Hx      Social History     Tobacco Use    Smoking status: Never    Smokeless tobacco: Never    Tobacco comments:     .  .   Occup:  .     Substance Use Topics    Alcohol use: No    Drug use: No     Review of Systems   Constitutional:  Positive for appetite change. Negative for activity change, chills and fever.   HENT:  Negative for congestion, rhinorrhea, sneezing and sore throat.    Respiratory:  Negative for cough, choking, shortness of breath and wheezing.    Cardiovascular:  Positive for chest pain (intermittent). Negative for palpitations.   Gastrointestinal:  Positive for constipation (resolved), nausea and vomiting. Negative for abdominal pain and diarrhea.   Musculoskeletal:         + L ankle pain    Neurological:  Positive for numbness (L arm; intermittent). Negative for dizziness, syncope, light-headedness and headaches.   All other systems reviewed and are negative.      Physical Exam     Initial Vitals [24 0744]   BP Pulse Resp Temp SpO2   131/82 91 20 97.9 °F (36.6 °C) 96 %      MAP       --         Physical Exam    Nursing note and vitals reviewed.  Constitutional: She appears well-developed and well-nourished. No distress.   HENT:   Head: Normocephalic and atraumatic.   Eyes: Conjunctivae are normal.   Neck:   Normal  range of motion.  Cardiovascular:  Normal rate, regular rhythm and normal heart sounds.           No murmur heard.  Pulmonary/Chest: Breath sounds normal. No respiratory distress.   Abdominal: Abdomen is soft. Bowel sounds are normal. She exhibits no distension and no mass. There is abdominal tenderness in the epigastric area. There is no rebound and no guarding.   Musculoskeletal:         General: No tenderness or edema. Normal range of motion.      Cervical back: Normal range of motion.     Neurological: She is alert and oriented to person, place, and time. GCS score is 15. GCS eye subscore is 4. GCS verbal subscore is 5. GCS motor subscore is 6.   Skin: Skin is warm and dry.   Psychiatric: She has a normal mood and affect. Her behavior is normal.         ED Course   Procedures  Labs Reviewed   POCT URINALYSIS W/O SCOPE - Abnormal; Notable for the following components:       Result Value    Ketones, UA 1+ (*)     Spec Grav UA >=1.030 (*)     Leukocytes, UA Trace (*)     All other components within normal limits   POCT LIVER PANEL - Abnormal; Notable for the following components:    POC  (*)     All other components within normal limits   POCT CMP - Abnormal; Notable for the following components:    Calcium, POC 10.6 (*)     POC Chloride 97 (*)     All other components within normal limits   CULTURE, URINE   TROPONIN ISTAT   TROPONIN ISTAT   POCT CBC   POCT URINALYSIS(INSTRUMENT)   POCT CMP   POCT TROPONIN   POCT LIVER PANEL   POCT TROPONIN     EKG Readings: (Independently Interpreted)   Initial Reading: No STEMI. Rhythm: Normal Sinus Rhythm. Heart Rate: 85. Ectopy: No Ectopy. Conduction: Normal. ST Segments: Normal ST Segments. T Waves Flipped: III. Clinical Impression: Normal Sinus Rhythm Other Impression: independenlty interpreted by me       Imaging Results              CT Abdomen Pelvis With IV Contrast NO Oral Contrast (Final result)  Result time 03/27/24 09:51:54      Final result by Jam Doe  MD [facility] (03/27/24 09:51:54)                   Impression:      1. No acute intra-abdominal/pelvic CT abnormalities to account for the reported history of abdominal pain.  2. Prominent retained fecal material throughout the visualized colon suggesting constipation.  3. Gastric bypass and stable hiatal hernia.  4. Right abdominal wall intermuscular lipoma with intra-abdominal component, similar when compared to the previous CT.  5. Additional stable chronic findings as detailed in the body of the report.      Electronically signed by: Jam Doe MD  Date:    03/27/2024  Time:    09:51               Narrative:    EXAMINATION:  CT ABDOMEN PELVIS WITH IV CONTRAST    CLINICAL HISTORY:  Abdominal pain, acute, nonlocalized;Nausea/vomiting;    TECHNIQUE:  5 mm axial images were obtained through the abdomen and pelvis following administration of 100 cc of Omnipaque 350 IV contrast.  Coronal and sagittal reformats were performed.    COMPARISON:  CT 07/13/2021.    FINDINGS:  Visualized heart is normal in size.  Severe dense coronary artery atherosclerosis in a 3 vessel distribution.  Moderate calcification of the aortic annulus.  No pericardial effusion.    Stable bibasilar high attenuation micronodules, likely benign.  Partially visualized subsegmental area of increased attenuation at the level of the lingula, likely atelectasis or scarring.  No pleural effusion.    The liver is normal in size.  Hepatic parenchyma demonstrates homogeneous enhancement without focal lesions.  Punctate calcified granuloma at the periphery of the left lobe.  No intrahepatic bile duct dilatation.    The gallbladder is surgically absent.  Borderline prominence of the extrahepatic bile ducts with tapering at the level of the ampulla.    Postoperative change of Emmie-en-Y gastric bypass.  Excluded stomach appears decompressed.  Slight intact hiatal hernia.    Duodenum, spleen and pancreas appear within normal limits.    Stable nodular thickening of  the bilateral adrenal glands, likely related to underlying benign adenomas.    Kidneys are normal in size and location.  No enhancing cortical lesions.  No nephrolithiasis.  No hydronephrosis or hydroureter.  Bladder is fluid filled and unremarkable.    Uterus is surgically absent.  Ovaries appear unchanged.  No pelvic free fluid.    Small bowel is normal in caliber.  Ill-defined soft tissue attenuation tract at the level of the left upper quadrant small bowel mesentery that appears to extend between the distal duodenum and proximal jejunum, stable when compared to the previous exam and possibly postoperative change or a scarred fistulous tract.  Moderate amount of retained fecal material throughout the visualized colon.  The appendix is surgically absent.  No obstruction or inflammatory changes.    The abdominal aorta tapers normally with severe atherosclerotic calcification.  Celiac artery, SMA, bilateral renal arteries and JACEK demonstrate appropriate contrast opacification.  IVC and iliac veins are patent.    No ascites or intra-abdominal free air.  No abdominal or pelvic lymph node enlargement.  Stable right cardiophrenic lymph node.  No focal mesenteric masses.    Persistent intermuscular lipoma at the right abdominal wall with intra-abdominal component that appears to displace the adjacent peritoneum, similar when compared to the to the previous CT.  Remaining visualized subcutaneous soft tissues appear within normal limits.    Degenerative changes of the spine.  No acute osseous abnormalities.                                       X-Ray Ankle Complete Left (Final result)  Result time 03/27/24 09:01:30      Final result by Luan Dickens MD (03/27/24 09:01:30)                   Impression:      Unremarkable examination.      Electronically signed by: Luan Dickens  Date:    03/27/2024  Time:    09:01               Narrative:    EXAMINATION:  XR ANKLE COMPLETE 3 VIEW LEFT    CLINICAL HISTORY:  Pain in left  ankle and joints of left foot    TECHNIQUE:  AP, lateral and oblique views of the left ankle were performed.    COMPARISON:  None    FINDINGS:  No bone, joint, or soft tissue abnormality is seen.                                       X-Ray Chest AP Portable (Final result)  Result time 03/27/24 08:51:01      Final result by Luan Dickens MD (03/27/24 08:51:01)                   Impression:      No acute abnormality.      Electronically signed by: Luan Dickens  Date:    03/27/2024  Time:    08:51               Narrative:    EXAMINATION:  XR CHEST AP PORTABLE    CLINICAL HISTORY:  Chest Pain;    TECHNIQUE:  Single frontal view of the chest was performed.    COMPARISON:  Chest radiograph 05/7/2021    FINDINGS:  Lines and tubes: None.    Heart and mediastinum: Magnified by AP technique.    Pleura: No pleural effusion or pneumothorax.    Lungs: Lung volumes are moderate.  No focal consolidations or evidence of pulmonary edema.  Unchanged bandlike opacity in the left midlung zone, likely scarring.    Soft tissue/bone: Unremarkable.                                       Medications   sodium chloride 0.9% bolus 1,000 mL 1,000 mL (1,000 mLs Intravenous New Bag 3/27/24 0840)   ondansetron injection 4 mg (4 mg Intravenous Given 3/27/24 0810)   iohexoL (OMNIPAQUE 350) injection 100 mL (75 mLs Intravenous Given 3/27/24 0921)   promethazine (PHENERGAN) 12.5 mg in dextrose 5 % (D5W) 50 mL IVPB (12.5 mg Intravenous New Bag 3/27/24 1008)     Medical Decision Making  63F with a PMHx of GERD,HTN, who presents to the ED with complaint of worsening nausea/vomiting that began 2 days ago. On exam patient has epigastric tenderness with normal bowel sounds, soft abdomen, normal heart rate, and normal breath sounds. In shared decision making with the patient I will order labs, imaging, and a EKG. Will give IVFs and zofran. Nausea was worse after zofran, so phenergan given and it improved. I considered but excluded fever, sepsis,  dehydration, DUSTIN, gastric outlet obstruction, bowel obstruction, acute pancreatic, acute MI in in my differential diagnosis. No signs of obstruction on infection on CT. Will treat symptoms with phenergan. Stop norco and mounjaro as these may be contributing to her symptoms.     Amount and/or Complexity of Data Reviewed  Labs: ordered.  Radiology: ordered.  ECG/medicine tests: ordered and independent interpretation performed.    Risk  Prescription drug management.            Scribe Attestation:   Scribe #1: I performed the above scribed service and the documentation accurately describes the services I performed. I attest to the accuracy of the note.                   Medical Decision Making:   History:   Old Medical Records: I decided to obtain old medical records.           I, Dr. Kaity Mohamud, personally performed the services described in this documentation.   All medical record entries made by the scribe were at my direction and in my presence.   I have reviewed the chart and agree that the record is accurate and complete.   Kaity Mohamud MD.  8:27 AM 03/27/2024     Clinical Impression:  Final diagnoses:  [R07.9] Chest pain  [M25.572] Left ankle pain  [R11.2] Nausea and vomiting, unspecified vomiting type (Primary)          ED Disposition Condition    Discharge Stable          ED Prescriptions       Medication Sig Dispense Start Date End Date Auth. Provider    promethazine (PHENERGAN) 12.5 MG Tab Take 1 tablet (12.5 mg total) by mouth every 6 (six) hours as needed for Nausea. 20 tablet 3/27/2024 -- Kaity Mohamud MD    predniSONE (DELTASONE) 20 MG tablet Take 2 tablets at once on days 1&2, then 1 tablet daily on days 3&4 6 tablet 3/27/2024 -- Kaity Mohamud MD    methocarbamoL (ROBAXIN) 500 MG Tab Take 2 tablets (1,000 mg total) by mouth 3 (three) times daily. for 5 days 30 tablet 3/27/2024 4/1/2024 Kaity Mohamud MD          Follow-up Information       Follow up With Specialties Details Why Contact Info    [facility]STRO[facility]OLOG[facility] Gastroenterology Schedule an appointment as soon as possible for a visit   [address] [facility] [place] [address] [phone]             Kaity Mohamud MD  03/27/24 114

## 2024-03-27 NOTE — DISCHARGE INSTRUCTIONS
Your ER work up did not determine the cause of your nausea and vomiting. Your norco and mounjaro may be contributing to your symptoms. Stop these medications. Take the new medications as directed. Follow up with your PCP and with GI.

## 2024-03-28 ENCOUNTER — PATIENT OUTREACH (OUTPATIENT)
Dept: EMERGENCY MEDICINE | Facility: HOSPITAL | Age: 64
End: 2024-03-28
Payer: MEDICARE

## 2024-03-28 ENCOUNTER — OFFICE VISIT (OUTPATIENT)
Dept: ENDOCRINOLOGY | Facility: CLINIC | Age: 64
End: 2024-03-28
Payer: MEDICARE

## 2024-03-28 VITALS
SYSTOLIC BLOOD PRESSURE: 110 MMHG | DIASTOLIC BLOOD PRESSURE: 66 MMHG | BODY MASS INDEX: 29.55 KG/M2 | HEART RATE: 76 BPM | HEIGHT: 63 IN | WEIGHT: 166.81 LBS

## 2024-03-28 DIAGNOSIS — E66.09 CLASS 1 OBESITY DUE TO EXCESS CALORIES WITHOUT SERIOUS COMORBIDITY WITH BODY MASS INDEX (BMI) OF 30.0 TO 30.9 IN ADULT: ICD-10-CM

## 2024-03-28 DIAGNOSIS — M81.0 OSTEOPOROSIS WITHOUT CURRENT PATHOLOGICAL FRACTURE, UNSPECIFIED OSTEOPOROSIS TYPE: ICD-10-CM

## 2024-03-28 DIAGNOSIS — Z98.84 GASTRIC BYPASS STATUS FOR OBESITY: Primary | ICD-10-CM

## 2024-03-28 DIAGNOSIS — E55.9 VITAMIN D DEFICIENCY DISEASE: ICD-10-CM

## 2024-03-28 PROBLEM — E66.811 CLASS 1 OBESITY DUE TO EXCESS CALORIES WITHOUT SERIOUS COMORBIDITY WITH BODY MASS INDEX (BMI) OF 30.0 TO 30.9 IN ADULT: Status: ACTIVE | Noted: 2024-03-28

## 2024-03-28 LAB
OHS QRS DURATION: 74 MS
OHS QTC CALCULATION: 442 MS

## 2024-03-28 PROCEDURE — 99204 OFFICE O/P NEW MOD 45 MIN: CPT | Mod: S$GLB,,, | Performed by: HOSPITALIST

## 2024-03-28 PROCEDURE — 99999 PR PBB SHADOW E&M-EST. PATIENT-LVL V: CPT | Mod: PBBFAC,,, | Performed by: HOSPITALIST

## 2024-03-28 RX ORDER — SODIUM CHLORIDE 0.9 % (FLUSH) 0.9 %
10 SYRINGE (ML) INJECTION
OUTPATIENT
Start: 2024-04-02

## 2024-03-28 RX ORDER — ZOLEDRONIC ACID 5 MG/100ML
5 INJECTION, SOLUTION INTRAVENOUS
OUTPATIENT
Start: 2024-04-02

## 2024-03-28 RX ORDER — HEPARIN 100 UNIT/ML
500 SYRINGE INTRAVENOUS
OUTPATIENT
Start: 2024-04-02

## 2024-03-28 NOTE — PROGRESS NOTES
Patient was seen in the on 3/27/24. Phoned patient on 2 separate occasions to assist with Post ED Discharge Navigation. Patient was unavailable. Message left on voicemail. Encounter closed.  Drake Carter

## 2024-03-28 NOTE — PROGRESS NOTES
"Subjective:      Patient ID: Cherie Richards is a 63 y.o. female presented to [facility] on 3/28/2024.  Chief complaint:  Osteoporosis    History of Present illness: Cherie Richards is a 63 y.o. female here for  osteoporosis  Other significant past medical history: Gastric Bypass 8/2012, obesity      1) Osteoporosis  - Diagnosis on DXA in it 1/3/2024  - NEVER been on medication  - Vit D intake?/Calcium intake? Vit D 5000iu (decrease from 98483 IU due to hypervitaminosis), on calcium OTC supplement   - History of falls over the last 2 years: no  - History of fractures to wrist, hip or spine: no  - Hx of gastric bypass surgery in 8/2012  - Hx of epidural injection for her back, sciatic    Osteoporosis Risk Factor Assessment:  - Family history of osteoporosis: yes, mom  - Family history of fractures of hip: yes, mom with fracture of elbow and arm without   - History of loss of height of more than 1 1/2 to 2 inches: no, measured height in clinic: 5'2.5", previously reported 5'3"  - Age of menopause: partial Hysterectomy in her 30s   - Tobacco use, including use in the past:  no   - Patient lives with:  Family at home   - Walking gait:  Unsteady due to her back/sciatica    Medical hx  - Dental work planned? no, no dentist regularly, implant 3 months ago healed  - Chronic kidney disease: no  - History of Hyperparathyrodism, no  - History of kidney stones, yes  - History of cancer or malignancy, history of malignancy, or prior radiation treatment , no  - Walking around the house    Recent DXA: Reviewed   01/03/2024  Lumbar spine (L1-L4):  BMD is 0.772 g/cm2, T-score is -2.5, and Z-score is -0..  Total hip: BMD is 0.774 g/cm2, T-score is -1.4, and Z-score is -0.1.  Femoral neck: BMD is 0.586 g/cm2, T-score is -2.4, and Z-score is -0.8.  Distal 1/3 radius: Not applicable     FRAX:  12% risk of a major osteoporotic fracture in the next 10 years.  2.2% risk of hip fracture in the next 10 years.   " "  Impression:  *Osteoporosis based on T-score of -2.5.  *Fracture risk is high.  *Compared with previous DXA, BMD at the lumbar spine has declined by -9%, and BMD at the total hip has declined by -7.2%.                                            Lab work reviewed  Lab Results   Component Value Date    DVRYLMPH978S 53 08/29/2014    AKTWLPUA87YU 120 (H) 04/13/2023    SBKBRFRB33PT 35 12/11/2020    LZOTWOEB52TX 57 01/24/2020    CALCIUM 10.0 03/19/2024    CALCIUM 9.7 01/23/2024    CALCIUM 9.7 12/07/2023    PHOS 3.3 01/23/2024    PHOS 3.5 02/13/2009    PHOS 3.3 11/11/2008    ALKPHOS 115 03/19/2024    ALKPHOS 83 12/07/2023    ALKPHOS 133 04/13/2023    EGFRNORACEVR >60.0 03/19/2024     Lab Results   Component Value Date    TSH 1.221 03/19/2024       2) Obesity  - Hx of gastric bypass surgery in 8/2012  - Patient with history of obesity, Current weight:166, Body mass index is 30.02 kg/m².  - Prediabetes history  - currently Mounjaro per PCP    Wt Readings from Last 3 Encounters:   03/28/24 0951 75.7 kg (166 lb 12.8 oz)   03/27/24 0744 74.8 kg (165 lb)   03/22/24 1024 78.2 kg (172 lb 6.4 oz)     The patient's medications, allergies, past medical, surgical, social and family histories were reviewed and updated as appropriate.  Review of Systems: pertinent positives as per the above HPI, and otherwise negative.    Objective:   /66   Pulse 76   Ht 5' 2.5" (1.588 m)   Wt 75.7 kg (166 lb 12.8 oz)   BMI 30.02 kg/m²   Body mass index is 30.02 kg/m².  Vital signs reviewed    Physical Exam  Vitals and nursing note reviewed.   Constitutional:       Appearance: Normal appearance. She is well-developed. She is not ill-appearing.   Neck:      Thyroid: No thyromegaly.   Pulmonary:      Effort: Pulmonary effort is normal. No respiratory distress.   Musculoskeletal:         General: Normal range of motion.      Cervical back: Normal range of motion.   Neurological:      General: No focal deficit present.      Mental Status: She is " alert. Mental status is at baseline.   Psychiatric:         Mood and Affect: Mood normal.         Behavior: Behavior normal.       Labs reviewed:  See results in subjective  Lab Results   Component Value Date    HGBA1C 5.7 (H) 04/13/2023     Lab Results   Component Value Date    CHOL 209 (H) 03/19/2024    HDL 71 03/19/2024    LDLCALC 123.0 03/19/2024    TRIG 75 03/19/2024    CHOLHDL 34.0 03/19/2024     Lab Results   Component Value Date     (L) 03/19/2024    K 4.1 03/19/2024     03/19/2024    CO2 24 03/19/2024     03/19/2024    BUN 12 03/19/2024    CREATININE 0.8 03/19/2024    CALCIUM 10.0 03/19/2024    PHOS 3.3 01/23/2024    PROT 7.5 03/19/2024    ALBUMIN 4.1 03/19/2024    BILITOT 0.4 03/19/2024    ALKPHOS 115 03/19/2024    AST 19 03/19/2024    ALT 25 03/19/2024    ANIONGAP 9 03/19/2024    EGFRNORACEVR >60.0 03/19/2024    TSH 1.221 03/19/2024    [id number] 120 (H) 04/13/2023     Assessment     1. Gastric bypass status for obesity, 08-.        2. Osteoporosis without current pathological fracture, unspecified osteoporosis type  Ambulatory referral/consult to Endocrinology      3. Vitamin D deficiency disease        4. Class 1 obesity due to excess calories without serious comorbidity with body mass index (BMI) of 30.0 to 30.9 in adult           Plan     Osteoporosis without current pathological fracture  - Patient is here for evaluation of osteoporosis first noted on 1/3/2024  - Independently reviewed bone density images done 1/3/2024 osteoporosis of lumbar spine and femoral neck  - Never been on Osteoporosis medication  - Vit D 5000iu (decrease from 63773 IU due to hypervitaminosis), on calcium OTC supplement   - High fall risk, family history of osteoporosis  - History of gastric bypass 2012, avoid the use of oral bisphosphonate     Plan  - Discuss treatment option, Guidelines for osteoporosis recommend initiation of bisphosphonate as first-line therapy  - History of gastric bypass,  start her on IV Reclast yearly with [facility] infusion  - Duration of therapy:  Plan to treat IV Reclast 2 times, then repeat bone density in 2/2026 for re-evaluation  - Advised patient continue calcium supplements OTC as well as 5000 IU daily of vitamin D3.  - Fall precautions/Exercise regimen: advised, (weight bearing exercises recommended)  - Routine dental health screening advised, dental cleaning every 6 months  - Routine follow-up with me every yearly    Gastric bypass status for obesity, 08-.  - history of gastric bypass, 2008 and 2012.  Avoid the use of oral bisphosphonate.    Vitamin D deficiency disease  - agree with decreasing Vit D 5000iu (decrease from 03826 IU due to hypervitaminosis)  - on calcium OTC supplement     Class 1 obesity due to excess calories without serious comorbidity with body mass index (BMI) of 30.0 to 30.9 in adult  - Body mass index is 30.02 kg/m².  - on Mounjaro per PCP       Advised patient to follow up with PCP for routine health maintenance care.   RTC in Yearly with endocrinology for bone density    Benjy Hart M.D.  Endocrinology  [facility]  3/28/2024      Disclaimer: This note has been generated in part with the use of voice-recognition software. There may be typographical errors that have been missed during proof-reading.

## 2024-03-28 NOTE — PATIENT INSTRUCTIONS
Please call the infusion center at the number below if they have not contacted you within 1 weeks:  [facility]: [phone]     Zoledronic acid (Reclast) start  - Once yearly infusion that is typically given for 3 consecutive years (and occasionally up to 5 years)  - The day before and day of the infusion, drink plenty of fluids  - The day of the infusion, take over the counter tylenol one dose every six to eight hours for one day to minimize the joint pains that can occur with reclast  - Take an extra dose of over the counter vitamin D the day before the infusion  - DEXA scan should be performed at baseline and every 1-3 years on treatment (usually 2 years after starting therapy, then more or less frequently depending on stability of bone density)     - Zoledronic acid works by inhibiting bone resorption by inhibiting osteoclasts or their precursors, leading to an indirect increase in bone mineral density  - Most patients do not experience serious side-effects from Reclast but can include flu-like symptoms within 24 to 72 hours of the first dose. This may include a low-grade fever and muscle and joint pain. Treatment with a fever-reducing medication (Tylenol) generally improves the symptoms. Subsequent doses typically cause milder symptoms

## 2024-03-28 NOTE — ASSESSMENT & PLAN NOTE
- agree with decreasing Vit D 5000iu (decrease from 20146 IU due to hypervitaminosis)  - on calcium OTC supplement

## 2024-03-28 NOTE — ASSESSMENT & PLAN NOTE
- Patient is here for evaluation of osteoporosis first noted on 1/3/2024  - Independently reviewed bone density images done 1/3/2024 osteoporosis of lumbar spine and femoral neck  - Never been on Osteoporosis medication  - Vit D 5000iu (decrease from 90200 IU due to hypervitaminosis), on calcium OTC supplement   - High fall risk, family history of osteoporosis  - History of gastric bypass 2012, avoid the use of oral bisphosphonate     Plan  - Discuss treatment option, Guidelines for osteoporosis recommend initiation of bisphosphonate as first-line therapy  - History of gastric bypass, start her on IV Reclast yearly with [facility]  - Duration of therapy:  Plan to treat IV Reclast 2 times, then repeat bone density in 2/2026 for re-evaluation  - Advised patient continue calcium supplements OTC as well as 5000 IU daily of vitamin D3.  - Fall precautions/Exercise regimen: advised, (weight bearing exercises recommended)  - Routine dental health screening advised, dental cleaning every 6 months  - Routine follow-up with me every yearly

## 2024-03-29 LAB — BACTERIA UR CULT: NORMAL

## 2024-04-04 ENCOUNTER — CLINICAL SUPPORT (OUTPATIENT)
Dept: REHABILITATION | Facility: HOSPITAL | Age: 64
End: 2024-04-04
Payer: MEDICARE

## 2024-04-04 DIAGNOSIS — M53.86 DECREASED RANGE OF MOTION OF LUMBAR SPINE: ICD-10-CM

## 2024-04-04 DIAGNOSIS — M62.81 WEAKNESS OF TRUNK MUSCULATURE: Primary | ICD-10-CM

## 2024-04-04 PROCEDURE — 97530 THERAPEUTIC ACTIVITIES: CPT | Mod: PN,CQ

## 2024-04-04 PROCEDURE — 97112 NEUROMUSCULAR REEDUCATION: CPT | Mod: PN,CQ

## 2024-04-04 NOTE — PROGRESS NOTES
"  Physical Therapy Progress Note     Name: Cherie Richards  Clinic Number: [id number]    Therapy Diagnosis:   Encounter Diagnoses   Name Primary?    Weakness of trunk musculature Yes    Decreased range of motion of lumbar spine      Physician: Tracy Chand MD    Visit Date: 4/4/2024    Physician Orders: PT Eval and Treat   Medical Diagnosis from Referral: M54.16 (ICD-10-CM) - Lumbar back pain with radiculopathy affecting left lower extremity      Evaluation Date: 2/26/2024  Plan of Care Expiration: 5/25/24     Authorization Period Expiration: 12/31/24  Visit # / Visits authorized: 3/ 10        Time In:  0900AM  Time Out: 1000AM     Total Billable Time: 30 minutes 1:1     Precautions: Standard    Subjective     Pt reports: she's doing a little better.      She was compliant with home exercise program.  Response to previous treatment: okay  Functional change: ongoing    Pain:no numerical value given/10  Location: left lumbar, left hip, left LE     Objective     Cherie received therapeutic exercises to develop strength for 20 minutes including:    HL Hip Add w/ ball 2x10, 3"  Supine Clamshells YTB 2x10  Sidelying clam x 20  Shuttle Squats 1.5 bands 3x8    Cherie participated in neuromuscular re-education activities to improve: Muscles Re-Education, Coordination, Kinesthetic, and Proprioception for 25 minutes. The following activities were included:    LTR x 20  Piriformis stretch 20 sec x 4  PPT x 20  TrA recruitment 2 x 10  Supine Sciatic nerve glide x 20    Cherie participated in dynamic functional therapeutic activities to improve functional performance for 10  minutes, including:    NuStep 10 minutes    Cherie received hot pack for 10 minutes to low back in supine.      Home Exercises Provided and Patient Education Provided     Education provided:   Cont to perform HEP as provided.     Written Home Exercises Provided: Patient instructed to cont prior HEP.  Exercises were reviewed and Cherie was able to " demonstrate them prior to the end of the session.  Cherie demonstrated good  understanding of the education provided.     See EMR under Patient Instructions for exercises provided prior visit.    Assessment     Pt tolerated the above tx session well without increased symptoms. Continued with previously prescribe therEx, and pt requires min cueing with pelvic mobility and abdominal response with supine therex.  No c/o increased discomfort with prescribed activities.  Good response to lumbar stabilization therex.      Cherie Is progressing well towards her goals.   Pt prognosis is Good.     Pt will continue to benefit from skilled outpatient physical therapy to address the deficits listed in the problem list box on initial evaluation, provide pt/family education and to maximize pt's level of independence in the home and community environment.     Pt's spiritual, cultural and educational needs considered and pt agreeable to plan of care and goals.    Anticipated barriers to physical therapy: none    Goals: Short Term Goals (4 Weeks):  Updated 3/21/24  not MET     1.Pt to increase strength by a 1/2 grade of muscles test to allow for improvement in functional activities such as performing chores.  2.Pt to improve range of motion by 25% to allow for improved functional mobility to allow for improvement in IADLs.   3.Pt to report compliance with HEP and demonstrate proper exercise technique to PT to show competence with self management of condition.  4.Decrease pain by 25% during functional activities.     Long Term Goals (12 Weeks):      1. Increase ROM to allow improved joint biomechanics during functional activities.   2.Increase trunk and lower extremity strength to within normal limits during functional activities.   3. Independent with home exercise program.   4. Full return to functional activities with manageable complaints.  5. Patient to demonstrate improved posture and body mechanics.  6. Decrease pain by 75%  during functional activities.    Plan     Cont skilled PT session towards PT and patient's goals.    Sulema Mendez, PTA   04/04/2024

## 2024-04-09 ENCOUNTER — OFFICE VISIT (OUTPATIENT)
Dept: PAIN MEDICINE | Facility: CLINIC | Age: 64
End: 2024-04-09
Payer: MEDICARE

## 2024-04-09 VITALS
OXYGEN SATURATION: 97 % | DIASTOLIC BLOOD PRESSURE: 65 MMHG | HEART RATE: 69 BPM | WEIGHT: 166.44 LBS | RESPIRATION RATE: 18 BRPM | SYSTOLIC BLOOD PRESSURE: 118 MMHG | BODY MASS INDEX: 29.96 KG/M2

## 2024-04-09 DIAGNOSIS — M54.16 LUMBAR RADICULOPATHY: ICD-10-CM

## 2024-04-09 DIAGNOSIS — M47.816 LUMBAR SPONDYLOSIS: ICD-10-CM

## 2024-04-09 DIAGNOSIS — M54.16 LUMBAR BACK PAIN WITH RADICULOPATHY AFFECTING LEFT LOWER EXTREMITY: ICD-10-CM

## 2024-04-09 DIAGNOSIS — M51.36 DDD (DEGENERATIVE DISC DISEASE), LUMBAR: Primary | ICD-10-CM

## 2024-04-09 PROCEDURE — 3078F DIAST BP <80 MM HG: CPT | Mod: CPTII,S$GLB,, | Performed by: PAIN MEDICINE

## 2024-04-09 PROCEDURE — 1101F PT FALLS ASSESS-DOCD LE1/YR: CPT | Mod: CPTII,S$GLB,, | Performed by: PAIN MEDICINE

## 2024-04-09 PROCEDURE — 4010F ACE/ARB THERAPY RXD/TAKEN: CPT | Mod: CPTII,S$GLB,, | Performed by: PAIN MEDICINE

## 2024-04-09 PROCEDURE — 1125F AMNT PAIN NOTED PAIN PRSNT: CPT | Mod: CPTII,S$GLB,, | Performed by: PAIN MEDICINE

## 2024-04-09 PROCEDURE — 3074F SYST BP LT 130 MM HG: CPT | Mod: CPTII,S$GLB,, | Performed by: PAIN MEDICINE

## 2024-04-09 PROCEDURE — 1159F MED LIST DOCD IN RCRD: CPT | Mod: CPTII,S$GLB,, | Performed by: PAIN MEDICINE

## 2024-04-09 PROCEDURE — 99999 PR PBB SHADOW E&M-EST. PATIENT-LVL V: CPT | Mod: PBBFAC,,, | Performed by: PAIN MEDICINE

## 2024-04-09 PROCEDURE — 99204 OFFICE O/P NEW MOD 45 MIN: CPT | Mod: S$GLB,,, | Performed by: PAIN MEDICINE

## 2024-04-09 PROCEDURE — 1160F RVW MEDS BY RX/DR IN RCRD: CPT | Mod: CPTII,S$GLB,, | Performed by: PAIN MEDICINE

## 2024-04-09 PROCEDURE — 3008F BODY MASS INDEX DOCD: CPT | Mod: CPTII,S$GLB,, | Performed by: PAIN MEDICINE

## 2024-04-09 PROCEDURE — 3288F FALL RISK ASSESSMENT DOCD: CPT | Mod: CPTII,S$GLB,, | Performed by: PAIN MEDICINE

## 2024-04-09 RX ORDER — GABAPENTIN 300 MG/1
600 CAPSULE ORAL 3 TIMES DAILY
Qty: 180 CAPSULE | Refills: 11 | Status: SHIPPED | OUTPATIENT
Start: 2024-04-09 | End: 2025-04-09

## 2024-04-09 NOTE — PROGRESS NOTES
Subjective:     Patient ID: Cherie Richards is a 63 y.o. female    Chief Complaint: Low-back Pain (Left sided throbbing pain radiating into hip and leg area)      Referred by: Tracy Chand MD      HPI:    Initial Encounter (4/9/24):  Cherie Richards is a 63 y.o. female who presents today with chronic left-sided low back and lower extremity pain.  Patient states that she has had this pain to some degree for years, but has been particularly bad over the past year and has been worsening ever since.  The pain is located in the left lower lumbar/lumbosacral region radiates the left lower extremity consistent with the L4 and/or L5 dermatomes.  She reports associated paresthesias extending all the way to her toes.  She denies any focal weakness but does feel that her left lower extremity feels weaker than the right.  She denies any associated bowel bladder dysfunction.  Pain is constant and worsened with activity.  Patient has undergone bilateral L5-S1 transforaminal epidural steroid injections.  No relief noted.  Patient does report having nausea and vomiting and other side effects following this injection.  She has not very eager to undergo additional injections at this time.  She is also attending outpatient physical therapy.  She does find that this is somewhat helpful.  She is interested in additional therapy sessions.  She is taking gabapentin.  Taking a relatively small dosage.  She is interested in scaling this up gradually to see if this provides additional benefit.   This pain is described in detail below.    Physical Therapy:  Yes.  Currently attending.    Non-pharmacologic Treatment:  Rest helps         TENS?  No    Pain Medications:         Currently taking:  Tylenol, baclofen, Flexeril, Voltaren gel, gabapentin, Aleve    Has tried in the past:  Norco    Has not tried:   TCAs, SNRIs    Blood thinners:  None    Interventional Therapies:   3/15/24 - bilateral L5-S1 transforaminal epidural steroid  injections - no relief noted    Relevant Surgeries:  None    Affecting sleep?  Yes    Affecting daily activities? yes    Depressive symptoms? No          SI/HI? No    Work status: Employed    Pain Scores:    Best:       4/10  Worst:     10/10  Usually:   7/10  Today:    7/10    Pain Disability Index  Family/Home Responsibilities:: 7  Recreation:: 5  Social Activity:: 1  Occupation:: 6  Sexual Behavior:: 8  Self Care:: 2  Life-Support Activities:: 0  Pain Disability Index (PDI): 29    Review of Systems   Constitutional:  Negative for activity change, appetite change, chills, fatigue, fever and unexpected weight change.   HENT:  Negative for hearing loss.    Eyes:  Negative for visual disturbance.   Respiratory:  Negative for chest tightness and shortness of breath.    Cardiovascular:  Negative for chest pain.   Gastrointestinal:  Negative for abdominal pain, constipation, diarrhea, nausea and vomiting.   Genitourinary:  Negative for difficulty urinating.   Musculoskeletal:  Positive for back pain, gait problem and myalgias. Negative for neck pain.   Skin:  Negative for rash.   Neurological:  Positive for weakness and numbness. Negative for dizziness, light-headedness and headaches.   Psychiatric/Behavioral:  Positive for sleep disturbance. Negative for hallucinations and suicidal ideas. The patient is not nervous/anxious.        Past Medical History:   Diagnosis Date    Abnormal Pap smear     Anemia     history    Anxiety     Cancer     uterine-cancer cells     Colon polyps, next C-scope 2019. 4/22/2014    Dry eyes     Dyspareunia, female 6/24/2020    Essential hypertension, started in her mid 30's 1/18/2017    Family history of malignant neoplasm of pancreas 6/1/2018    Gastric bypass status for obesity 8/21/2012    GERD (gastroesophageal reflux disease)     Helicobacter pylori gastritis, 2008. 8/20/2014    Hemangioma of liver, stable 2010, 2012, right lobe 8/20/2014    Hematuria     History of cosmetic surgeries,  tummy tuck . 2013    History of frquent UTI 2020    HTN (hypertension) 2012    120s-130s/80s    Kidney stone     Right sided sciatica 2018    Sleep apnea     patient does not use the C-PAP mask-cannot tolerate    Ureteral stone 2014    Urinary incontinence     Urinary retention     Varicose veins of lower extremity with inflammation 2015    Vertigo     mild    Vitamin D deficiency disease 2018       Past Surgical History:   Procedure Laterality Date    Abdominoplasty with Liposcuction      APPENDECTOMY      CERVICAL BIOPSY  W/ LOOP ELECTRODE EXCISION      Prior to hysterectomy    CHOLECYSTECTOMY      Laparoscopic    COLONOSCOPY N/A 2020    Procedure: COLONOSCOPY;  Surgeon: Pb Smith MD;  Location: [facility] ([address] [place] [address]);  Service: Endoscopy;  Laterality: N/A;    EPIDURAL STEROID INJECTION INTO LUMBAR SPINE Bilateral 3/15/2024    Procedure: B/L L5-S1 TFESI;  Surgeon: Glendy Banks MD;  Location: [facility];  Service: Pain Management;  Laterality: Bilateral;  20 mins    ESOPHAGOGASTRODUODENOSCOPY N/A 2021    Procedure: EGD (ESOPHAGOGASTRODUODENOSCOPY);  Surgeon: Clayton Martínez MD;  Location: [facility] ([address] [place] [address]);  Service: Endoscopy;  Laterality: N/A;  fully vac. 3/15/21 / instr portal -ml    GASTRIC BYPASS      HERNIA REPAIR      HYSTERECTOMY  1980     TVH  both ovaries remain       Social History     Socioeconomic History    Marital status:    Occupational History    Occupation:    Tobacco Use    Smoking status: Never    Smokeless tobacco: Never    Tobacco comments:     .  .   Occup:  .     Substance and Sexual Activity    Alcohol use: No    Drug use: No    Sexual activity: Yes     Partners: Male     Birth control/protection: Surgical   Social History Narrative    Patient is     She and her  own business together.    [place] [employer] [place]    Her son is also involved in the  business        [place] barg[place] for physical activity.         Feels safe in her home and with spouse.      Social Determinants of Health     Financial Resource Strain: Patient Declined (12/18/2023)    Overall Financial Resource Strain (CARDIA)     Difficulty of Paying Living Expenses: Patient declined   Food Insecurity: Patient Declined (12/18/2023)    Hunger Vital Sign     Worried About Running Out of Food in the Last Year: Patient declined     Ran Out of Food in the Last Year: Patient declined   Transportation Needs: Patient Declined (12/18/2023)    PRAPARE - Transportation     Lack of Transportation (Medical): Patient declined     Lack of Transportation (Non-Medical): Patient declined   Physical Activity: Sufficiently Active (12/18/2023)    Exercise Vital Sign     Days of Exercise per Week: 5 days     Minutes of Exercise per Session: 40 min   Stress: Stress Concern Present (12/18/2023)    [employer] [place] [employer] - Occupational Stress Questionnaire     Feeling of Stress : Very much   Social Connections: Unknown (12/18/2023)    Social Connection and Isolation Panel [NHANES]     Frequency of Communication with Friends and Family: Three times a week     Frequency of Social Gatherings with Friends and Family: Patient declined     Active Member of Clubs or Organizations: Patient declined     Attends Club or Organization Meetings: Patient declined     Marital Status:    Housing Stability: Patient Declined (12/18/2023)    Housing Stability Vital Sign     Unable to Pay for Housing in the Last Year: Patient declined     Number of Places Lived in the Last Year: 1     Unstable Housing in the Last Year: Patient declined       Review of patient's allergies indicates:   Allergen Reactions    Adhesive tape-silicones Other (See Comments)     Other reaction(s): BLISTERS    Penicillins Hives and Itching    Sulfamethoxazole-trimethoprim Hives and Itching    Prosed ec [meth-hyos-atrp-m blue-ba-phsal] Hives,  Itching and Swelling    Pyridium [phenazopyridine]     Cephalexin Nausea And Vomiting and Other (See Comments)     Dehydration       Current Outpatient Medications on File Prior to Visit   Medication Sig Dispense Refill    acetaminophen (TYLENOL) 500 MG tablet Take 1-2 tablets (500-1,000 mg total) by mouth 3 (three) times daily as needed for Pain.  0    albuterol (PROVENTIL/VENTOLIN HFA) 90 mcg/actuation inhaler Inhale 1-2 puffs into the lungs every 4 (four) hours as needed for Shortness of Breath (coughing). Rescue 8.5 g 11    ALPRAZolam (XANAX) 0.5 MG tablet TAKE 1 TABLET(0.5 MG) BY MOUTH EVERY NIGHT AS NEEDED FOR INSOMNIA OR ANXIETY 30 tablet 5    amLODIPine (NORVASC) 5 MG tablet Take 1 tablet (5 mg total) by mouth once daily. 30 tablet 11    baclofen (LIORESAL) 10 MG tablet Take 1 tablet (10 mg total) by mouth 3 (three) times daily as needed (muscle spasm). 90 tablet 11    buPROPion (WELLBUTRIN SR) 100 MG TBSR 12 hr tablet Take 1 tablet (100 mg total) by mouth 2 (two) times daily. 180 tablet 3    cholecalciferol, vitamin D3, 5,000 unit Tab Take by mouth. 1 Tablet Oral Every day      cranberry extract (ELLURA) 200 mg Cap Take 1 tablet by mouth once daily. 90 capsule 3    cyanocobalamin 1,000 mcg/mL injection INJECT 1 ML (CC) INTRAMUSCULARLY EVERY TWO WEEKS 2 mL 11    cyclobenzaprine (FLEXERIL) 10 MG tablet Take 1 tablet (10 mg total) by mouth every evening. 30 tablet 6    cycloSPORINE (RESTASIS) 0.05 % ophthalmic emulsion Instill 1 drop into both eyes twice daily 180 each 3    diclofenac sodium (VOLTAREN) 1 % Gel Apply 2 g topically 4 (four) times daily as needed (pain). 400 g 3    hydroCHLOROthiazide (HYDRODIURIL) 12.5 MG Tab Take 1 tablet (12.5 mg total) by mouth once daily. 30 tablet 11    losartan (COZAAR) 100 MG tablet Take 1 tablet (100 mg total) by mouth every morning. 90 tablet 3    metoprolol succinate (TOPROL-XL) 25 MG 24 hr tablet Take 1 tablet (25 mg total) by mouth once daily. 90 tablet 3     "olopatadine (PATANOL) 0.1 % ophthalmic solution Place 1 drop into both eyes 2 (two) times daily. 5 mL 6    ondansetron (ZOFRAN-ODT) 4 MG TbDL Dissolve 1 tablet (4 mg total) on the tongue every 8 (eight) hours as needed. 30 tablet 1    pantoprazole (PROTONIX) 40 MG tablet Take 1 tablet (40 mg total) by mouth 2 (two) times daily. 180 tablet 3    predniSONE (DELTASONE) 20 MG tablet Take 2 tablets at once on days 1&2, then 1 tablet daily on days 3&4 6 tablet 0    promethazine (PHENERGAN) 12.5 MG Tab Take 1 tablet (12.5 mg total) by mouth every 6 (six) hours as needed for Nausea. 20 tablet 0    syringe with needle (BD LUER-CLARIBEL SYRINGE) 3 mL 25 x 1 1/2 " Syrg Use as directed with Cyanocobalamin 10 Syringe 3    tirzepatide 10 mg/0.5 mL PnIj Inject 10 mg into the skin every 7 days. 12 pen 1    tretinoin (RETIN-A) 0.1 % cream Apply to affected area Topical Every day 45 g 11    [DISCONTINUED] gabapentin (NEURONTIN) 300 MG capsule Take 1 capsule (300 mg total) by mouth 3 (three) times daily. 90 capsule 11     No current facility-administered medications on file prior to visit.       Objective:      /65 (BP Location: Left arm, Patient Position: Sitting, BP Method: Medium (Automatic))   Pulse 69   Resp 18   Wt 75.5 kg (166 lb 7.2 oz)   SpO2 97%   BMI 29.96 kg/m²     Exam:  GEN:  Well developed, well nourished.  No acute distress.  Normal pain behavior.  HEENT:  No trauma.  Mucous membranes moist.  Nares patent bilaterally.  PSYCH: Normal affect. Thought content appropriate.  CHEST:  Breathing symmetric.  No audible wheezing.  ABD: Soft, non-distended.  SKIN:  Warm, pink, dry.  No rash on exposed areas.    EXT:  No cyanosis, clubbing, or edema.  No color change or changes in nail or hair growth.  NEURO/MUSCULOSKELETAL:  Fully alert, oriented, and appropriate. Speech normal otf. No cranial nerve deficits.   Gait:  Normal.  No trendelenburg sign bilaterally.   Motor Strength:  5/5 motor strength throughout lower " extremities.   Sensory:  No sensory deficit in the lower extremities.   Reflexes:  2+ and symmetric patellar DTRs.  Unable to elicit bilateral Achilles DTRs.  No clonus or spasticity.  L-Spine:  Limited ROM with pain on extension more than flexion.  Negative pain with axial/facet loading bilaterally.  Positive SLR on the left.    Positive TTP over left lower lumbar paraspinals      Imaging:    Narrative & Impression    EXAMINATION:  MRI LUMBAR SPINE WITHOUT CONTRAST     CLINICAL HISTORY:  Lumbar radiculopathy, symptoms persist with conservative treatment; Radiculopathy, lumbar region     TECHNIQUE:  Multiplanar, multisequence MR images were acquired from the thoracolumbar junction to the sacrum without the administration of contrast.     COMPARISON:  02/14/2024     FINDINGS:  Alignment: Normal.     Vertebrae: Rudimentary disc at S1-S2.     Please note there is a transition type lumbosacral segment with nomenclature as above.  Correlation with imaging suggested prior to any intervention.     No aggressive marrow replacement process or fracture.Nobone marrow edema .     Discs: Disc space narrowing T12-L1 L1-L2 L2-L3 with multilevel desiccation.     Cord: Normal. Conus terminates at L1-L2.     Degenerative findings:     T12-L1: There is no focal disc herniation. No significant central canal narrowing . No significant neural foraminal narrowing.     L1-L2: Broad based mild diffuse bulging of disc material .  No significant central canal narrowing . No significant neural foraminal narrowing.     L2-L3: Broad based mild diffuse bulging of disc material .  No significant central canal narrowing . No significant neural foraminal narrowing.     L3-L4: There is no focal disc herniation. No significant central canal narrowing . No significant neural foraminal narrowing.     L4-L5: There is no focal disc herniation. No significant central canal narrowing . No significant neural foraminal narrowing.     L5-S1: There is no focal  disc herniation.  Mild central canal narrowing.  Moderate facet hypertrophy.  Note made of facet effusion about the bilateral facet joints  consistent with active inflammatory change, presumably degenerative in nature. No significant central canal narrowing . Moderate LEFT neural foraminal narrowing.     Paraspinal muscles & soft tissues: Unremarkable.     Impression:     Degenerative changes lumbar spine predominantly L5-S1 with facet hypertrophy and moderate LEFT neural foraminal encroachment.  Level by level details as above.        Electronically signed by: Darrell Silva MD  Date:                                            03/03/2024  Time:                                           05:20       Assessment:       Encounter Diagnoses   Name Primary?    Lumbar back pain with radiculopathy affecting left lower extremity     DDD (degenerative disc disease), lumbar Yes    Lumbar spondylosis     Lumbar radiculopathy      Plan:       Cherie was seen today for low-back pain.    Diagnoses and all orders for this visit:    DDD (degenerative disc disease), lumbar  -     gabapentin (NEURONTIN) 300 MG capsule; Take 2 capsules (600 mg total) by mouth 3 (three) times daily.  -     Ambulatory referral/consult to Physical/Occupational Therapy; Future    Lumbar back pain with radiculopathy affecting left lower extremity  -     Ambulatory referral/consult to Pain Clinic    Lumbar spondylosis  -     gabapentin (NEURONTIN) 300 MG capsule; Take 2 capsules (600 mg total) by mouth 3 (three) times daily.  -     Ambulatory referral/consult to Physical/Occupational Therapy; Future    Lumbar radiculopathy  -     gabapentin (NEURONTIN) 300 MG capsule; Take 2 capsules (600 mg total) by mouth 3 (three) times daily.  -     Ambulatory referral/consult to Physical/Occupational Therapy; Future        Cherie Richards is a 63 y.o. female with chronic left-sided low back and lower extremity pain.  Pain appears to be radicular.  Most consistent  with the L4 and/or L5 dermatomes.  Transitional anatomy noted.  Does have left-sided L5-S1 foraminal stenosis.  I suspect this is the source of her pain.    Pertinent imaging studies reviewed by me. Imaging results were discussed with patient.  Gradually increase gabapentin to 600 mg t.i.d. as tolerated/needed.  Patient was given instructions on how to do this.    Proceed with additional physical therapy.  New orders placed today.  Return to clinic in 8 weeks or sooner if needed.  At that time we will discuss efficacy of physical therapy/home exercise program and increase dosage of gabapentin.  May consider left L5 (infraneural)  and/or left S1 transforaminal epidural steroid injection.  If warranted.          This note was created by combination of typed  and M-Modal dictation. Transcription and phonetic errors may be present.  If there are any questions, please contact me.

## 2024-04-09 NOTE — PATIENT INSTRUCTIONS
Gabapentin 300mg pills  Start taking one pill at night. (1 pill total per day.)  After three days, start taking one pill in the morning and one pill at night (2 pills total per day.)  After three days start taking one pill in the morning, one pill in the afternoon and one pill at night. (3 pills total per day.)  After three days start taking one pill in the morning, one pill in the afternoon and two pills at night. (4 pills total per day.)  After three days start taking two pills in the morning, one pill in the afternoon and two pills at night. (5 pills total per day.)  After three days start taking two pills in the morning, two pills in the afternoon and two pills at night. (6 pills total per day.)  Stay at this dose until next visit.   If experiencing any side effects take highest dose tolerated.

## 2024-04-10 ENCOUNTER — TELEPHONE (OUTPATIENT)
Dept: INFUSION THERAPY | Facility: HOSPITAL | Age: 64
End: 2024-04-10
Payer: MEDICARE

## 2024-04-10 DIAGNOSIS — M54.2 NECK PAIN: ICD-10-CM

## 2024-04-10 NOTE — TELEPHONE ENCOUNTER
No care due was identified.  [facility] Embedded Care Due Messages. Reference number: [id number].   4/10/2024 7:34:15 AM CDT

## 2024-04-10 NOTE — TELEPHONE ENCOUNTER
Refill Routing Note   Medication(s) are not appropriate for processing by [facility] for the following reason(s):        Outside of protocol    [facility] action(s):  Route               Appointments  past 12m or future 3m with PCP    Date Provider   Last Visit   3/22/2024 Tracy Chand MD   Next Visit   Visit date not found Tracy Chand MD   ED visits in past 90 days: 3        Note composed:8:28 AM 04/10/2024

## 2024-04-11 ENCOUNTER — CLINICAL SUPPORT (OUTPATIENT)
Dept: REHABILITATION | Facility: HOSPITAL | Age: 64
End: 2024-04-11
Payer: MEDICARE

## 2024-04-11 DIAGNOSIS — M62.81 WEAKNESS OF TRUNK MUSCULATURE: Primary | ICD-10-CM

## 2024-04-11 DIAGNOSIS — M53.86 DECREASED RANGE OF MOTION OF LUMBAR SPINE: ICD-10-CM

## 2024-04-11 PROCEDURE — 97112 NEUROMUSCULAR REEDUCATION: CPT | Mod: PN

## 2024-04-11 PROCEDURE — 97530 THERAPEUTIC ACTIVITIES: CPT | Mod: PN

## 2024-04-11 PROCEDURE — 97110 THERAPEUTIC EXERCISES: CPT | Mod: PN

## 2024-04-11 RX ORDER — HYDROCODONE BITARTRATE AND ACETAMINOPHEN 5; 325 MG/1; MG/1
1 TABLET ORAL EVERY 12 HOURS PRN
Qty: 12 TABLET | Refills: 0 | Status: SHIPPED | OUTPATIENT
Start: 2024-04-11 | End: 2024-04-18

## 2024-04-11 NOTE — PROGRESS NOTES
"  Physical Therapy Daily treatment Note     Name: Cherie Richards  Clinic Number: [id number]    Therapy Diagnosis:   Encounter Diagnoses   Name Primary?    Weakness of trunk musculature Yes    Decreased range of motion of lumbar spine      Physician: Tracy Chand MD    Visit Date: 4/11/2024    Physician Orders: PT Eval and Treat   Medical Diagnosis from Referral: M54.16 (ICD-10-CM) - Lumbar back pain with radiculopathy affecting left lower extremity      Evaluation Date: 2/26/2024  Plan of Care Expiration: 5/25/24     Authorization Period Expiration: 12/31/24  Visit # / Visits authorized: 3/ 10        Time In:  0850  Time Out: 0949     Total Billable Time: 55 minutes     Precautions: Standard    Subjective     Pt reports: she's continues to improve with therapy.     She was compliant with home exercise program.  Response to previous treatment: good  Functional change: increase in walking    Pain:3/10  Location: left lumbar, left hip, left LE     Objective     Cherie received therapeutic exercises to develop strength for 20 minutes including:    HL Hip Add w/ ball 2x10, 3"  Supine Clamshells YTB 2x10  Sidelying clam x 20  Shuttle Squats 1.5 bands 3x8    Cherie participated in neuromuscular re-education activities to improve: Muscles Re-Education, Coordination, Kinesthetic, and Proprioception for 25 minutes. The following activities were included:    LTR x 20  Piriformis stretch 20 sec x 4  PPT x 20  TrA recruitment 2 x 10  Supine Sciatic nerve glide x 20    Cherie participated in dynamic functional therapeutic activities to improve functional performance for 10  minutes, including:    NuStep 10 minutes    Cherie received hot pack for 10 minutes to low back in supine.      Home Exercises Provided and Patient Education Provided     Education provided:   Cont to perform HEP as provided.     Written Home Exercises Provided: Patient instructed to cont prior HEP.  Exercises were reviewed and Cherie was able to " demonstrate them prior to the end of the session.  Cherie demonstrated good  understanding of the education provided.     See EMR under Patient Instructions for exercises provided prior visit.    Assessment     Pt presents ambulating with guarded gait, decreased lumbopelvic mobility. Requires decreased cueing with pelvic mobility and abdominal response with supine therex. No c/o increased discomfort with prescribed activities. Cherie Is progressing well towards her goals.     Pt prognosis is Good.     Pt will continue to benefit from skilled outpatient physical therapy to address the deficits listed in the problem list box on initial evaluation, provide pt/family education and to maximize pt's level of independence in the home and community environment.     Pt's spiritual, cultural and educational needs considered and pt agreeable to plan of care and goals.    Anticipated barriers to physical therapy: none    Goals: Short Term Goals (4 Weeks):  Updated 3/21/24  not MET     1.Pt to increase strength by a 1/2 grade of muscles test to allow for improvement in functional activities such as performing chores.  2.Pt to improve range of motion by 25% to allow for improved functional mobility to allow for improvement in IADLs.   3.Pt to report compliance with HEP and demonstrate proper exercise technique to PT to show competence with self management of condition.  4.Decrease pain by 25% during functional activities.     Long Term Goals (12 Weeks):      1. Increase ROM to allow improved joint biomechanics during functional activities.   2.Increase trunk and lower extremity strength to within normal limits during functional activities.   3. Independent with home exercise program.   4. Full return to functional activities with manageable complaints.  5. Patient to demonstrate improved posture and body mechanics.  6. Decrease pain by 75% during functional activities.    Plan     Cont skilled PT session towards PT and  patient's goals.    Momo Javed, PT   04/11/2024

## 2024-04-16 ENCOUNTER — TELEPHONE (OUTPATIENT)
Dept: INFUSION THERAPY | Facility: HOSPITAL | Age: 64
End: 2024-04-16
Payer: MEDICARE

## 2024-04-16 ENCOUNTER — OFFICE VISIT (OUTPATIENT)
Dept: OPTOMETRY | Facility: CLINIC | Age: 64
End: 2024-04-16
Payer: MEDICARE

## 2024-04-16 DIAGNOSIS — H25.13 NUCLEAR SCLEROSIS, BILATERAL: Primary | ICD-10-CM

## 2024-04-16 DIAGNOSIS — H52.03 HYPEROPIA OF BOTH EYES WITH ASTIGMATISM AND PRESBYOPIA: ICD-10-CM

## 2024-04-16 DIAGNOSIS — H52.203 HYPEROPIA OF BOTH EYES WITH ASTIGMATISM AND PRESBYOPIA: ICD-10-CM

## 2024-04-16 DIAGNOSIS — H10.13 ALLERGIC CONJUNCTIVITIS, BILATERAL: ICD-10-CM

## 2024-04-16 DIAGNOSIS — H52.4 HYPEROPIA OF BOTH EYES WITH ASTIGMATISM AND PRESBYOPIA: ICD-10-CM

## 2024-04-16 DIAGNOSIS — H16.223 KERATOCONJUNCTIVITIS SICCA, NOT SPECIFIED AS SJÖGREN'S, BILATERAL: ICD-10-CM

## 2024-04-16 DIAGNOSIS — Z13.5 GLAUCOMA SCREENING: ICD-10-CM

## 2024-04-16 PROCEDURE — 1125F AMNT PAIN NOTED PAIN PRSNT: CPT | Mod: CPTII,S$GLB,, | Performed by: OPTOMETRIST

## 2024-04-16 PROCEDURE — 99999 PR PBB SHADOW E&M-EST. PATIENT-LVL III: CPT | Mod: PBBFAC,,, | Performed by: OPTOMETRIST

## 2024-04-16 PROCEDURE — 1159F MED LIST DOCD IN RCRD: CPT | Mod: CPTII,S$GLB,, | Performed by: OPTOMETRIST

## 2024-04-16 PROCEDURE — 99204 OFFICE O/P NEW MOD 45 MIN: CPT | Mod: S$GLB,,, | Performed by: OPTOMETRIST

## 2024-04-16 PROCEDURE — 92015 DETERMINE REFRACTIVE STATE: CPT | Mod: S$GLB,,, | Performed by: OPTOMETRIST

## 2024-04-16 PROCEDURE — 4010F ACE/ARB THERAPY RXD/TAKEN: CPT | Mod: CPTII,S$GLB,, | Performed by: OPTOMETRIST

## 2024-04-16 RX ORDER — CYCLOSPORINE 0.5 MG/ML
EMULSION OPHTHALMIC
Qty: 180 EACH | Refills: 3 | Status: SHIPPED | OUTPATIENT
Start: 2024-04-16

## 2024-04-16 NOTE — PROGRESS NOTES
HPI    DLS:11/04/2014 Wenceslao Doshi Last Year     Patient here for annual check up. Last eye exam x 3 years([place])  Vision blurry w/wo eyeglasses.  Occasional floaters no flashes.  Eye ache constant 6 on pain scale.(Allergy)    Eye drops:Patanol prn OU, Ran out of Restasis     Last edited by Oscar Doshi, OD on 4/16/2024  9:30 AM.            Assessment /Plan     For exam results, see Encounter Report.    Nuclear sclerosis, bilateral  -Educated patient on presence of cataracts at today's exam, monitor at annual dilated fundus exam. 5+ years surgical estimate.    Keratoconjunctivitis sicca, not specified as Sjögren's, bilateral  -     cycloSPORINE (RESTASIS) 0.05 % ophthalmic emulsion; Instill 1 drop into both eyes twice daily  Dispense: 180 each; Refill: 3  -Restasis BID    Allergic conjunctivitis, bilateral  -Pataday QAM PRN for itch    Glaucoma screening  -Monitor with annual eye exam and IOP check    Hyperopia of both eyes with astigmatism and presbyopia  Eyeglass Final Rx       Eyeglass Final Rx         Sphere Cylinder Axis Dist VA Add    Right +0.25 +0.50 005 20/25-- +2.50    Left +0.75 +0.50 005 20/40 +2.50      Expiration Date: 4/16/2025                      RTC 1 yr

## 2024-04-19 ENCOUNTER — DOCUMENTATION ONLY (OUTPATIENT)
Dept: REHABILITATION | Facility: HOSPITAL | Age: 64
End: 2024-04-19

## 2024-04-19 NOTE — PROGRESS NOTES
"   [facility]                          Canceled Therapy Appointment     Cherie Richards  MRN: [id number]    Patient canceled today's therapy appt on 4/19/2024 via [facility] SYSTEM MESSAGE [[employer]] selecting "Covid-19 concerns" as reasons.    Renetta He, PTA  4/19/2024          "

## 2024-04-22 DIAGNOSIS — M54.2 NECK PAIN: ICD-10-CM

## 2024-04-22 RX ORDER — HYDROCODONE BITARTRATE AND ACETAMINOPHEN 5; 325 MG/1; MG/1
1 TABLET ORAL EVERY 12 HOURS PRN
Qty: 12 TABLET | Refills: 0 | Status: CANCELLED | OUTPATIENT
Start: 2024-04-22 | End: 2024-04-29

## 2024-04-22 NOTE — TELEPHONE ENCOUNTER
No care due was identified.  [facility] Embedded Care Due Messages. Reference number: [id number].   4/22/2024 6:01:55 PM CDT

## 2024-04-23 NOTE — TELEPHONE ENCOUNTER
Refill Routing Note   Medication(s) are not appropriate for processing by [facility] for the following reason(s):        Outside of protocol    [facility] action(s):  Route               Appointments  past 12m or future 3m with PCP    Date Provider   Last Visit   3/22/2024 Tracy Chand MD   Next Visit   Visit date not found Tracy Chand MD   ED visits in past 90 days: 3        Note composed:7:32 PM 04/22/2024

## 2024-04-24 ENCOUNTER — OFFICE VISIT (OUTPATIENT)
Dept: GASTROENTEROLOGY | Facility: CLINIC | Age: 64
End: 2024-04-24
Payer: MEDICARE

## 2024-04-24 ENCOUNTER — TELEPHONE (OUTPATIENT)
Dept: ENDOSCOPY | Facility: HOSPITAL | Age: 64
End: 2024-04-24
Payer: MEDICARE

## 2024-04-24 VITALS
HEIGHT: 62 IN | HEART RATE: 70 BPM | SYSTOLIC BLOOD PRESSURE: 155 MMHG | RESPIRATION RATE: 18 BRPM | BODY MASS INDEX: 31.58 KG/M2 | WEIGHT: 171.63 LBS | DIASTOLIC BLOOD PRESSURE: 91 MMHG

## 2024-04-24 DIAGNOSIS — R11.10 REGURGITATION AND RECHEWING: Primary | ICD-10-CM

## 2024-04-24 DIAGNOSIS — K58.2 IRRITABLE BOWEL SYNDROME WITH BOTH CONSTIPATION AND DIARRHEA: ICD-10-CM

## 2024-04-24 PROCEDURE — 4010F ACE/ARB THERAPY RXD/TAKEN: CPT | Mod: CPTII,S$GLB,, | Performed by: STUDENT IN AN ORGANIZED HEALTH CARE EDUCATION/TRAINING PROGRAM

## 2024-04-24 PROCEDURE — 1101F PT FALLS ASSESS-DOCD LE1/YR: CPT | Mod: CPTII,S$GLB,, | Performed by: STUDENT IN AN ORGANIZED HEALTH CARE EDUCATION/TRAINING PROGRAM

## 2024-04-24 PROCEDURE — 3008F BODY MASS INDEX DOCD: CPT | Mod: CPTII,S$GLB,, | Performed by: STUDENT IN AN ORGANIZED HEALTH CARE EDUCATION/TRAINING PROGRAM

## 2024-04-24 PROCEDURE — 1125F AMNT PAIN NOTED PAIN PRSNT: CPT | Mod: CPTII,S$GLB,, | Performed by: STUDENT IN AN ORGANIZED HEALTH CARE EDUCATION/TRAINING PROGRAM

## 2024-04-24 PROCEDURE — 3080F DIAST BP >= 90 MM HG: CPT | Mod: CPTII,S$GLB,, | Performed by: STUDENT IN AN ORGANIZED HEALTH CARE EDUCATION/TRAINING PROGRAM

## 2024-04-24 PROCEDURE — 3288F FALL RISK ASSESSMENT DOCD: CPT | Mod: CPTII,S$GLB,, | Performed by: STUDENT IN AN ORGANIZED HEALTH CARE EDUCATION/TRAINING PROGRAM

## 2024-04-24 PROCEDURE — 99999 PR PBB SHADOW E&M-EST. PATIENT-LVL V: CPT | Mod: PBBFAC,,, | Performed by: STUDENT IN AN ORGANIZED HEALTH CARE EDUCATION/TRAINING PROGRAM

## 2024-04-24 PROCEDURE — 99214 OFFICE O/P EST MOD 30 MIN: CPT | Mod: S$GLB,,, | Performed by: STUDENT IN AN ORGANIZED HEALTH CARE EDUCATION/TRAINING PROGRAM

## 2024-04-24 PROCEDURE — 3077F SYST BP >= 140 MM HG: CPT | Mod: CPTII,S$GLB,, | Performed by: STUDENT IN AN ORGANIZED HEALTH CARE EDUCATION/TRAINING PROGRAM

## 2024-04-24 NOTE — TELEPHONE ENCOUNTER
"----- Message from Karla Squires MD sent at 2024  9:07 AM CDT -----  Regarding: EGD w BRAVO  Procedure: EGD w BRAVO 96 hr study, off PPI    Diagnosis: regurgitation    Procedure Timin-12 weeks    #If within 4 weeks selected, please amilcar as high priority#    #If greater than 12 weeks, please select "5-12 weeks" and delay sending until 3 months prior to requested date#     Provider: Myself    Location:  Endo    Additional Scheduling Information: No scheduling concerns    Prep Specifications:Standard prep    Is the patient taking a GLP-1 Agonist:yes    Have you attached a patient to this message: yes  "

## 2024-04-24 NOTE — PROGRESS NOTES
Gastroenterology Clinic    Reason for visit: The encounter diagnosis was Chronic constipation.  Referring Provider/PCP: Tracy Chand MD    History of Present Illness:  Cherie Richards is a 64 y.o. female with a history of gastric bypass in 2010, chronic back pain, who is presenting for initial evaluation of regurgitation, nausea and altered bowel habits.    The patient reports that for the past 2 years she has been having regurgitation of liquid and food into her mouth 2 to 3 times a week.  Associated with nausea but no vomiting.  She also has pain in her chest that feels like it is cardiac, she had a stress echo in 2021 but she did not reach target heart rate.  EF was normal and there was no evidence of ischemia.  She underwent an EGD in 2021 for abdominal pain, images personally reviewed, normal-appearing stomach with evidence of gastric bypass, normal jejunum.  She has been on PPI b.i.d. for many years.  She does not feel like that helps with her symptoms.  She denies heartburn per se ever since she had hiatal hernia repair before her gastric bypass surgery.  She also reports altered bowel habits, some weeks she has constipation having 2 bowel movements a week other weeks she has up to 3 bowel movements a day that are loose associated with incontinence.  She noticed that the diarrhea happens when she eats high-fiber foods such as cabbage.  She takes some fiber tablets and MiraLax to try to regulate her bowels.      Physical Exam:  Constitutional:  not in acute distress and well developed  HENT: Head: Normal, normocephalic, atraumatic.  Eyes: conjunctiva clear and sclera nonicteric  Skin: normal color  Neurological: alert, oriented x3  Psychiatric: mood and affect are within normal limits, pt is a good historian; no memory problems were noted    Laboratory:  Lab Results   Component Value Date/Time    HGB 13.9 03/19/2024 10:31 AM    HGB 13.7 12/07/2023 08:12 AM    AST 19 03/19/2024 10:31 AM    AST 15  12/07/2023 08:12 AM    ALT 25 03/19/2024 10:31 AM    ALT 15 12/07/2023 08:12 AM    BILITOT 0.4 03/19/2024 10:31 AM    BILITOT 0.4 12/07/2023 08:12 AM     Reviewed.  Normal Hgb, LFTs    Imaging:  See HPI.    Endoscopy:  See HPI    Assessment:  Cherie Richards is a 64 y.o. female who is presenting for initial evaluation of regurgitation, chest pain, altered bowel habits    Problems:  Regurgitation, chest pain  The patient reports episodes of regurgitating mostly nonacidic food/liquid 3 times a week.  This is associated with chest pain.  She had a stress test in 2021 that was negative.  She may need repeat cardiac evaluation but for now will perform EGD with Bravo for 96 hours to evaluate for excessive acid reflux as the cause of her symptoms.  She may need a manometry down the road but the patient refuses it due to severe discomfort on a prior attempt.  Will stop Protonix since she did not feel a difference on her symptoms.    2. IBS-M  She has alternating diarrhea and constipation, appears to be mixed IBS.  Recommend daily fiber supplement and MiraLax for constipation as needed.      Plan:  EGD w BRAVO off PPI  Fiber daily  Taper off protonix  F/u pending the above    Karla Squires MD  Gastroenterology and Hepatology    No orders of the defined types were placed in this encounter.

## 2024-04-24 NOTE — TELEPHONE ENCOUNTER
Spoke to patient to schedule procedure(s) Upper Endoscopy (EGD)       Physician to perform procedure(s) Dr. FRIDA Squires  Date of Procedure (s) 07/11/2024  Arrival Time 10:30 Am   Time of Procedure(s) 11:30 am   Location of Procedure(s) [facility] 2nd Floor--Enter at the rear of the building through the emergency department screening station or the Outpatient Registration door, then continue to endoscopy department on the 2nd floor.    Type of Rx Prep sent to patient: N/A  Instructions provided to patient via Copy in hand    Patient was informed on the following information and verbalized understanding. Screening questionnaire reviewed with patient and complete. If procedure requires anesthesia, a responsible adult needs to be present to accompany the patient home, patient cannot drive after receiving anesthesia. Appointment details are tentative, especially check-in time. Patient will receive a prep-op call 7 days prior to confirm check-in time for procedure. If applicable the patient should contact their pharmacy to verify Rx for procedure prep is ready for pick-up. Patient was advised to call the scheduling department at [phone] if pharmacy states no Rx is available. Patient was advised to call the endoscopy scheduling department if any questions or concerns arise.      [facility] Scheduling Department

## 2024-04-24 NOTE — PATIENT INSTRUCTIONS
Take fiber supplements everyday for your diarrhea/constipation  We will do an upper endoscopy with measuring acid in your stomach  Stop the protonix and see how you do, take one tablet a day for a week, then every other day for a week then twice a week for a week then stop

## 2024-04-29 ENCOUNTER — DOCUMENTATION ONLY (OUTPATIENT)
Dept: REHABILITATION | Facility: HOSPITAL | Age: 64
End: 2024-04-29
Payer: MEDICARE

## 2024-04-29 NOTE — PROGRESS NOTES
"   [facility]                          Canceled Therapy Appointment     Cherie Richards  MRN: [id number]    Patient canceled today's therapy appt on 4/29/2024 via [facility] MESSAGE [[employer]] selecting "Appt Time No Longer Works" as reasons.    Renetta He, PTA  4/29/2024          "

## 2024-04-30 ENCOUNTER — OFFICE VISIT (OUTPATIENT)
Dept: UROGYNECOLOGY | Facility: CLINIC | Age: 64
End: 2024-04-30
Payer: MEDICARE

## 2024-04-30 VITALS
HEIGHT: 62 IN | HEART RATE: 71 BPM | WEIGHT: 169 LBS | SYSTOLIC BLOOD PRESSURE: 123 MMHG | BODY MASS INDEX: 31.1 KG/M2 | DIASTOLIC BLOOD PRESSURE: 75 MMHG

## 2024-04-30 DIAGNOSIS — N39.41 URGENCY INCONTINENCE: ICD-10-CM

## 2024-04-30 DIAGNOSIS — N39.0 RECURRENT UTI: Primary | ICD-10-CM

## 2024-04-30 LAB
BILIRUB SERPL-MCNC: NORMAL MG/DL
BLOOD URINE, POC: NORMAL
CLARITY, POC UA: CLEAR
COLOR, POC UA: YELLOW
GLUCOSE UR QL STRIP: NORMAL
KETONES UR QL STRIP: NORMAL
LEUKOCYTE ESTERASE URINE, POC: NORMAL
NITRITE, POC UA: NORMAL
PH, POC UA: NORMAL
POC RESIDUAL URINE VOLUME: 172 ML (ref 0–100)
PROTEIN, POC: NORMAL
SPECIFIC GRAVITY, POC UA: 1.01
UROBILINOGEN, POC UA: NORMAL

## 2024-04-30 PROCEDURE — 51798 US URINE CAPACITY MEASURE: CPT | Mod: S$GLB,,, | Performed by: OBSTETRICS & GYNECOLOGY

## 2024-04-30 PROCEDURE — 99213 OFFICE O/P EST LOW 20 MIN: CPT | Mod: S$GLB,,, | Performed by: OBSTETRICS & GYNECOLOGY

## 2024-04-30 PROCEDURE — 81002 URINALYSIS NONAUTO W/O SCOPE: CPT | Mod: S$GLB,,, | Performed by: OBSTETRICS & GYNECOLOGY

## 2024-04-30 PROCEDURE — 1101F PT FALLS ASSESS-DOCD LE1/YR: CPT | Mod: CPTII,S$GLB,, | Performed by: OBSTETRICS & GYNECOLOGY

## 2024-04-30 PROCEDURE — 87086 URINE CULTURE/COLONY COUNT: CPT | Performed by: OBSTETRICS & GYNECOLOGY

## 2024-04-30 PROCEDURE — 87088 URINE BACTERIA CULTURE: CPT | Performed by: OBSTETRICS & GYNECOLOGY

## 2024-04-30 PROCEDURE — 3078F DIAST BP <80 MM HG: CPT | Mod: CPTII,S$GLB,, | Performed by: OBSTETRICS & GYNECOLOGY

## 2024-04-30 PROCEDURE — 1160F RVW MEDS BY RX/DR IN RCRD: CPT | Mod: CPTII,S$GLB,, | Performed by: OBSTETRICS & GYNECOLOGY

## 2024-04-30 PROCEDURE — 99999 PR PBB SHADOW E&M-EST. PATIENT-LVL IV: CPT | Mod: PBBFAC,,, | Performed by: OBSTETRICS & GYNECOLOGY

## 2024-04-30 PROCEDURE — 3008F BODY MASS INDEX DOCD: CPT | Mod: CPTII,S$GLB,, | Performed by: OBSTETRICS & GYNECOLOGY

## 2024-04-30 PROCEDURE — 4010F ACE/ARB THERAPY RXD/TAKEN: CPT | Mod: CPTII,S$GLB,, | Performed by: OBSTETRICS & GYNECOLOGY

## 2024-04-30 PROCEDURE — 87077 CULTURE AEROBIC IDENTIFY: CPT | Performed by: OBSTETRICS & GYNECOLOGY

## 2024-04-30 PROCEDURE — 3074F SYST BP LT 130 MM HG: CPT | Mod: CPTII,S$GLB,, | Performed by: OBSTETRICS & GYNECOLOGY

## 2024-04-30 PROCEDURE — 87186 SC STD MICRODIL/AGAR DIL: CPT | Performed by: OBSTETRICS & GYNECOLOGY

## 2024-04-30 PROCEDURE — 3288F FALL RISK ASSESSMENT DOCD: CPT | Mod: CPTII,S$GLB,, | Performed by: OBSTETRICS & GYNECOLOGY

## 2024-04-30 PROCEDURE — 1159F MED LIST DOCD IN RCRD: CPT | Mod: CPTII,S$GLB,, | Performed by: OBSTETRICS & GYNECOLOGY

## 2024-04-30 PROCEDURE — 1125F AMNT PAIN NOTED PAIN PRSNT: CPT | Mod: CPTII,S$GLB,, | Performed by: OBSTETRICS & GYNECOLOGY

## 2024-04-30 NOTE — PROGRESS NOTES
Chief Complaint   Patient presents with    Follow-up     Poss uti        HPI: Patient is a 64 y.o. female  presents today for a follow up visit for recurrent UTIs. Patient is currently experiencing burning with urination and some urinary urgency. She states symptoms started about three weeks ago.     Unable to do vaginal estrogen as it caused vaginal irritation and swelling. She is taking D-Mannose, Probiotics from [employer].    Drinking 3 glasses of cranberry juice and 4 glasses of water. May rarely have a cup of coffee.     She has overall noticed the urgency has been better except sometimes can't void and then she will start drinking the cranberry juice. May occasionally have urgency urinary incontinence and notices it more when she has the dysuria.     REVIEW OF SYSTEMS:  A full 14-point ROS was performed and was significant for those  mentioned in the HPI.     The following portions of the patient's history were reviewed and updated as appropriate: allergies, current medications, past medical history, past surgical history and problem list.    PHYSICAL EXAMINATION    Vitals:    24 1117   BP: 123/75   Pulse: 71        General: Healthy in appearance, Well nourished, Affect Normal, NAD.    Office Visit on 2024   Component Date Value Ref Range Status    POC Residual Urine Volume 2024 172 (A)  0 - 100 mL Final    Glucose, UA 2024 n   Final    Bilirubin, POC 2024 n   Final    Ketones, UA 2024 n   Final    Spec Grav UA 2024 1.015   Final    Blood, UA 2024 n   Final    pH, UA 2024 n   Final    Protein, POC 2024 trace   Final    Urobilinogen, UA 2024 n   Final    Nitrite, UA 2024 n   Final    WBC, UA 2024 n   Final    Color, UA 2024 Yellow   Final    Clarity, UA 2024 Clear   Final        ASSESSMENT & PLAN:  Recurrent UTI  -     Urine culture    Urgency incontinence  -     POCT Bladder Scan  -     POCT URINE DIPSTICK WITHOUT  MICROSCOPE       62 yo with a h/o recurrent UTI's and urge incontinence presents today for a follow up visit. Her last positive urine culture was on 10/27/23. She has been to Urgent Care in March on two occasions where a UA was done but no culture. She was treated both times with antibiotics.   Reviewed that we will send her urine today for culture. Discussed importance of adequate hydration since at least one UA showed ketones. Reviewed that she should try to titrate her fluids through the day and attempt to drink 6 glasses of water.     Reminded patient about the type of Probiotics to take with the lactobacillus crispatus and about the high potency cranberry extract- Lady Bugs and Cr[employer]aze. Discussed that the [employer]aze also has D-Mannose so she does not need to purchase separately. She is only drinking the cranberry juice when she gets symptoms and we discussed that likely the juice does not have the amount of extract needed to prevent infections but also by the time she starts it she likely already has the infection and it won't help.   She will return in 3 months for re-evaluation. Will treat based on her urine culture.   All questions were answered today. The patient was encouraged to contact the office as needed with any additional questions or concerns.     Total time spent on visit was 20 minutes.  This includes face to face time and non-face to face time preparing to see the patient (eg, review of tests), Obtaining and/or reviewing separately obtained history, Documenting clinical information in the electronic or other health record, Independently interpreting resultsand communicating results to the patient/family/caregiver, or Care coordination.    Adriana Waller MD

## 2024-05-03 ENCOUNTER — TELEPHONE (OUTPATIENT)
Dept: ENDOCRINOLOGY | Facility: CLINIC | Age: 64
End: 2024-05-03
Payer: MEDICARE

## 2024-05-03 DIAGNOSIS — N39.0 RECURRENT UTI: Primary | ICD-10-CM

## 2024-05-03 LAB — BACTERIA UR CULT: ABNORMAL

## 2024-05-03 RX ORDER — NITROFURANTOIN 25; 75 MG/1; MG/1
100 CAPSULE ORAL 2 TIMES DAILY
Qty: 14 CAPSULE | Refills: 0 | Status: SHIPPED | OUTPATIENT
Start: 2024-05-03 | End: 2024-05-11

## 2024-05-03 NOTE — LETTER
May 3, 2024    Cherie Richards  [address] [facility]  [place] [address]         [facility] - Endocrinology  [address]   [place] [address]  Phone: [phone]  Fax: [phone] May 3, 2024     Patient: Cherie Richards   YOB: 1960   Date of Visit: 5/3/2024       Please call the infusion center to schedule the Reclast infusion to treat your osteoporosis.      They have been trying to reach you.  Please call them soon  [facility]: [phone]    Sincerely,        Benjy Hart MD

## 2024-05-06 ENCOUNTER — TELEPHONE (OUTPATIENT)
Dept: INFUSION THERAPY | Facility: HOSPITAL | Age: 64
End: 2024-05-06
Payer: MEDICARE

## 2024-05-14 ENCOUNTER — TELEPHONE (OUTPATIENT)
Dept: INFUSION THERAPY | Facility: HOSPITAL | Age: 64
End: 2024-05-14
Payer: MEDICARE

## 2024-05-14 ENCOUNTER — TELEPHONE (OUTPATIENT)
Dept: ENDOSCOPY | Facility: HOSPITAL | Age: 64
End: 2024-05-14
Payer: MEDICARE

## 2024-05-14 NOTE — TELEPHONE ENCOUNTER
Return call back to the patient to confirm cancellation. NO answer left a message for patient to call the office back.

## 2024-05-14 NOTE — TELEPHONE ENCOUNTER
----- Message from Preethi Naik sent at 5/14/2024  9:38 AM CDT -----  Regarding: FW: Patient call back    ----- Message -----  From: Torie Casarez MA  Sent: 5/13/2024   1:07 PM CDT  To: [facility]  Subject: FW: Patient call back                            Please review/advise, thank you.  ----- Message -----  From: Yanna Jackson  Sent: 5/13/2024  12:31 PM CDT  To: [facility] Staff  Subject: Patient call back                                .Type: Patient Call Back    Who called:self     What is the request in detail:caller states she would like to cancel her procedure that is scheduled for 07/11/2024    Can the clinic reply by [facility]?no     Would the patient rather a call back or a response via [facility]?call     Best call back [phone]      Additional Information:

## 2024-05-28 ENCOUNTER — TELEPHONE (OUTPATIENT)
Dept: ENDOSCOPY | Facility: HOSPITAL | Age: 64
End: 2024-05-28
Payer: MEDICARE

## 2024-05-28 NOTE — TELEPHONE ENCOUNTER
----- Message from Preethi Naik sent at 5/14/2024  9:38 AM CDT -----  Regarding: FW: Patient call back    ----- Message -----  From: Torie Casarez MA  Sent: 5/13/2024   1:07 PM CDT  To: [facility] Patients  Subject: FW: Patient call back                            Please review/advise, thank you.  ----- Message -----  From: Yanna Jackson  Sent: 5/13/2024  12:31 PM CDT  To: [facility] Staff  Subject: Patient call back                                .Type: Patient Call Back    Who called:self     What is the request in detail:caller states she would like to cancel her procedure that is scheduled for 07/11/2024    Can the clinic reply by [facility]?no     Would the patient rather a call back or a response via [facility]?call     Best call back number:.[phone]      Additional Information:

## 2024-05-28 NOTE — TELEPHONE ENCOUNTER
Contacted the patient to schedule an endoscopy procedure(s) egd/colon. The patient did not answer the call and left a voice message requesting a call back.

## 2024-05-31 ENCOUNTER — OFFICE VISIT (OUTPATIENT)
Dept: PAIN MEDICINE | Facility: CLINIC | Age: 64
End: 2024-05-31
Payer: MEDICARE

## 2024-05-31 VITALS — DIASTOLIC BLOOD PRESSURE: 77 MMHG | HEART RATE: 77 BPM | OXYGEN SATURATION: 97 % | SYSTOLIC BLOOD PRESSURE: 139 MMHG

## 2024-05-31 DIAGNOSIS — M54.16 LUMBAR RADICULOPATHY: ICD-10-CM

## 2024-05-31 DIAGNOSIS — G89.4 CHRONIC PAIN SYNDROME: ICD-10-CM

## 2024-05-31 DIAGNOSIS — M51.36 DDD (DEGENERATIVE DISC DISEASE), LUMBAR: Primary | ICD-10-CM

## 2024-05-31 DIAGNOSIS — M54.16 LUMBAR BACK PAIN WITH RADICULOPATHY AFFECTING LEFT LOWER EXTREMITY: ICD-10-CM

## 2024-05-31 DIAGNOSIS — M47.816 LUMBAR SPONDYLOSIS: ICD-10-CM

## 2024-05-31 PROCEDURE — 1125F AMNT PAIN NOTED PAIN PRSNT: CPT | Mod: CPTII,S$GLB,,

## 2024-05-31 PROCEDURE — 1160F RVW MEDS BY RX/DR IN RCRD: CPT | Mod: CPTII,S$GLB,,

## 2024-05-31 PROCEDURE — 3078F DIAST BP <80 MM HG: CPT | Mod: CPTII,S$GLB,,

## 2024-05-31 PROCEDURE — 1101F PT FALLS ASSESS-DOCD LE1/YR: CPT | Mod: CPTII,S$GLB,,

## 2024-05-31 PROCEDURE — 3288F FALL RISK ASSESSMENT DOCD: CPT | Mod: CPTII,S$GLB,,

## 2024-05-31 PROCEDURE — 4010F ACE/ARB THERAPY RXD/TAKEN: CPT | Mod: CPTII,S$GLB,,

## 2024-05-31 PROCEDURE — 99213 OFFICE O/P EST LOW 20 MIN: CPT | Mod: S$GLB,,,

## 2024-05-31 PROCEDURE — 3075F SYST BP GE 130 - 139MM HG: CPT | Mod: CPTII,S$GLB,,

## 2024-05-31 PROCEDURE — 1159F MED LIST DOCD IN RCRD: CPT | Mod: CPTII,S$GLB,,

## 2024-05-31 PROCEDURE — 99999 PR PBB SHADOW E&M-EST. PATIENT-LVL IV: CPT | Mod: PBBFAC,,,

## 2024-05-31 NOTE — PROGRESS NOTES
Subjective:     Patient ID: Cherie Richards is a 64 y.o. female    Chief Complaint: Follow-up      Referred by: No ref. provider found      HPI:    Interval History PA (05/31/2024):  Patient returns to clinic for follow up of chronic left-sided lower back and extremity pain.  Since previous visit patient has attended a few physical therapy sessions.  Subsequently discontinued due to family related issues, notes difficulty with scheduling.  Overall notes benefit while attending physical therapy, states this does significantly help with her pain levels.  However she has had difficulty scheduling and is holding off on additional physical therapy at this time.  Does plan on resuming in the next few weeks.  Otherwise denies significant changes in the quality or location of her pain.  Pain continues to be located in her right lower lumbar/lumbosacral region radiating into her left lower extremity via L4 and/or L5 dermatome.  Continued paresthesia in left lower extremity.  Left lower extremity weakness persists although denies any worsening with the symptoms.  Denies any bowel or bladder dysfunction.  Since previous visit patient has increase gabapentin to 600 mg t.i.d. with some benefit, denies any adverse effects.    Initial Encounter (4/9/24):  Cherie Richards is a 64 y.o. female who presents today with chronic left-sided low back and lower extremity pain.  Patient states that she has had this pain to some degree for years, but has been particularly bad over the past year and has been worsening ever since.  The pain is located in the left lower lumbar/lumbosacral region radiates the left lower extremity consistent with the L4 and/or L5 dermatomes.  She reports associated paresthesias extending all the way to her toes.  She denies any focal weakness but does feel that her left lower extremity feels weaker than the right.  She denies any associated bowel bladder dysfunction.  Pain is constant and worsened with activity.   Patient has undergone bilateral L5-S1 transforaminal epidural steroid injections.  No relief noted.  Patient does report having nausea and vomiting and other side effects following this injection.  She has not very eager to undergo additional injections at this time.  She is also attending outpatient physical therapy.  She does find that this is somewhat helpful.  She is interested in additional therapy sessions.  She is taking gabapentin.  Taking a relatively small dosage.  She is interested in scaling this up gradually to see if this provides additional benefit.   This pain is described in detail below.    Physical Therapy:  Yes.  Currently attending.    Non-pharmacologic Treatment:  Rest helps         TENS?  No    Pain Medications:         Currently taking:  Tylenol, baclofen, Flexeril, Voltaren gel, gabapentin, Aleve    Has tried in the past:  Norco    Has not tried:   TCAs, SNRIs    Blood thinners:  None    Interventional Therapies:   3/15/24 - bilateral L5-S1 transforaminal epidural steroid injections - no relief noted    Relevant Surgeries:  None    Affecting sleep?  Yes    Affecting daily activities? yes    Depressive symptoms? No          SI/HI? No    Work status: Employed    Pain Scores:    Best:       6/10  Worst:     10/10  Usually:   9/10  Today:    9/10    Pain Disability Index  Family/Home Responsibilities:: 7  Recreation:: 7  Social Activity:: 10  Occupation:: 10  Sexual Behavior:: 8  Self Care:: 6  Life-Support Activities:: 7  Pain Disability Index (PDI): 55    Review of Systems   Constitutional:  Negative for activity change, appetite change, chills, fatigue, fever and unexpected weight change.   HENT:  Negative for hearing loss.    Eyes:  Negative for visual disturbance.   Respiratory:  Negative for chest tightness and shortness of breath.    Cardiovascular:  Negative for chest pain.   Gastrointestinal:  Negative for abdominal pain, constipation, diarrhea, nausea and vomiting.   Genitourinary:   Negative for difficulty urinating.   Musculoskeletal:  Positive for back pain, gait problem and myalgias. Negative for neck pain.   Skin:  Negative for rash.   Neurological:  Positive for weakness and numbness. Negative for dizziness, light-headedness and headaches.   Psychiatric/Behavioral:  Positive for sleep disturbance. Negative for hallucinations and suicidal ideas. The patient is not nervous/anxious.        Past Medical History:   Diagnosis Date    Abnormal Pap smear     Anemia     history    Anxiety     Cancer     uterine-cancer cells     Colon polyps, next C-scope 2019. 4/22/2014    Dry eyes     Dyspareunia, female 6/24/2020    Essential hypertension, started in her mid 30's 1/18/2017    Family history of malignant neoplasm of pancreas 6/1/2018    Gastric bypass status for obesity 8/21/2012    GERD (gastroesophageal reflux disease)     Helicobacter pylori gastritis, 2008. 8/20/2014    Hemangioma of liver, stable 2010, 2012, right lobe 8/20/2014    Hematuria     History of cosmetic surgeries, tummy tuck 2011. 4/5/2013    History of frquent UTI 6/24/2020    HTN (hypertension) 8/21/2012    120s-130s/80s    Kidney stone     Right sided sciatica 6/1/2018    Sleep apnea     patient does not use the C-PAP mask-cannot tolerate    Ureteral stone 4/8/2014    Urinary incontinence     Urinary retention     Varicose veins of lower extremity with inflammation 4/8/2015    Vertigo     mild    Vitamin D deficiency disease 6/1/2018       Past Surgical History:   Procedure Laterality Date    Abdominoplasty with Liposcuction      APPENDECTOMY      CERVICAL BIOPSY  W/ LOOP ELECTRODE EXCISION      Prior to hysterectomy    CHOLECYSTECTOMY      Laparoscopic    COLONOSCOPY N/A 2/13/2020    Procedure: COLONOSCOPY;  Surgeon: Pb Smith MD;  Location: [address];  Service: Endoscopy;  Laterality: N/A;    EPIDURAL STEROID INJECTION INTO LUMBAR SPINE Bilateral 3/15/2024    Procedure: B/L L5-S1 TFESI;  Surgeon: Darren  MD Glendy;  Location: [facility];  Service: Pain Management;  Laterality: Bilateral;  20 mins    ESOPHAGOGASTRODUODENOSCOPY N/A 2021    Procedure: EGD (ESOPHAGOGASTRODUODENOSCOPY);  Surgeon: Clayton Martínez MD;  Location: [address]);  Service: Endoscopy;  Laterality: N/A;  fully vac. 3/15/21 / instr portal -ml    GASTRIC BYPASS      HERNIA REPAIR      HYSTERECTOMY       TVH  both ovaries remain       Social History     Socioeconomic History    Marital status:    Occupational History    Occupation:    Tobacco Use    Smoking status: Never    Smokeless tobacco: Never    Tobacco comments:     .  .   Occup:  .     Substance and Sexual Activity    Alcohol use: No    Drug use: No    Sexual activity: Yes     Partners: Male     Birth control/protection: Surgical   Social History Narrative    Patient is     She and her  own business together.    [place] [employer] [place]    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         Feels safe in her home and with spouse.      Social Determinants of Health     Financial Resource Strain: Patient Declined (2023)    Overall Financial Resource Strain (CARDIA)     Difficulty of Paying Living Expenses: Patient declined   Food Insecurity: Patient Declined (2023)    Hunger Vital Sign     Worried About Running Out of Food in the Last Year: Patient declined     Ran Out of Food in the Last Year: Patient declined   Transportation Needs: Patient Declined (2023)    PRAPARE - Transportation     Lack of Transportation (Medical): Patient declined     Lack of Transportation (Non-Medical): Patient declined   Physical Activity: Sufficiently Active (2023)    Exercise Vital Sign     Days of Exercise per Week: 5 days     Minutes of Exercise per Session: 40 min   Stress: Stress Concern Present (2023)    [employer] - Occupational Stress Questionnaire     Feeling of Stress : Very much   Housing Stability: Patient Declined (12/18/2023)    Housing Stability Vital Sign     Unable to Pay for Housing in the Last Year: Patient declined     Number of Places Lived in the Last Year: 1     Unstable Housing in the Last Year: Patient declined       Review of patient's allergies indicates:   Allergen Reactions    Adhesive tape-silicones Other (See Comments)     Other reaction(s): BLISTERS    Penicillins Hives and Itching    Sulfamethoxazole-trimethoprim Hives and Itching    Prosed ec [meth-hyos-atrp-m blue-ba-phsal] Hives, Itching and Swelling    Pyridium [phenazopyridine]     Cephalexin Nausea And Vomiting and Other (See Comments)     Dehydration       Current Outpatient Medications on File Prior to Visit   Medication Sig Dispense Refill    acetaminophen (TYLENOL) 500 MG tablet Take 1-2 tablets (500-1,000 mg total) by mouth 3 (three) times daily as needed for Pain.  0    albuterol (PROVENTIL/VENTOLIN HFA) 90 mcg/actuation inhaler Inhale 1-2 puffs into the lungs every 4 (four) hours as needed for Shortness of Breath (coughing). Rescue 8.5 g 11    ALPRAZolam (XANAX) 0.5 MG tablet TAKE 1 TABLET(0.5 MG) BY MOUTH EVERY NIGHT AS NEEDED FOR INSOMNIA OR ANXIETY 30 tablet 5    amLODIPine (NORVASC) 5 MG tablet Take 1 tablet (5 mg total) by mouth once daily. 30 tablet 11    baclofen (LIORESAL) 10 MG tablet Take 1 tablet (10 mg total) by mouth 3 (three) times daily as needed (muscle spasm). 90 tablet 11    buPROPion (WELLBUTRIN SR) 100 MG TBSR 12 hr tablet Take 1 tablet (100 mg total) by mouth 2 (two) times daily. 180 tablet 3    cholecalciferol, vitamin D3, 5,000 unit Tab Take by mouth. 1 Tablet Oral Every day      cranberry extract (ELLURA) 200 mg Cap Take 1 tablet by mouth once daily. 90 capsule 3    cyanocobalamin 1,000 mcg/mL injection INJECT 1 ML (CC) INTRAMUSCULARLY EVERY TWO WEEKS 2 mL 11    cyclobenzaprine (FLEXERIL) 10 MG  "tablet Take 1 tablet (10 mg total) by mouth every evening. 30 tablet 6    cycloSPORINE (RESTASIS) 0.05 % ophthalmic emulsion Instill 1 drop into both eyes twice daily 180 each 3    diclofenac sodium (VOLTAREN) 1 % Gel Apply 2 g topically 4 (four) times daily as needed (pain). 400 g 3    gabapentin (NEURONTIN) 300 MG capsule Take 2 capsules (600 mg total) by mouth 3 (three) times daily. 180 capsule 11    hydroCHLOROthiazide (HYDRODIURIL) 12.5 MG Tab Take 1 tablet (12.5 mg total) by mouth once daily. 30 tablet 11    losartan (COZAAR) 100 MG tablet Take 1 tablet (100 mg total) by mouth every morning. 90 tablet 3    metoprolol succinate (TOPROL-XL) 25 MG 24 hr tablet Take 1 tablet (25 mg total) by mouth once daily. 90 tablet 3    olopatadine (PATANOL) 0.1 % ophthalmic solution Place 1 drop into both eyes 2 (two) times daily. 5 mL 6    ondansetron (ZOFRAN-ODT) 4 MG TbDL Dissolve 1 tablet (4 mg total) on the tongue every 8 (eight) hours as needed. 30 tablet 1    pantoprazole (PROTONIX) 40 MG tablet Take 1 tablet (40 mg total) by mouth 2 (two) times daily. 180 tablet 3    predniSONE (DELTASONE) 20 MG tablet Take 2 tablets at once on days 1&2, then 1 tablet daily on days 3&4 6 tablet 0    promethazine (PHENERGAN) 12.5 MG Tab Take 1 tablet (12.5 mg total) by mouth every 6 (six) hours as needed for Nausea. 20 tablet 0    syringe with needle (BD LUER-CLARIBEL SYRINGE) 3 mL 25 x 1 1/2 " Syrg Use as directed with Cyanocobalamin 10 Syringe 3    tirzepatide 10 mg/0.5 mL PnIj Inject 10 mg into the skin every 7 days. 12 pen 1    tretinoin (RETIN-A) 0.1 % cream Apply to affected area Topical Every day 45 g 11     No current facility-administered medications on file prior to visit.       Objective:      /77 (BP Location: Right arm, Patient Position: Sitting, BP Method: Medium (Automatic))   Pulse 77   SpO2 97%     Exam:  GEN:  Well developed, well nourished.  No acute distress.  Normal pain behavior.  HEENT:  No trauma.  Mucous " membranes moist.  Nares patent bilaterally.  PSYCH: Normal affect. Thought content appropriate.  CHEST:  Breathing symmetric.  No audible wheezing.  ABD: Soft, non-distended.  SKIN:  Warm, pink, dry.  No rash on exposed areas.    EXT:  No cyanosis, clubbing, or edema.  No color change or changes in nail or hair growth.  NEURO/MUSCULOSKELETAL:  Fully alert, oriented, and appropriate. Speech normal otf. No cranial nerve deficits.   Gait:  Normal.  No trendelenburg sign bilaterally.       Imaging:    Narrative & Impression    EXAMINATION:  MRI LUMBAR SPINE WITHOUT CONTRAST     CLINICAL HISTORY:  Lumbar radiculopathy, symptoms persist with conservative treatment; Radiculopathy, lumbar region     TECHNIQUE:  Multiplanar, multisequence MR images were acquired from the thoracolumbar junction to the sacrum without the administration of contrast.     COMPARISON:  02/14/2024     FINDINGS:  Alignment: Normal.     Vertebrae: Rudimentary disc at S1-S2.     Please note there is a transition type lumbosacral segment with nomenclature as above.  Correlation with imaging suggested prior to any intervention.     No aggressive marrow replacement process or fracture.Nobone marrow edema .     Discs: Disc space narrowing T12-L1 L1-L2 L2-L3 with multilevel desiccation.     Cord: Normal. Conus terminates at L1-L2.     Degenerative findings:     T12-L1: There is no focal disc herniation. No significant central canal narrowing . No significant neural foraminal narrowing.     L1-L2: Broad based mild diffuse bulging of disc material .  No significant central canal narrowing . No significant neural foraminal narrowing.     L2-L3: Broad based mild diffuse bulging of disc material .  No significant central canal narrowing . No significant neural foraminal narrowing.     L3-L4: There is no focal disc herniation. No significant central canal narrowing . No significant neural foraminal narrowing.     L4-L5: There is no focal disc herniation. No  significant central canal narrowing . No significant neural foraminal narrowing.     L5-S1: There is no focal disc herniation.  Mild central canal narrowing.  Moderate facet hypertrophy.  Note made of facet effusion about the bilateral facet joints  consistent with active inflammatory change, presumably degenerative in nature. No significant central canal narrowing . Moderate LEFT neural foraminal narrowing.     Paraspinal muscles & soft tissues: Unremarkable.     Impression:     Degenerative changes lumbar spine predominantly L5-S1 with facet hypertrophy and moderate LEFT neural foraminal encroachment.  Level by level details as above.        Electronically signed by: Darrell Silva MD  Date:                                            03/03/2024  Time:                                           05:20       Assessment:       Encounter Diagnoses   Name Primary?    DDD (degenerative disc disease), lumbar Yes    Lumbar spondylosis     Lumbar radiculopathy     Lumbar back pain with radiculopathy affecting left lower extremity     Chronic pain syndrome        Plan:       Cherie was seen today for follow-up.    Diagnoses and all orders for this visit:    DDD (degenerative disc disease), lumbar    Lumbar spondylosis    Lumbar radiculopathy    Lumbar back pain with radiculopathy affecting left lower extremity    Chronic pain syndrome          Cherie Richards is a 64 y.o. female with chronic left-sided low back and lower extremity pain.  Pain appears to be radicular.  Most consistent with the L4 and/or L5 dermatomes.  Transitional anatomy noted.  Does have left-sided L5-S1 foraminal stenosis.  I suspect this is the source of her pain.    Prior records reviewed.  Pertinent imaging studies reviewed by me. Imaging results were discussed with patient.  We discussed various treatment options including left L5 (inferaneural) and/or left S1 TF MARIANNA.  Patient deferred at this time.  Recently underwent bilateral L5-S1 TF MARIANNA in  March 2024 without significant relief.  Patient also noting adverse effects from the injection.  May reconsider in the future.  Discussed proceeding with additional physical therapy.  Patient does plan on resuming physical therapy in the next few weeks.  Advised to message via [employer] if she needs a new referral.  Stressed the importance of maintaining regular home exercise program and being mindful of how they use their back throughout the day.  Patient expressed understanding and agreement.  Return to clinic as needed.            This note was created by combination of typed  and M-Modal dictation. Transcription and phonetic errors may be present.  If there are any questions, please contact me.

## 2024-06-04 ENCOUNTER — PATIENT MESSAGE (OUTPATIENT)
Dept: INTERNAL MEDICINE | Facility: CLINIC | Age: 64
End: 2024-06-04
Payer: MEDICARE

## 2024-06-04 ENCOUNTER — OFFICE VISIT (OUTPATIENT)
Dept: INTERNAL MEDICINE | Facility: CLINIC | Age: 64
End: 2024-06-04
Payer: MEDICARE

## 2024-06-04 VITALS
HEART RATE: 71 BPM | BODY MASS INDEX: 31.24 KG/M2 | DIASTOLIC BLOOD PRESSURE: 76 MMHG | HEIGHT: 62 IN | WEIGHT: 169.75 LBS | OXYGEN SATURATION: 96 % | SYSTOLIC BLOOD PRESSURE: 135 MMHG

## 2024-06-04 DIAGNOSIS — I10 ESSENTIAL HYPERTENSION: ICD-10-CM

## 2024-06-04 DIAGNOSIS — R30.0 DYSURIA: Primary | ICD-10-CM

## 2024-06-04 DIAGNOSIS — L71.9 ROSACEA: ICD-10-CM

## 2024-06-04 DIAGNOSIS — K21.9 GASTROESOPHAGEAL REFLUX DISEASE WITHOUT ESOPHAGITIS: ICD-10-CM

## 2024-06-04 DIAGNOSIS — R79.9 ABNORMAL FINDING OF BLOOD CHEMISTRY, UNSPECIFIED: ICD-10-CM

## 2024-06-04 LAB
BACTERIA #/AREA URNS AUTO: NORMAL /HPF
BILIRUB UR QL STRIP: NEGATIVE
CLARITY UR REFRACT.AUTO: ABNORMAL
COLOR UR AUTO: YELLOW
GLUCOSE UR QL STRIP: NEGATIVE
HGB UR QL STRIP: NEGATIVE
KETONES UR QL STRIP: NEGATIVE
LEUKOCYTE ESTERASE UR QL STRIP: ABNORMAL
MICROSCOPIC COMMENT: NORMAL
NITRITE UR QL STRIP: POSITIVE
PH UR STRIP: 7 [PH] (ref 5–8)
PROT UR QL STRIP: NEGATIVE
RBC #/AREA URNS AUTO: 1 /HPF (ref 0–4)
SP GR UR STRIP: 1.01 (ref 1–1.03)
SQUAMOUS #/AREA URNS AUTO: 3 /HPF
URN SPEC COLLECT METH UR: ABNORMAL
WBC #/AREA URNS AUTO: 5 /HPF (ref 0–5)

## 2024-06-04 PROCEDURE — 3288F FALL RISK ASSESSMENT DOCD: CPT | Mod: CPTII,S$GLB,, | Performed by: INTERNAL MEDICINE

## 2024-06-04 PROCEDURE — 3078F DIAST BP <80 MM HG: CPT | Mod: CPTII,S$GLB,, | Performed by: INTERNAL MEDICINE

## 2024-06-04 PROCEDURE — 99214 OFFICE O/P EST MOD 30 MIN: CPT | Mod: S$GLB,,, | Performed by: INTERNAL MEDICINE

## 2024-06-04 PROCEDURE — 1125F AMNT PAIN NOTED PAIN PRSNT: CPT | Mod: CPTII,S$GLB,, | Performed by: INTERNAL MEDICINE

## 2024-06-04 PROCEDURE — 3008F BODY MASS INDEX DOCD: CPT | Mod: CPTII,S$GLB,, | Performed by: INTERNAL MEDICINE

## 2024-06-04 PROCEDURE — 81001 URINALYSIS AUTO W/SCOPE: CPT | Performed by: INTERNAL MEDICINE

## 2024-06-04 PROCEDURE — 99999 PR PBB SHADOW E&M-EST. PATIENT-LVL III: CPT | Mod: PBBFAC,,, | Performed by: INTERNAL MEDICINE

## 2024-06-04 PROCEDURE — 1101F PT FALLS ASSESS-DOCD LE1/YR: CPT | Mod: CPTII,S$GLB,, | Performed by: INTERNAL MEDICINE

## 2024-06-04 PROCEDURE — 4010F ACE/ARB THERAPY RXD/TAKEN: CPT | Mod: CPTII,S$GLB,, | Performed by: INTERNAL MEDICINE

## 2024-06-04 PROCEDURE — 3075F SYST BP GE 130 - 139MM HG: CPT | Mod: CPTII,S$GLB,, | Performed by: INTERNAL MEDICINE

## 2024-06-04 RX ORDER — HYDROCODONE BITARTRATE AND ACETAMINOPHEN 5; 325 MG/1; MG/1
1 TABLET ORAL
Qty: 15 TABLET | Refills: 0 | Status: SHIPPED | OUTPATIENT
Start: 2024-06-04

## 2024-06-04 RX ORDER — METRONIDAZOLE 7.5 MG/G
GEL TOPICAL 2 TIMES DAILY
Qty: 45 G | Refills: 3 | Status: SHIPPED | OUTPATIENT
Start: 2024-06-04 | End: 2025-06-04

## 2024-06-04 RX ORDER — ONDANSETRON 4 MG/1
4 TABLET, ORALLY DISINTEGRATING ORAL EVERY 8 HOURS PRN
Qty: 30 TABLET | Refills: 1 | Status: SHIPPED | OUTPATIENT
Start: 2024-06-04 | End: 2024-06-04

## 2024-06-04 RX ORDER — ONDANSETRON 4 MG/1
4 TABLET, ORALLY DISINTEGRATING ORAL EVERY 8 HOURS PRN
Qty: 30 TABLET | Refills: 1 | OUTPATIENT
Start: 2024-06-04 | End: 2024-06-11

## 2024-06-04 NOTE — PROGRESS NOTES
Subjective:       Patient ID: Cherie Richards is a 64 y.o. female.    Chief Complaint: Medication Refill and Back Pain    Presents for follow up.     Continues to have left lower back pain. Recently saw pain management and was offered MARIANNA which she declines.   She was given hydrocodone which helped with her pain. She cannot take NSAID as she hx of gastric bypass and tylenol is not working for her.     Has erythematous rash over bilateral cheeks.     Thinks she has another UTI.      HTN: well controlled on Amlodipine 2.5 mg araceli, losartan 100 mg daily and metoprolol 25 mg daily. During previous visit we decreased HCTZ to 12.5 mg daily due to hyponatremia. Sodium improved on last labs and BP stable.      Liver Hemangioma last CT scan in 2021 showed no liver lesions      Vit D      Obesity: Gastric Bypass in in 2010. Lost 70 Ibs lowest weight was 130. Gained 50 Ibs back. Now on mounjaro 7.5 mg weekly and doing well. Has  lost about 15 Ibs.      H Pylori      GERD / Hiatal hernia. On PPI. nack on pantoprazole BID      ANDREW      HLD Stopped taking statin due to myalgias.      abnormal uterine cancer cells s/p hysterectomy     Mixed urinary incontinence follows with urogyn gets frequent UTIs. She is on pre/probiotic, D mannose and cranberry pills daily.      Depression/ Anxiety: on xanax and Wellbutrin.      Pulm nodule. Was having annual CT chest. Las CT in 2021 showed nodule had resolved.      Health Maintenance:  Colon Cancer Screening: colonoscopy done in 2020 with one polyp removed. Due again in 2025   Mammogram: normal December 2023  DEXA: showed osteoporosis done 12/21/23    HIV: negative 2021   Hep C: negative 2021   Lipids: order today   Vaccines: needs PNA       Medication Refill  Associated symptoms include arthralgias, headaches, joint swelling, neck pain and weakness. Pertinent negatives include no chest pain, chills, rash or vomiting.   Back Pain  Associated symptoms include dysuria, headaches and weakness.  Pertinent negatives include no chest pain or weight loss.   Dysuria   This is a recurrent problem. The current episode started 1 to 4 weeks ago. The problem occurs intermittently. The problem has been waxing and waning. The quality of the pain is described as burning. The pain is at a severity of 8/10. The pain is moderate. There has been no fever. She is Sexually active. There is A history of pyelonephritis. Associated symptoms include a discharge, flank pain, frequency, hesitancy, sweats, urgency and withholding. Pertinent negatives include no behavior changes, chills, hematuria, possible pregnancy, vomiting, weight loss, constipation or rash. She has tried antibiotics for the symptoms. The treatment provided mild relief. Her past medical history is significant for catheterization, hypertension, kidney stones and recurrent UTIs. There is no history of diabetes insipidus, diabetes mellitus, genitourinary reflux, a single kidney, STD or a urological procedure.   Neck Pain   Associated symptoms include headaches and weakness. Pertinent negatives include no chest pain, trouble swallowing or weight loss.     Review of Systems   Constitutional:  Positive for activity change. Negative for chills, unexpected weight change and weight loss.   HENT:  Positive for hearing loss. Negative for rhinorrhea and trouble swallowing.    Eyes:  Positive for discharge and visual disturbance.   Respiratory:  Negative for chest tightness and wheezing.    Cardiovascular:  Negative for chest pain and palpitations.   Gastrointestinal:  Negative for blood in stool, constipation, diarrhea and vomiting.   Endocrine: Negative for polydipsia and polyuria.   Genitourinary:  Positive for dysuria, flank pain, frequency, hesitancy and urgency. Negative for difficulty urinating, hematuria and menstrual problem.   Musculoskeletal:  Positive for arthralgias, back pain, joint swelling and neck pain.   Integumentary:  Negative for rash.   Neurological:   Positive for weakness and headaches.   Psychiatric/Behavioral:  Negative for confusion and dysphoric mood.            Past Medical History:   Diagnosis Date    Abnormal Pap smear     Anemia     history    Anxiety     Cancer     uterine-cancer cells     Colon polyps, next C-scope 2019. 4/22/2014    Dry eyes     Dyspareunia, female 6/24/2020    Essential hypertension, started in her mid 30's 1/18/2017    Family history of malignant neoplasm of pancreas 6/1/2018    Gastric bypass status for obesity 8/21/2012    GERD (gastroesophageal reflux disease)     Helicobacter pylori gastritis, 2008. 8/20/2014    Hemangioma of liver, stable 2010, 2012, right lobe 8/20/2014    Hematuria     History of cosmetic surgeries, tummy tuck 2011. 4/5/2013    History of frquent UTI 6/24/2020    HTN (hypertension) 8/21/2012    120s-130s/80s    Kidney stone     Right sided sciatica 6/1/2018    Sleep apnea     patient does not use the C-PAP mask-cannot tolerate    Ureteral stone 4/8/2014    Urinary incontinence     Urinary retention     Varicose veins of lower extremity with inflammation 4/8/2015    Vertigo     mild    Vitamin D deficiency disease 6/1/2018     Past Surgical History:   Procedure Laterality Date    Abdominoplasty with Liposcuction      APPENDECTOMY      CERVICAL BIOPSY  W/ LOOP ELECTRODE EXCISION      Prior to hysterectomy    CHOLECYSTECTOMY      Laparoscopic    COLONOSCOPY N/A 2/13/2020    Procedure: COLONOSCOPY;  Surgeon: Pb Smith MD;  Location: [address]);  Service: Endoscopy;  Laterality: N/A;    EPIDURAL STEROID INJECTION INTO LUMBAR SPINE Bilateral 3/15/2024    Procedure: B/L L5-S1 TFESI;  Surgeon: Glendy Banks MD;  Location: [facility];  Service: Pain Management;  Laterality: Bilateral;  20 mins    ESOPHAGOGASTRODUODENOSCOPY N/A 11/2/2021    Procedure: EGD (ESOPHAGOGASTRODUODENOSCOPY);  Surgeon: Clayton Martínez MD;  Location: [address]);  Service: Endoscopy;  Laterality: N/A;   fully vac. 3/15/21 / instr portal -ml    GASTRIC BYPASS      HERNIA REPAIR      HYSTERECTOMY  1980     TVH  both ovaries remain      Patient Active Problem List   Diagnosis    GERD (gastroesophageal reflux disease), repair of [place] fundoplication 08-.    Gastric bypass status for obesity, 08-.    Sleep apnea, resolved following gastric bypass surgery. CPAP intolerant.    Colon polyps    Helicobacter pylori gastritis, 2008.    Hemangioma of liver, stable 2010, 2012, right lobe; resolved     Essential hypertension, started in her mid 30's    Vitamin D deficiency disease    Right sided sciatica    Urinary incontinence, mixed    Chronic constipation    Major depressive disorder    Nodule of lower lobe of right lung, spiculated 1.4 cm by CT scan 5/4/2021, resolved 12/2021    Colon wall thickening, distal transverse colon to mid descending colon by CT 5/4/2021    Atherosclerosis of native coronary artery of native heart without angina pectoris by CT scan 5/4/2021    Aortic calcification, by CT scan 5/4/2021    SOB (shortness of breath)    Other chest pain    Calcified granuloma of lung    Moderate episode of recurrent major depressive disorder    Weakness of trunk musculature    Decreased range of motion of lumbar spine    Numbness    Osteoporosis without current pathological fracture    Class 1 obesity due to excess calories without serious comorbidity with body mass index (BMI) of 30.0 to 30.9 in adult        Objective:      Physical Exam  Constitutional:       Appearance: Normal appearance.   HENT:      Head: Normocephalic.   Cardiovascular:      Rate and Rhythm: Normal rate and regular rhythm.      Pulses: Normal pulses.      Heart sounds: Normal heart sounds.   Pulmonary:      Effort: Pulmonary effort is normal.      Breath sounds: Normal breath sounds.   Abdominal:      General: Abdomen is flat. Bowel sounds are normal.      Palpations: Abdomen is soft.   Musculoskeletal:         General: Normal  range of motion.      Cervical back: Normal range of motion and neck supple.   Skin:     General: Skin is warm and dry.   Neurological:      General: No focal deficit present.      Mental Status: She is alert and oriented to person, place, and time.   Psychiatric:         Mood and Affect: Mood normal.         Assessment:       Problem List Items Addressed This Visit    None        Plan:       Dysuria  -     Urinalysis, Reflex to Urine Culture Urine, Clean Catch    Gastroesophageal reflux disease without esophagitis  She will restart pantoprazole.     Rosacea  -     metroNIDAZOLE (METROGEL) 0.75 % gel; Apply topically 2 (two) times daily.  Dispense: 45 g; Refill: 3    Essential hypertension  Well controlled on current meds.     Chronic lumbar back pain  -     ondansetron (ZOFRAN-ODT) 4 MG TbDL; Dissolve 1 tablet (4 mg total) on the tongue every 8 (eight) hours as needed.  Dispense: 30 tablet; Refill: 1  -     HYDROcodone-acetaminophen (NORCO) 5-325 mg per tablet; Take 1 tablet by mouth every 24 hours as needed for Pain.  Dispense: 15 tablet; Refill: 0  Continue gabapentin. Cannot take NSAIDS due to bypass status. Tylenol does not help. MARIANNA in the past did not help. REfill Norco today.  reviewed.          Tracy Chand MD   Internal Medicine   Primary Care

## 2024-06-05 ENCOUNTER — TELEPHONE (OUTPATIENT)
Dept: INTERNAL MEDICINE | Facility: CLINIC | Age: 64
End: 2024-06-05
Payer: MEDICARE

## 2024-06-05 ENCOUNTER — PATIENT MESSAGE (OUTPATIENT)
Dept: INTERNAL MEDICINE | Facility: CLINIC | Age: 64
End: 2024-06-05
Payer: MEDICARE

## 2024-06-05 RX ORDER — NITROFURANTOIN 25; 75 MG/1; MG/1
100 CAPSULE ORAL 2 TIMES DAILY
Qty: 10 CAPSULE | Refills: 0 | Status: SHIPPED | OUTPATIENT
Start: 2024-06-05 | End: 2024-06-05

## 2024-06-05 RX ORDER — NITROFURANTOIN 25; 75 MG/1; MG/1
100 CAPSULE ORAL 2 TIMES DAILY
Qty: 10 CAPSULE | Refills: 0 | OUTPATIENT
Start: 2024-06-05 | End: 2024-06-11

## 2024-06-06 NOTE — TELEPHONE ENCOUNTER
Lm for pt asking her to call for the nurse to explain the reason to check pharmacy for medication (antibiotic) -    ---- Message from Tracy Chand MD sent at 6/5/2024  2:15 PM CDT -----  Please let her know the urine does show signs of infection and I sent an antibiotic to the [facility] thank you

## 2024-06-10 ENCOUNTER — PATIENT MESSAGE (OUTPATIENT)
Dept: INTERNAL MEDICINE | Facility: CLINIC | Age: 64
End: 2024-06-10
Payer: MEDICARE

## 2024-06-11 ENCOUNTER — HOSPITAL ENCOUNTER (EMERGENCY)
Facility: HOSPITAL | Age: 64
Discharge: HOME | End: 2024-06-11
Attending: INTERNAL MEDICINE
Payer: MEDICARE

## 2024-06-11 VITALS
SYSTOLIC BLOOD PRESSURE: 142 MMHG | DIASTOLIC BLOOD PRESSURE: 74 MMHG | HEIGHT: 63 IN | OXYGEN SATURATION: 96 % | HEART RATE: 108 BPM | WEIGHT: 160 LBS | TEMPERATURE: 98 F | BODY MASS INDEX: 28.35 KG/M2 | RESPIRATION RATE: 17 BRPM

## 2024-06-11 DIAGNOSIS — R11.2 NAUSEA AND VOMITING: ICD-10-CM

## 2024-06-11 DIAGNOSIS — N30.01 ACUTE CYSTITIS WITH HEMATURIA: Primary | ICD-10-CM

## 2024-06-11 DIAGNOSIS — K52.9 GASTROENTERITIS: ICD-10-CM

## 2024-06-11 LAB
ALBUMIN SERPL-MCNC: 4.1 G/DL (ref 3.3–5.5)
ALBUMIN SERPL-MCNC: 4.4 G/DL (ref 3.3–5.5)
ALLENS TEST: ABNORMAL
ALP SERPL-CCNC: 110 U/L (ref 42–141)
ALP SERPL-CCNC: 119 U/L (ref 42–141)
BILIRUB SERPL-MCNC: 0.5 MG/DL (ref 0.2–1.6)
BILIRUB SERPL-MCNC: 0.6 MG/DL (ref 0.2–1.6)
BILIRUBIN, POC UA: NEGATIVE
BLOOD, POC UA: ABNORMAL
BUN SERPL-MCNC: 9 MG/DL (ref 7–22)
CALCIUM SERPL-MCNC: 10.2 MG/DL (ref 8–10.3)
CHLORIDE SERPL-SCNC: 94 MMOL/L (ref 98–108)
CLARITY, POC UA: CLEAR
COLOR, POC UA: YELLOW
CREAT SERPL-MCNC: 0.8 MG/DL (ref 0.6–1.2)
DELSYS: ABNORMAL
GLUCOSE SERPL-MCNC: 112 MG/DL (ref 73–118)
GLUCOSE, POC UA: NEGATIVE
HCO3 UR-SCNC: 22.6 MMOL/L (ref 24–28)
HCT, POC: NORMAL
HGB, POC: NORMAL (ref 14–18)
KETONES, POC UA: ABNORMAL
LDH SERPL L TO P-CCNC: 0.81 MMOL/L (ref 0.5–2.2)
LEUKOCYTE EST, POC UA: ABNORMAL
MCH, POC: NORMAL
MCHC, POC: NORMAL
MCV, POC: NORMAL
MPV, POC: NORMAL
NITRITE, POC UA: POSITIVE
OHS QRS DURATION: 76 MS
OHS QTC CALCULATION: 433 MS
PCO2 BLDA: 31.7 MMHG (ref 35–45)
PH SMN: 7.46 [PH] (ref 7.35–7.45)
PH UR STRIP: 7 [PH]
PO2 BLDA: 49 MMHG (ref 40–60)
POC ALT (SGPT): 13 U/L (ref 10–47)
POC ALT (SGPT): 16 U/L (ref 10–47)
POC AMYLASE: 18 U/L (ref 14–97)
POC AST (SGOT): 21 U/L (ref 11–38)
POC AST (SGOT): 22 U/L (ref 11–38)
POC BE: 0 MMOL/L
POC GGT: 80 U/L (ref 5–65)
POC PLATELET COUNT: NORMAL
POC SATURATED O2: 87 % (ref 95–100)
POC TCO2: 22 MMOL/L (ref 18–33)
POC TCO2: 24 MMOL/L (ref 24–29)
POTASSIUM BLD-SCNC: 4 MMOL/L (ref 3.6–5.1)
PROTEIN, POC UA: ABNORMAL
PROTEIN, POC: 7.4 G/DL (ref 6.4–8.1)
PROTEIN, POC: 7.4 G/DL (ref 6.4–8.1)
RBC, POC: NORMAL
RDW, POC: NORMAL
SAMPLE: ABNORMAL
SITE: ABNORMAL
SODIUM BLD-SCNC: 133 MMOL/L (ref 128–145)
SPECIFIC GRAVITY, POC UA: 1.02
UROBILINOGEN, POC UA: 2 E.U./DL
WBC, POC: NORMAL

## 2024-06-11 PROCEDURE — 96361 HYDRATE IV INFUSION ADD-ON: CPT | Mod: ER

## 2024-06-11 PROCEDURE — 87077 CULTURE AEROBIC IDENTIFY: CPT | Performed by: EMERGENCY MEDICINE

## 2024-06-11 PROCEDURE — 99285 EMERGENCY DEPT VISIT HI MDM: CPT | Mod: 25,ER

## 2024-06-11 PROCEDURE — 63600175 PHARM REV CODE 636 W HCPCS: Mod: ER | Performed by: EMERGENCY MEDICINE

## 2024-06-11 PROCEDURE — 93010 ELECTROCARDIOGRAM REPORT: CPT | Mod: ,,, | Performed by: INTERNAL MEDICINE

## 2024-06-11 PROCEDURE — 96375 TX/PRO/DX INJ NEW DRUG ADDON: CPT | Mod: ER

## 2024-06-11 PROCEDURE — 25000003 PHARM REV CODE 250: Mod: ER | Performed by: EMERGENCY MEDICINE

## 2024-06-11 PROCEDURE — 87088 URINE BACTERIA CULTURE: CPT | Performed by: EMERGENCY MEDICINE

## 2024-06-11 PROCEDURE — 96365 THER/PROPH/DIAG IV INF INIT: CPT | Mod: ER

## 2024-06-11 PROCEDURE — 25500020 PHARM REV CODE 255: Mod: ER | Performed by: INTERNAL MEDICINE

## 2024-06-11 PROCEDURE — 87086 URINE CULTURE/COLONY COUNT: CPT | Performed by: EMERGENCY MEDICINE

## 2024-06-11 PROCEDURE — 96367 TX/PROPH/DG ADDL SEQ IV INF: CPT | Mod: ER

## 2024-06-11 PROCEDURE — 96372 THER/PROPH/DIAG INJ SC/IM: CPT | Mod: 59 | Performed by: EMERGENCY MEDICINE

## 2024-06-11 PROCEDURE — 25000003 PHARM REV CODE 250: Mod: ER | Performed by: INTERNAL MEDICINE

## 2024-06-11 PROCEDURE — 87186 SC STD MICRODIL/AGAR DIL: CPT | Performed by: EMERGENCY MEDICINE

## 2024-06-11 PROCEDURE — 63600175 PHARM REV CODE 636 W HCPCS: Mod: ER | Performed by: INTERNAL MEDICINE

## 2024-06-11 PROCEDURE — 93005 ELECTROCARDIOGRAM TRACING: CPT | Mod: ER

## 2024-06-11 RX ORDER — PROMETHAZINE HYDROCHLORIDE 25 MG/1
25 SUPPOSITORY RECTAL EVERY 6 HOURS PRN
Qty: 10 SUPPOSITORY | Refills: 0 | Status: SHIPPED | OUTPATIENT
Start: 2024-06-11

## 2024-06-11 RX ORDER — NITROFURANTOIN 25; 75 MG/1; MG/1
100 CAPSULE ORAL
Status: DISCONTINUED | OUTPATIENT
Start: 2024-06-11 | End: 2024-06-11

## 2024-06-11 RX ORDER — LEVOFLOXACIN 750 MG/1
750 TABLET ORAL DAILY
Qty: 5 TABLET | Refills: 0 | Status: SHIPPED | OUTPATIENT
Start: 2024-06-11 | End: 2024-06-16

## 2024-06-11 RX ORDER — METOCLOPRAMIDE HYDROCHLORIDE 5 MG/ML
10 INJECTION INTRAMUSCULAR; INTRAVENOUS
Status: COMPLETED | OUTPATIENT
Start: 2024-06-11 | End: 2024-06-11

## 2024-06-11 RX ORDER — NITROFURANTOIN 25; 75 MG/1; MG/1
100 CAPSULE ORAL 2 TIMES DAILY
Qty: 10 CAPSULE | Refills: 0 | Status: SHIPPED | OUTPATIENT
Start: 2024-06-11 | End: 2024-06-11 | Stop reason: CLARIF

## 2024-06-11 RX ORDER — FAMOTIDINE 10 MG/ML
40 INJECTION INTRAVENOUS
Status: COMPLETED | OUTPATIENT
Start: 2024-06-11 | End: 2024-06-11

## 2024-06-11 RX ORDER — KETOROLAC TROMETHAMINE 30 MG/ML
15 INJECTION, SOLUTION INTRAMUSCULAR; INTRAVENOUS
Status: COMPLETED | OUTPATIENT
Start: 2024-06-11 | End: 2024-06-11

## 2024-06-11 RX ORDER — PANTOPRAZOLE SODIUM 20 MG/1
20 TABLET, DELAYED RELEASE ORAL DAILY
Qty: 30 TABLET | Refills: 0 | Status: SHIPPED | OUTPATIENT
Start: 2024-06-11 | End: 2025-06-11

## 2024-06-11 RX ORDER — ONDANSETRON 4 MG/1
4 TABLET, ORALLY DISINTEGRATING ORAL EVERY 8 HOURS PRN
Qty: 10 TABLET | Refills: 0 | Status: SHIPPED | OUTPATIENT
Start: 2024-06-11 | End: 2024-06-11 | Stop reason: CLARIF

## 2024-06-11 RX ORDER — HALOPERIDOL 5 MG/ML
5 INJECTION INTRAMUSCULAR
Status: COMPLETED | OUTPATIENT
Start: 2024-06-11 | End: 2024-06-11

## 2024-06-11 RX ORDER — ONDANSETRON 8 MG/1
8 TABLET, ORALLY DISINTEGRATING ORAL EVERY 6 HOURS PRN
Qty: 12 TABLET | Refills: 0 | Status: SHIPPED | OUTPATIENT
Start: 2024-06-11 | End: 2024-06-14

## 2024-06-11 RX ORDER — DIPHENHYDRAMINE HYDROCHLORIDE 50 MG/ML
25 INJECTION INTRAMUSCULAR; INTRAVENOUS
Status: COMPLETED | OUTPATIENT
Start: 2024-06-11 | End: 2024-06-11

## 2024-06-11 RX ORDER — ACETAMINOPHEN 500 MG
500 TABLET ORAL EVERY 6 HOURS PRN
Qty: 30 TABLET | Refills: 0 | Status: SHIPPED | OUTPATIENT
Start: 2024-06-11

## 2024-06-11 RX ORDER — METHYLPREDNISOLONE SOD SUCC 125 MG
125 VIAL (EA) INJECTION
Status: COMPLETED | OUTPATIENT
Start: 2024-06-11 | End: 2024-06-11

## 2024-06-11 RX ORDER — ONDANSETRON HYDROCHLORIDE 2 MG/ML
4 INJECTION, SOLUTION INTRAVENOUS
Status: COMPLETED | OUTPATIENT
Start: 2024-06-11 | End: 2024-06-11

## 2024-06-11 RX ORDER — CIPROFLOXACIN 2 MG/ML
400 INJECTION, SOLUTION INTRAVENOUS
Status: DISCONTINUED | OUTPATIENT
Start: 2024-06-11 | End: 2024-06-11

## 2024-06-11 RX ADMIN — FAMOTIDINE 40 MG: 10 INJECTION, SOLUTION INTRAVENOUS at 06:06

## 2024-06-11 RX ADMIN — SODIUM CHLORIDE 1000 ML: 9 INJECTION, SOLUTION INTRAVENOUS at 09:06

## 2024-06-11 RX ADMIN — METHYLPREDNISOLONE SODIUM SUCCINATE 125 MG: 125 INJECTION, POWDER, FOR SOLUTION INTRAMUSCULAR; INTRAVENOUS at 06:06

## 2024-06-11 RX ADMIN — DIPHENHYDRAMINE HYDROCHLORIDE 25 MG: 50 INJECTION INTRAMUSCULAR; INTRAVENOUS at 06:06

## 2024-06-11 RX ADMIN — KETOROLAC TROMETHAMINE 15 MG: 30 INJECTION, SOLUTION INTRAMUSCULAR at 07:06

## 2024-06-11 RX ADMIN — ONDANSETRON 4 MG: 2 INJECTION INTRAMUSCULAR; INTRAVENOUS at 05:06

## 2024-06-11 RX ADMIN — CEFTRIAXONE 1 G: 1 INJECTION, POWDER, FOR SOLUTION INTRAMUSCULAR; INTRAVENOUS at 07:06

## 2024-06-11 RX ADMIN — HALOPERIDOL LACTATE 5 MG: 5 INJECTION, SOLUTION INTRAMUSCULAR at 10:06

## 2024-06-11 RX ADMIN — CIPROFLOXACIN 400 MG: 2 INJECTION, SOLUTION INTRAVENOUS at 05:06

## 2024-06-11 RX ADMIN — SODIUM CHLORIDE 1000 ML: 9 INJECTION, SOLUTION INTRAVENOUS at 05:06

## 2024-06-11 RX ADMIN — METOCLOPRAMIDE 10 MG: 5 INJECTION, SOLUTION INTRAMUSCULAR; INTRAVENOUS at 06:06

## 2024-06-11 RX ADMIN — IOHEXOL 75 ML: 350 INJECTION, SOLUTION INTRAVENOUS at 08:06

## 2024-06-11 NOTE — Clinical Note
"Cherie"Gerardo Richards was seen and treated in our emergency department on 6/11/2024.  She may return to work on 06/15/2024.       If you have any questions or concerns, please don't hesitate to call.      Josie Benson, DO"

## 2024-06-11 NOTE — ED PROVIDER NOTES
Encounter Date: 6/11/2024       History     Chief Complaint   Patient presents with    Vomiting    Diarrhea    Abdominal Pain     PT REPORTS N/V/D AND ABD PAIN SINCE SATURDAY NIGHT      64-year-old female with a past medical history significant for anemia, anxiety disorder, hypertension,   Gastric bypass,frequent UTIs and kidney stones presents to the emergency department complaining of nausea /vomiting /loose stools /abdominal discomfort times 3 days.   Patient was written a prescription for Macrobid to treat her urinary tract infection on June 5th.   Patient states her UTI has not gotten any better. She denies fever /chills.    The history is provided by the patient. No  was used.     Review of patient's allergies indicates:   Allergen Reactions    Adhesive tape-silicones Other (See Comments)     Other reaction(s): BLISTERS    Penicillins Hives and Itching    Sulfamethoxazole-trimethoprim Hives and Itching    Prosed ec [meth-hyos-atrp-m blue-ba-phsal] Hives, Itching and Swelling    Pyridium [phenazopyridine]     Cephalexin Nausea And Vomiting and Other (See Comments)     Dehydration     Past Medical History:   Diagnosis Date    Abnormal Pap smear     Anemia     history    Anxiety     Cancer     uterine-cancer cells     Colon polyps, next C-scope 2019. 4/22/2014    Dry eyes     Dyspareunia, female 6/24/2020    Essential hypertension, started in her mid 30's 1/18/2017    Family history of malignant neoplasm of pancreas 6/1/2018    Gastric bypass status for obesity 8/21/2012    GERD (gastroesophageal reflux disease)     Helicobacter pylori gastritis, 2008. 8/20/2014    Hemangioma of liver, stable 2010, 2012, right lobe 8/20/2014    Hematuria     History of cosmetic surgeries, tummy tuck 2011. 4/5/2013    History of frquent UTI 6/24/2020    HTN (hypertension) 8/21/2012    120s-130s/80s    Kidney stone     Right sided sciatica 6/1/2018    Sleep apnea     patient does not use the C-PAP mask-cannot  tolerate    Ureteral stone 4/8/2014    Urinary incontinence     Urinary retention     Varicose veins of lower extremity with inflammation 4/8/2015    Vertigo     mild    Vitamin D deficiency disease 6/1/2018     Past Surgical History:   Procedure Laterality Date    Abdominoplasty with Liposcuction      APPENDECTOMY      CERVICAL BIOPSY  W/ LOOP ELECTRODE EXCISION      Prior to hysterectomy    CHOLECYSTECTOMY      Laparoscopic    COLONOSCOPY N/A 2/13/2020    Procedure: COLONOSCOPY;  Surgeon: Pb Smith MD;  Location: [facility] ([address] [place] [address]);  Service: Endoscopy;  Laterality: N/A;    EPIDURAL STEROID INJECTION INTO LUMBAR SPINE Bilateral 3/15/2024    Procedure: B/L L5-S1 TFESI;  Surgeon: Glendy Banks MD;  Location: [facility];  Service: Pain Management;  Laterality: Bilateral;  20 mins    ESOPHAGOGASTRODUODENOSCOPY N/A 11/2/2021    Procedure: EGD (ESOPHAGOGASTRODUODENOSCOPY);  Surgeon: Clayton Martínez MD;  Location: [address]);  Service: Endoscopy;  Laterality: N/A;  fully vac. 3/15/21 / instr portal -ml    GASTRIC BYPASS      HERNIA REPAIR      HYSTERECTOMY  1980     TVH  both ovaries remain     Family History   Problem Relation Name Age of Onset    Heart disease Mother      Stroke Mother      Coronary artery disease Mother      Heart failure Mother      Obesity Mother      Cancer Father          pancreatic cancer    Diabetes Father      Obesity Father      No Known Problems Sister      No Known Problems Brother      Hearing loss Maternal Grandmother      No Known Problems Maternal Grandfather      No Known Problems Paternal Grandmother      No Known Problems Paternal Grandfather      Breast cancer Maternal Aunt      Colon cancer Maternal Uncle      No Known Problems Paternal Aunt      No Known Problems Paternal Uncle      Ovarian cancer Neg Hx      Anesthesia problems Neg Hx      Amblyopia Neg Hx      Blindness Neg Hx      Cataracts Neg Hx      Glaucoma Neg Hx      Hypertension Neg Hx       Macular degeneration Neg Hx      Retinal detachment Neg Hx      Strabismus Neg Hx      Thyroid disease Neg Hx       Social History     Tobacco Use    Smoking status: Never    Smokeless tobacco: Never    Tobacco comments:     .  .   Occup:  .     Substance Use Topics    Alcohol use: No    Drug use: No     Review of Systems   Constitutional:  Negative for chills and fever.   Respiratory:  Negative for shortness of breath.    Cardiovascular:  Negative for chest pain.   Gastrointestinal:  Positive for abdominal pain, diarrhea, nausea and vomiting.   Genitourinary:  Positive for dysuria, frequency and urgency.   All other systems reviewed and are negative.      Physical Exam     Initial Vitals [24 0500]   BP Pulse Resp Temp SpO2   (!) 154/84 (!) 114 20 97.8 °F (36.6 °C) 97 %      MAP       --         Physical Exam    Nursing note and vitals reviewed.  Constitutional: She is not diaphoretic. No distress.   HENT:   Head: Normocephalic and atraumatic.   Right Ear: Hearing and external ear normal.   Left Ear: Hearing and external ear normal.   Nose: Nose normal.   Mouth/Throat: Mucous membranes are dry.   Neck:   Normal range of motion.  Cardiovascular:  Regular rhythm, S1 normal, S2 normal, normal heart sounds and intact distal pulses.   Tachycardia present.   Exam reveals no gallop and no friction rub.       No murmur heard.  Pulmonary/Chest: Breath sounds normal. No respiratory distress. She has no wheezes. She has no rhonchi. She has no rales.   Abdominal: Abdomen is soft. Bowel sounds are normal. She exhibits no distension. There is no abdominal tenderness. There is no rebound and no guarding.   Musculoskeletal:         General: Normal range of motion.      Cervical back: Normal range of motion.     Neurological: She is alert. She has normal strength.   Skin: Skin is warm and dry. Capillary refill takes less than 2 seconds.   Psychiatric: She has a normal mood and affect. Thought  content normal.         ED Course   Critical Care    Date/Time: 6/11/2024 12:05 PM    Performed by: Josie Benson DO  Authorized by: Josie Benson DO  Direct patient critical care time: 12 minutes  Additional history critical care time: 12 minutes  Ordering / reviewing critical care time: 12 minutes  Documentation critical care time: 12 minutes  Consult with family critical care time: 12 minutes  Total critical care time (exclusive of procedural time) : 60 minutes  Critical care was necessary to treat or prevent imminent or life-threatening deterioration of the following conditions: dehydration.  Critical care was time spent personally by me on the following activities: evaluation of patient's response to treatment, examination of patient, obtaining history from patient or surrogate, ordering and performing treatments and interventions, ordering and review of laboratory studies, ordering and review of radiographic studies, pulse oximetry, re-evaluation of patient's condition and review of old charts.  Comments:   Patient with persistent nausea and vomiting.  Unable to take her oral antibiotics secondary to nausea and vomiting.  UTI with failed outpatient antibiotic treatment.  Patient and  offered hospital admission however they declined.  Patient prefers to try home treatment.        Labs Reviewed   POCT URINALYSIS W/O SCOPE - Abnormal; Notable for the following components:       Result Value    Ketones, UA 3+ (*)     Blood, UA Trace-intact (*)     Protein, UA Trace (*)     Urobilinogen, UA 2.0 (*)     Nitrite, UA Positive (*)     Leukocytes, UA 1+ (*)     All other components within normal limits   ISTAT PROCEDURE - Abnormal; Notable for the following components:    POC PH 7.462 (*)     POC PCO2 31.7 (*)     POC HCO3 22.6 (*)     All other components within normal limits   POCT LIVER PANEL - Abnormal; Notable for the following components:    POC GGT 80 (*)     All other components within normal limits    POCT CMP - Abnormal; Notable for the following components:    POC Chloride 94 (*)     All other components within normal limits   CULTURE, URINE   POCT URINALYSIS W/O SCOPE   POCT CBC   POCT CMP   POCT LIVER PANEL     EKG Readings: (Independently Interpreted)    Sinus tachycardia, rate of 110 beats per minute, normal ST, nonspecific T-wave changes, no STEMI       Imaging Results              CT Abdomen Pelvis With IV Contrast NO Oral Contrast (Final result)  Result time 06/11/24 09:52:03      Final result by Myranda Keys MD (06/11/24 09:52:03)                   Impression:      Status post gastric bypass.  Unchanged hiatal hernia.    Slight prominence of the common bile duct measuring 8 mm, this can be seen after cholecystectomy.  No definite obstructive process seen.  To correlate with liver enzymes.  An MRCP could be done if clinically warranted.      Electronically signed by: Myranda Keys MD  Date:    06/11/2024  Time:    09:52               Narrative:    EXAMINATION:  CT ABDOMEN PELVIS WITH IV CONTRAST    CLINICAL HISTORY:  Epigastric pain;Nausea/vomiting;    TECHNIQUE:  Low dose axial images, sagittal and coronal reformations were obtained from the lung bases to the pubic symphysis following the IV administration of 75 mL of Omnipaque 350 .  Oral contrast was not given. Axial and coronal images reformatted.    COMPARISON:  03/27/2020 CT abdomen and pelvis    FINDINGS:  The lung bases are clear.  No pericardial effusion.    There is a small hiatal hernia.  Prior gastric bypass.  The gastric pouch and gastric remnant do not appear dilated.    The liver appears normal.   The gallbladder is removed.  The common bile duct measures 8 mm, slightly prominent, the intrahepatic biliary ducts are not dilated.  No obstructive process seen.  This can be seen after cholecystectomy.    The pancreas, and spleen appear normal.  Mild nodularity of the bilateral adrenal glands, no worrisome mass, it is  unchanged.    The abdominal aorta tapers normally, there is moderate calcified atherosclerotic plaque, no para-aortic lymphadenopathy.    Mild stool retention, no inflammatory changes of the bowel seen, no bowel dilation, the appendix is not definitely identified.  No inflammatory changes of the mesentery.    Stable lipoma of the anterior right abdominal wall.  The inguinal regions appear normal.  No free air, no free fluid.    The bladder is nondistended.  The uterus is removed.  The ovaries appear within normal limits.    The osseous structures demonstrate facet joint osseous hypertrophy at L5-S1.                                       Medications   sodium chloride 0.9% bolus 1,000 mL 1,000 mL (0 mLs Intravenous Stopped 6/11/24 0815)   ondansetron injection 4 mg (4 mg Intravenous Given 6/11/24 0528)   metoclopramide injection 10 mg (10 mg Intravenous Given 6/11/24 0637)   diphenhydrAMINE injection 25 mg (25 mg Intravenous Given 6/11/24 0633)   methylPREDNISolone sodium succinate injection 125 mg (125 mg Intravenous Given 6/11/24 0632)   famotidine (PF) injection 40 mg (40 mg Intravenous Given 6/11/24 0641)   ketorolac injection 15 mg (15 mg Intravenous Given 6/11/24 0759)   cefTRIAXone (Rocephin) 1 g in dextrose 5 % in water (D5W) 100 mL IVPB (MB+) (0 g Intravenous Stopped 6/11/24 0809)   sodium chloride 0.9% bolus 1,000 mL 1,000 mL (0 mLs Intravenous Stopped 6/11/24 1034)   iohexoL (OMNIPAQUE 350) injection 100 mL (75 mLs Intravenous Given 6/11/24 0834)   haloperidol lactate injection 5 mg (5 mg Intramuscular Given 6/11/24 1052)     Medical Decision Making   64-year-old female with a past medical history significant for anemia, anxiety disorder, hypertension, frequent UTIs and kidney stones presents to the emergency department complaining of nausea /vomiting /loose stools /abdominal discomfort times 3 days.  She denies fever /chills.  Course of ED stay:   Urinalysis shows signs of infection.   CBC and CMP were  ordered.Patient received   Cipro/ Zofran emergency department as well as saline bolus.   Care of this patient was transferred to Dr. Benson at shift change pending reassessment  / disposition after saline bolus/ Zofran/Cipro.    Amount and/or Complexity of Data Reviewed  Labs: ordered.  Radiology: ordered.    Risk  OTC drugs.  Prescription drug management.               ED Course as of 06/11/24 1206   Tue Jun 11, 2024   0610  I assumed care of patient at 6:00 a.m..  Patient with persistent nausea and vomiting on repeat evaluation.  Patient also reports she feels itching after the ciprofloxacin was started.  Patient has maybe received 20 cc of the antibiotic.   Antibiotics stopped.   Symptoms immediately treated with Benadryl, Pepcid, and steroids. [RF]   0700   Patient reports   All symptoms of itching/allergic reaction resolved. [RF]   1000   Patient continues to dry heave.  Will treat with Haldol.  Offered patient hospital admission for intractable nausea and vomiting.  Patient states she does not want to be admitted to the hospital at this time. [RF]   1130   Patient is feeling much better.  Dry heaving has resolved. Patient feels she is ready to go home.  Patient is very concerned that she will not be able to take oral antibiotics at home.  I told patient if vomiting returns please go to her preferred hospital to be admitted.  If she is unable to tolerate p.o. antibiotics at home she is welcome to return to any urgent care or this ER for a ceftriaxone injection to effectively treat UTI. [RF]      ED Course User Index  [RF] Josie Benson DO                           Clinical Impression:  Final diagnoses:  [R11.2] Nausea and vomiting  [N30.01] Acute cystitis with hematuria (Primary)  [K52.9] Gastroenteritis          ED Disposition Condition    Discharge Stable          ED Prescriptions       Medication Sig Dispense Start Date End Date Auth. Provider    nitrofurantoin, macrocrystal-monohydrate, (MACROBID) 100 MG  capsule  (Status: Discontinued) Take 1 capsule (100 mg total) by mouth 2 (two) times daily. for 5 days 10 capsule 6/11/2024 6/11/2024 Arvin Titus MD    ondansetron (ZOFRAN-ODT) 4 MG TbDL  (Status: Discontinued) Take 1 tablet (4 mg total) by mouth every 8 (eight) hours as needed (nausea). 10 tablet 6/11/2024 6/11/2024 Arvin Titus MD    acetaminophen (TYLENOL) 500 MG tablet Take 1 tablet (500 mg total) by mouth every 6 (six) hours as needed for Pain (and fever). 30 tablet 6/11/2024 -- Josie Benson DO    pantoprazole (PROTONIX) 20 MG tablet Take 1 tablet (20 mg total) by mouth once daily. Take when you 1st get up 30 minutes prior to eating or drinking 30 tablet 6/11/2024 6/11/2025 Josie Benson DO    promethazine (PHENERGAN) 25 MG suppository Place 1 suppository (25 mg total) rectally every 6 (six) hours as needed for Nausea. 10 suppository 6/11/2024 -- Josie Benson DO    ondansetron (ZOFRAN-ODT) 8 MG TbDL Take 1 tablet (8 mg total) by mouth every 6 (six) hours as needed (As needed for nausea vomiting). 12 tablet 6/11/2024 6/14/2024 Josie Benson DO    levoFLOXacin (LEVAQUIN) 750 MG tablet Take 1 tablet (750 mg total) by mouth once daily. for 5 days 5 tablet 6/11/2024 6/16/2024 Josie Benson DO          Follow-up Information       Follow up With Specialties Details Why Contact Info    [facility] - Emergency Dept Emergency Medicine  Go to [facility] emergency department on [facility] if symptoms worsen or do not resolve [address] [facility] [address] [phone]    Tracy Chand MD Internal Medicine Schedule an appointment as soon as possible for a visit in 1 day  [address]  [place] [address]  [phone]               Josie Benson DO  06/11/24 [id number]

## 2024-06-12 ENCOUNTER — PATIENT OUTREACH (OUTPATIENT)
Dept: EMERGENCY MEDICINE | Facility: HOSPITAL | Age: 64
End: 2024-06-12
Payer: MEDICARE

## 2024-06-12 ENCOUNTER — HOSPITAL ENCOUNTER (EMERGENCY)
Facility: HOSPITAL | Age: 64
Discharge: HOME | End: 2024-06-12
Attending: EMERGENCY MEDICINE
Payer: MEDICARE

## 2024-06-12 VITALS
DIASTOLIC BLOOD PRESSURE: 74 MMHG | SYSTOLIC BLOOD PRESSURE: 148 MMHG | TEMPERATURE: 98 F | HEART RATE: 94 BPM | OXYGEN SATURATION: 98 % | RESPIRATION RATE: 20 BRPM

## 2024-06-12 DIAGNOSIS — N39.0 ACUTE UTI: Primary | ICD-10-CM

## 2024-06-12 DIAGNOSIS — R11.0 NAUSEA: ICD-10-CM

## 2024-06-12 DIAGNOSIS — H10.13 ALLERGIC CONJUNCTIVITIS OF BOTH EYES: ICD-10-CM

## 2024-06-12 PROCEDURE — 96372 THER/PROPH/DIAG INJ SC/IM: CPT | Performed by: EMERGENCY MEDICINE

## 2024-06-12 PROCEDURE — 63600175 PHARM REV CODE 636 W HCPCS: Mod: ER | Performed by: EMERGENCY MEDICINE

## 2024-06-12 PROCEDURE — 25000003 PHARM REV CODE 250: Mod: ER | Performed by: EMERGENCY MEDICINE

## 2024-06-12 PROCEDURE — 99284 EMERGENCY DEPT VISIT MOD MDM: CPT | Mod: 25,ER

## 2024-06-12 RX ADMIN — CEFTRIAXONE 1 G: 1 INJECTION, POWDER, FOR SOLUTION INTRAMUSCULAR; INTRAVENOUS at 07:06

## 2024-06-12 NOTE — DISCHARGE INSTRUCTIONS
If you remain unable to take your oral antibiotics at home please return to the ED or urgent care for ceftriaxone injection.

## 2024-06-12 NOTE — Clinical Note
"Cherie"Gerardo Richards was seen and treated in our emergency department on 6/12/2024.  She may return to work on 06/14/2024.       If you have any questions or concerns, please don't hesitate to call.      Josie Benson, DO" Home

## 2024-06-12 NOTE — ED PROVIDER NOTES
Encounter Date: 6/12/2024    SCRIBE #1 NOTE: I, Yeimi Doty, am scribing for, and in the presence of,  Josie Benson DO. I have scribed the following portions of the note - Other sections scribed: HPI,ROS, PE,MDM.       History     Chief Complaint   Patient presents with    Antibiotic injection     Cherie Richards is a 64 y.o. female with Hx of HTN, GERD, who presents to the ED requesting an antibiotic shot after being diagnosed with a UTI yesterday.  Patient is feeling much better today.  Patient reports symptoms of dry heaving with no active vomiting. Patient states she cannot tolerate oral antibiotics that she was prescribed during prior visit(6/11/24). There are no further complaints at this time.       The history is provided by the patient. No  was used.     Review of patient's allergies indicates:   Allergen Reactions    Adhesive tape-silicones Other (See Comments)     Other reaction(s): BLISTERS    Penicillins Hives and Itching    Sulfamethoxazole-trimethoprim Hives and Itching    Prosed ec [meth-hyos-atrp-m blue-ba-phsal] Hives, Itching and Swelling    Pyridium [phenazopyridine]     Cephalexin Nausea And Vomiting and Other (See Comments)     Dehydration     Past Medical History:   Diagnosis Date    Abnormal Pap smear     Anemia     history    Anxiety     Cancer     uterine-cancer cells     Colon polyps, next C-scope 2019. 4/22/2014    Dry eyes     Dyspareunia, female 6/24/2020    Essential hypertension, started in her mid 30's 1/18/2017    Family history of malignant neoplasm of pancreas 6/1/2018    Gastric bypass status for obesity 8/21/2012    GERD (gastroesophageal reflux disease)     Helicobacter pylori gastritis, 2008. 8/20/2014    Hemangioma of liver, stable 2010, 2012, right lobe 8/20/2014    Hematuria     History of cosmetic surgeries, tummy tuck 2011. 4/5/2013    History of frquent UTI 6/24/2020    HTN (hypertension) 8/21/2012    120s-130s/80s    Kidney stone     Right sided  sciatica 6/1/2018    Sleep apnea     patient does not use the C-PAP mask-cannot tolerate    Ureteral stone 4/8/2014    Urinary incontinence     Urinary retention     Varicose veins of lower extremity with inflammation 4/8/2015    Vertigo     mild    Vitamin D deficiency disease 6/1/2018     Past Surgical History:   Procedure Laterality Date    Abdominoplasty with Liposcuction      APPENDECTOMY      CERVICAL BIOPSY  W/ LOOP ELECTRODE EXCISION      Prior to hysterectomy    CHOLECYSTECTOMY      Laparoscopic    COLONOSCOPY N/A 2/13/2020    Procedure: COLONOSCOPY;  Surgeon: Pb Smith MD;  Location: [facility]  Service: Endoscopy;  Laterality: N/A;    EPIDURAL STEROID INJECTION INTO LUMBAR SPINE Bilateral 3/15/2024    Procedure: B/L L5-S1 TFESI;  Surgeon: Glendy Banks MD;  [facility]  Service: Pain Management;  Laterality: Bilateral;  20 mins    ESOPHAGOGASTRODUODENOSCOPY N/A 11/2/2021    Procedure: EGD (ESOPHAGOGASTRODUODENOSCOPY);  Surgeon: Clayton Martínez MD;  Location: [facility] ([address] [place] [address]);  Service: Endoscopy;  Laterality: N/A;  fully vac. 3/15/21 / instr portal -ml    GASTRIC BYPASS      HERNIA REPAIR      HYSTERECTOMY  1980     TVH  both ovaries remain     Family History   Problem Relation Name Age of Onset    Heart disease Mother      Stroke Mother      Coronary artery disease Mother      Heart failure Mother      Obesity Mother      Cancer Father          pancreatic cancer    Diabetes Father      Obesity Father      No Known Problems Sister      No Known Problems Brother      Hearing loss Maternal Grandmother      No Known Problems Maternal Grandfather      No Known Problems Paternal Grandmother      No Known Problems Paternal Grandfather      Breast cancer Maternal Aunt      Colon cancer Maternal Uncle      No Known Problems Paternal Aunt      No Known Problems Paternal Uncle      Ovarian cancer Neg Hx      Anesthesia problems Neg Hx      Amblyopia Neg Hx       Blindness Neg Hx      Cataracts Neg Hx      Glaucoma Neg Hx      Hypertension Neg Hx      Macular degeneration Neg Hx      Retinal detachment Neg Hx      Strabismus Neg Hx      Thyroid disease Neg Hx       Social History     Tobacco Use    Smoking status: Never    Smokeless tobacco: Never    Tobacco comments:     .  .   Occup:  .     Substance Use Topics    Alcohol use: No    Drug use: No     Review of Systems   Constitutional:  Negative for fever.   HENT:  Negative for rhinorrhea and sore throat.    Eyes:  Negative for redness.   Respiratory:  Negative for shortness of breath.    Cardiovascular:  Negative for chest pain and leg swelling.   Gastrointestinal:  Negative for abdominal pain, diarrhea, nausea and vomiting.   Musculoskeletal:  Negative for back pain.   Skin:  Negative for rash.   Neurological:  Negative for syncope and headaches.   All other systems reviewed and are negative.      Patient gave consent to have physical exam performed.    Physical Exam     Initial Vitals [24 0632]   BP Pulse Resp Temp SpO2   (!) 156/82 104 20 97.4 °F (36.3 °C) 97 %      MAP       --         Physical Exam    Nursing note and vitals reviewed.  Constitutional: She appears well-developed and well-nourished.   HENT:   Head: Normocephalic and atraumatic.   Right Ear: External ear normal.   Left Ear: External ear normal.   Nose: Nose normal.   Mouth/Throat: Oropharynx is clear and moist.   Eyes: Conjunctivae and EOM are normal. Pupils are equal, round, and reactive to light.   Neck: Phonation normal. Neck supple.   Normal range of motion.  Cardiovascular:  Normal rate, regular rhythm, normal heart sounds and intact distal pulses.     Exam reveals no gallop and no friction rub.       No murmur heard.  Pulmonary/Chest: Effort normal and breath sounds normal. No stridor. No respiratory distress. She has no wheezes. She has no rhonchi. She has no rales. She exhibits no tenderness.   Abdominal: Abdomen is  soft. Bowel sounds are normal. She exhibits no distension. There is no abdominal tenderness. There is no rigidity, no rebound and no guarding.   Musculoskeletal:         General: No tenderness or edema. Normal range of motion.      Cervical back: Normal range of motion and neck supple.     Neurological: She is alert and oriented to person, place, and time. She has normal strength. No cranial nerve deficit or sensory deficit. GCS score is 15. GCS eye subscore is 4. GCS verbal subscore is 5. GCS motor subscore is 6.   Skin: Skin is warm and dry. Capillary refill takes less than 2 seconds. No rash noted.   Psychiatric: She has a normal mood and affect. Her behavior is normal.         ED Course   Procedures  Labs Reviewed - No data to display       Imaging Results    None          Medications   cefTRIAXone (Rocephin) 1 g in LIDOcaine HCL 10 mg/ml (1%) 4 mL IM only syringe (has no administration in time range)     Medical Decision Making          Scribe Attestation:   Scribe #1: I performed the above scribed service and the documentation accurately describes the services I performed. I attest to the accuracy of the note.              Medical Decision Making:    This is an evaluation of a 64 y.o. female that presents to the Emergency Department for   Chief Complaint   Patient presents with    Antibiotic injection       Independent historian: (parent/ EMS/ spouse/ etc) none    The patient is a non-toxic and well appearing patient. On physical exam, patient appears well hydrated with moist mucus membranes. Breath sounds are clear and equal bilaterally with no adventitious breath sounds, tachypnea or respiratory distress. Regular rate and rhythm. No murmurs. Abdomen soft and non tender. Patient is tolerating PO without difficulty. Physical exam otherwise as above.     I have reviewed vital signs and nursing notes.   Vital Signs Are Reassuring.     Based on the patient's symptoms, I am considering and evaluating for the  following differential diagnoses: dry heaving, nausea, vomiting, UTI, unable to tolerate oral antibiotics     ED Course:Treatment in the ED included Physical Exam and medications given in ED  Medications   cefTRIAXone (Rocephin) 1 g in LIDOcaine HCL 10 mg/ml (1%) 4 mL IM only syringe (1 g Intramuscular Given 6/12/24 0724)   .   Patient reports feeling better after treatment in the ER.   Vital signs reviewed  Nurse's notes reviewed  External Data/Documents Reviewed: Previous medical records and vital signs reviewed, see HPI and Physical exam.   Labs: ordered and reviewed.  Urine culture pending.      Risk  Diagnosis or treatment significantly limited by the following social determinants of health: There is no height or weight on file to calculate BMI.     In shared decision making with the patient, we discussed treatment, prescriptions, labs, and imaging results.    Discharge home with   ED Prescriptions    None       Fill and take prescriptions as directed.  Return to the ED if symptoms worsen or do not resolve.   Answered questions and discussed discharge plan.    Patient reports resolution of symptoms and is ready for discharge.  Follow up with PCP/specialist in 1 day    The following labs and imaging were reviewed:        No visits with results within 1 Day(s) from this visit.   Latest known visit with results is:   Admission on 06/11/2024, Discharged on 06/11/2024   Component Date Value Ref Range Status    Glucose, UA 06/11/2024 Negative   Final    Bilirubin, UA 06/11/2024 Negative   Final    Ketones, UA 06/11/2024 3+ (A)   Final    Spec Grav UA 06/11/2024 1.020   Final    Blood, UA 06/11/2024 Trace-intact (A)   Final    PH, UA 06/11/2024 7.0   Final    Protein, UA 06/11/2024 Trace (A)   Final    Urobilinogen, UA 06/11/2024 2.0 (A)  E.U./dL Final    Nitrite, UA 06/11/2024 Positive (P)   Final    Leukocytes, UA 06/11/2024 1+ (A)   Final    Color, UA 06/11/2024 Yellow   Final    Clarity, UA 06/11/2024 Clear    Final    QRS Duration 06/11/2024 76  ms Final    [facility] QTC Calculation 06/11/2024 433  ms Final    POC PH 06/11/2024 7.462 (H)  7.35 - 7.45 Final    POC PCO2 06/11/2024 31.7 (L)  35 - 45 mmHg Final    POC PO2 06/11/2024 49  40 - 60 mmHg Final    POC HCO3 06/11/2024 22.6 (L)  24 - 28 mmol/L Final    POC BE 06/11/2024 0  -2 to 2 mmol/L Final    POC SATURATED O2 06/11/2024 87  95 - 100 % Final    POC Lactate 06/11/2024 0.81  0.5 - 2.2 mmol/L Final    POC TCO2 06/11/2024 24  24 - 29 mmol/L Final    Sample 06/11/2024 VENOUS   Final    Site 06/11/2024 Other   Final    Allens Test 06/11/2024 N/A   Final    DelSys 06/11/2024 Room Air   Final    Albumin, POC 06/11/2024 4.4  3.3 - 5.5 g/dL Final    Alkaline Phosphatase, POC 06/11/2024 110  42 - 141 U/L Final    ALT (SGPT), POC 06/11/2024 16  10 - 47 U/L Final    Amylase, POC 06/11/2024 18  14 - 97 U/L Final    AST (SGOT), POC 06/11/2024 22  11 - 38 U/L Final    POC GGT 06/11/2024 80 (H)  5 - 65 U/L Final    Bilirubin, POC 06/11/2024 0.6  0.2 - 1.6 mg/dL Final    Protein, POC 06/11/2024 7.4  6.4 - 8.1 g/dL Final    Albumin, POC 06/11/2024 4.1  3.3 - 5.5 g/dL Final    Alkaline Phosphatase, POC 06/11/2024 119  42 - 141 U/L Final    ALT (SGPT), POC 06/11/2024 13  10 - 47 U/L Final    AST (SGOT), POC 06/11/2024 21  11 - 38 U/L Final    POC BUN 06/11/2024 9  7 - 22 mg/dL Final    Calcium, POC 06/11/2024 10.2  8.0 - 10.3 mg/dL Final    POC Chloride 06/11/2024 94 (L)  98 - 108 mmol/L Final    POC Creatinine 06/11/2024 0.8  0.6 - 1.2 mg/dL Final    POC Glucose 06/11/2024 112  73 - 118 mg/dL Final    POC Potassium 06/11/2024 4.0  3.6 - 5.1 mmol/L Final    POC Sodium 06/11/2024 133  128 - 145 mmol/L Final    Bilirubin, POC 06/11/2024 0.5  0.2 - 1.6 mg/dL Final    POC TCO2 06/11/2024 22  18 - 33 mmol/L Final    Protein, POC 06/11/2024 7.4  6.4 - 8.1 g/dL Final        Imaging Results    None            Medical Decision Making:   History:   Old Medical Records: I decided to obtain old  medical records.            I, Dr. Josie Benson, personally performed the services described in this documentation. This document was produced by a scribe under my direction and in my presence. All medical record entries made by the scribe were at my direction and in my presence.  I have reviewed the chart and agree that the record reflects my personal performance and is accurate and complete. Josie Benson DO.     06/13/2024 8:21 AM    Clinical Impression:  Final diagnoses:  [N39.0] Acute UTI (Primary)  [R11.0] Nausea          ED Disposition Condition    Discharge Stable          ED Prescriptions    None       Follow-up Information       Follow up With Specialties Details Why Contact Info    Tracy Chand MD Internal Medicine Schedule an appointment as soon as possible for a visit in 1 day  [address]  [place] [address]  [phone]      [facility] ED Emergency Medicine Go to  As needed [address] [facility] [address] [phone]    [facility] - Emergency Dept Emergency Medicine Go to  Please go to [facility] emergency department if symptoms worsen [address] [facility] [place] [address] [phone]             Josie Benson DO  06/13/24 0822

## 2024-06-12 NOTE — TELEPHONE ENCOUNTER
Refill Routing Note   Medication(s) are not appropriate for processing by [facility] for the following reason(s):        Outside of protocol    [facility] action(s):  Route               Appointments  past 12m or future 3m with PCP    Date Provider   Last Visit   6/4/2024 Tracy Chand MD   Next Visit   6/24/2024 Tracy Chand MD   ED visits in past 90 days: 5        Note composed:4:26 PM 06/12/2024

## 2024-06-13 ENCOUNTER — HOSPITAL ENCOUNTER (EMERGENCY)
Facility: HOSPITAL | Age: 64
Discharge: HOME | End: 2024-06-13
Attending: EMERGENCY MEDICINE
Payer: MEDICARE

## 2024-06-13 VITALS
TEMPERATURE: 98 F | OXYGEN SATURATION: 98 % | SYSTOLIC BLOOD PRESSURE: 163 MMHG | RESPIRATION RATE: 18 BRPM | HEART RATE: 101 BPM | DIASTOLIC BLOOD PRESSURE: 94 MMHG

## 2024-06-13 DIAGNOSIS — N39.0 ACUTE UTI (URINARY TRACT INFECTION): Primary | ICD-10-CM

## 2024-06-13 LAB
ALBUMIN SERPL-MCNC: 4.3 G/DL (ref 3.3–5.5)
ALLENS TEST: ABNORMAL
ALP SERPL-CCNC: 110 U/L (ref 42–141)
BACTERIA UR CULT: ABNORMAL
BILIRUB SERPL-MCNC: 0.7 MG/DL (ref 0.2–1.6)
BILIRUBIN, POC UA: NEGATIVE
BLOOD, POC UA: ABNORMAL
BUN SERPL-MCNC: 12 MG/DL (ref 7–22)
CALCIUM SERPL-MCNC: 10 MG/DL (ref 8–10.3)
CHLORIDE SERPL-SCNC: 96 MMOL/L (ref 98–108)
CLARITY, POC UA: CLEAR
COLOR, POC UA: YELLOW
CREAT SERPL-MCNC: 0.6 MG/DL (ref 0.6–1.2)
GLUCOSE SERPL-MCNC: 90 MG/DL (ref 73–118)
GLUCOSE, POC UA: NEGATIVE
HCO3 UR-SCNC: 24.2 MMOL/L (ref 24–28)
HCT, POC: NORMAL
HGB, POC: NORMAL (ref 14–18)
KETONES, POC UA: ABNORMAL
LDH SERPL L TO P-CCNC: 1.17 MMOL/L (ref 0.5–2.2)
LEUKOCYTE EST, POC UA: ABNORMAL
MCH, POC: NORMAL
MCHC, POC: NORMAL
MCV, POC: NORMAL
MPV, POC: NORMAL
NITRITE, POC UA: NEGATIVE
PCO2 BLDA: 37.6 MMHG (ref 35–45)
PH SMN: 7.42 [PH] (ref 7.35–7.45)
PH UR STRIP: 6.5 [PH]
PO2 BLDA: 24 MMHG (ref 40–60)
POC ALT (SGPT): 18 U/L (ref 10–47)
POC AST (SGOT): 30 U/L (ref 11–38)
POC BE: 0 MMOL/L
POC PLATELET COUNT: NORMAL
POC SATURATED O2: 45 % (ref 95–100)
POC TCO2: 25 MMOL/L (ref 18–33)
POC TCO2: 25 MMOL/L (ref 24–29)
POTASSIUM BLD-SCNC: 3.6 MMOL/L (ref 3.6–5.1)
PROTEIN, POC UA: NEGATIVE
PROTEIN, POC: 7.5 G/DL (ref 6.4–8.1)
RBC, POC: NORMAL
RDW, POC: NORMAL
SAMPLE: ABNORMAL
SITE: ABNORMAL
SODIUM BLD-SCNC: 131 MMOL/L (ref 128–145)
SPECIFIC GRAVITY, POC UA: 1.02
UROBILINOGEN, POC UA: 0.2 E.U./DL
WBC, POC: NORMAL

## 2024-06-13 PROCEDURE — 96367 TX/PROPH/DG ADDL SEQ IV INF: CPT | Mod: ER

## 2024-06-13 PROCEDURE — 99900035 HC TECH TIME PER 15 MIN (STAT): Mod: ER

## 2024-06-13 PROCEDURE — 63600175 PHARM REV CODE 636 W HCPCS: Mod: ER | Performed by: EMERGENCY MEDICINE

## 2024-06-13 PROCEDURE — 96365 THER/PROPH/DIAG IV INF INIT: CPT | Mod: ER

## 2024-06-13 PROCEDURE — 82803 BLOOD GASES ANY COMBINATION: CPT | Mod: ER

## 2024-06-13 PROCEDURE — 81002 URINALYSIS NONAUTO W/O SCOPE: CPT | Mod: ER

## 2024-06-13 PROCEDURE — 80053 COMPREHEN METABOLIC PANEL: CPT | Mod: ER

## 2024-06-13 PROCEDURE — 25000003 PHARM REV CODE 250: Mod: ER | Performed by: EMERGENCY MEDICINE

## 2024-06-13 PROCEDURE — 99284 EMERGENCY DEPT VISIT MOD MDM: CPT | Mod: 25,ER

## 2024-06-13 PROCEDURE — 96375 TX/PRO/DX INJ NEW DRUG ADDON: CPT | Mod: ER

## 2024-06-13 RX ORDER — OLOPATADINE HYDROCHLORIDE 1 MG/ML
1 SOLUTION/ DROPS OPHTHALMIC 2 TIMES DAILY
Qty: 5 ML | Refills: 6 | Status: SHIPPED | OUTPATIENT
Start: 2024-06-13

## 2024-06-13 RX ORDER — CEFTRIAXONE 500 MG/1
1000 INJECTION, POWDER, FOR SOLUTION INTRAMUSCULAR; INTRAVENOUS
Status: DISCONTINUED | OUTPATIENT
Start: 2024-06-13 | End: 2024-06-13

## 2024-06-13 RX ORDER — LEVOFLOXACIN 5 MG/ML
750 INJECTION, SOLUTION INTRAVENOUS
Status: COMPLETED | OUTPATIENT
Start: 2024-06-13 | End: 2024-06-13

## 2024-06-13 RX ORDER — DIPHENHYDRAMINE HYDROCHLORIDE 50 MG/ML
25 INJECTION INTRAMUSCULAR; INTRAVENOUS
Status: COMPLETED | OUTPATIENT
Start: 2024-06-13 | End: 2024-06-13

## 2024-06-13 RX ORDER — ONDANSETRON HYDROCHLORIDE 2 MG/ML
4 INJECTION, SOLUTION INTRAVENOUS
Status: COMPLETED | OUTPATIENT
Start: 2024-06-13 | End: 2024-06-13

## 2024-06-13 RX ORDER — FAMOTIDINE 10 MG/ML
40 INJECTION INTRAVENOUS
Status: COMPLETED | OUTPATIENT
Start: 2024-06-13 | End: 2024-06-13

## 2024-06-13 RX ORDER — KETOROLAC TROMETHAMINE 30 MG/ML
15 INJECTION, SOLUTION INTRAMUSCULAR; INTRAVENOUS
Status: COMPLETED | OUTPATIENT
Start: 2024-06-13 | End: 2024-06-13

## 2024-06-13 RX ORDER — METHYLPREDNISOLONE SOD SUCC 125 MG
125 VIAL (EA) INJECTION
Status: COMPLETED | OUTPATIENT
Start: 2024-06-13 | End: 2024-06-13

## 2024-06-13 RX ADMIN — KETOROLAC TROMETHAMINE 15 MG: 30 INJECTION, SOLUTION INTRAMUSCULAR at 07:06

## 2024-06-13 RX ADMIN — SODIUM CHLORIDE 1000 ML: 9 INJECTION, SOLUTION INTRAVENOUS at 07:06

## 2024-06-13 RX ADMIN — FAMOTIDINE 40 MG: 10 INJECTION, SOLUTION INTRAVENOUS at 08:06

## 2024-06-13 RX ADMIN — DIPHENHYDRAMINE HYDROCHLORIDE 25 MG: 50 INJECTION INTRAMUSCULAR; INTRAVENOUS at 08:06

## 2024-06-13 RX ADMIN — ONDANSETRON 4 MG: 2 INJECTION INTRAMUSCULAR; INTRAVENOUS at 08:06

## 2024-06-13 RX ADMIN — CEFTRIAXONE 1 G: 1 INJECTION, POWDER, FOR SOLUTION INTRAMUSCULAR; INTRAVENOUS at 07:06

## 2024-06-13 RX ADMIN — LEVOFLOXACIN 750 MG: 750 INJECTION, SOLUTION INTRAVENOUS at 08:06

## 2024-06-13 RX ADMIN — METHYLPREDNISOLONE SODIUM SUCCINATE 125 MG: 125 INJECTION, POWDER, FOR SOLUTION INTRAMUSCULAR; INTRAVENOUS at 08:06

## 2024-06-13 NOTE — PROGRESS NOTES
Patient was seen in the ED on 6/11/24. Phoned patient to assist with Post ED Discharge Navigation. Patient has PCP follow-up on 6/13/24.  Drake Carter

## 2024-06-13 NOTE — ED PROVIDER NOTES
Encounter Date: 6/13/2024    SCRIBE #1 NOTE: I, Dodie Colin, am scribing for, and in the presence of,  Josie Benson DO.       History     Chief Complaint   Patient presents with    Antibiotic injection     65 yo F with known UTI presenting to the ED for abx injection request. Patient was seen by myself yesterday and 2 days ago for her UTI. She states she is unable to tolerate pills given chronic diminished appetite 2/2 gastric sleeve. She received rocephin in the ED yesterday and returns today for repeat IM abx. Her urine culture from 2 days ago was significant for pseudomonas. She otherwise reports she has been feeling improved and has no other complaints at this time.     The history is provided by the patient and medical records.     Review of patient's allergies indicates:   Allergen Reactions    Adhesive tape-silicones Other (See Comments)     Other reaction(s): BLISTERS    Penicillins Hives and Itching    Sulfamethoxazole-trimethoprim Hives and Itching    Prosed ec [meth-hyos-atrp-m blue-ba-phsal] Hives, Itching and Swelling    Pyridium [phenazopyridine]     Cephalexin Nausea And Vomiting and Other (See Comments)     Dehydration     Past Medical History:   Diagnosis Date    Abnormal Pap smear     Anemia     history    Anxiety     Cancer     uterine-cancer cells     Colon polyps, next C-scope 2019. 4/22/2014    Dry eyes     Dyspareunia, female 6/24/2020    Essential hypertension, started in her mid 30's 1/18/2017    Family history of malignant neoplasm of pancreas 6/1/2018    Gastric bypass status for obesity 8/21/2012    GERD (gastroesophageal reflux disease)     Helicobacter pylori gastritis, 2008. 8/20/2014    Hemangioma of liver, stable 2010, 2012, right lobe 8/20/2014    Hematuria     History of cosmetic surgeries, tummy tuck 2011. 4/5/2013    History of frquent UTI 6/24/2020    HTN (hypertension) 8/21/2012    120s-130s/80s    Kidney stone     Right sided sciatica 6/1/2018    Sleep apnea     patient does  not use the C-PAP mask-cannot tolerate    Ureteral stone 4/8/2014    Urinary incontinence     Urinary retention     Varicose veins of lower extremity with inflammation 4/8/2015    Vertigo     mild    Vitamin D deficiency disease 6/1/2018     Past Surgical History:   Procedure Laterality Date    Abdominoplasty with Liposcuction      APPENDECTOMY      CERVICAL BIOPSY  W/ LOOP ELECTRODE EXCISION      Prior to hysterectomy    CHOLECYSTECTOMY      Laparoscopic    COLONOSCOPY N/A 2/13/2020    Procedure: COLONOSCOPY;  Surgeon: Pb Smith MD;  Location: [facility] ([address] [place] [address]);  Service: Endoscopy;  Laterality: N/A;    EPIDURAL STEROID INJECTION INTO LUMBAR SPINE Bilateral 3/15/2024    Procedure: B/L L5-S1 TFESI;  Surgeon: Glendy Banks MD;  Location: [facility];  Service: Pain Management;  Laterality: Bilateral;  20 mins    ESOPHAGOGASTRODUODENOSCOPY N/A 11/2/2021    Procedure: EGD (ESOPHAGOGASTRODUODENOSCOPY);  Surgeon: Clayton Martínez MD;  Location: [address]);  Service: Endoscopy;  Laterality: N/A;  fully vac. 3/15/21 / instr portal -ml    GASTRIC BYPASS      HERNIA REPAIR      HYSTERECTOMY  1980     TVH  both ovaries remain     Family History   Problem Relation Name Age of Onset    Heart disease Mother      Stroke Mother      Coronary artery disease Mother      Heart failure Mother      Obesity Mother      Cancer Father          pancreatic cancer    Diabetes Father      Obesity Father      No Known Problems Sister      No Known Problems Brother      Hearing loss Maternal Grandmother      No Known Problems Maternal Grandfather      No Known Problems Paternal Grandmother      No Known Problems Paternal Grandfather      Breast cancer Maternal Aunt      Colon cancer Maternal Uncle      No Known Problems Paternal Aunt      No Known Problems Paternal Uncle      Ovarian cancer Neg Hx      Anesthesia problems Neg Hx      Amblyopia Neg Hx      Blindness Neg Hx      Cataracts Neg Hx      Glaucoma Neg  Hx      Hypertension Neg Hx      Macular degeneration Neg Hx      Retinal detachment Neg Hx      Strabismus Neg Hx      Thyroid disease Neg Hx       Social History     Tobacco Use    Smoking status: Never    Smokeless tobacco: Never    Tobacco comments:     .  .   Occup:  .     Substance Use Topics    Alcohol use: No    Drug use: No     Review of Systems   Constitutional:  Negative for chills and fever.   HENT:  Negative for congestion, rhinorrhea and sore throat.    Eyes:  Negative for visual disturbance.   Respiratory:  Negative for cough and shortness of breath.    Cardiovascular:  Negative for chest pain.   Gastrointestinal:  Negative for abdominal pain, diarrhea, nausea and vomiting.   Genitourinary:  Negative for dysuria, frequency and hematuria.   Musculoskeletal:  Negative for back pain.   Skin:  Negative for rash.   Neurological:  Negative for dizziness, weakness and headaches.       Physical Exam     Initial Vitals [24 0623]   BP Pulse Resp Temp SpO2   129/87 99 20 97.5 °F (36.4 °C) 97 %      MAP       --         Physical Exam    Nursing note and vitals reviewed.  Constitutional: She appears well-developed and well-nourished. No distress.   HENT:   Head: Normocephalic and atraumatic.   Right Ear: External ear normal.   Left Ear: External ear normal.   Nose: Nose normal.   Eyes: EOM are normal. Pupils are equal, round, and reactive to light. Right eye exhibits no discharge. Left eye exhibits no discharge. No scleral icterus.   Neck: Neck supple.   Normal range of motion.  Cardiovascular:  Normal rate, regular rhythm, normal heart sounds and intact distal pulses.           No murmur heard.  Pulmonary/Chest: Breath sounds normal. No respiratory distress. She has no wheezes.   Abdominal: Abdomen is soft. There is no abdominal tenderness. There is no rebound and no guarding.   Musculoskeletal:         General: No tenderness or edema. Normal range of motion.      Cervical back:  Normal range of motion and neck supple.     Neurological: She is alert and oriented to person, place, and time. She has normal strength.   Skin: Skin is warm and dry. Capillary refill takes less than 2 seconds.   Psychiatric: She has a normal mood and affect.         ED Course   Procedures  Labs Reviewed   POCT URINALYSIS W/O SCOPE - Abnormal; Notable for the following components:       Result Value    Ketones, UA Trace (*)     Blood, UA Trace-intact (*)     Leukocytes, UA Trace (*)     All other components within normal limits   ISTAT PROCEDURE - Abnormal; Notable for the following components:    POC PO2 24 (*)     All other components within normal limits   POCT CMP - Abnormal; Notable for the following components:    POC Chloride 96 (*)     All other components within normal limits   POCT CBC   POCT URINALYSIS W/O SCOPE   POCT CMP          Imaging Results    None          Medications   sodium chloride 0.9% bolus 1,000 mL 1,000 mL (0 mLs Intravenous Stopped 6/13/24 0838)   ketorolac injection 15 mg (15 mg Intravenous Given 6/13/24 0738)   cefTRIAXone (Rocephin) 1 g in dextrose 5 % in water (D5W) 100 mL IVPB (MB+) (0 g Intravenous Stopped 6/13/24 0808)   levoFLOXacin 750 mg/150 mL IVPB 750 mg (0 mg Intravenous Stopped 6/13/24 0942)   methylPREDNISolone sodium succinate injection 125 mg (125 mg Intravenous Given 6/13/24 0813)   famotidine (PF) injection 40 mg (40 mg Intravenous Given 6/13/24 0812)   diphenhydrAMINE injection 25 mg (25 mg Intravenous Given 6/13/24 0812)   ondansetron injection 4 mg (4 mg Intravenous Given 6/13/24 0812)     Medical Decision Making  Amount and/or Complexity of Data Reviewed  Labs: ordered.    Risk  Prescription drug management.        Medical Decision Making:    This is an evaluation of a 64 y.o. female that presents to the Emergency Department for   Chief Complaint   Patient presents with    Antibiotic injection       Independent historian: (parent/ EMS/ spouse/ etc) patient's   is at bedside.  Reports she asked to eat a small amount of food 8 times a day.  He states he will help her take her antibiotics and take her directly to [facility] for they want to be admitted if she has not able to tolerate oral antibiotics at home.    The patient is a non-toxic and well appearing patient. On physical exam, patient appears well hydrated with moist mucus membranes. Breath sounds are clear and equal bilaterally with no adventitious breath sounds, tachypnea or respiratory distress. Regular rate and rhythm. No murmurs. Abdomen soft and non tender. Patient is tolerating PO without difficulty. Physical exam otherwise as above.     I have reviewed vital signs and nursing notes.   Vital Signs Are Reassuring.     Based on the patient's symptoms, I am considering and evaluating for the following differential diagnoses:  Dehydration, UTI, nausea, and hypoglycemia.    ED Course:Treatment in the ED included Physical Exam and medications given in ED  Medications   sodium chloride 0.9% bolus 1,000 mL 1,000 mL (0 mLs Intravenous Stopped 6/13/24 0838)   ketorolac injection 15 mg (15 mg Intravenous Given 6/13/24 0738)   cefTRIAXone (Rocephin) 1 g in dextrose 5 % in water (D5W) 100 mL IVPB (MB+) (0 g Intravenous Stopped 6/13/24 0808)   levoFLOXacin 750 mg/150 mL IVPB 750 mg (0 mg Intravenous Stopped 6/13/24 0942)   methylPREDNISolone sodium succinate injection 125 mg (125 mg Intravenous Given 6/13/24 0813)   famotidine (PF) injection 40 mg (40 mg Intravenous Given 6/13/24 0812)   diphenhydrAMINE injection 25 mg (25 mg Intravenous Given 6/13/24 0812)   ondansetron injection 4 mg (4 mg Intravenous Given 6/13/24 0812)   .   Patient reports feeling better after treatment in the ER.   Vital signs reviewed  Nurse's notes reviewed  External Data/Documents Reviewed: Previous medical records and vital signs reviewed, see HPI and Physical exam.   Labs: ordered and reviewed.  White blood cell count is 7.3.  Improved  when compared to previous white blood cell count.      Risk  Diagnosis or treatment significantly limited by the following social determinants of health: There is no height or weight on file to calculate BMI.     In shared decision making with the patient, we discussed treatment, prescriptions, labs, and imaging results.    Patient was offered admission for IV abx but declined.  With patient and  we discussed urine culture results.  Advised that she must take the levofloxacin daily.  She reports she has a PCP appointment today at 1 PM and will discuss with PCP regarding hospital admission before making a decision for admission. Advised to return to ED to [facility] if she would like to be admitted at [facility].     Discharge home with   ED Prescriptions patient prescribed levofloxacin 700 mg to take by mouth once a day for 5 days.   Patient reports she has picked up the prescription and has it at home.  She will start taking it tomorrow.       Fill and take prescriptions as directed.  Return to the ED if symptoms worsen or do not resolve.   Answered questions and discussed discharge plan.    Patient reports resolution of symptoms and is ready for discharge.  Follow up with PCP/specialist in 1 day    The following labs and imaging were reviewed:        Admission on 06/13/2024, Discharged on 06/13/2024   Component Date Value Ref Range Status    POC PH 06/13/2024 7.417  7.35 - 7.45 Final    POC PCO2 06/13/2024 37.6  35 - 45 mmHg Final    POC PO2 06/13/2024 24 (LL)  40 - 60 mmHg Final    POC HCO3 06/13/2024 24.2  24 - 28 mmol/L Final    POC BE 06/13/2024 0  -2 to 2 mmol/L Final    POC SATURATED O2 06/13/2024 45  95 - 100 % Final    POC Lactate 06/13/2024 1.17  0.5 - 2.2 mmol/L Final    POC TCO2 06/13/2024 25  24 - 29 mmol/L Final    Sample 06/13/2024 VENOUS   Final    Site 06/13/2024 Other   Final    Allens Test 06/13/2024 N/A   Final    Albumin, POC 06/13/2024 4.3  3.3 - 5.5 g/dL Final     Alkaline Phosphatase, POC 06/13/2024 110  42 - 141 U/L Final    ALT (SGPT), POC 06/13/2024 18  10 - 47 U/L Final    AST (SGOT), POC 06/13/2024 30  11 - 38 U/L Final    POC BUN 06/13/2024 12  7 - 22 mg/dL Final    Calcium, POC 06/13/2024 10.0  8.0 - 10.3 mg/dL Final    POC Chloride 06/13/2024 96 (L)  98 - 108 mmol/L Final    POC Creatinine 06/13/2024 0.6  0.6 - 1.2 mg/dL Final    POC Glucose 06/13/2024 90  73 - 118 mg/dL Final    POC Potassium 06/13/2024 3.6  3.6 - 5.1 mmol/L Final    POC Sodium 06/13/2024 131  128 - 145 mmol/L Final    Bilirubin, POC 06/13/2024 0.7  0.2 - 1.6 mg/dL Final    POC TCO2 06/13/2024 25  18 - 33 mmol/L Final    Protein, POC 06/13/2024 7.5  6.4 - 8.1 g/dL Final    Glucose, UA 06/13/2024 Negative   Final    Bilirubin, UA 06/13/2024 Negative   Final    Ketones, UA 06/13/2024 Trace (A)   Final    Spec Grav UA 06/13/2024 1.020   Final    Blood, UA 06/13/2024 Trace-intact (A)   Final    PH, UA 06/13/2024 6.5   Final    Protein, UA 06/13/2024 Negative   Final    Urobilinogen, UA 06/13/2024 0.2  E.U./dL Final    Nitrite, UA 06/13/2024 Negative   Final    Leukocytes, UA 06/13/2024 Trace (A)   Final    Color, UA 06/13/2024 Yellow   Final    Clarity, UA 06/13/2024 Clear   Final        Imaging Results    None                   Scribe Attestation:   Scribe #1: I performed the above scribed service and the documentation accurately describes the services I performed. I attest to the accuracy of the note.                            I, Dr. Josie Benson, personally performed the services described in this documentation. This document was produced by a scribe under my direction and in my presence. All medical record entries made by the scribe were at my direction and in my presence.  I have reviewed the chart and agree that the record reflects my personal performance and is accurate and complete. Josie Benson DO.     06/13/2024 12:14 PM      Clinical Impression:  Final diagnoses:  [N39.0] Acute  UTI (urinary tract infection) (Primary)          ED Disposition Condition    Discharge Stable          ED Prescriptions    None       Follow-up Information       Follow up With Specialties Details Why Contact Info    [facility] - Emergency Dept Emergency Medicine  Go to [facility] emergency department on [facility] if symptoms worsen or do not resolve [id number] [facility] [id number]  [phone]             Josie Benson DO  06/13/24 1213

## 2024-06-13 NOTE — DISCHARGE INSTRUCTIONS
Please take antiemetic, eat, wait 30 minutes and then take your oral antibiotic.  Please take levofloxacin daily until prescription is finished.  Please sign up for and monitor all results on [facility] patient portal.    Please return to the ED for any new, concerning symptoms, or worsening symptoms.    Please follow-up with your primary care provider within 1 day.  An ER visit can not replace the evaluation by your primary care physician.    In the emergency department it is our goal to rule out life-threatening conditions and make sure that you are safe to be discharged home.    Many medical conditions are difficult to diagnose and may be impossible to diagnosed during a single ER visit.  Many health conditions start with nonspecific symptoms and can only be diagnosed on follow-up visits with your primary care physician or specialist.    Please be aware that all medical conditions can change and we can not predict how you will be feeling tomorrow or the next day.   If you have any worsening or new symptoms you should not hesitate to return to the emergency department for re-evaluation.  Be sure to follow up with your primary care physician for recheck and review any labs or imaging that were performed today.  It is very common for us to identify non emergent incidental findings on labs and imaging which must be followed up with your primary care physician.   If you do not have a primary care physician, you may contact the 1 listed on your discharge paperwork or you can also call the [facility] appointment desk at [phone] to schedule an appointment.

## 2024-06-17 ENCOUNTER — PATIENT MESSAGE (OUTPATIENT)
Dept: INTERNAL MEDICINE | Facility: CLINIC | Age: 64
End: 2024-06-17
Payer: MEDICARE

## 2024-06-17 DIAGNOSIS — E66.9 CLASS 1 OBESITY WITH SERIOUS COMORBIDITY AND BODY MASS INDEX (BMI) OF 32.0 TO 32.9 IN ADULT, UNSPECIFIED OBESITY TYPE: Primary | ICD-10-CM

## 2024-06-17 DIAGNOSIS — F41.1 GAD (GENERALIZED ANXIETY DISORDER): ICD-10-CM

## 2024-06-17 NOTE — TELEPHONE ENCOUNTER
Refill Routing Note   Medication(s) are not appropriate for processing by [facility] for the following reason(s):        Clarification of medication (Rx) details    [facility] action(s):  Defer        Medication Therapy Plan: Patient requesting 7.5mg due to pharmacy shortage; Please advise      Appointments  past 12m or future 3m with PCP    Date Provider   Last Visit   6/4/2024 Tracy Chand MD   Next Visit   6/17/2024 Tracy Chand MD   ED visits in past 90 days: 5        Note composed:5:01 PM 06/17/2024

## 2024-06-17 NOTE — TELEPHONE ENCOUNTER
No care due was identified.  [facility] Embedded Care Due Messages. Reference number: [id number].   6/17/2024 2:57:31 PM CDT

## 2024-06-17 NOTE — TELEPHONE ENCOUNTER
Care Due:                  Date            Visit Type   Department     Provider  --------------------------------------------------------------------------------                                MYCHART                              FOLLOWUP/OF  [facility] INTERNAL  Last Visit: 06-      [facility] VISIT   MEDICINE       Tracy Chand  Next Visit: None Scheduled  None         None Found                                                            Last  Test          Frequency    Reason                     Performed    Due Date  --------------------------------------------------------------------------------    HBA1C.......  6 months...  tirzepatide..............  04-   10-    [facility] Embedded Care Due Messages. Reference number: [id number].   6/17/2024 10:44:51 AM CDT

## 2024-06-17 NOTE — TELEPHONE ENCOUNTER
Refill Routing Note   Medication(s) are not appropriate for processing by [facility] for the following reason(s):        Outside of protocol    [facility] action(s):  Route               Appointments  past 12m or future 3m with PCP    Date Provider   Last Visit   6/4/2024 Tracy Chand MD   Next Visit   6/24/2024 Tracy Chand MD   ED visits in past 90 days: 5        Note composed:4:00 PM 06/17/2024

## 2024-06-18 RX ORDER — ALPRAZOLAM 0.5 MG/1
TABLET ORAL
Qty: 30 TABLET | Refills: 5 | Status: SHIPPED | OUTPATIENT
Start: 2024-06-18

## 2024-06-24 ENCOUNTER — PATIENT MESSAGE (OUTPATIENT)
Dept: INTERNAL MEDICINE | Facility: CLINIC | Age: 64
End: 2024-06-24

## 2024-06-24 ENCOUNTER — OFFICE VISIT (OUTPATIENT)
Dept: INTERNAL MEDICINE | Facility: CLINIC | Age: 64
End: 2024-06-24
Payer: MEDICARE

## 2024-06-24 VITALS
SYSTOLIC BLOOD PRESSURE: 137 MMHG | WEIGHT: 167.13 LBS | HEIGHT: 63 IN | BODY MASS INDEX: 29.61 KG/M2 | OXYGEN SATURATION: 97 % | HEART RATE: 63 BPM | DIASTOLIC BLOOD PRESSURE: 89 MMHG

## 2024-06-24 DIAGNOSIS — G89.29 CHRONIC BILATERAL LOW BACK PAIN WITH BILATERAL SCIATICA: ICD-10-CM

## 2024-06-24 DIAGNOSIS — M54.42 CHRONIC BILATERAL LOW BACK PAIN WITH BILATERAL SCIATICA: ICD-10-CM

## 2024-06-24 DIAGNOSIS — E66.9 CLASS 1 OBESITY WITH SERIOUS COMORBIDITY AND BODY MASS INDEX (BMI) OF 32.0 TO 32.9 IN ADULT, UNSPECIFIED OBESITY TYPE: ICD-10-CM

## 2024-06-24 DIAGNOSIS — N39.0 RECURRENT UTI: Primary | ICD-10-CM

## 2024-06-24 DIAGNOSIS — I10 ESSENTIAL HYPERTENSION: ICD-10-CM

## 2024-06-24 DIAGNOSIS — M54.41 CHRONIC BILATERAL LOW BACK PAIN WITH BILATERAL SCIATICA: ICD-10-CM

## 2024-06-24 LAB
BILIRUB UR QL STRIP: NEGATIVE
BILIRUB UR QL STRIP: NEGATIVE
CLARITY UR REFRACT.AUTO: CLEAR
CLARITY UR REFRACT.AUTO: CLEAR
COLOR UR AUTO: YELLOW
COLOR UR AUTO: YELLOW
GLUCOSE UR QL STRIP: NEGATIVE
GLUCOSE UR QL STRIP: NEGATIVE
HGB UR QL STRIP: NEGATIVE
HGB UR QL STRIP: NEGATIVE
KETONES UR QL STRIP: NEGATIVE
KETONES UR QL STRIP: NEGATIVE
LEUKOCYTE ESTERASE UR QL STRIP: NEGATIVE
LEUKOCYTE ESTERASE UR QL STRIP: NEGATIVE
NITRITE UR QL STRIP: NEGATIVE
NITRITE UR QL STRIP: NEGATIVE
PH UR STRIP: 7 [PH] (ref 5–8)
PH UR STRIP: 7 [PH] (ref 5–8)
PROT UR QL STRIP: NEGATIVE
PROT UR QL STRIP: NEGATIVE
SP GR UR STRIP: 1.01 (ref 1–1.03)
SP GR UR STRIP: 1.01 (ref 1–1.03)
URN SPEC COLLECT METH UR: NORMAL
URN SPEC COLLECT METH UR: NORMAL

## 2024-06-24 PROCEDURE — 1125F AMNT PAIN NOTED PAIN PRSNT: CPT | Mod: CPTII,S$GLB,, | Performed by: INTERNAL MEDICINE

## 2024-06-24 PROCEDURE — 1101F PT FALLS ASSESS-DOCD LE1/YR: CPT | Mod: CPTII,S$GLB,, | Performed by: INTERNAL MEDICINE

## 2024-06-24 PROCEDURE — 99999 PR PBB SHADOW E&M-EST. PATIENT-LVL III: CPT | Mod: PBBFAC,,, | Performed by: INTERNAL MEDICINE

## 2024-06-24 PROCEDURE — 99214 OFFICE O/P EST MOD 30 MIN: CPT | Mod: S$GLB,,, | Performed by: INTERNAL MEDICINE

## 2024-06-24 PROCEDURE — 81003 URINALYSIS AUTO W/O SCOPE: CPT | Performed by: INTERNAL MEDICINE

## 2024-06-24 PROCEDURE — 4010F ACE/ARB THERAPY RXD/TAKEN: CPT | Mod: CPTII,S$GLB,, | Performed by: INTERNAL MEDICINE

## 2024-06-24 PROCEDURE — 3008F BODY MASS INDEX DOCD: CPT | Mod: CPTII,S$GLB,, | Performed by: INTERNAL MEDICINE

## 2024-06-24 PROCEDURE — 3288F FALL RISK ASSESSMENT DOCD: CPT | Mod: CPTII,S$GLB,, | Performed by: INTERNAL MEDICINE

## 2024-06-24 PROCEDURE — 3079F DIAST BP 80-89 MM HG: CPT | Mod: CPTII,S$GLB,, | Performed by: INTERNAL MEDICINE

## 2024-06-24 PROCEDURE — 3075F SYST BP GE 130 - 139MM HG: CPT | Mod: CPTII,S$GLB,, | Performed by: INTERNAL MEDICINE

## 2024-06-24 RX ORDER — LEVOFLOXACIN 500 MG/1
500 TABLET, FILM COATED ORAL DAILY
Qty: 3 TABLET | Refills: 0 | Status: SHIPPED | OUTPATIENT
Start: 2024-06-24

## 2024-06-24 RX ORDER — BENZONATATE 200 MG/1
200 CAPSULE ORAL 2 TIMES DAILY PRN
Qty: 60 CAPSULE | Refills: 3 | Status: SHIPPED | OUTPATIENT
Start: 2024-06-24 | End: 2024-10-22

## 2024-06-24 NOTE — PROGRESS NOTES
Subjective:       Patient ID: Cherie Richards is a 64 y.o. female.    Chief Complaint: hospital f/u    Has had multiple ER visits since June 11th for UTI.  Urine culture from June 11th grew Pseudomonas sensitive to Cipro and levofloxacin.  She was treated with 5 days of levofloxacin and her symptoms did improve.  However today she states she feels that she has a UTI coming on again.  She has been seen by multiple urologists for these recurrent UTIs.     HTN: well controlled on Amlodipine 2.5 mg araceli, losartan 100 mg daily and metoprolol 25 mg daily.  HCTZ decreased to 12.5 in 2023 due to hyponatremia.  Blood pressure remained stable and sodium has improved.     Liver Hemangioma last CT scan in 2021 showed no liver lesions      Vit D      Obesity: Gastric Bypass in in 2010. Lost 70 Ibs lowest weight was 130. Gained 50 Ibs back. Now on mounjaro 10 mg weekly and doing well. Has  lost about 15 Ibs.      Hx H Pylori      GERD / Hiatal hernia. On PPI. nack on pantoprazole BID      ANDREW      HLD Stopped taking statin due to myalgias.      abnormal uterine cancer cells s/p hysterectomy     Mixed urinary incontinence follows with urogyn gets frequent UTIs. She is on pre/probiotic, D mannose and cranberry pills daily.  Recent recurrent UTIs again as above     Depression/ Anxiety: on xanax and Wellbutrin.      Pulm nodule. Was having annual CT chest. Las CT in 2021 showed nodule had resolved.     DDD/Lumbar spondylosis/ lumbar radiculopathy / chronic pain syndrome:  Seen by pain management in April 20, 2024. Recently underwent bilateral L5-S1 TF MARIANNA in March 2024 without significant relief.  She does not wish to do any further injections at this time.  She can not take anti-inflammatories due to her gastric bypass.  Gabapentin does not help.  She has tried PT in the past as well.  She is now on Norco 5-325 mg tablets daily as needed       Health Maintenance:  Colon Cancer Screening: colonoscopy done in 2020 with one polyp  removed. Due again in 2025   Mammogram: normal December 2023  DEXA: showed osteoporosis done 12/21/23    HIV: negative 2021   Hep C: negative 2021   Lipids: order today   Vaccines: needs PNA       Medication Refill  Associated symptoms include arthralgias, headaches, joint swelling, neck pain, vomiting and weakness. Pertinent negatives include no chest pain, chills or rash.   Back Pain  Associated symptoms include dysuria, headaches and weakness. Pertinent negatives include no chest pain or weight loss.   Dysuria   This is a recurrent problem. The current episode started 1 to 4 weeks ago. The problem occurs intermittently. The problem has been waxing and waning. The quality of the pain is described as burning. The pain is at a severity of 8/10. The pain is moderate. There has been no fever. She is Sexually active. There is A history of pyelonephritis. Associated symptoms include a discharge, flank pain, frequency, hesitancy, sweats, urgency, vomiting, constipation and withholding. Pertinent negatives include no behavior changes, chills, hematuria, possible pregnancy, weight loss or rash. She has tried antibiotics for the symptoms. The treatment provided mild relief. Her past medical history is significant for catheterization, hypertension, kidney stones and recurrent UTIs. There is no history of diabetes insipidus, diabetes mellitus, genitourinary reflux, a single kidney, STD or a urological procedure.   Neck Pain   Associated symptoms include headaches, trouble swallowing and weakness. Pertinent negatives include no chest pain or weight loss.     Review of Systems   Constitutional:  Positive for activity change. Negative for chills, unexpected weight change and weight loss.   HENT:  Positive for rhinorrhea and trouble swallowing. Negative for hearing loss.    Eyes:  Positive for discharge and visual disturbance.   Respiratory:  Negative for chest tightness and wheezing.    Cardiovascular:  Negative for chest pain  and palpitations.   Gastrointestinal:  Positive for constipation, diarrhea and vomiting. Negative for blood in stool.   Endocrine: Positive for polyuria. Negative for polydipsia.   Genitourinary:  Positive for difficulty urinating, dysuria, flank pain, frequency, hesitancy and urgency. Negative for hematuria and menstrual problem.   Musculoskeletal:  Positive for arthralgias, back pain, joint swelling and neck pain.   Integumentary:  Negative for rash.   Neurological:  Positive for weakness and headaches.   Psychiatric/Behavioral:  Negative for confusion and dysphoric mood.            Past Medical History:   Diagnosis Date    Abnormal Pap smear     Anemia     history    Anxiety     Cancer     uterine-cancer cells     Colon polyps, next C-scope 2019. 4/22/2014    Dry eyes     Dyspareunia, female 6/24/2020    Essential hypertension, started in her mid 30's 1/18/2017    Family history of malignant neoplasm of pancreas 6/1/2018    Gastric bypass status for obesity 8/21/2012    GERD (gastroesophageal reflux disease)     Helicobacter pylori gastritis, 2008. 8/20/2014    Hemangioma of liver, stable 2010, 2012, right lobe 8/20/2014    Hematuria     History of cosmetic surgeries, tummy baylee 2011. 4/5/2013    History of frquent UTI 6/24/2020    HTN (hypertension) 8/21/2012    120s-130s/80s    Kidney stone     Right sided sciatica 6/1/2018    Sleep apnea     patient does not use the C-PAP mask-cannot tolerate    Ureteral stone 4/8/2014    Urinary incontinence     Urinary retention     Varicose veins of lower extremity with inflammation 4/8/2015    Vertigo     mild    Vitamin D deficiency disease 6/1/2018     Past Surgical History:   Procedure Laterality Date    Abdominoplasty with Liposcuction      APPENDECTOMY      CERVICAL BIOPSY  W/ LOOP ELECTRODE EXCISION      Prior to hysterectomy    CHOLECYSTECTOMY      Laparoscopic    COLONOSCOPY N/A 2/13/2020    Procedure: COLONOSCOPY;  Surgeon: Pb Smith MD;  Location: [facility]  ENDO (4TH FLR);  Service: Endoscopy;  Laterality: N/A;    EPIDURAL STEROID INJECTION INTO LUMBAR SPINE Bilateral 3/15/2024    Procedure: B/L L5-S1 TFESI;  Surgeon: Glendy Banks MD;  Location: [facility] PAIN MANAGEMENT;  Service: Pain Management;  Laterality: Bilateral;  20 mins    ESOPHAGOGASTRODUODENOSCOPY N/A 11/2/2021    Procedure: EGD (ESOPHAGOGASTRODUODENOSCOPY);  Surgeon: Clayton Martínez MD;  Location: [facility] ENDO (4TH FLR);  Service: Endoscopy;  Laterality: N/A;  fully vac. 3/15/21 / instr portal -ml    GASTRIC BYPASS      HERNIA REPAIR      HYSTERECTOMY  1980     TVH  both ovaries remain      Patient Active Problem List   Diagnosis    GERD (gastroesophageal reflux disease), repair of Niessen fundoplication 08-.    Gastric bypass status for obesity, 08-.    Sleep apnea, resolved following gastric bypass surgery. CPAP intolerant.    Colon polyps    Helicobacter pylori gastritis, 2008.    Hemangioma of liver, stable 2010, 2012, right lobe; resolved     Essential hypertension, started in her mid 30's    Vitamin D deficiency disease    Right sided sciatica    Urinary incontinence, mixed    Chronic constipation    Major depressive disorder    Nodule of lower lobe of right lung, spiculated 1.4 cm by CT scan 5/4/2021, resolved 12/2021    Colon wall thickening, distal transverse colon to mid descending colon by CT 5/4/2021    Atherosclerosis of native coronary artery of native heart without angina pectoris by CT scan 5/4/2021    Aortic calcification, by CT scan 5/4/2021    SOB (shortness of breath)    Other chest pain    Calcified granuloma of lung    Moderate episode of recurrent major depressive disorder    Weakness of trunk musculature    Decreased range of motion of lumbar spine    Numbness    Osteoporosis without current pathological fracture    Class 1 obesity due to excess calories without serious comorbidity with body mass index (BMI) of 30.0 to 30.9 in adult    Nausea and vomiting    Acute  cystitis with hematuria        Objective:      Physical Exam  Constitutional:       Appearance: Normal appearance.   HENT:      Head: Normocephalic.   Cardiovascular:      Rate and Rhythm: Normal rate and regular rhythm.      Pulses: Normal pulses.      Heart sounds: Normal heart sounds.   Pulmonary:      Effort: Pulmonary effort is normal.      Breath sounds: Normal breath sounds.   Abdominal:      General: Abdomen is flat. Bowel sounds are normal.      Palpations: Abdomen is soft.   Musculoskeletal:         General: Normal range of motion.      Cervical back: Normal range of motion and neck supple.   Skin:     General: Skin is warm and dry.   Neurological:      General: No focal deficit present.      Mental Status: She is alert and oriented to person, place, and time.   Psychiatric:         Mood and Affect: Mood normal.         Assessment:       Problem List Items Addressed This Visit          Cardiac/Vascular    Essential hypertension, started in her mid 30's     Other Visit Diagnoses       Recurrent UTI    -  Primary    Relevant Orders    Urinalysis, Reflex to Urine Culture Urine, Clean Catch (Completed)    Urinalysis, Reflex to Urine Culture Urine, Clean Catch (Completed)    Class 1 obesity with serious comorbidity and body mass index (BMI) of 32.0 to 32.9 in adult, unspecified obesity type        Chronic bilateral low back pain with bilateral sciatica                  Plan:       Cherie was seen today for hospital f/u.    Diagnoses and all orders for this visit:    Recurrent UTI  -     Urinalysis, Reflex to Urine Culture Urine, Clean Catch  -     Urinalysis, Reflex to Urine Culture Urine, Clean Catch  -     levoFLOXacin (LEVAQUIN) 500 MG tablet; Take 1 tablet (500 mg total) by mouth once daily.  Treat empirically with Levaquin and check UA today.  She has a follow up with her urologist in 2 weeks.    Class 1 obesity with serious comorbidity and body mass index (BMI) of 32.0 to 32.9 in adult, unspecified  obesity type  Continue Mounjaro.  Continue working on diet and exercise.    Essential hypertension  Well-controlled on current meds.    Chronic bilateral low back pain with bilateral sciatica  -     HYDROcodone-acetaminophen (NORCO) 5-325 mg per tablet; Take 1 tablet by mouth every 24 hours as needed for Pain.  Dispense: 15 tablet; Refill: 0  Continue gabapentin. Cannot take NSAIDS due to bypass status. Tylenol does not help. MARIANNA in the past did not help. REfill Norco today.  reviewed.          Tracy Chand MD   Internal Medicine   Primary Care

## 2024-07-05 ENCOUNTER — TELEPHONE (OUTPATIENT)
Dept: ENDOSCOPY | Facility: HOSPITAL | Age: 64
End: 2024-07-05
Payer: MEDICARE

## 2024-07-05 NOTE — TELEPHONE ENCOUNTER
Left voicemail and sent portal message for patient to call Endoscopy Scheduling to review instructions and confirm appointment for Upper endoscopy (EGD) with bravo placement on 7/11/24.

## 2024-07-05 NOTE — TELEPHONE ENCOUNTER
Received call from patient.  Patient requests cancellation of EGD/Bravo on 7/11/24, due to her mother's illness.  Rescheduling was offered, but patient declined.  Patient was provided with call back number for rescheduling in the future.

## 2024-07-11 ENCOUNTER — PATIENT MESSAGE (OUTPATIENT)
Dept: INTERNAL MEDICINE | Facility: CLINIC | Age: 64
End: 2024-07-11
Payer: MEDICARE

## 2024-07-12 ENCOUNTER — TELEPHONE (OUTPATIENT)
Dept: INTERNAL MEDICINE | Facility: CLINIC | Age: 64
End: 2024-07-12

## 2024-07-12 ENCOUNTER — PATIENT MESSAGE (OUTPATIENT)
Dept: INTERNAL MEDICINE | Facility: CLINIC | Age: 64
End: 2024-07-12

## 2024-07-12 ENCOUNTER — LAB VISIT (OUTPATIENT)
Dept: LAB | Facility: HOSPITAL | Age: 64
End: 2024-07-12
Payer: MEDICARE

## 2024-07-12 ENCOUNTER — E-VISIT (OUTPATIENT)
Dept: INTERNAL MEDICINE | Facility: CLINIC | Age: 64
End: 2024-07-12
Payer: MEDICARE

## 2024-07-12 DIAGNOSIS — N39.0 RECURRENT UTI: Primary | ICD-10-CM

## 2024-07-12 DIAGNOSIS — N39.0 RECURRENT UTI: ICD-10-CM

## 2024-07-12 PROCEDURE — 81001 URINALYSIS AUTO W/SCOPE: CPT | Performed by: INTERNAL MEDICINE

## 2024-07-12 PROCEDURE — 81003 URINALYSIS AUTO W/O SCOPE: CPT | Performed by: INTERNAL MEDICINE

## 2024-07-12 RX ORDER — CIPROFLOXACIN 250 MG/1
250 TABLET, FILM COATED ORAL EVERY 12 HOURS
Qty: 10 TABLET | Refills: 0 | Status: SHIPPED | OUTPATIENT
Start: 2024-07-12 | End: 2024-07-17

## 2024-07-12 NOTE — PROGRESS NOTES
Patient ID: Cherie Richards is a 64 y.o. female.    Chief Complaint: Urinary Tract Infection (Entered automatically based on patient selection in [employer].)    The patient initiated a request through [employer] on 7/12/2024 for evaluation and management with a chief complaint of Urinary Tract Infection (Entered automatically based on patient selection in [employer].)     I evaluated the questionnaire submission on 7/12/24.    Ohs Peq Evisit Uti Questionnaire    7/12/2024  8:07 AM CDT - Filed by Patient   Do you agree to participate in an E-Visit? Yes   If you have any of the following symptoms, please present to your local emergency room or call 911:  I acknowledge   What is the main issue you would like addressed today? Burning and cloudy looking urine   What symptoms do you currently have? Pain while passing urine;  Difficulty passing urine;  Change in urine appearance or smell   When did your symptoms first appear? 7/10/2024   List what you have done or taken to help your symptoms. Drink lots of water   Please indicate whether you have had the following symptoms during the past 24 hours     Urgent urination (a sudden and uncontrollable urge to urinate) Severe   Frequent urination of small amounts of urine (going to the toilet very often) Moderate   Burning pain when urinating Severe   Incomplete bladder emptying (still feel like you need to urinate again after urination) Moderate   Pain not associated with urination in the lower abdomen below the belly button) None   What does your urine look like? Cloudy   Blood seen in the urine None   Flank pain (pain in one or both sides of the lower back) Mild   Abnormal Vaginal Discharge (abnormal amount, color and/or odor) Mild   Discharge from the urethra (urinary opening) without urination None   High body temperature/fever? None-<99.5   Please rate how much discomfort you have experience because of the symptoms in the past 24 hours: Moderate   Please indicate how the  symptoms have interfered with your every day activities/work in the past 24 hours: Severe   Please indicate how these symptoms have interfered with your social activities (visiting people, meeting with friends, etc.) in the past 24 hours? Severe   Are you a diabetic? No   Please indicate whether you have the following at the time of completion of this questionnaire: None of the above   Provide any additional information you feel is important.    Please attach any relevant images or files (if you have performed a home test for UTI, please submit a photo of results)    Are you able to take your vital signs? No         Encounter Diagnosis   Name Primary?    Recurrent UTI Yes        Orders Placed This Encounter   Procedures    Urinalysis, Reflex to Urine Culture Urine, Clean Catch     Standing Status:   Future     Standing Expiration Date:   9/10/2025     Order Specific Question:   Preferred Collection Type     Answer:   Urine, Clean Catch     Order Specific Question:   Specimen Source     Answer:   Urine      Medications Ordered This Encounter   Medications    ciprofloxacin HCl (CIPRO) 250 MG tablet     Sig: Take 1 tablet (250 mg total) by mouth every 12 (twelve) hours. for 5 days     Dispense:  10 tablet     Refill:  0        No follow-ups on file.    Discussed with patient over the phone.  Has been having symptoms of UTI again.  Her last culture showed Pseudomonas sensitive to Cipro and Levaquin.  We tried Levaquin 3 weeks ago and her symptoms have returned.  We will try Cipro this time.  She has a follow up with Uro gyn in 2 weeks.       E-Visit Time Tracking: 10 minutes

## 2024-07-12 NOTE — TELEPHONE ENCOUNTER
----- Message from Tracy Chand MD sent at 7/12/2024  2:29 PM CDT -----  Contact: mirian Kwon I placed an order for UA. Can you help her schdule it in [place]. Thanks  ----- Message -----  From: Geovanna Titus MA  Sent: 7/12/2024   2:15 PM CDT  To: Tracy Chand MD      ----- Message -----  From: Noman Ivey  Sent: 7/12/2024   2:13 PM CDT  To: Mannie PATTEN Staff    Type:  Patient Call          Who Called: Patient         Does the patient know what this is regarding?: Requesting a call back about having lab orders for urine added to her chart ; please advise           Would the patient rather a call back or a response via [facility]?call           Best Call Back Number: [phone]             Additional Information: Pt said that she was on the call earlier with the Dr and the call dropped ; pt said that she called the pharmacy and the script wasn't there ; pt said that the pharmacy close at

## 2024-07-13 LAB
BACTERIA #/AREA URNS AUTO: ABNORMAL /HPF
BILIRUB UR QL STRIP: NEGATIVE
CAOX CRY UR QL COMP ASSIST: ABNORMAL
CLARITY UR REFRACT.AUTO: ABNORMAL
COLOR UR AUTO: YELLOW
GLUCOSE UR QL STRIP: NEGATIVE
HGB UR QL STRIP: NEGATIVE
KETONES UR QL STRIP: NEGATIVE
LEUKOCYTE ESTERASE UR QL STRIP: ABNORMAL
MICROSCOPIC COMMENT: ABNORMAL
NITRITE UR QL STRIP: POSITIVE
PH UR STRIP: >8 [PH] (ref 5–8)
PROT UR QL STRIP: ABNORMAL
RBC #/AREA URNS AUTO: 1 /HPF (ref 0–4)
SP GR UR STRIP: 1.02 (ref 1–1.03)
SQUAMOUS #/AREA URNS AUTO: 0 /HPF
URN SPEC COLLECT METH UR: ABNORMAL
WBC #/AREA URNS AUTO: 4 /HPF (ref 0–5)

## 2024-07-23 ENCOUNTER — PATIENT MESSAGE (OUTPATIENT)
Dept: ADMINISTRATIVE | Facility: OTHER | Age: 64
End: 2024-07-23
Payer: MEDICARE

## 2024-07-30 ENCOUNTER — OFFICE VISIT (OUTPATIENT)
Dept: UROGYNECOLOGY | Facility: CLINIC | Age: 64
End: 2024-07-30
Payer: MEDICARE

## 2024-07-30 VITALS
SYSTOLIC BLOOD PRESSURE: 112 MMHG | BODY MASS INDEX: 29.18 KG/M2 | DIASTOLIC BLOOD PRESSURE: 65 MMHG | WEIGHT: 164.69 LBS | HEART RATE: 70 BPM | HEIGHT: 63 IN

## 2024-07-30 DIAGNOSIS — R11.0 NAUSEA: ICD-10-CM

## 2024-07-30 DIAGNOSIS — N39.0 RECURRENT UTI: Primary | ICD-10-CM

## 2024-07-30 LAB
BACTERIA #/AREA URNS AUTO: ABNORMAL /HPF
MICROSCOPIC COMMENT: ABNORMAL
RBC #/AREA URNS AUTO: 1 /HPF (ref 0–4)
SQUAMOUS #/AREA URNS AUTO: 0 /HPF
WBC #/AREA URNS AUTO: 20 /HPF (ref 0–5)

## 2024-07-30 PROCEDURE — 87088 URINE BACTERIA CULTURE: CPT | Performed by: OBSTETRICS & GYNECOLOGY

## 2024-07-30 PROCEDURE — 3074F SYST BP LT 130 MM HG: CPT | Mod: CPTII,S$GLB,, | Performed by: OBSTETRICS & GYNECOLOGY

## 2024-07-30 PROCEDURE — 3008F BODY MASS INDEX DOCD: CPT | Mod: CPTII,S$GLB,, | Performed by: OBSTETRICS & GYNECOLOGY

## 2024-07-30 PROCEDURE — 87086 URINE CULTURE/COLONY COUNT: CPT | Performed by: OBSTETRICS & GYNECOLOGY

## 2024-07-30 PROCEDURE — 4010F ACE/ARB THERAPY RXD/TAKEN: CPT | Mod: CPTII,S$GLB,, | Performed by: OBSTETRICS & GYNECOLOGY

## 2024-07-30 PROCEDURE — 3288F FALL RISK ASSESSMENT DOCD: CPT | Mod: CPTII,S$GLB,, | Performed by: OBSTETRICS & GYNECOLOGY

## 2024-07-30 PROCEDURE — 99999 PR PBB SHADOW E&M-EST. PATIENT-LVL III: CPT | Mod: PBBFAC,,, | Performed by: OBSTETRICS & GYNECOLOGY

## 2024-07-30 PROCEDURE — 1126F AMNT PAIN NOTED NONE PRSNT: CPT | Mod: CPTII,S$GLB,, | Performed by: OBSTETRICS & GYNECOLOGY

## 2024-07-30 PROCEDURE — 3078F DIAST BP <80 MM HG: CPT | Mod: CPTII,S$GLB,, | Performed by: OBSTETRICS & GYNECOLOGY

## 2024-07-30 PROCEDURE — 99213 OFFICE O/P EST LOW 20 MIN: CPT | Mod: S$GLB,,, | Performed by: OBSTETRICS & GYNECOLOGY

## 2024-07-30 PROCEDURE — 81001 URINALYSIS AUTO W/SCOPE: CPT | Performed by: OBSTETRICS & GYNECOLOGY

## 2024-07-30 PROCEDURE — 1159F MED LIST DOCD IN RCRD: CPT | Mod: CPTII,S$GLB,, | Performed by: OBSTETRICS & GYNECOLOGY

## 2024-07-30 PROCEDURE — 1101F PT FALLS ASSESS-DOCD LE1/YR: CPT | Mod: CPTII,S$GLB,, | Performed by: OBSTETRICS & GYNECOLOGY

## 2024-07-30 RX ORDER — ONDANSETRON 4 MG/1
4 TABLET, ORALLY DISINTEGRATING ORAL EVERY 6 HOURS PRN
Qty: 24 TABLET | Refills: 0 | Status: SHIPPED | OUTPATIENT
Start: 2024-07-30

## 2024-07-30 RX ORDER — ONDANSETRON 4 MG/1
4 TABLET, FILM COATED ORAL EVERY 6 HOURS PRN
Qty: 12 TABLET | Refills: 0 | Status: CANCELLED | OUTPATIENT
Start: 2024-07-30

## 2024-07-30 RX ORDER — NITROFURANTOIN 25; 75 MG/1; MG/1
100 CAPSULE ORAL 2 TIMES DAILY
Qty: 20 CAPSULE | Refills: 0 | Status: SHIPPED | OUTPATIENT
Start: 2024-07-30 | End: 2024-08-09

## 2024-07-30 NOTE — PROGRESS NOTES
Chief Complaint   Patient presents with    Follow-up        HPI: Patient is a 64 y.o. female  presents today for a follow up visit. Last seen 24 and she started to use D-Mannose, Cranberry supplements and probiotics at that time. Currently not using any vaginal estrogen cream as she has had an adverse reaction to commercial and compounding preparations of estrogen.     Records were reviewed and showed infections for enterococcus on 24 and pseudomonas aeruginosa on 24. She had an E-visit on 24 and had a positive UA but no urine culture was performed. She was treated with Cipro 250 mg bid for 5 days.     Has recently had some dysuria intermittently. She has urinary urgency and urinary incontinence which only seem to occur with a UTI.     REVIEW OF SYSTEMS:  A full 14-point ROS was performed and was significant for those  mentioned in the HPI.     The following portions of the patient's history were reviewed and updated as appropriate: allergies, current medications, past medical history, past surgical history and problem list.    PHYSICAL EXAMINATION    Vitals:    24 1350   BP: 112/65   Pulse: 70        General: Healthy in appearance, Well nourished, Affect Normal, NAD.    No visits with results within 1 Day(s) from this visit.   Latest known visit with results is:   Lab Visit on 2024   Component Date Value Ref Range Status    Specimen UA 2024 Urine, Clean Catch   Final    Color, UA 2024 Yellow  Yellow, Straw, Jami Final    Appearance, UA 2024 Hazy (A)  Clear Final    pH, UA 2024 >8.0 (A)  5.0 - 8.0 Final    Specific Gravity, UA 2024 1.020  1.005 - 1.030 Final    Protein, UA 2024 Trace (A)  Negative Final    Glucose, UA 2024 Negative  Negative Final    Ketones, UA 2024 Negative  Negative Final    Bilirubin (UA) 2024 Negative  Negative Final    Occult Blood UA 2024 Negative  Negative Final    Nitrite, UA 2024  Positive (A)  Negative Final    Leukocytes, UA 07/12/2024 1+ (A)  Negative Final    RBC, UA 07/12/2024 1  0 - 4 /hpf Final    WBC, UA 07/12/2024 4  0 - 5 /hpf Final    Bacteria 07/12/2024 Moderate (A)  None-Occ /hpf Final    Squam Epithel, UA 07/12/2024 0  /hpf Final    Ca Oxalate Shena, UA 07/12/2024 Rare  None-Moderate Final    Microscopic Comment 07/12/2024 SEE COMMENT   Final        ASSESSMENT & PLAN:  Recurrent UTI  -     Urine culture  -     Urinalysis Microscopic  -     nitrofurantoin, macrocrystal-monohydrate, (MACROBID) 100 MG capsule; Take 1 capsule (100 mg total) by mouth 2 (two) times daily. for 10 days  Dispense: 20 capsule; Refill: 0    Nausea  -     ondansetron (ZOFRAN-ODT) 4 MG TbDL; Dissolve 1 tablet (4 mg total) on the tongue every 6 (six) hours as needed (nausea).  Dispense: 24 tablet; Refill: 0       62 yo with a h/o recurrent UTI's and urge incontinence presents today for a follow up visit. Her last positive urine culture was on 10/27/23.     Reviewed that we will send her urine today for culture. Will start her on Macrobid as she is symptomatic. Will change antibiotics if needed and then also consider suppression therapy for 6 months. Unable to use vaginal estrogen and is allergic to Prosed EC so unable to take Methenamine due to cross reactivity.       Patient will continue Cranberry extract with D-Mannose and Probiotics.     She will return in 3 months for re-evaluation. The patient was encouraged to contact the office as needed with any additional questions or concerns.       All questions were answered today. The patient was encouraged to contact the office as needed with any additional questions or concerns.     Total time spent on visit was 22 minutes.  This includes face to face time and non-face to face time preparing to see the patient (eg, review of tests), Obtaining and/or reviewing separately obtained history, Documenting clinical information in the electronic or other health record,  Independently interpreting resultsand communicating results to the patient/family/caregiver, or Care coordination.    Adriana Waller MD

## 2024-07-30 NOTE — TELEPHONE ENCOUNTER
No care due was identified.  [facility] Embedded Care Due Messages. Reference number: [id number].   7/30/2024 9:07:16 AM CDT

## 2024-08-01 LAB — BACTERIA UR CULT: ABNORMAL

## 2024-08-13 DIAGNOSIS — R06.09 DOE (DYSPNEA ON EXERTION): ICD-10-CM

## 2024-08-13 DIAGNOSIS — R11.0 NAUSEA: ICD-10-CM

## 2024-08-13 RX ORDER — ONDANSETRON 4 MG/1
4 TABLET, ORALLY DISINTEGRATING ORAL EVERY 6 HOURS PRN
Qty: 24 TABLET | Refills: 0 | OUTPATIENT
Start: 2024-08-13

## 2024-08-13 RX ORDER — ALBUTEROL SULFATE 90 UG/1
1-2 INHALANT RESPIRATORY (INHALATION) EVERY 4 HOURS PRN
Qty: 25.5 G | Refills: 3 | Status: SHIPPED | OUTPATIENT
Start: 2024-08-13 | End: 2025-08-13

## 2024-08-13 NOTE — TELEPHONE ENCOUNTER
Refill Decision Note   Cherie Richards  is requesting a refill authorization.  Brief Assessment and Rationale for Refill:  Approve     Medication Therapy Plan:         Comments:     Note composed:1:05 PM 08/13/2024

## 2024-08-13 NOTE — TELEPHONE ENCOUNTER
Refill Routing Note   Medication(s) are not appropriate for processing by [facility] for the following reason(s):        Outside of protocol    [facility] action(s):  Route               Appointments  past 12m or future 3m with PCP    Date Provider   Last Visit   7/30/2024 Adriana Waller MD   Next Visit   10/29/2024 Adriana Waller MD   ED visits in past 90 days: 3        Note composed:10:56 AM 08/13/2024

## 2024-08-13 NOTE — TELEPHONE ENCOUNTER
No care due was identified.  [facility] Embedded Care Due Messages. Reference number: [id number].   8/13/2024 12:45:03 PM CDT

## 2024-08-14 ENCOUNTER — PATIENT MESSAGE (OUTPATIENT)
Dept: UROGYNECOLOGY | Facility: CLINIC | Age: 64
End: 2024-08-14
Payer: MEDICARE

## 2024-08-14 DIAGNOSIS — N39.0 RECURRENT UTI: ICD-10-CM

## 2024-08-14 DIAGNOSIS — N39.0 RECURRENT URINARY TRACT INFECTION: Primary | ICD-10-CM

## 2024-08-14 RX ORDER — NITROFURANTOIN 25; 75 MG/1; MG/1
100 CAPSULE ORAL DAILY
Qty: 90 CAPSULE | Refills: 1 | Status: SHIPPED | OUTPATIENT
Start: 2024-08-14 | End: 2025-02-10

## 2024-08-14 NOTE — SUBJECTIVE & OBJECTIVE
Past Medical History:   Diagnosis Date    Abnormal Pap smear     Anemia     history    Anxiety     Cancer     uterine-cancer cells     Colon polyps, next C-scope 2019. 4/22/2014    Dry eyes     Dyspareunia, female 6/24/2020    Essential hypertension, started in her mid 30's 1/18/2017    Family history of malignant neoplasm of pancreas 6/1/2018    Gastric bypass status for obesity 8/21/2012    GERD (gastroesophageal reflux disease)     Helicobacter pylori gastritis, 2008. 8/20/2014    Hemangioma of liver, stable 2010, 2012, right lobe 8/20/2014    Hematuria     History of cosmetic surgeries, tummy tuck 2011. 4/5/2013    History of frquent UTI 6/24/2020    HTN (hypertension) 8/21/2012    120s-130s/80s    Kidney stone     Right sided sciatica 6/1/2018    Sleep apnea     patient does not use the C-PAP mask-cannot tolerate    Ureteral stone 4/8/2014    Urinary incontinence     Urinary retention     Varicose veins of lower extremity with inflammation 4/8/2015    Vertigo     mild    Vitamin D deficiency disease 6/1/2018     Past Surgical History:   Procedure Laterality Date    Abdominoplasty with Liposcuction      APPENDECTOMY      CERVICAL BIOPSY  W/ LOOP ELECTRODE EXCISION      Prior to hysterectomy    CHOLECYSTECTOMY      Laparoscopic    COLONOSCOPY N/A 2/13/2020    Procedure: COLONOSCOPY;  Surgeon: Pb Smith MD;  Location: [facility] ([address] [place] [address]);  Service: Endoscopy;  Laterality: N/A;    EPIDURAL STEROID INJECTION INTO LUMBAR SPINE Bilateral 3/15/2024    Procedure: B/L L5-S1 TFESI;  Surgeon: Glendy Banks MD;  Location: [facility];  Service: Pain Management;  Laterality: Bilateral;  20 mins    ESOPHAGOGASTRODUODENOSCOPY N/A 11/2/2021    Procedure: EGD (ESOPHAGOGASTRODUODENOSCOPY);  Surgeon: Clayton Martínez MD;  Location: [facility] ([address] [place] [address]);  Service: Endoscopy;  Laterality: N/A;  fully vac. 3/15/21 / instr portal -ml    GASTRIC BYPASS      HERNIA REPAIR      HYSTERECTOMY  1980     TVH  both  What Is The Reason For Today's Visit?: Full Body Skin Examination What Is The Reason For Today's Visit? (Being Monitored For X): concerning skin lesions on a periodic basis ovaries remain     Social History     Tobacco Use    Smoking status: Never    Smokeless tobacco: Never    Tobacco comments:     .  .   Occup:  .     Substance Use Topics    Alcohol use: No    Drug use: No     Review of patient's allergies indicates:   Allergen Reactions    Adhesive tape-silicones Other (See Comments)     Other reaction(s): BLISTERS    Penicillins Hives and Itching    Sulfamethoxazole-trimethoprim Hives and Itching    Prosed ec [meth-hyos-atrp-m blue-ba-phsal] Hives, Itching and Swelling    Pyridium [phenazopyridine]     Cephalexin Nausea And Vomiting and Other (See Comments)     Dehydration       Medications: I have reviewed the current medication administration record.    (Not in a hospital admission)      Review of Systems   Neurological:  Positive for numbness. Negative for speech difficulty.     Objective:     Vital Signs (Most Recent):  Temp: 98.3 °F (36.8 °C) (24 1046)  Pulse: 108 (24 1046)  Resp: 16 (24 1046)  BP: (!) 212/82 (24 1046)  SpO2: 98 % (24 1046)    Vital Signs Range (Last 24H):  Temp:  [98.1 °F (36.7 °C)-98.3 °F (36.8 °C)]   Pulse:  [108]   Resp:  [16-18]   BP: (193-212)/(82-86)   SpO2:  [97 %-98 %]        Physical Exam  Vitals and nursing note reviewed.   Constitutional:       General: She is not in acute distress.     Appearance: She is not ill-appearing or toxic-appearing.   Pulmonary:      Effort: Pulmonary effort is normal. No respiratory distress.   Neurological:      Mental Status: She is alert.              Neurological Exam:   LOC: alert  Attention Span: Good   Language: No aphasia  Articulation: No dysarthria  Orientation: Person, Place, Time   Visual Fields: Full  EOM (CN III, IV, VI): Full/intact  Pupils (CN II, III): PERRL  Facial Sensation (CN V): Normal  Facial Movement (CN VII): Symmetric facial expression    Motor: 4 extremities anti-gravity; left lower extremity limited by pain. Semi-rhythmic, high amplitude  "movement in bilateral upper extremities, right upper extremity abnormal movement worsens with distraction.   Cerebellum: No evidence of appendicular or axial ataxia  Sensation: Lito-hypoesthesia left      Laboratory:  CMP:   Recent Labs   Lab 03/19/24  1031   CALCIUM 10.0   ALBUMIN 4.1   PROT 7.5   *   K 4.1   CO2 24      BUN 12   CREATININE 0.8   ALKPHOS 115   ALT 25   AST 19   BILITOT 0.4     CBC:   Recent Labs   Lab 03/19/24  1031   WBC 9.66   RBC 4.72   HGB 13.9   HCT 41.7      MCV 88   MCH 29.4   MCHC 33.3     Lipid Panel:   Recent Labs   Lab 03/19/24  1031   CHOL 209*   LDLCALC 123.0   HDL 71   TRIG 75     Coagulation:   Recent Labs   Lab 03/19/24  1033   INR 1.2     Hgb A1C: No results for input(s): "HGBA1C" in the last 168 hours.  TSH: No results for input(s): "TSH" in the last 168 hours.    Diagnostic Results:      Brain/Vessel imaging:  CTA STROKE MULTI-PHASE 03/19/2024    Narrative  EXAMINATION:  CTA STROKE MULTI-PHASE    CLINICAL HISTORY:  Neuro deficit, acute, stroke suspected;    TECHNIQUE:  5 mm axial images of the head pre and post contrast with 0.625 mm axial CTA images of the head neck post-contrast.  Coronal and sagittal MPR and MIP imaging was performed 100 ml of Omnipaque 350 contrast was injected intravenously    COMPARISON:  MRI brain 05/22/2021 and MRA head 05/17/2019    FINDINGS:  CT head: No evidence for acute intracranial hemorrhage or sulcal effacement. Slight prominent extra-axial space overlying the frontal lobes bilaterally likely developmental variant. There is no significant mass effect or midline shift. Ventricles normal in size without hydrocephalus.    CTA head: Bilateral distal cervical, petrous, cavernous and supraclinoid segments of the ICAs are patent without significant focal stenosis or proximal large vessel occlusion.. The anterior and middle cerebral arteries are patent without significant focal stenosis or large vessel occlusion.    Posterior " circulation: Distal vertebral arteries, basilar artery and posterior cerebral arteries are patent with hypoplastic or absent left P1 segment PCA and essentially persistent fetal circulation left PCA via left posterior communicating artery    CTA neck: Mild atherosclerotic plaquing of the arch and origin great vessels without significant focal stenosis. Slight ectasia of the ascending aorta measuring approximately 3.9 cm.  Vertebral artery origins from the subclavian arteries are within normal limits without significant focal stenosis. Vertebral arteries are patent throughout their course without focal stenosis or occlusion.    Right carotid: The right common carotid artery, carotid bifurcation and extracranial portions of the internal carotid artery are patent without significant focal stenosis.    Left carotid: The left common carotid artery, carotid bifurcation and extracranial portions of the internal carotid arteries are patent without significant focal stenosis.    There is atherosclerotic plaquing of the carotid bifurcations and proximal ICAs with less than 50% proximal ICA stenosis by NASCET criteria    Pharynx/larynx: Evaluation distorted by scatter artifact from dental metal and motion allowing for artifacts the nasopharynx, oropharynx, hypopharynx larynx and proximal trachea are grossly free of focal lesions    Oral cavity and the buccal space     distorted by dental metal.    Glands: No focal parotid, submandibular or thyroid lesion    No evidence for adenopathy throughout the neck by size criteria.    Degenerative changes cervical spine with grade 1 anterolisthesis of C3 on C4 and C4 on C5.  Allowing for degenerative changes there is no evidence for acute fracture of the cervical spine    Impression  CTA head: Unremarkable CTA of the head specifically without evidence for proximal significant stenosis or proximal large vessel occlusion    There is developmental variant with absent or hypoplastic left P1  segment of the PCA and essentially persistent fetal circulation of the left PCA via left posterior communicating artery.    CTA neck: Atherosclerotic plaquing of the carotid bifurcations and proximal ICAs with less than 50% proximal ICA stenosis by NASCET criteria.    CT head: No evidence for acute intracranial hemorrhage or sulcal effacement to suggest large territory recent infarction    Clinical correlation and further evaluation with MRI as warranted if patient compatible..      Electronically signed by: Gino Rankin DO  Date:    03/19/2024  Time:    10:53      Cardiac Evaluation:   Transthoracic echo (TTE) complete 12/09/2021    Interpretation Summary  · The left ventricle is normal in size with normal systolic function. The estimated ejection fraction is 65%.  · Normal right ventricular size with normal right ventricular systolic function.  · Indeterminate left ventricular diastolic function.  · The estimated PA systolic pressure is 27 mmHg.  · Normal central venous pressure (3 mmHg).

## 2024-08-14 NOTE — TELEPHONE ENCOUNTER
Refill Routing Note   Medication(s) are not appropriate for processing by [facility] for the following reason(s):        Outside of protocol    [facility] action(s):  Route               Appointments  past 12m or future 3m with PCP    Date Provider   Last Visit   7/30/2024 Adriana Waller MD   Next Visit   10/29/2024 Adriana Waller MD   ED visits in past 90 days: 3        Note composed:5:50 PM 08/14/2024

## 2024-08-15 RX ORDER — NITROFURANTOIN 25; 75 MG/1; MG/1
100 CAPSULE ORAL 2 TIMES DAILY
Qty: 20 CAPSULE | Refills: 0 | OUTPATIENT
Start: 2024-08-15 | End: 2024-08-25

## 2024-08-23 ENCOUNTER — TELEPHONE (OUTPATIENT)
Dept: INTERNAL MEDICINE | Facility: CLINIC | Age: 64
End: 2024-08-23
Payer: MEDICARE

## 2024-09-21 NOTE — TELEPHONE ENCOUNTER
Care Due:                  Date            Visit Type   Department     Provider  --------------------------------------------------------------------------------                                MYCHART                              FOLLOWUP/OF  [facility] INTERNAL  Last Visit: 06-      FICE VISIT   MEDICINE       Tracy Chand                              EP -                              PRIMARY      [facility] INTERNAL  Next Visit: 10-      CARE ([facility])   MEDICINE       Tracy Chand                                                            Last  Test          Frequency    Reason                     Performed    Due Date  --------------------------------------------------------------------------------    HBA1C.......  6 months...  tirzepatide..............  04-   10-    Health [facility] Embedded Care Due Messages. Reference number: [id number].   9/21/2024 5:10:26 PM CDT

## 2024-09-22 RX ORDER — HYDROCODONE BITARTRATE AND ACETAMINOPHEN 5; 325 MG/1; MG/1
1 TABLET ORAL
Qty: 15 TABLET | Refills: 0 | Status: SHIPPED | OUTPATIENT
Start: 2024-09-22

## 2024-09-22 NOTE — TELEPHONE ENCOUNTER
Refill Routing Note   Medication(s) are not appropriate for processing by [facility] for the following reason(s):        Outside of protocol    [facility] action(s):  Route     Requires labs : Yes             Appointments  past 12m or future 3m with PCP    Date Provider   Last Visit   7/12/2024 Tracy Chand MD   Next Visit   10/7/2024 Tracy Chand MD   ED visits in past 90 days: 0        Note composed:4:50 PM 09/22/2024

## 2024-09-25 ENCOUNTER — LAB VISIT (OUTPATIENT)
Dept: LAB | Facility: HOSPITAL | Age: 64
End: 2024-09-25
Payer: MEDICARE

## 2024-09-25 DIAGNOSIS — R79.9 ABNORMAL FINDING OF BLOOD CHEMISTRY, UNSPECIFIED: ICD-10-CM

## 2024-09-25 DIAGNOSIS — I10 ESSENTIAL HYPERTENSION: ICD-10-CM

## 2024-09-25 LAB
ALBUMIN SERPL BCP-MCNC: 4.2 G/DL (ref 3.5–5.2)
ALP SERPL-CCNC: 118 U/L (ref 55–135)
ALT SERPL W/O P-5'-P-CCNC: 16 U/L (ref 10–44)
ANION GAP SERPL CALC-SCNC: 11 MMOL/L (ref 8–16)
AST SERPL-CCNC: 19 U/L (ref 10–40)
BASOPHILS # BLD AUTO: 0.05 K/UL (ref 0–0.2)
BASOPHILS NFR BLD: 0.6 % (ref 0–1.9)
BILIRUB SERPL-MCNC: 0.4 MG/DL (ref 0.1–1)
BUN SERPL-MCNC: 15 MG/DL (ref 8–23)
CALCIUM SERPL-MCNC: 9.7 MG/DL (ref 8.7–10.5)
CHLORIDE SERPL-SCNC: 98 MMOL/L (ref 95–110)
CO2 SERPL-SCNC: 21 MMOL/L (ref 23–29)
CREAT SERPL-MCNC: 0.8 MG/DL (ref 0.5–1.4)
DIFFERENTIAL METHOD BLD: ABNORMAL
EOSINOPHIL # BLD AUTO: 0.1 K/UL (ref 0–0.5)
EOSINOPHIL NFR BLD: 1 % (ref 0–8)
ERYTHROCYTE [DISTWIDTH] IN BLOOD BY AUTOMATED COUNT: 15 % (ref 11.5–14.5)
EST. GFR  (NO RACE VARIABLE): >60 ML/MIN/1.73 M^2
ESTIMATED AVG GLUCOSE: 100 MG/DL (ref 68–131)
GLUCOSE SERPL-MCNC: 89 MG/DL (ref 70–110)
HBA1C MFR BLD: 5.1 % (ref 4–5.6)
HCT VFR BLD AUTO: 40.5 % (ref 37–48.5)
HGB BLD-MCNC: 13.1 G/DL (ref 12–16)
IMM GRANULOCYTES # BLD AUTO: 0.02 K/UL (ref 0–0.04)
IMM GRANULOCYTES NFR BLD AUTO: 0.2 % (ref 0–0.5)
LYMPHOCYTES # BLD AUTO: 1.4 K/UL (ref 1–4.8)
LYMPHOCYTES NFR BLD: 16.7 % (ref 18–48)
MCH RBC QN AUTO: 29.2 PG (ref 27–31)
MCHC RBC AUTO-ENTMCNC: 32.3 G/DL (ref 32–36)
MCV RBC AUTO: 90 FL (ref 82–98)
MONOCYTES # BLD AUTO: 0.7 K/UL (ref 0.3–1)
MONOCYTES NFR BLD: 8 % (ref 4–15)
NEUTROPHILS # BLD AUTO: 6.3 K/UL (ref 1.8–7.7)
NEUTROPHILS NFR BLD: 73.5 % (ref 38–73)
NRBC BLD-RTO: 0 /100 WBC
PLATELET # BLD AUTO: 330 K/UL (ref 150–450)
PMV BLD AUTO: 10 FL (ref 9.2–12.9)
POTASSIUM SERPL-SCNC: 4.3 MMOL/L (ref 3.5–5.1)
PROT SERPL-MCNC: 7.5 G/DL (ref 6–8.4)
RBC # BLD AUTO: 4.48 M/UL (ref 4–5.4)
SODIUM SERPL-SCNC: 130 MMOL/L (ref 136–145)
TSH SERPL DL<=0.005 MIU/L-ACNC: 1 UIU/ML (ref 0.4–4)
WBC # BLD AUTO: 8.58 K/UL (ref 3.9–12.7)

## 2024-09-25 PROCEDURE — 80053 COMPREHEN METABOLIC PANEL: CPT | Performed by: INTERNAL MEDICINE

## 2024-09-25 PROCEDURE — 85025 COMPLETE CBC W/AUTO DIFF WBC: CPT | Performed by: INTERNAL MEDICINE

## 2024-09-25 PROCEDURE — 83036 HEMOGLOBIN GLYCOSYLATED A1C: CPT | Performed by: INTERNAL MEDICINE

## 2024-09-25 PROCEDURE — 84443 ASSAY THYROID STIM HORMONE: CPT | Performed by: INTERNAL MEDICINE

## 2024-09-25 PROCEDURE — 36415 COLL VENOUS BLD VENIPUNCTURE: CPT | Performed by: INTERNAL MEDICINE

## 2024-09-26 ENCOUNTER — PATIENT MESSAGE (OUTPATIENT)
Dept: INTERNAL MEDICINE | Facility: CLINIC | Age: 64
End: 2024-09-26
Payer: MEDICARE

## 2024-10-03 ENCOUNTER — TELEPHONE (OUTPATIENT)
Dept: INTERNAL MEDICINE | Facility: CLINIC | Age: 64
End: 2024-10-03
Payer: MEDICARE

## 2024-10-03 NOTE — TELEPHONE ENCOUNTER
"Patient did receive your message however report that "B/P is creeping up". Did not have reading in front of her. Denies dizziness, headaches,arm or chest pain.Ask patient to up load blood pressure to portal , and also bring reading to next appointment  "

## 2024-10-03 NOTE — TELEPHONE ENCOUNTER
----- Message from Tracy Chand MD sent at 10/3/2024 10:04 AM CDT -----  Hey can we make sure she got my message about her sodium. She should stop taking the HCTZ until our visit next week and watch her BP closely. We can check again after she has been of the hctz for a few days.   Thanks  ----- Message -----  From: Rehan, [facility]  Sent: 9/25/2024   5:36 PM CDT  To: Tracy Chand MD

## 2024-10-29 ENCOUNTER — OFFICE VISIT (OUTPATIENT)
Dept: UROGYNECOLOGY | Facility: CLINIC | Age: 64
End: 2024-10-29
Payer: MEDICARE

## 2024-10-29 VITALS
SYSTOLIC BLOOD PRESSURE: 132 MMHG | BODY MASS INDEX: 28.71 KG/M2 | WEIGHT: 162.06 LBS | HEART RATE: 65 BPM | DIASTOLIC BLOOD PRESSURE: 69 MMHG | HEIGHT: 63 IN

## 2024-10-29 DIAGNOSIS — N39.0 RECURRENT URINARY TRACT INFECTION: Primary | ICD-10-CM

## 2024-10-29 PROCEDURE — 1126F AMNT PAIN NOTED NONE PRSNT: CPT | Mod: CPTII,S$GLB,, | Performed by: OBSTETRICS & GYNECOLOGY

## 2024-10-29 PROCEDURE — 3078F DIAST BP <80 MM HG: CPT | Mod: CPTII,S$GLB,, | Performed by: OBSTETRICS & GYNECOLOGY

## 2024-10-29 PROCEDURE — 3075F SYST BP GE 130 - 139MM HG: CPT | Mod: CPTII,S$GLB,, | Performed by: OBSTETRICS & GYNECOLOGY

## 2024-10-29 PROCEDURE — 3288F FALL RISK ASSESSMENT DOCD: CPT | Mod: CPTII,S$GLB,, | Performed by: OBSTETRICS & GYNECOLOGY

## 2024-10-29 PROCEDURE — 1101F PT FALLS ASSESS-DOCD LE1/YR: CPT | Mod: CPTII,S$GLB,, | Performed by: OBSTETRICS & GYNECOLOGY

## 2024-10-29 PROCEDURE — 99999 PR PBB SHADOW E&M-EST. PATIENT-LVL V: CPT | Mod: PBBFAC,,, | Performed by: OBSTETRICS & GYNECOLOGY

## 2024-10-29 PROCEDURE — 99213 OFFICE O/P EST LOW 20 MIN: CPT | Mod: S$GLB,,, | Performed by: OBSTETRICS & GYNECOLOGY

## 2024-10-29 PROCEDURE — 3008F BODY MASS INDEX DOCD: CPT | Mod: CPTII,S$GLB,, | Performed by: OBSTETRICS & GYNECOLOGY

## 2024-10-29 PROCEDURE — 1159F MED LIST DOCD IN RCRD: CPT | Mod: CPTII,S$GLB,, | Performed by: OBSTETRICS & GYNECOLOGY

## 2024-10-29 PROCEDURE — 3044F HG A1C LEVEL LT 7.0%: CPT | Mod: CPTII,S$GLB,, | Performed by: OBSTETRICS & GYNECOLOGY

## 2024-10-29 PROCEDURE — 4010F ACE/ARB THERAPY RXD/TAKEN: CPT | Mod: CPTII,S$GLB,, | Performed by: OBSTETRICS & GYNECOLOGY

## 2024-10-29 RX ORDER — NITROFURANTOIN 25; 75 MG/1; MG/1
100 CAPSULE ORAL DAILY
Qty: 90 CAPSULE | Refills: 1 | Status: SHIPPED | OUTPATIENT
Start: 2024-10-29 | End: 2025-04-27

## 2024-10-30 ENCOUNTER — OFFICE VISIT (OUTPATIENT)
Dept: FAMILY MEDICINE | Facility: CLINIC | Age: 64
End: 2024-10-30
Payer: MEDICARE

## 2024-10-30 VITALS
DIASTOLIC BLOOD PRESSURE: 78 MMHG | HEART RATE: 71 BPM | TEMPERATURE: 98 F | HEIGHT: 62 IN | OXYGEN SATURATION: 95 % | BODY MASS INDEX: 29.49 KG/M2 | SYSTOLIC BLOOD PRESSURE: 138 MMHG | WEIGHT: 160.25 LBS

## 2024-10-30 DIAGNOSIS — G47.00 INSOMNIA, UNSPECIFIED TYPE: ICD-10-CM

## 2024-10-30 DIAGNOSIS — M79.7 FIBROMYALGIA: ICD-10-CM

## 2024-10-30 DIAGNOSIS — Z86.0100 HISTORY OF COLONIC POLYPS: ICD-10-CM

## 2024-10-30 DIAGNOSIS — E87.1 HYPONATREMIA: ICD-10-CM

## 2024-10-30 DIAGNOSIS — M81.0 OSTEOPOROSIS WITHOUT CURRENT PATHOLOGICAL FRACTURE, UNSPECIFIED OSTEOPOROSIS TYPE: ICD-10-CM

## 2024-10-30 DIAGNOSIS — N39.0 RECURRENT UTI: ICD-10-CM

## 2024-10-30 DIAGNOSIS — I87.2 VENOUS INSUFFICIENCY: ICD-10-CM

## 2024-10-30 DIAGNOSIS — Z98.84 GASTRIC BYPASS STATUS FOR OBESITY: ICD-10-CM

## 2024-10-30 DIAGNOSIS — G89.29 CHRONIC PAIN OF LEFT ANKLE: ICD-10-CM

## 2024-10-30 DIAGNOSIS — Z00.00 ROUTINE MEDICAL EXAM: Primary | ICD-10-CM

## 2024-10-30 DIAGNOSIS — K64.9 HEMORRHOIDS, UNSPECIFIED HEMORRHOID TYPE: ICD-10-CM

## 2024-10-30 DIAGNOSIS — M43.06 LUMBAR SPONDYLOLYSIS: ICD-10-CM

## 2024-10-30 DIAGNOSIS — M25.572 CHRONIC PAIN OF LEFT ANKLE: ICD-10-CM

## 2024-10-30 DIAGNOSIS — K21.9 GASTROESOPHAGEAL REFLUX DISEASE WITHOUT ESOPHAGITIS: ICD-10-CM

## 2024-10-30 DIAGNOSIS — E67.3 HIGH VITAMIN D LEVEL: ICD-10-CM

## 2024-10-30 DIAGNOSIS — I10 ESSENTIAL HYPERTENSION: ICD-10-CM

## 2024-10-30 DIAGNOSIS — Z12.31 ENCOUNTER FOR SCREENING MAMMOGRAM FOR BREAST CANCER: ICD-10-CM

## 2024-10-30 DIAGNOSIS — F39 MOOD DISORDER: ICD-10-CM

## 2024-10-30 DIAGNOSIS — M79.605 LEFT LEG PAIN: ICD-10-CM

## 2024-10-30 PROCEDURE — 1101F PT FALLS ASSESS-DOCD LE1/YR: CPT | Mod: CPTII,S$GLB,, | Performed by: INTERNAL MEDICINE

## 2024-10-30 PROCEDURE — 3075F SYST BP GE 130 - 139MM HG: CPT | Mod: CPTII,S$GLB,, | Performed by: INTERNAL MEDICINE

## 2024-10-30 PROCEDURE — 99397 PER PM REEVAL EST PAT 65+ YR: CPT | Mod: S$GLB,,, | Performed by: INTERNAL MEDICINE

## 2024-10-30 PROCEDURE — 3078F DIAST BP <80 MM HG: CPT | Mod: CPTII,S$GLB,, | Performed by: INTERNAL MEDICINE

## 2024-10-30 PROCEDURE — 3008F BODY MASS INDEX DOCD: CPT | Mod: CPTII,S$GLB,, | Performed by: INTERNAL MEDICINE

## 2024-10-30 PROCEDURE — 3288F FALL RISK ASSESSMENT DOCD: CPT | Mod: CPTII,S$GLB,, | Performed by: INTERNAL MEDICINE

## 2024-10-30 PROCEDURE — 1125F AMNT PAIN NOTED PAIN PRSNT: CPT | Mod: CPTII,S$GLB,, | Performed by: INTERNAL MEDICINE

## 2024-10-30 PROCEDURE — 1159F MED LIST DOCD IN RCRD: CPT | Mod: CPTII,S$GLB,, | Performed by: INTERNAL MEDICINE

## 2024-10-30 PROCEDURE — 3044F HG A1C LEVEL LT 7.0%: CPT | Mod: CPTII,S$GLB,, | Performed by: INTERNAL MEDICINE

## 2024-10-30 PROCEDURE — 99999 PR PBB SHADOW E&M-EST. PATIENT-LVL IV: CPT | Mod: PBBFAC,,, | Performed by: INTERNAL MEDICINE

## 2024-10-30 PROCEDURE — 4010F ACE/ARB THERAPY RXD/TAKEN: CPT | Mod: CPTII,S$GLB,, | Performed by: INTERNAL MEDICINE

## 2024-10-30 RX ORDER — FLUCONAZOLE 150 MG/1
150 TABLET ORAL DAILY PRN
Qty: 3 TABLET | Refills: 3 | Status: SHIPPED | OUTPATIENT
Start: 2024-10-30

## 2024-10-30 RX ORDER — VALSARTAN 80 MG/1
80 TABLET ORAL DAILY
Qty: 90 TABLET | Refills: 3 | Status: SHIPPED | OUTPATIENT
Start: 2024-10-30 | End: 2025-10-30

## 2024-11-04 ENCOUNTER — PATIENT MESSAGE (OUTPATIENT)
Dept: FAMILY MEDICINE | Facility: CLINIC | Age: 64
End: 2024-11-04
Payer: MEDICARE

## 2024-11-04 DIAGNOSIS — E66.811 CLASS 1 OBESITY WITH SERIOUS COMORBIDITY AND BODY MASS INDEX (BMI) OF 32.0 TO 32.9 IN ADULT, UNSPECIFIED OBESITY TYPE: Primary | ICD-10-CM

## 2024-11-04 DIAGNOSIS — E66.811 CLASS 1 OBESITY WITH SERIOUS COMORBIDITY AND BODY MASS INDEX (BMI) OF 32.0 TO 32.9 IN ADULT, UNSPECIFIED OBESITY TYPE: ICD-10-CM

## 2024-11-04 RX ORDER — TIRZEPATIDE 7.5 MG/.5ML
7.5 INJECTION, SOLUTION SUBCUTANEOUS
Qty: 12 PEN | Refills: 0 | Status: CANCELLED | OUTPATIENT
Start: 2024-11-04

## 2024-11-04 NOTE — TELEPHONE ENCOUNTER
No care due was identified.  Health [facility] Embedded Care Due Messages. Reference number: [id number].   11/04/2024 4:28:53 PM CST

## 2024-11-04 NOTE — TELEPHONE ENCOUNTER
No care due was identified.  Health [facility] Embedded Care Due Messages. Reference number: [id number].   11/04/2024 1:42:57 PM CST

## 2024-11-04 NOTE — TELEPHONE ENCOUNTER
No care due was identified.  Health [facility] Embedded Care Due Messages. Reference number: [id number].   11/04/2024 1:40:09 PM CST

## 2024-11-05 RX ORDER — TIRZEPATIDE 10 MG/.5ML
10 INJECTION, SOLUTION SUBCUTANEOUS
Qty: 6 ML | Refills: 12 | Status: SHIPPED | OUTPATIENT
Start: 2024-11-05 | End: 2027-11-02

## 2024-11-22 ENCOUNTER — PATIENT MESSAGE (OUTPATIENT)
Dept: PODIATRY | Facility: CLINIC | Age: 64
End: 2024-11-22
Payer: MEDICARE

## 2024-11-24 DIAGNOSIS — R11.0 NAUSEA: ICD-10-CM

## 2024-11-24 DIAGNOSIS — K21.9 GASTROESOPHAGEAL REFLUX DISEASE WITHOUT ESOPHAGITIS: ICD-10-CM

## 2024-11-24 DIAGNOSIS — I10 ESSENTIAL HYPERTENSION: ICD-10-CM

## 2024-11-24 NOTE — TELEPHONE ENCOUNTER
No care due was identified.  Health [facility] Embedded Care Due Messages. Reference number: [id number].   11/24/2024 10:13:08 AM CST

## 2024-11-25 ENCOUNTER — OFFICE VISIT (OUTPATIENT)
Dept: PODIATRY | Facility: CLINIC | Age: 64
End: 2024-11-25
Payer: MEDICARE

## 2024-11-25 VITALS
WEIGHT: 160.25 LBS | SYSTOLIC BLOOD PRESSURE: 165 MMHG | HEART RATE: 79 BPM | HEIGHT: 62 IN | BODY MASS INDEX: 29.49 KG/M2 | DIASTOLIC BLOOD PRESSURE: 98 MMHG

## 2024-11-25 DIAGNOSIS — M20.5X2 HALLUX LIMITUS, ACQUIRED, LEFT: ICD-10-CM

## 2024-11-25 DIAGNOSIS — G89.29 CHRONIC PAIN OF LEFT ANKLE: ICD-10-CM

## 2024-11-25 DIAGNOSIS — S93.402A INVERSION SPRAIN OF ANKLE, LEFT, INITIAL ENCOUNTER: Primary | ICD-10-CM

## 2024-11-25 DIAGNOSIS — M25.572 CHRONIC PAIN OF LEFT ANKLE: ICD-10-CM

## 2024-11-25 PROCEDURE — 99203 OFFICE O/P NEW LOW 30 MIN: CPT | Mod: S$GLB,,, | Performed by: PODIATRIST

## 2024-11-25 PROCEDURE — 3080F DIAST BP >= 90 MM HG: CPT | Mod: CPTII,S$GLB,, | Performed by: PODIATRIST

## 2024-11-25 PROCEDURE — 1160F RVW MEDS BY RX/DR IN RCRD: CPT | Mod: CPTII,S$GLB,, | Performed by: PODIATRIST

## 2024-11-25 PROCEDURE — 1125F AMNT PAIN NOTED PAIN PRSNT: CPT | Mod: CPTII,S$GLB,, | Performed by: PODIATRIST

## 2024-11-25 PROCEDURE — 99999 PR PBB SHADOW E&M-EST. PATIENT-LVL V: CPT | Mod: PBBFAC,,, | Performed by: PODIATRIST

## 2024-11-25 PROCEDURE — 3044F HG A1C LEVEL LT 7.0%: CPT | Mod: CPTII,S$GLB,, | Performed by: PODIATRIST

## 2024-11-25 PROCEDURE — 3008F BODY MASS INDEX DOCD: CPT | Mod: CPTII,S$GLB,, | Performed by: PODIATRIST

## 2024-11-25 PROCEDURE — 1159F MED LIST DOCD IN RCRD: CPT | Mod: CPTII,S$GLB,, | Performed by: PODIATRIST

## 2024-11-25 PROCEDURE — 3077F SYST BP >= 140 MM HG: CPT | Mod: CPTII,S$GLB,, | Performed by: PODIATRIST

## 2024-11-25 PROCEDURE — 4010F ACE/ARB THERAPY RXD/TAKEN: CPT | Mod: CPTII,S$GLB,, | Performed by: PODIATRIST

## 2024-11-25 RX ORDER — METHYLPREDNISOLONE 4 MG/1
TABLET ORAL
Qty: 21 TABLET | Refills: 0 | Status: SHIPPED | OUTPATIENT
Start: 2024-11-25

## 2024-11-25 RX ORDER — METOPROLOL SUCCINATE 25 MG/1
25 TABLET, EXTENDED RELEASE ORAL DAILY
Qty: 90 TABLET | Refills: 3 | Status: SHIPPED | OUTPATIENT
Start: 2024-11-25

## 2024-11-25 RX ORDER — ONDANSETRON 4 MG/1
4 TABLET, ORALLY DISINTEGRATING ORAL EVERY 6 HOURS PRN
Qty: 24 TABLET | Refills: 0 | OUTPATIENT
Start: 2024-11-25

## 2024-11-25 RX ORDER — LOSARTAN POTASSIUM 100 MG/1
100 TABLET ORAL EVERY MORNING
Qty: 90 TABLET | Refills: 3 | Status: SHIPPED | OUTPATIENT
Start: 2024-11-25

## 2024-11-25 RX ORDER — PANTOPRAZOLE SODIUM 40 MG/1
40 TABLET, DELAYED RELEASE ORAL 2 TIMES DAILY
Qty: 180 TABLET | Refills: 3 | Status: SHIPPED | OUTPATIENT
Start: 2024-11-25

## 2024-11-25 NOTE — TELEPHONE ENCOUNTER
Refill Routing Note   Medication(s) are not appropriate for processing by [facility] for the following reason(s):        No active prescription written by provider  Outside of protocol: PPI dose outside of [facility] Protocol    [facility] action(s):  Defer  Route        Medication Therapy Plan: Last ordered: 11 months ago (12/14/2023) by Tracy Chand MD      Appointments  past 12m or future 3m with PCP    Date Provider   Last Visit   10/30/2024 Luan Cueva MD   Next Visit   12/5/2024 Luan Cueva MD   ED visits in past 90 days: 0        Note composed:10:39 PM 11/24/2024

## 2024-11-25 NOTE — TELEPHONE ENCOUNTER
Refill Routing Note   Medication(s) are not appropriate for processing by [facility] for the following reason(s):        Outside of protocol    [facility] action(s):  Route               Appointments  past 12m or future 3m with PCP    Date Provider   Last Visit   10/29/2024 Adriana Waller MD   Next Visit   2/4/2025 Adriana Waller MD   ED visits in past 90 days: 0        Note composed:10:28 PM 11/24/2024

## 2024-11-25 NOTE — PROGRESS NOTES
Subjective:      Patient ID: Cherie Richards is a 64 y.o. female.    Chief Complaint: Ankle Pain (Left foot)    Cherie Richards is a 64 y.o. female who presents to the podiatry clinic  with complaint of  left ankle pain, especially with palpation and ambulation. Description: moderate to severe. Nature: aching, burning, sharp, and throbbing Location: lateral Onset of the symptoms was several months ago. Precipitating event: none known.  History of injury: no.  She does relates left sided sciatica as well.  Current symptoms include: ability to bear weight, but with some pain, stiffness, swelling, worsening symptoms after a period of activity, and worsening symptoms after a period of inactivity. Alleviating factors: acetaminophen Symptoms have progressed to a point and plateaued.  Patients rates pain 10/10 on pain scale.      Patient Active Problem List   Diagnosis    GERD (gastroesophageal reflux disease), repair of [place] fundoplication 08-.    Gastric bypass status for obesity, 08-.    Sleep apnea, resolved following gastric bypass surgery. CPAP intolerant.    Colon polyps    Helicobacter pylori gastritis, 2008.    Hemangioma of liver, stable 2010, 2012, right lobe; resolved     Essential hypertension, started in her mid 30's    Vitamin D deficiency disease    Right sided sciatica    Urinary incontinence, mixed    Chronic constipation    Major depressive disorder    Nodule of lower lobe of right lung, spiculated 1.4 cm by CT scan 5/4/2021, resolved 12/2021    Colon wall thickening, distal transverse colon to mid descending colon by CT 5/4/2021    Atherosclerosis of native coronary artery of native heart without angina pectoris by CT scan 5/4/2021    Aortic calcification, by CT scan 5/4/2021    SOB (shortness of breath)    Other chest pain    Calcified granuloma of lung    Moderate episode of recurrent major depressive disorder    Weakness of trunk musculature    Decreased range of motion of lumbar  spine    Numbness    Osteoporosis without current pathological fracture    Class 1 obesity due to excess calories without serious comorbidity with body mass index (BMI) of 30.0 to 30.9 in adult    Nausea and vomiting    Acute cystitis with hematuria       Current Outpatient Medications on File Prior to Visit   Medication Sig Dispense Refill    acetaminophen (TYLENOL) 500 MG tablet Take 1 tablet (500 mg total) by mouth every 6 (six) hours as needed for Pain (and fever). 30 tablet 0    albuterol (PROVENTIL/VENTOLIN HFA) 90 mcg/actuation inhaler Inhale 1-2 puffs into the lungs every 4 (four) hours as needed for Shortness of Breath (coughing). Rescue 25.5 g 3    ALPRAZolam (XANAX) 0.5 MG tablet TAKE 1 TABLET(0.5 MG) BY MOUTH EVERY NIGHT AS NEEDED FOR INSOMNIA OR ANXIETY 30 tablet 5    amLODIPine (NORVASC) 5 MG tablet Take 1 tablet (5 mg total) by mouth once daily. 30 tablet 11    baclofen (LIORESAL) 10 MG tablet Take 1 tablet (10 mg total) by mouth 3 (three) times daily as needed (muscle spasm). 90 tablet 11    buPROPion (WELLBUTRIN SR) 100 MG TBSR 12 hr tablet Take 1 tablet (100 mg total) by mouth 2 (two) times daily. 180 tablet 3    cholecalciferol, vitamin D3, 5,000 unit Tab Take by mouth. 1 Tablet Oral Every day      cyanocobalamin 1,000 mcg/mL injection INJECT 1 ML (CC) INTRAMUSCULARLY EVERY TWO WEEKS 2 mL 11    cyclobenzaprine (FLEXERIL) 10 MG tablet Take 1 tablet (10 mg total) by mouth every evening. 30 tablet 6    cycloSPORINE (RESTASIS) 0.05 % ophthalmic emulsion Instill 1 drop into both eyes twice daily 180 each 3    D-MANNOSE ORAL Take by mouth.      diclofenac sodium (VOLTAREN) 1 % Gel Apply 2 g topically 4 (four) times daily as needed (pain). 400 g 3    fluconazole (DIFLUCAN) 150 MG Tab Take 1 tablet (150 mg total) by mouth daily as needed. 3 tablet 3    gabapentin (NEURONTIN) 300 MG capsule Take 2 capsules (600 mg total) by mouth 3 (three) times daily. 180 capsule 11    hydroCHLOROthiazide (HYDRODIURIL)  "12.5 MG Tab Take 1 tablet (12.5 mg total) by mouth once daily. 30 tablet 11    HYDROcodone-acetaminophen (NORCO) 5-325 mg per tablet Take 1 tablet by mouth every 24 hours as needed for Pain. 15 tablet 0    metroNIDAZOLE (METROGEL) 0.75 % gel Apply topically 2 (two) times daily. 45 g 3    nitrofurantoin, macrocrystal-monohydrate, (MACROBID) 100 MG capsule Take 1 capsule (100 mg total) by mouth once daily. 90 capsule 1    olopatadine (PATANOL) 0.1 % ophthalmic solution Place 1 drop into both eyes 2 (two) times daily. 5 mL 6    ondansetron (ZOFRAN-ODT) 4 MG TbDL Dissolve 1 tablet (4 mg total) on the tongue every 6 (six) hours as needed (nausea). 24 tablet 0    pantoprazole (PROTONIX) 20 MG tablet Take 1 tablet (20 mg total) by mouth once daily. Take when you 1st get up 30 minutes prior to eating or drinking 30 tablet 0    predniSONE (DELTASONE) 20 MG tablet Take 2 tablets at once on days 1&2, then 1 tablet daily on days 3&4 6 tablet 0    promethazine (PHENERGAN) 25 MG suppository Place 1 suppository (25 mg total) rectally every 6 (six) hours as needed for Nausea. 10 suppository 0    syringe with needle (BD LUER-CLARIBEL SYRINGE) 3 mL 25 x 1 1/2 " Syrg Use as directed with Cyanocobalamin 10 Syringe 3    tirzepatide (MOUNJARO) 10 mg/0.5 mL PnIj Inject 10 mg into the skin every 7 days. 6 mL 12    tirzepatide 7.5 mg/0.5 mL PnIj Inject 7.5 mg into the skin every 7 days. 12 Pen 0    tretinoin (RETIN-A) 0.1 % cream Apply to affected area Topical Every day 45 g 11    valsartan (DIOVAN) 80 MG tablet Take 1 tablet (80 mg total) by mouth once daily. 90 tablet 3    losartan (COZAAR) 100 MG tablet Take 1 tablet (100 mg total) by mouth every morning. 90 tablet 3    metoprolol succinate (TOPROL-XL) 25 MG 24 hr tablet Take 1 tablet (25 mg total) by mouth once daily. 90 tablet 3    pantoprazole (PROTONIX) 40 MG tablet Take 1 tablet (40 mg total) by mouth 2 (two) times daily. 180 tablet 3     No current facility-administered medications on " file prior to visit.       Review of patient's allergies indicates:   Allergen Reactions    Adhesive tape-silicones Other (See Comments)     Other reaction(s): BLISTERS    Penicillins Hives and Itching    Sulfamethoxazole-trimethoprim Hives and Itching    Prosed ec [meth-hyos-atrp-m blue-ba-phsal] Hives, Itching and Swelling    Pyridium [phenazopyridine]     Cephalexin Nausea And Vomiting and Other (See Comments)     Dehydration       Past Surgical History:   Procedure Laterality Date    Abdominoplasty with Liposcuction      APPENDECTOMY      CERVICAL BIOPSY  W/ LOOP ELECTRODE EXCISION      Prior to hysterectomy    CHOLECYSTECTOMY      Laparoscopic    COLONOSCOPY N/A 2/13/2020    Procedure: COLONOSCOPY;  Surgeon: Pb Smith MD;  Location: [facility] ([address] [place] [address]);  Service: Endoscopy;  Laterality: N/A;    EPIDURAL STEROID INJECTION INTO LUMBAR SPINE Bilateral 3/15/2024    Procedure: B/L L5-S1 TFESI;  Surgeon: Glendy Banks MD;  [facility];  Service: Pain Management;  Laterality: Bilateral;  20 mins    ESOPHAGOGASTRODUODENOSCOPY N/A 11/2/2021    Procedure: EGD (ESOPHAGOGASTRODUODENOSCOPY);  Surgeon: Clayton Martínez MD;  Location: [address]);  Service: Endoscopy;  Laterality: N/A;  fully vac. 3/15/21 / instr portal -ml    GASTRIC BYPASS      HERNIA REPAIR      HYSTERECTOMY  1980     TVH  both ovaries remain       Family History   Problem Relation Name Age of Onset    Heart disease Mother      Stroke Mother      Coronary artery disease Mother      Heart failure Mother      Obesity Mother      Cancer Father          pancreatic cancer    Diabetes Father      Obesity Father      No Known Problems Sister      No Known Problems Brother      Hearing loss Maternal Grandmother      No Known Problems Maternal Grandfather      No Known Problems Paternal Grandmother      No Known Problems Paternal Grandfather      Breast cancer Maternal Aunt      Colon cancer Maternal Uncle      No Known  Problems Paternal Aunt      No Known Problems Paternal Uncle      Ovarian cancer Neg Hx      Anesthesia problems Neg Hx      Amblyopia Neg Hx      Blindness Neg Hx      Cataracts Neg Hx      Glaucoma Neg Hx      Hypertension Neg Hx      Macular degeneration Neg Hx      Retinal detachment Neg Hx      Strabismus Neg Hx      Thyroid disease Neg Hx         Social History     Socioeconomic History    Marital status:    Occupational History    Occupation:    Tobacco Use    Smoking status: Never    Smokeless tobacco: Never    Tobacco comments:     .  .   Occup:  .     Substance and Sexual Activity    Alcohol use: No    Drug use: No    Sexual activity: Yes     Partners: Male     Birth control/protection: Surgical   Social History Narrative    Patient is     She and her  own business together.    [place] [employer] [place]    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         Feels safe in her home and with spouse.      Social Drivers of Health     Financial Resource Strain: Patient Declined (2023)    Overall Financial Resource Strain (CARDIA)     Difficulty of Paying Living Expenses: Patient declined   Food Insecurity: Patient Declined (2023)    Hunger Vital Sign     Worried About Running Out of Food in the Last Year: Patient declined     Ran Out of Food in the Last Year: Patient declined   Transportation Needs: Patient Declined (2023)    PRAPARE - Transportation     Lack of Transportation (Medical): Patient declined     Lack of Transportation (Non-Medical): Patient declined   Physical Activity: Sufficiently Active (2023)    Exercise Vital Sign     Days of Exercise per Week: 5 days     Minutes of Exercise per Session: 40 min   Stress: Stress Concern Present (2023)    [employer] - Occupational Stress Questionnaire     Feeling of Stress : Very much  "  Housing Stability: Patient Declined (12/18/2023)    Housing Stability Vital Sign     Unable to Pay for Housing in the Last Year: Patient declined     Number of Places Lived in the Last Year: 1     Unstable Housing in the Last Year: Patient declined       Review of Systems   Constitutional: Negative for chills and fever.   Cardiovascular:  Negative for claudication and leg swelling.   Respiratory:  Negative for cough and shortness of breath.    Skin:  Negative for itching and rash.   Musculoskeletal:  Positive for back pain, joint pain, myalgias and stiffness. Negative for falls, joint swelling and muscle weakness.   Gastrointestinal:  Negative for diarrhea, nausea and vomiting.   Neurological:  Positive for numbness and paresthesias. Negative for tremors and weakness.   Psychiatric/Behavioral:  Negative for altered mental status and hallucinations.            Objective:      Vitals:    11/25/24 0940   BP: (!) 165/98   Pulse: 79   Weight: 72.7 kg (160 lb 4.4 oz)   Height: 5' 2" (1.575 m)   PainSc: 10-Worst pain ever   PainLoc: Ankle       Physical Exam  Nursing note reviewed.   Constitutional:       General: She is not in acute distress.     Appearance: She is not toxic-appearing or diaphoretic.   Cardiovascular:      Pulses:           Dorsalis pedis pulses are 2+ on the right side and 2+ on the left side.        Posterior tibial pulses are 2+ on the right side and 2+ on the left side.   Pulmonary:      Effort: No respiratory distress.   Musculoskeletal:      Right ankle: No swelling. No tenderness. No lateral malleolus, medial malleolus, AITF ligament, CF ligament or posterior TF ligament tenderness. Decreased range of motion.      Right Achilles Tendon: No defects. Walker's test negative.      Left ankle: Swelling present. Tenderness present over the ATF ligament, AITF ligament and CF ligament. No lateral malleolus, medial malleolus, posterior TF ligament, base of 5th metatarsal or proximal fibula tenderness. " Decreased range of motion.      Left Achilles Tendon: No defects. Walker's test negative.      Right foot: No bony tenderness.      Left foot: Tenderness (1st MTPJ) present. No bony tenderness.      Comments: There is equinus deformity bilateral with decreased dorsiflexion at the ankle joint bilateral.    Skin:     General: Skin is warm and dry.      Coloration: Skin is not pale.      Findings: No bruising, burn, laceration, lesion or rash.      Nails: There is no clubbing.   Neurological:      Sensory: No sensory deficit.      Motor: No tremor, atrophy or abnormal muscle tone.      Deep Tendon Reflexes: Reflexes are normal and symmetric.      Comments: Paresthesias, and hyperesthesia left foot with no clearly identified trigger or source.      Psychiatric:         Attention and Perception: She is attentive.         Mood and Affect: Mood is not anxious. Affect is not inappropriate.         Speech: She is communicative. Speech is not slurred.         Behavior: Behavior is not combative.         EXAMINATION:  XR ANKLE COMPLETE 3 VIEW LEFT     CLINICAL HISTORY:  Pain in left ankle and joints of left foot     TECHNIQUE:  AP, lateral and oblique views of the left ankle were performed.     COMPARISON:  None     FINDINGS:  No bone, joint, or soft tissue abnormality is seen.     Impression:     Unremarkable examination.        Electronically signed by:Luan Dickens  Date:                                            03/27/2024  Time:                                           09:01        Assessment:       Encounter Diagnoses   Name Primary?    Chronic pain of left ankle     Inversion sprain of ankle, left, initial encounter Yes    Hallux limitus, acquired, left          Plan:     Problem List Items Addressed This Visit    None  Visit Diagnoses       Inversion sprain of ankle, left, initial encounter    -  Primary    Relevant Orders    POST-SURGICAL BOOT/SHOE FOR HOME USE    Chronic pain of left ankle        Relevant Orders     POST-SURGICAL BOOT/SHOE FOR HOME USE    Hallux limitus, acquired, left                 I counseled the patient on her conditions, their implications and medical management.      Patient has diffuse left foot and ankle pain, likely compensatory in nature following continued excess weightbearing on initial injury.     Discussed ankle sprains and importance of immobilization for healing as well as the lengthy course of treatment for complete resolution.    Dispensed CAM Boot for immobilization  Patient instructed to rest affected limb, avoid excessive WB and use crutch or walker assistance if needed.  Instructions on elevation to reduce pain and swelling. When sleeping, place a pillow under the injured leg. When sitting, support the injured leg so it is level with your waist.   Patient instructed on adequate icing techniques. Patient should ice the affected area at least once per day x 10 minutes for 10 days . I advised the  patient that extra icing would also be beneficial to ensure adequate anti inflammatory effect   Rx medrol    RTC in  7-10 weeks if no improvement, at this time we will order MRI. If improved will begin transition to supportive shoes and referral to PT. Patient is amenable to plan.

## 2024-11-25 NOTE — PATIENT INSTRUCTIONS
Over the counter pain creams: Voltaren Gel, Biofreeze, Bengay, tiger balm, two old goat, lidocaine gel,  Absorbine Veterinary Liniment Gel Topical Analgesic Sore Muscle and Joint Pain Relief    Recommend lotions: eucerin, eucerin for diabetics, aquaphor, A&D ointment, gold bond for diabetics, sween, [place]'s Bees all purpose baby ointment,  urea 40 with aloe or SkinIntegra rapid crack repair (found on amazon.com)    Shoe recommendations: (try 6pm.com, zappos.com , nordstromrack[employer], or shoes.com for discounted prices) you can visit varsity shoes in [place], DSW shoes in [place]  or esther rack in the [facility] (there are also several shoe brand outlets in the [facility])    ONLY purchase stability style tennis shoes AVOID flex, foam, free, yoga mat style shoes or shoes that claim to feel like clouds  If you have a flatter foot purchase shoes for PRONATION  If you have a high arch purchase shoes for SUPINATION    Shoe examples:    Asics (GT or gel foundations, gel-kayano-30), new balance stability type shoes (such as the 940 series or the [id number]), ramez (Guide 16, stabil c3),  Best (GTS or Beast or   transcend), propet, HokaOne (karlie 7, arahi, mattie) Clifton (tennis shoes and boots)    Sofft Brand (women) Sarah&Yannick (men), clarks, crocs, aerosoles, naturalizers, SAS, ecco, born, jessica ruiz, rockports (dress shoes)    Vionic, burkenstocks, fitflops, propet, taos, baretraps, Hoka or vionic recovery slides/sandals (sandals)    Hoka or vionic recovery slides/sandals, crocs, propet (house shoes)    Nail Home remedy:  Vicks Vapor rub or Emuaid to nails for easier manageability    Understanding Ankle Sprain    The ankle is the joint where the leg and foot meet. Bones are held in place by connective tissue called ligaments. When ankle ligaments are stretched to the point of pain and injury, it is called an ankle sprain. A sprain can tear the ligaments. These tears can be very small but still cause pain.  Ankle sprains can be mild or severe.  What causes an ankle sprain?  A sprain may occur when you twist your ankle or bend it too far. This can happen when you stumble or fall. Things that can make an ankle sprain more likely include:  Having had an ankle sprain before  Playing sports that involve running and jumping. Or playing contact sports such as football or hockey.  Wearing shoes that dont support your feet and ankles well  Having ankles with poor strength and flexibility  Symptoms of an ankle sprain  Symptoms may include:  Pain or soreness in the ankle  Swelling  Redness or bruising  Not being able to walk or put weight on the affected foot  Reduced range of motion in the ankle  A popping or tearing feeling at the time the sprain occurs  An abnormal or dislocated look to the ankle  Instability or too much range of motion in the ankle  Treatment for an ankle sprain  Treatment focuses on reducing pain and swelling, and avoiding further injury. Treatments may include:  Resting the ankle. Avoid putting weight on it. This may mean using crutches until the sprain heals.  Prescription or over-the-counter pain medicines. These help reduce swelling and pain.  Cold packs. These help reduce pain and swelling.  Raising your ankle above your heart. This helps reduce swelling.  Wrapping the ankle with an elastic bandage or ankle brace. This helps reduce swelling and gives some support to the ankle. In rare cases, you may need a cast or boot.  Stretching and other exercises. These improve flexibility and strength.  Heat packs. These may be recommended before doing ankle exercises.  Possible complications of an ankle sprain  An ankle that has been weakened by a sprain can be more likely to have repeated sprains afterward. Doing exercises to strengthen your ankle and improve balance can reduce your risk for repeated sprains. Other possible complications are long-term (chronic) pain or an ankle that remains unstable.  When to  call your healthcare provider  Call your healthcare provider right away if you have any of these:  Fever of 100.4°F (38°C) or higher, or as directed  Pain, numbness, discoloration, or coldness in the foot or toes  Pain that gets worse  Symptoms that dont get better, or get worse  New symptoms   Date Last Reviewed: 3/10/2016  © 3907-2969 [employer]. [address] [place] [address]. All rights reserved. This information is not intended as a substitute for professional medical care. Always follow your healthcare professional's instructions.        Treating Ankle Sprains  Treatment will depend on how bad your sprain is. For a severe sprain, healing may take 3 months or more.  Right after your injury: Use R.I.C.E.  Rest: At first, keep weight off the ankle as much as you can. You may be given crutches to help you walk without putting weight on the ankle.  Ice: Put an ice pack on the ankle for 15 minutes. Remove the pack and wait at least 30 minutes. Repeat for up to 3 days. This helps reduce swelling.  Compression: To reduce swelling and keep the joint stable, you may need to wrap the ankle with an elastic bandage. For more severe sprains, you may need an ankle brace or a cast.  Elevation: To reduce swelling, keep your ankle raised above your heart when you sit or lie down.  Medicine  Your healthcare provider may suggest oral non-steroidal anti-inflammatory medicine (NSAIDs), such as ibuprofen. This relieves the pain and helps reduce any swelling. Be sure to take your medicine as directed.  Contrast baths  After 3 days, soak your ankle in warm water for 30 seconds, then in cool water for 30 seconds. Go back and forth for 5 minutes. Doing this every 2 hours will help keep the swelling down.  Exercises    After about 2 to 3 weeks, you may be given exercises to strengthen the ligaments and muscles in the ankle. Doing these exercises will help prevent another ankle sprain. Exercises may include  standing on your toes and then on your heels and doing ankle curls.  Ankle curls  Sit on the edge of a sturdy table or lie on your back.  Pull your toes toward you. Then point them away from you. Repeat for 2 to 3 minutes.   Date Last Reviewed: 9/28/2015  © 1464-1366 [employer]. [address] [place] [address], [place] [address]. All rights reserved. This information is not intended as a substitute for professional medical care. Always follow your healthcare professional's instructions.          Wearing Proper Shoes                    You walk on your feet every day, forcing them to support the weight of your body. Repeated stress on your feet can cause damage over time. The right shoes can help protect your feet. The wrong shoes can cause more foot problems. Read the information below to help you find a shoe that fits your foot needs.      A good shoe fit will cover your foot outline. A shoe that doesnt cover the outline is a bad fit.   Whats your foot shape?  To get a good fit, you need to know the shape of your foot. Do this simple test: While standing, place your foot on a piece of paper and trace around it. Is your foot straight or curved? Do you have a foot problem, such as a bunion, that causes your foot outline to show a bulge on the side of your big toe?  Finding your fit  Bring your foot outline to the shoe store to help you find the right shoe. Place a shoe you like on top of the outline to see if it matches the shape. The shoe should cover the outline. (If you have a bunion, the shoe may not cover the bulge on the outline. Look for soft leather shoes to stretch over the bunion.) Once youve found a pair of proper shoes, put them on. Walk around. Be sure the shoes dont rub or pinch. If the shoes feel good, youve found your fit!  The right shoe for you  A good shoe has features that provide comfort and support. It must also be the right size and shape for your feet. Look for a shoe made of  breathable fabric and lining, such as leather or canvas. Be sure that shoes have enough tread to prevent slipping. Go to a good shoe store for help finding the right shoe.  Good shoe features  An ideal shoe has the following:  Laces for support. If tying laces is a problem for you, try shoes with Velcro fasteners or martha.  A front of the shoe (toe box) with ½ inch space in front of your longest toes.  An arch shape that supports your foot.  No more than 1½ inches of heel.  A stiff, snug back of the shoe to keep your foot from sliding around.  A smooth lining with no rough seams.  Shoe shopping tips  Below are some dos and donts for when you go to the shoe store.  Do:  Select the shoes that feel right. Wear them around the house. Then bring them to your foot healthcare provider to check for fit. If they dont fit well, return them.  Shop late in the day, when your feet will be slightly bigger.  Each time you buy shoes, have both your feet measured while you are standing. Foot size changes with time.  Pick shoes to suit their purpose. High heels are OK for an occasional night on the town. But for everyday wear, choose a more sensible shoe.  Try on shoes while wearing any inserts specially made for your feet (orthoses).  Try on both the right and left shoes. If your feet are different sizes, pick a pair that fits the larger foot.  Dont:  Dont buy shoes based on shoe size alone. Always try on shoes, as sizes differ from brand to brand and within brands.  Dont expect shoes to break in. If they dont fit at the store, dont buy them.  Dont buy a shoe that doesnt match your foot shape.  What about socks?  Always wear socks with shoes. Socks help absorb sweat and reduce friction and blistering. When shopping for shoes, choose soft, padded socks with seams that dont irritate your feet.  If you have foot problems  Some foot problems cause deformities. This can make it hard to find a good fit. Look for shoes made  of soft leather to stretch over the deformity. If you have bunions, buy shoes with a wider toe box. To fit hammertoes, look for shoes with a tall toe box. If you have arch problems, you may need inserts. In some cases, youll need to have custom footwear or orthoses made for your feet.  Suggested footwear  Ask your healthcare provider what kind of footwear you need. He or she may recommend a certain brand or shoe store.  Date Last Reviewed: 8/1/2016  © 1843-7944 [employer]. [address] [place] [address]. All rights reserved. This information is not intended as a substitute for professional medical care. Always follow your healthcare professional's instructions.

## 2024-11-25 NOTE — TELEPHONE ENCOUNTER
Attempted to contact patient regarding refill request per Dr. Waller request:    Adriana Waller MD Cox, Anna, RN  Caller: Unspecified (Yesterday, 10:12 AM)  Can you please call the patient and see why she needs a refill. She may need to contact her PCP for on going refills for nausea. Thanks.  Adriana         Patient did not answer. Left  requesting call back.

## 2024-12-03 ENCOUNTER — TELEPHONE (OUTPATIENT)
Dept: FAMILY MEDICINE | Facility: CLINIC | Age: 64
End: 2024-12-03
Payer: MEDICARE

## 2024-12-03 NOTE — TELEPHONE ENCOUNTER
I left a voicemail that her appointment on 12/05/24 at 02:40 pm. with Dr. Cueva is cancelled. I then spoke with her spouse, Kyle, and rescheduled the appointment to 12/06/24 at 02:00 p.m.

## 2024-12-04 ENCOUNTER — TELEPHONE (OUTPATIENT)
Dept: SURGERY | Facility: CLINIC | Age: 64
End: 2024-12-04
Payer: MEDICARE

## 2024-12-04 NOTE — TELEPHONE ENCOUNTER
Placed call to pt regarding upcoming appt w/ Dr. Stinson.    Pt is an established pt of Dr. Smith & based on referral, in need of a c-scope. Offered to either r/s with existing provider if preferred, send order for procedure in place of existing CR[facility] appt, or leave appt as is.     Pt stated she was hoping to have both an EGD & C-scope done at the same time. RN informed pt that Dr. Stinson doesn't perform upper scopes but GI providers are able to do both at the same time. Mrs. Richards asked for a return call tomorrow to discuss options in detail as she is currently driving.     Plan to call pt back tomorrow to discuss.

## 2024-12-05 ENCOUNTER — PATIENT MESSAGE (OUTPATIENT)
Dept: PODIATRY | Facility: CLINIC | Age: 64
End: 2024-12-05
Payer: MEDICARE

## 2024-12-05 ENCOUNTER — TELEPHONE (OUTPATIENT)
Dept: SURGERY | Facility: CLINIC | Age: 64
End: 2024-12-05
Payer: MEDICARE

## 2024-12-05 ENCOUNTER — PATIENT MESSAGE (OUTPATIENT)
Dept: UROGYNECOLOGY | Facility: CLINIC | Age: 64
End: 2024-12-05
Payer: MEDICARE

## 2024-12-05 DIAGNOSIS — G89.29 CHRONIC PAIN OF LEFT ANKLE: Primary | ICD-10-CM

## 2024-12-05 DIAGNOSIS — M25.572 CHRONIC PAIN OF LEFT ANKLE: Primary | ICD-10-CM

## 2024-12-05 DIAGNOSIS — S93.402A INVERSION SPRAIN OF ANKLE, LEFT, INITIAL ENCOUNTER: ICD-10-CM

## 2024-12-05 DIAGNOSIS — N39.0 RECURRENT URINARY TRACT INFECTION: ICD-10-CM

## 2024-12-05 RX ORDER — NITROFURANTOIN 25; 75 MG/1; MG/1
100 CAPSULE ORAL DAILY
Qty: 90 CAPSULE | Refills: 0 | Status: SHIPPED | OUTPATIENT
Start: 2024-12-05 | End: 2025-03-05

## 2024-12-05 NOTE — TELEPHONE ENCOUNTER
Placed call to pt regarding upcoming appt w/ Dr. Stinson.    Pt is an established pt of Dr. Smith & based on referral, in need of a c-scope. Offered to either r/s with existing provider if preferred, send order for procedure in place of existing CRS appt, or leave appt as is.     Pt stated she was hoping to have both an EGD & C-scope done at the same time. RN informed pt that Dr. Stinson doesn't perform upper scopes but GI providers are able to do both at the same time.    Uses fleets enemas, takes miralax or metamucil to facilitate complete evacuation, leading to rectal pain w/ BMs. States she would like to discuss this concerning issue w/ a surgeon as well.   Pt approves of switching CRS providers.    Will leave appt as is for 12/11 @ 8 AM w/ Dr. Stinson. Confirmed date & time w/ pt prior to ending call.    No additional needs identified at this time.

## 2024-12-05 NOTE — PROGRESS NOTES
Patient called stating that she lost her antibiotic bottle while traveling and is requesting a refill on the medication. She mentioned that her PCP will now be Dr. Cueva. Informed her it appears her records are updated. She has an upcoming appointment in 2/2025.     Adriana Waller MD

## 2024-12-06 ENCOUNTER — PATIENT MESSAGE (OUTPATIENT)
Dept: PODIATRY | Facility: CLINIC | Age: 64
End: 2024-12-06
Payer: MEDICARE

## 2024-12-06 ENCOUNTER — OFFICE VISIT (OUTPATIENT)
Dept: FAMILY MEDICINE | Facility: CLINIC | Age: 64
End: 2024-12-06
Payer: MEDICARE

## 2024-12-06 VITALS
DIASTOLIC BLOOD PRESSURE: 76 MMHG | TEMPERATURE: 99 F | WEIGHT: 160.25 LBS | BODY MASS INDEX: 29.49 KG/M2 | OXYGEN SATURATION: 96 % | HEART RATE: 82 BPM | HEIGHT: 62 IN | SYSTOLIC BLOOD PRESSURE: 132 MMHG

## 2024-12-06 DIAGNOSIS — Z98.84 GASTRIC BYPASS STATUS FOR OBESITY: ICD-10-CM

## 2024-12-06 DIAGNOSIS — E67.3 HIGH VITAMIN D LEVEL: ICD-10-CM

## 2024-12-06 DIAGNOSIS — S93.402A INVERSION SPRAIN OF ANKLE, LEFT, INITIAL ENCOUNTER: ICD-10-CM

## 2024-12-06 DIAGNOSIS — E87.1 HYPONATREMIA: ICD-10-CM

## 2024-12-06 DIAGNOSIS — F41.1 GAD (GENERALIZED ANXIETY DISORDER): ICD-10-CM

## 2024-12-06 DIAGNOSIS — G89.29 CHRONIC PAIN OF LEFT ANKLE: Primary | ICD-10-CM

## 2024-12-06 DIAGNOSIS — M20.5X2 HALLUX LIMITUS, ACQUIRED, LEFT: ICD-10-CM

## 2024-12-06 DIAGNOSIS — I10 ESSENTIAL HYPERTENSION: ICD-10-CM

## 2024-12-06 DIAGNOSIS — M25.572 CHRONIC PAIN OF LEFT ANKLE: Primary | ICD-10-CM

## 2024-12-06 DIAGNOSIS — R25.1 TREMOR: ICD-10-CM

## 2024-12-06 DIAGNOSIS — F39 MOOD DISORDER: Primary | ICD-10-CM

## 2024-12-06 DIAGNOSIS — N39.0 RECURRENT UTI: ICD-10-CM

## 2024-12-06 DIAGNOSIS — G47.00 INSOMNIA, UNSPECIFIED TYPE: ICD-10-CM

## 2024-12-06 DIAGNOSIS — M79.7 FIBROMYALGIA: ICD-10-CM

## 2024-12-06 DIAGNOSIS — R11.0 NAUSEA: ICD-10-CM

## 2024-12-06 PROCEDURE — 99999 PR PBB SHADOW E&M-EST. PATIENT-LVL IV: CPT | Mod: PBBFAC,,, | Performed by: INTERNAL MEDICINE

## 2024-12-06 RX ORDER — ALPRAZOLAM 0.5 MG/1
0.5 TABLET ORAL 3 TIMES DAILY PRN
Qty: 60 TABLET | Refills: 5 | Status: SHIPPED | OUTPATIENT
Start: 2024-12-06

## 2024-12-06 RX ORDER — BUPROPION HYDROCHLORIDE 100 MG/1
100 TABLET, EXTENDED RELEASE ORAL 2 TIMES DAILY
Qty: 180 TABLET | Refills: 3 | Status: SHIPPED | OUTPATIENT
Start: 2024-12-06 | End: 2025-12-06

## 2024-12-06 RX ORDER — VALSARTAN 160 MG/1
160 TABLET ORAL DAILY
Qty: 90 TABLET | Refills: 3 | Status: SHIPPED | OUTPATIENT
Start: 2024-12-06 | End: 2025-12-06

## 2024-12-06 RX ORDER — DULOXETIN HYDROCHLORIDE 30 MG/1
30 CAPSULE, DELAYED RELEASE ORAL DAILY
Qty: 30 CAPSULE | Refills: 11 | Status: SHIPPED | OUTPATIENT
Start: 2024-12-06 | End: 2024-12-06 | Stop reason: SDUPTHER

## 2024-12-06 RX ORDER — ONDANSETRON 4 MG/1
4 TABLET, ORALLY DISINTEGRATING ORAL EVERY 6 HOURS PRN
Qty: 30 TABLET | Refills: 12 | Status: SHIPPED | OUTPATIENT
Start: 2024-12-06

## 2024-12-06 RX ORDER — TRAMADOL HYDROCHLORIDE 50 MG/1
50 TABLET ORAL EVERY 6 HOURS PRN
Qty: 29 TABLET | Refills: 0 | Status: SHIPPED | OUTPATIENT
Start: 2024-12-06

## 2024-12-06 RX ORDER — DULOXETIN HYDROCHLORIDE 60 MG/1
60 CAPSULE, DELAYED RELEASE ORAL DAILY
Qty: 30 CAPSULE | Refills: 11 | Status: SHIPPED | OUTPATIENT
Start: 2024-12-06 | End: 2025-12-06

## 2024-12-06 NOTE — PROGRESS NOTES
Chief complaint:  Anxiety, shaking      Physical exam and reestablished care 10/24    last seen me 2019    64-year-old white female essentially new to me 6/18.  Reviewed interval events. Last Na in Sept 130 and hctz held.  Blood pressure looks normal.  We went over her medications at length.  We sent in valsartan 80 mg but she is actually still continuing on the losartan 100 so gave her written instructions to hold the losartan.  We will increase the valsartan to 160 mg.  She is taking the Toprol-XL 25 mg in the morning.  She was told by her prior physicians only to take the Norvasc 5 mg PRN if her blood pressure was over 140.  Given her blood pressure elevations we will go ahead and start the Norvasc 5 mg.  Her thyroid function was normal in September 2024.  She states her blood pressure is going up and down.  I reviewed a list of her blood pressures and they are generally up even though good today.    Other main complaint is general increased anxiety and she has a lot of stress going on in her life.  She sees outside pain management in his an some constant pain.  When she extends her hands they are both shaky but she can extinguish it and she does feel shaky on the inside for the past three months.  She only takes her Xanax at night which relaxes her anxiety and allows her to go to sleep but she can not sleep without it.  She only gets a supply of 30.  We discussed trying a half pill or a whole pill of Xanax when feeling shaky inside to assess if it improves that would help confirm this is anxiety.  I increased her supply of medications to allow her to do so.      she is on Wellbutrin  mg twice a day.  She was given this by me she says when there was a death in the family and she was depressed.  I perfect gave her Wellbutrin XL and her interval PCP may have switched her to the SR.  One thought would be to increase her back to the Wellbutrin  but given the level of her anxiety would not want to  make that worse.  Given her chronic pain we discussed starting Cymbalta 30 mg an in two weeks increase to 60 mg and I sent a separate 60 mg prescription to follow.  Discussed how this could potentially help with the anxiety/depression as well as chronic pain.      Delete that now she is wearing a boot on her foot for chronic ligament strain and will need to do so for many months according to her report of podiatry evaluation       she also has general pains in the left lower extremity and everything is sensitive.  We discussed this could be her venous insufficiency.  She seen outside vascular and internal vascular in the past.   Get her back in 2018 and [facility] prior.  Prior vascular ultrasound shows she has had greater saphenous vein ablation on the right at least.  She is not quite sure if she remembers having it on the left but probably not which may explain her ongoing progressive left lower extremity symptoms could be due to vascular issues and she is open to seeing vascular again.       Patient apparently has taken a Macrobid after sex.  She has not taken any in quite sometime.  Apparently we do not have that medication on the medication list and she did see a urologist at [facility] we discussed that is likely who prescribed it.  She would sometimes take a five day course when she would get a UTI.      Seen  ---Reviewed with patient the long term risks of remaining on Macrodantin, including impact on liver and pulmonary systems as well as bacterial resistance. Offered to switch her to Monurol or Keflex but patient would like to wait until her next visit. LFT's from 9/2024 were normal and she has not upper respiratory complaints.    Unable to use vaginal estrogen and is allergic to Prosed EC so unable to take Methenamine due to cross reactivity.   Patient will continue Cranberry extract with D-Mannose and Probiotics.    She will return in 3 months for re-evaluation. The patient was encouraged to  contact the office as needed with any additional questions or concerns.    Plan 10/24  Recurrent UTI, plan reviewed, currently on Macrobid daily  Essential hypertension, started in her mid 30's, make adjustments as above due to hyponatremia will stop HCTZ completely, start ARB.  Intermittent swelling will only be worsened if we increase Norvasc so will hold that  Mood disorder, use his Xanax at night  Left leg pain, some of her pain appears to be going down the front of the leg to the big toe and better when she stretches it downward and that could well be some recurrent strain of the muscles and tendons due to laxity of the ankle for which I will have her see Podiatry for that component but the general tenderness to the lower extremity could well be venous insufficiency that responded to vein treatment in the past possibly on the other leg.  She needs to revisit with vascular  -     Ambulatory referral/consult to Vascular Surgery; Future  Venous insufficiency  -     Ambulatory referral/consult to Vascular Surgery; Future  Hemorrhoids, unspecified hemorrhoid type  -     Ambulatory referral/consult to Colorectal Surgery; Future  Chronic pain of left ankle  -     Ambulatory referral/consult to Podiatry; Future  Fibromyalgia, reviewed that she did see rheumatology.  If indeed she has true fibromyalgia we discussed switching her gabapentin to Lyrica and titrating and or starting Cymbalta possibly in place of Wellbutrin and so forth          She was going to a weight loss surgeon for stimulant appetite suppressant and now currently on Mounjaro and had gastric surgery in the past as well.    Reviewed her long history of hyponatremia.  Asymptomatic and recently other doctor reduced her HCTZ from 25 mg down to 12.5.  Norvasc 2.5 increase to 5 mg so she does occasionally gets some edema but has none today.  We discussed eliminating the HCTZ due to the low sodium and she also drinks a lot of water which may need to be  restricted.  I see no contraindication to ARB and so will start valsartan 80 and titrate accordingly.  Explained all this.    She also takes her Xanax which she takes to help her sleep.  It does sound like she has some anxiety and thinks about things and that is what is keeping her from going to sleep so that is probably why the Xanax is the only thing that works to help her go to sleep.  Her use sounds appropriate.  Other sleeping pills unspecified she said gave her sedation the next day as would be expected.      High vit D- reduced in march from 10k to 1000      Liver Hemangioma last CT scan in 2021 showed no liver lesions      Vit D      Obesity: Gastric Bypass in in 2010. Lost 70 Ibs lowest weight was 130. Gained 50 Ibs back. Now on mounjaro 10 mg weekly and doing well. Has  lost about 15 Ibs.      Hx H Pylori      GERD / Hiatal hernia. On PPI. nack on pantoprazole BID      ANDREW      HLD Stopped taking statin due to myalgias.      abnormal uterine cancer cells s/p hysterectomy     Mixed urinary incontinence follows with urogyn gets frequent UTIs. She is on pre/probiotic, D mannose and cranberry pills daily.  Recent recurrent UTIs again as above     Depression/ Anxiety: on xanax and Wellbutrin.      Pulm nodule. Was having annual CT chest. Las CT in 2021 showed nodule had resolved.      Bubba ---DDD/Lumbar spondylosis/ lumbar radiculopathy / chronic pain syndrome:  Seen by pain management in April 20, 2024. Recently underwent bilateral L5-S1 TF MARIANNA in March 2024 without significant relief.  She does not wish to do any further injections at this time.  She can not take anti-inflammatories due to her gastric bypass.  Gabapentin does not help.  She has tried PT in the past as well.  She is now on Norco 5-325 mg tablets daily as needed     OP- seen Hart 3/2024-  Plan  - Discuss treatment option, Guidelines for osteoporosis recommend initiation of bisphosphonate as first-line therapy  - History of gastric bypass,  start her on IV Reclast yearly with [facility] infusion  - Duration of therapy:  Plan to treat IV Reclast 2 times, then repeat bone density in 2/2026 for re-evaluation     Health Maintenance:  Colon Cancer Screening: colonoscopy done in 2020 with one polyp removed. Due again in 2025   Mammogram: normal December 2023  DEXA: showed osteoporosis done 12/21/23    HIV: negative 2021   Hep C: negative 2021   Lipids: order today   Vaccines: needs PNA     ROS:   CONST: weight stable. EYES: no vision change. ENT: no sore throat. CV: no chest pain w/ exertion. RESP: no shortness of breath. GI: no nausea, vomiting, diarrhea. No dysphagia. : no other urinary issues. MUSCULOSKELETAL: no new myalgias or arthralgias. SKIN: no new changes. NEURO: no other focal deficits. PSYCH: no other new issues. ENDOCRINE: no polyuria. HEME: no lymph nodes. ALLERGY: no general pruritis.          Past Medical History:   Diagnosis Date    Abnormal Pap smear     Anemia     history    Anxiety     Cancer     uterine-cancer cells     Colon polyps, next C-scope 2019. 4/22/2014    Dry eyes     Dyspareunia, female 6/24/2020    Essential hypertension, started in her mid 30's 1/18/2017    Family history of malignant neoplasm of pancreas 6/1/2018    Gastric bypass status for obesity 8/21/2012    GERD (gastroesophageal reflux disease)     Helicobacter pylori gastritis, 2008. 8/20/2014    Hemangioma of liver, stable 2010, 2012, right lobe 8/20/2014    Hematuria     History of cosmetic surgeries, tummy tuck 2011. 4/5/2013    History of frquent UTI 6/24/2020    HTN (hypertension) 8/21/2012    120s-130s/80s    Kidney stone     Right sided sciatica 6/1/2018    Sleep apnea     patient does not use the C-PAP mask-cannot tolerate    Ureteral stone 4/8/2014    Urinary incontinence     Urinary retention     Varicose veins of lower extremity with inflammation 4/8/2015    Vertigo     mild    Vitamin D deficiency disease 6/1/2018       Past Surgical History:    Procedure Laterality Date    Abdominoplasty with Liposcuction      APPENDECTOMY      CERVICAL BIOPSY  W/ LOOP ELECTRODE EXCISION      Prior to hysterectomy    CHOLECYSTECTOMY      Laparoscopic    COLONOSCOPY N/A 2020    Procedure: COLONOSCOPY;  Surgeon: Pb Smith MD;  [facility]  Service: Endoscopy;  Laterality: N/A;    EPIDURAL STEROID INJECTION INTO LUMBAR SPINE Bilateral 3/15/2024    Procedure: B/L L5-S1 TFESI;  Surgeon: Glendy Banks MD;  Location: [facility] PAIN MANAGEMENT;  Service: Pain Management;  Laterality: Bilateral;  20 mins    ESOPHAGOGASTRODUODENOSCOPY N/A 2021    Procedure: EGD (ESOPHAGOGASTRODUODENOSCOPY);  Surgeon: Clayton Martínez MD;  Location: [facility] ([address] [place] [address]);  Service: Endoscopy;  Laterality: N/A;  fully vac. 3/15/21 / instr portal -ml    GASTRIC BYPASS      HERNIA REPAIR      HYSTERECTOMY       TVH  both ovaries remain     Social History     Socioeconomic History    Marital status:    Occupational History    Occupation:    Tobacco Use    Smoking status: Never    Smokeless tobacco: Never    Tobacco comments:     .  .   Occup:  .     Substance and Sexual Activity    Alcohol use: No    Drug use: No    Sexual activity: Yes     Partners: Male     Birth control/protection: Surgical   Social History Narrative    Patient is     She and her  own business together.    [place] [employer] [place]    Her son is also involved in the business        Walks the [employer] for physical activity.         Feels safe in her home and with spouse.      Social Drivers of Health     Financial Resource Strain: Patient Declined (2023)    Overall Financial Resource Strain (CARDIA)     Difficulty of Paying Living Expenses: Patient declined   Food Insecurity: Patient Declined (2023)    Hunger Vital Sign     Worried About Running Out of Food in the Last Year: Patient declined     Ran Out of Food in  the Last Year: Patient declined   Transportation Needs: Patient Declined (2023)    PRAPARE - Transportation     Lack of Transportation (Medical): Patient declined     Lack of Transportation (Non-Medical): Patient declined   Physical Activity: Sufficiently Active (2023)    Exercise Vital Sign     Days of Exercise per Week: 5 days     Minutes of Exercise per Session: 40 min   Stress: Stress Concern Present (2023)    [employer] [place] [employer] - Occupational Stress Questionnaire     Feeling of Stress : Very much   Housing Stability: Patient Declined (2023)    Housing Stability Vital Sign     Unable to Pay for Housing in the Last Year: Patient declined     Number of Places Lived in the Last Year: 1     Unstable Housing in the Last Year: Patient declined     family history includes Breast cancer in her maternal aunt; Colon cancer in her maternal uncle; Coronary artery disease in her mother; Diabetes in her father,  of pancreatic cancer; Hearing loss in her maternal grandmother; Heart disease in her mother; Heart failure in her mother; No Known Problems in her brother, maternal grandfather, paternal aunt, paternal grandfather, paternal grandmother, paternal uncle, and sister; Stroke in her mother.    Gen: no distress, anxious appearing.  When she extends both her hands she has a tremor but then it resolves and she has no resting tremor.  No cogwheel rigidity, gait is normal, affect normal so no Parkinson's suspected.    Heart regular rate rhythm without murmurs gallops rubs   Respiratory effort is good lungs are clear, abdomen soft and benign new line no edema       Over 70 min  minutes of total time spent on the encounter, which includes face to face time and non-face to face time preparing to see the patient (eg, review of tests), Obtaining and/or reviewing separately obtained history, Documenting clinical information in the electronic or other health record, Independently  interpreting results (not separately reported) and communicating results to the patient/family/caregiver, or Care coordination (not separately reported).      Diagnoses and all orders for this visit:    Mood disorder, worsening anxiety, assess response to Xanax and we discussed obvious not treating her controlling the condition with long term Xanax so she is aware of that.  Start Cymbalta an increase from 30-60 mg.  Continue the Wellbutrin SR for now but may well be able to titrate down and/or discontinue    Essential hypertension, started in her mid 30's, relatively uncontrolled, clarify medications as above    Insomnia, unspecified type, okay to continue to use Xanax as it appears anxiety is what is preventing sleep    Recurrent UTI, chronic and stable    Hyponatremia, reassess off HCTZ  -     Basic Metabolic Panel; Future    Fibromyalgia, contributing to overall pain and Cymbalta may help and we may well titrate up to even higher doses    Gastric bypass status for obesity, 08-.  Apparently also using Mounjaro at this time often on but nausea does not appear to correlate with the Mounjaro use    High vitamin D level, reassess after holding it  -     Vitamin D; Future    Nausea  -     ondansetron (ZOFRAN-ODT) 4 MG TbDL; Dissolve 1 tablet (4 mg total) on the tongue every 6 (six) hours as needed (nausea).    Tremor, appears to be more anxiety related    KAILEE (generalized anxiety disorder)  -     buPROPion (WELLBUTRIN SR) 100 MG TBSR 12 hr tablet; Take 1 tablet (100 mg total) by mouth 2 (two) times daily.  -     ALPRAZolam (XANAX) 0.5 MG tablet; Take 1 tablet (0.5 mg total) by mouth 3 (three) times daily as needed for Anxiety. TAKE 1 TABLET(0.5 MG) BY MOUTH EVERY NIGHT AS NEEDED FOR INSOMNIA OR ANXIETY    Other orders  -     valsartan (DIOVAN) 160 MG tablet; Take 1 tablet (160 mg total) by mouth once daily.  -     Discontinue: DULoxetine (CYMBALTA) 30 MG capsule; Take 1 capsule (30 mg total) by mouth once  daily.  -     DULoxetine (CYMBALTA) 60 MG capsule; Take 1 capsule (60 mg total) by mouth once daily.

## 2024-12-10 NOTE — PROGRESS NOTES
CRS Office Visit History and Physical    Referring Md:   Luan Cueva Md  [address] [place] [address]    SUBJECTIVE:     Chief Complaint: Constipation, hemorrhoids, due for colonoscopy    History of Present Illness:  The patient is new patient to this practice.   Course is as follows:  Patient is a 64 y.o. female presents with constipation.  Symptoms have been present for 3 months. Had it in the past, but got better after RYGB.  Spends 20-30min on toilet. Stools are hard, small balls.  Feels some tissue that comes out on occasion that needs to be reduced.  Takes MiraLax daily and Metamucil intermittently.  Uses enemas weekly, and has diarrhea with this.  Takes Zofran often/daily for nausea.  Metamucil makes nausea worse.  Incontinence to diarrhea and flatus.  Pain with bowel movements.  Last Colonoscopy: Feb 2020 (non-adenomatous polyp)  Family history of colorectal cancer or IBD: Uncle.    Prior abdominal surgery: Yes - RYGB  Prior pelvic or anorectal surgery: No  Family history of colon/rectal cancer: No  Family history of IBD: Yes   Steroid or other immunosuppression: No  Blood thinners: No  Current stool softeners or fiber supplement: Yes - MiraLax daily, Metamucil 2 times per week  Active smoking: No  Prior deliveries: Yes - 2  complicated by tear: No    [place] (FI):  Leakage of solid stool:   Rarely (1)        Leakage of liquid stool:   Sometimes (2)        Leakage of flatus:    Sometimes (2)      Wear a pad:    Daily (4)  Alter life:    Daily (4)  Total: 13    [place] (ODS):  How long on toilet:   21-30min (3)   How many times/day:  1 (0)   Digitation:   2-3x/wk (3)   Laxatives:   Daily (4)  Enemas:   Weekly (2)   Incomplete stool:  Always (4)  Strain:   Always (4)  Total: 20        Review of patient's allergies indicates:   Allergen Reactions    Adhesive tape-silicones Other (See Comments)     Other reaction(s): BLISTERS    Penicillins Hives and Itching    Sulfamethoxazole-trimethoprim  Hives and Itching    Prosed ec [meth-hyos-atrp-m blue-ba-phsal] Hives, Itching and Swelling    Pyridium [phenazopyridine]     Cephalexin Nausea And Vomiting and Other (See Comments)     Dehydration       Past Medical History:   Diagnosis Date    Abnormal Pap smear     Anemia     history    Anxiety     Cancer     uterine-cancer cells     Colon polyps, next C-scope 2019. 4/22/2014    Dry eyes     Dyspareunia, female 6/24/2020    Essential hypertension, started in her mid 30's 1/18/2017    Family history of malignant neoplasm of pancreas 6/1/2018    Gastric bypass status for obesity 8/21/2012    GERD (gastroesophageal reflux disease)     Helicobacter pylori gastritis, 2008. 8/20/2014    Hemangioma of liver, stable 2010, 2012, right lobe 8/20/2014    Hematuria     History of cosmetic surgeries, tummy tuck 2011. 4/5/2013    History of frquent UTI 6/24/2020    HTN (hypertension) 8/21/2012    120s-130s/80s    Kidney stone     Right sided sciatica 6/1/2018    Sleep apnea     patient does not use the C-PAP mask-cannot tolerate    Ureteral stone 4/8/2014    Urinary incontinence     Urinary retention     Varicose veins of lower extremity with inflammation 4/8/2015    Vertigo     mild    Vitamin D deficiency disease 6/1/2018     Past Surgical History:   Procedure Laterality Date    Abdominoplasty with Liposcuction      APPENDECTOMY      CERVICAL BIOPSY  W/ LOOP ELECTRODE EXCISION      Prior to hysterectomy    CHOLECYSTECTOMY      Laparoscopic    COLONOSCOPY N/A 2/13/2020    Procedure: COLONOSCOPY;  Surgeon: Pb Smith MD;  Location: [address]);  Service: Endoscopy;  Laterality: N/A;    EPIDURAL STEROID INJECTION INTO LUMBAR SPINE Bilateral 3/15/2024    Procedure: B/L L5-S1 TFESI;  Surgeon: Glendy Banks MD;  [facility] PAIN [facility]GEMENT;  Service: Pain Management;  Laterality: Bilateral;  20 mins    ESOPHAGOGASTRODUODENOSCOPY N/A 11/2/2021    Procedure: EGD (ESOPHAGOGASTRODUODENOSCOPY);  Surgeon: Clayton ZABALA  "MD Tanya;  Location: [facility] (4TH FLR);  Service: Endoscopy;  Laterality: N/A;  fully vac. 3/15/21 / instr portal -ml    GASTRIC BYPASS      HERNIA REPAIR      HYSTERECTOMY       TVH  both ovaries remain     Family History   Problem Relation Name Age of Onset    Heart disease Mother      Stroke Mother      Coronary artery disease Mother      Heart failure Mother      Obesity Mother      Cancer Father          pancreatic cancer    Diabetes Father      Obesity Father      No Known Problems Sister      No Known Problems Brother      Hearing loss Maternal Grandmother      No Known Problems Maternal Grandfather      No Known Problems Paternal Grandmother      No Known Problems Paternal Grandfather      Breast cancer Maternal Aunt      Colon cancer Maternal Uncle      No Known Problems Paternal Aunt      No Known Problems Paternal Uncle      Ovarian cancer Neg Hx      Anesthesia problems Neg Hx      Amblyopia Neg Hx      Blindness Neg Hx      Cataracts Neg Hx      Glaucoma Neg Hx      Hypertension Neg Hx      Macular degeneration Neg Hx      Retinal detachment Neg Hx      Strabismus Neg Hx      Thyroid disease Neg Hx       Social History     Tobacco Use    Smoking status: Never    Smokeless tobacco: Never    Tobacco comments:     .  .   Occup:  .     Substance Use Topics    Alcohol use: No    Drug use: No        Review of Systems:  ROS    OBJECTIVE:     Vital Signs (Most Recent)  BP (!) 151/79 (BP Location: Left arm, Patient Position: Sitting)   Pulse 75   Ht 5' 2" (1.575 m)   Wt 74 kg (163 lb 2.3 oz)   BMI 29.84 kg/m²     Physical Exam:  General: White female in no distress   Neuro: Alert and oriented x 4.  Moves all extremities.     HEENT: No icterus.  Trachea midline  Respiratory: Respirations are even and unlabored  Cardiac: Regular rate  Abdomen: soft non distended  Extremities: Warm dry and intact  Skin: No rashes    Patient was examined w/ a chaperone present.    Anorectal: "   External exam: right posterolateral soft thrombosed external hemorrhoid  Digital exam revealed decreased tone.    Anoscopy:  Verbal consent was obtained.   Clear plastic anoscope inserted.    Grade 1 internal hemorrhoids seen.      ASSESSMENT/PLAN:     63yo F w/ constipation and hemorrhoid prolapse with straining    The patient was instructed to:  Increase water intake to at least 8-10 glasses of water per day.  Take a daily fiber supplement (Konsyl, Benefiber, Metamucil) and increase dietary intake to 20-30 grams/day. Specifically fibercon 2tabd daily and increased titration.  Take miralax bid or even tid.  Avoid straining or spending >5min/event with bowel movements.  Follow-up in clinic in 8 weeks, colonoscopy if not improved    Dodie Stinson MD  Staff Surgeon, Colon and Rectal Surgery  [facility]

## 2024-12-11 ENCOUNTER — OFFICE VISIT (OUTPATIENT)
Dept: SURGERY | Facility: CLINIC | Age: 64
End: 2024-12-11
Attending: COLON & RECTAL SURGERY
Payer: MEDICARE

## 2024-12-11 VITALS
SYSTOLIC BLOOD PRESSURE: 151 MMHG | DIASTOLIC BLOOD PRESSURE: 79 MMHG | BODY MASS INDEX: 30.02 KG/M2 | WEIGHT: 163.13 LBS | HEIGHT: 62 IN | HEART RATE: 75 BPM

## 2024-12-11 DIAGNOSIS — K64.9 HEMORRHOIDS, UNSPECIFIED HEMORRHOID TYPE: ICD-10-CM

## 2024-12-11 DIAGNOSIS — Z86.0100 HISTORY OF COLONIC POLYPS: ICD-10-CM

## 2024-12-11 PROCEDURE — 99999 PR PBB SHADOW E&M-EST. PATIENT-LVL V: CPT | Mod: PBBFAC,,, | Performed by: COLON & RECTAL SURGERY

## 2024-12-11 NOTE — PATIENT INSTRUCTIONS
- Take fibercon fiber supplement 2 tabs daily for a week. If not improvement, can increase to 4 tabs daily for a week. You can take up to 8 tabs a day (usually 2 tabs four times a day). This allows you to find the right dose for you over time.  This may be better tolerated than taking metamucil.  - Take a capful of miralax twice a day. You can increase this to three times a day depending on how you feel and respond to the fibercon.  - Ok to use enemas as needed  - Continue to drink plenty of water (non-caffeinated fluids) throughout the day  - No more than 5min on the toilet at a time  - You can use a bidet or squirt bottle or shower head to clean yourself after bowel movements to clean off residue and prevent aggressive wiping. Afterward, praveen dry your bottom and keep it dry throughout the day.  - Return to clinic in 6-8 weeks to check on progression

## 2024-12-17 ENCOUNTER — LAB VISIT (OUTPATIENT)
Dept: LAB | Facility: HOSPITAL | Age: 64
End: 2024-12-17
Attending: INTERNAL MEDICINE
Payer: MEDICARE

## 2024-12-17 DIAGNOSIS — E87.1 HYPONATREMIA: ICD-10-CM

## 2024-12-17 DIAGNOSIS — E67.3 HIGH VITAMIN D LEVEL: ICD-10-CM

## 2024-12-17 LAB
25(OH)D3+25(OH)D2 SERPL-MCNC: 79 NG/ML (ref 30–96)
ANION GAP SERPL CALC-SCNC: 10 MMOL/L (ref 8–16)
BUN SERPL-MCNC: 9 MG/DL (ref 8–23)
CALCIUM SERPL-MCNC: 9.1 MG/DL (ref 8.7–10.5)
CHLORIDE SERPL-SCNC: 94 MMOL/L (ref 95–110)
CO2 SERPL-SCNC: 20 MMOL/L (ref 23–29)
CREAT SERPL-MCNC: 0.7 MG/DL (ref 0.5–1.4)
EST. GFR  (NO RACE VARIABLE): >60 ML/MIN/1.73 M^2
GLUCOSE SERPL-MCNC: 84 MG/DL (ref 70–110)
POTASSIUM SERPL-SCNC: 4.7 MMOL/L (ref 3.5–5.1)
SODIUM SERPL-SCNC: 124 MMOL/L (ref 136–145)

## 2024-12-17 PROCEDURE — 36415 COLL VENOUS BLD VENIPUNCTURE: CPT | Mod: PO | Performed by: INTERNAL MEDICINE

## 2024-12-17 PROCEDURE — 82306 VITAMIN D 25 HYDROXY: CPT | Performed by: INTERNAL MEDICINE

## 2024-12-17 PROCEDURE — 80048 BASIC METABOLIC PNL TOTAL CA: CPT | Performed by: INTERNAL MEDICINE

## 2024-12-18 ENCOUNTER — PATIENT MESSAGE (OUTPATIENT)
Dept: FAMILY MEDICINE | Facility: CLINIC | Age: 64
End: 2024-12-18
Payer: MEDICARE

## 2024-12-18 DIAGNOSIS — E87.1 HYPONATREMIA: Primary | ICD-10-CM

## 2024-12-20 ENCOUNTER — PATIENT MESSAGE (OUTPATIENT)
Dept: FAMILY MEDICINE | Facility: CLINIC | Age: 64
End: 2024-12-20
Payer: MEDICARE

## 2025-01-02 ENCOUNTER — LAB VISIT (OUTPATIENT)
Dept: LAB | Facility: HOSPITAL | Age: 65
End: 2025-01-02
Attending: INTERNAL MEDICINE
Payer: MEDICARE

## 2025-01-02 DIAGNOSIS — E87.1 HYPONATREMIA: ICD-10-CM

## 2025-01-02 DIAGNOSIS — S93.402A INVERSION SPRAIN OF ANKLE, LEFT, INITIAL ENCOUNTER: ICD-10-CM

## 2025-01-02 DIAGNOSIS — G89.29 CHRONIC PAIN OF LEFT ANKLE: ICD-10-CM

## 2025-01-02 DIAGNOSIS — M25.572 CHRONIC PAIN OF LEFT ANKLE: ICD-10-CM

## 2025-01-02 LAB
ANION GAP SERPL CALC-SCNC: 9 MMOL/L (ref 8–16)
BUN SERPL-MCNC: 16 MG/DL (ref 8–23)
CALCIUM SERPL-MCNC: 9.8 MG/DL (ref 8.7–10.5)
CHLORIDE SERPL-SCNC: 98 MMOL/L (ref 95–110)
CO2 SERPL-SCNC: 22 MMOL/L (ref 23–29)
CREAT SERPL-MCNC: 0.8 MG/DL (ref 0.5–1.4)
EST. GFR  (NO RACE VARIABLE): >60 ML/MIN/1.73 M^2
GLUCOSE SERPL-MCNC: 86 MG/DL (ref 70–110)
POTASSIUM SERPL-SCNC: 5 MMOL/L (ref 3.5–5.1)
SODIUM SERPL-SCNC: 129 MMOL/L (ref 136–145)

## 2025-01-02 PROCEDURE — 36415 COLL VENOUS BLD VENIPUNCTURE: CPT | Mod: PO | Performed by: INTERNAL MEDICINE

## 2025-01-02 PROCEDURE — 80048 BASIC METABOLIC PNL TOTAL CA: CPT | Performed by: INTERNAL MEDICINE

## 2025-01-03 ENCOUNTER — PATIENT MESSAGE (OUTPATIENT)
Dept: FAMILY MEDICINE | Facility: CLINIC | Age: 65
End: 2025-01-03
Payer: MEDICARE

## 2025-01-03 DIAGNOSIS — E66.811 CLASS 1 OBESITY WITH SERIOUS COMORBIDITY AND BODY MASS INDEX (BMI) OF 32.0 TO 32.9 IN ADULT, UNSPECIFIED OBESITY TYPE: ICD-10-CM

## 2025-01-03 RX ORDER — AMLODIPINE BESYLATE 5 MG/1
5 TABLET ORAL DAILY
Qty: 90 TABLET | Refills: 3 | Status: SHIPPED | OUTPATIENT
Start: 2025-01-03

## 2025-01-03 RX ORDER — TRAMADOL HYDROCHLORIDE 50 MG/1
50 TABLET ORAL EVERY 6 HOURS PRN
Qty: 29 TABLET | Refills: 0 | OUTPATIENT
Start: 2025-01-03

## 2025-01-03 NOTE — TELEPHONE ENCOUNTER
Placed call to pt informing pt refill request denied by provider and will be addressed during upcoming appt. Pt verbalized understanding

## 2025-01-03 NOTE — TELEPHONE ENCOUNTER
Refill Routing Note   Medication(s) are not appropriate for processing by [facility] for the following reason(s):        Required vitals abnormal  No active prescription written by provider    [facility] action(s):  Defer     Requires labs : Yes             Appointments  past 12m or future 3m with PCP    Date Provider   Last Visit   12/6/2024 Luan Cueva MD   Next Visit   Visit date not found Luan Cueva MD   ED visits in past 90 days: 0        Note composed:12:20 PM 01/03/2025

## 2025-01-03 NOTE — TELEPHONE ENCOUNTER
Care Due:                  Date            Visit Type   Department     Provider  --------------------------------------------------------------------------------                                             [facility]                              ESTABLISHED   MED/ INTERNAL  Luan Aquino  Last Visit: 12-      PATIENT      MED/ PEDS      [place]  Next Visit: None Scheduled  None         None Found                                                            Last  Test          Frequency    Reason                     Performed    Due Date  --------------------------------------------------------------------------------    HBA1C.......  6 months...  tirzepatide..............  09- 03-    [facility] Embedded Care Due Messages. Reference number: [id number].   1/02/2025 7:27:14 PM CST

## 2025-01-06 ENCOUNTER — PATIENT MESSAGE (OUTPATIENT)
Dept: FAMILY MEDICINE | Facility: CLINIC | Age: 65
End: 2025-01-06
Payer: MEDICARE

## 2025-01-10 ENCOUNTER — PATIENT MESSAGE (OUTPATIENT)
Dept: PODIATRY | Facility: CLINIC | Age: 65
End: 2025-01-10
Payer: MEDICARE

## 2025-01-13 ENCOUNTER — OFFICE VISIT (OUTPATIENT)
Dept: PODIATRY | Facility: CLINIC | Age: 65
End: 2025-01-13
Payer: MEDICARE

## 2025-01-13 VITALS
DIASTOLIC BLOOD PRESSURE: 76 MMHG | HEIGHT: 62 IN | SYSTOLIC BLOOD PRESSURE: 117 MMHG | BODY MASS INDEX: 29.84 KG/M2 | HEART RATE: 79 BPM

## 2025-01-13 DIAGNOSIS — M25.572 CHRONIC PAIN OF LEFT ANKLE: Primary | ICD-10-CM

## 2025-01-13 DIAGNOSIS — S93.402A INVERSION SPRAIN OF ANKLE, LEFT, INITIAL ENCOUNTER: ICD-10-CM

## 2025-01-13 DIAGNOSIS — M19.072 ARTHROSIS OF ANKLE, LEFT: ICD-10-CM

## 2025-01-13 DIAGNOSIS — G89.29 CHRONIC PAIN OF LEFT ANKLE: Primary | ICD-10-CM

## 2025-01-13 PROCEDURE — 3008F BODY MASS INDEX DOCD: CPT | Mod: CPTII,S$GLB,, | Performed by: PODIATRIST

## 2025-01-13 PROCEDURE — 1159F MED LIST DOCD IN RCRD: CPT | Mod: CPTII,S$GLB,, | Performed by: PODIATRIST

## 2025-01-13 PROCEDURE — 3288F FALL RISK ASSESSMENT DOCD: CPT | Mod: CPTII,S$GLB,, | Performed by: PODIATRIST

## 2025-01-13 PROCEDURE — 3078F DIAST BP <80 MM HG: CPT | Mod: CPTII,S$GLB,, | Performed by: PODIATRIST

## 2025-01-13 PROCEDURE — 20605 DRAIN/INJ JOINT/BURSA W/O US: CPT | Mod: LT,S$GLB,, | Performed by: PODIATRIST

## 2025-01-13 PROCEDURE — 1125F AMNT PAIN NOTED PAIN PRSNT: CPT | Mod: CPTII,S$GLB,, | Performed by: PODIATRIST

## 2025-01-13 PROCEDURE — 3074F SYST BP LT 130 MM HG: CPT | Mod: CPTII,S$GLB,, | Performed by: PODIATRIST

## 2025-01-13 PROCEDURE — 1101F PT FALLS ASSESS-DOCD LE1/YR: CPT | Mod: CPTII,S$GLB,, | Performed by: PODIATRIST

## 2025-01-13 PROCEDURE — 1160F RVW MEDS BY RX/DR IN RCRD: CPT | Mod: CPTII,S$GLB,, | Performed by: PODIATRIST

## 2025-01-13 PROCEDURE — 99999 PR PBB SHADOW E&M-EST. PATIENT-LVL IV: CPT | Mod: PBBFAC,,, | Performed by: PODIATRIST

## 2025-01-13 PROCEDURE — 99214 OFFICE O/P EST MOD 30 MIN: CPT | Mod: 25,S$GLB,, | Performed by: PODIATRIST

## 2025-01-13 RX ORDER — TRIAMCINOLONE ACETONIDE 40 MG/ML
20 INJECTION, SUSPENSION INTRA-ARTICULAR; INTRAMUSCULAR ONCE
Status: COMPLETED | OUTPATIENT
Start: 2025-01-13 | End: 2025-01-13

## 2025-01-13 RX ORDER — BUPIVACAINE HYDROCHLORIDE 5 MG/ML
1 INJECTION, SOLUTION EPIDURAL; INTRACAUDAL ONCE
Status: COMPLETED | OUTPATIENT
Start: 2025-01-13 | End: 2025-01-13

## 2025-01-13 RX ORDER — LIDOCAINE HYDROCHLORIDE 20 MG/ML
1 INJECTION, SOLUTION EPIDURAL; INFILTRATION; INTRACAUDAL; PERINEURAL ONCE
Status: COMPLETED | OUTPATIENT
Start: 2025-01-13 | End: 2025-01-13

## 2025-01-13 RX ORDER — DEXAMETHASONE SODIUM PHOSPHATE 4 MG/ML
2 INJECTION, SOLUTION INTRA-ARTICULAR; INTRALESIONAL; INTRAMUSCULAR; INTRAVENOUS; SOFT TISSUE ONCE
Status: COMPLETED | OUTPATIENT
Start: 2025-01-13 | End: 2025-01-13

## 2025-01-13 RX ADMIN — DEXAMETHASONE SODIUM PHOSPHATE 2 MG: 4 INJECTION, SOLUTION INTRA-ARTICULAR; INTRALESIONAL; INTRAMUSCULAR; INTRAVENOUS; SOFT TISSUE at 10:01

## 2025-01-13 RX ADMIN — LIDOCAINE HYDROCHLORIDE 20 MG: 20 INJECTION, SOLUTION EPIDURAL; INFILTRATION; INTRACAUDAL; PERINEURAL at 10:01

## 2025-01-13 RX ADMIN — TRIAMCINOLONE ACETONIDE 20 MG: 40 INJECTION, SUSPENSION INTRA-ARTICULAR; INTRAMUSCULAR at 10:01

## 2025-01-13 RX ADMIN — BUPIVACAINE HYDROCHLORIDE 5 MG: 5 INJECTION, SOLUTION EPIDURAL; INTRACAUDAL at 10:01

## 2025-01-13 NOTE — PATIENT INSTRUCTIONS
Over the counter pain creams: Voltaren Gel, Biofreeze, Bengay, tiger balm, two old goat, lidocaine gel,  Absorbine Veterinary Liniment Gel Topical Analgesic Sore Muscle and Joint Pain Relief    Recommend lotions: eucerin, eucerin for diabetics, aquaphor, A&D ointment, gold bond for diabetics, sween, Marvin's Bees all purpose baby ointment,  urea 40 with aloe or SkinIntegra rapid crack repair (found on amazon.com)    Shoe recommendations: (try 6pm.com, zappos.com , [email or web address].Invoiceable, or shoes.Invoiceable for discounted prices) you can visit varsity shoes in [place], DSW shoes in [place]  or esther rack in the [facility] (there are also several shoe brand outlets in the [facility])    ONLY purchase stability style tennis shoes NO flex, foam, free, yoga mat style shoes    Shoe examples:    Asics (GT 4000 or gel foundations), new balance stability type shoes (such as the 940 series), saucony (stabil c3),  Best (GTS or Beast or   transcend), propet, HokaOne (tennis shoe) Clifton (tennis shoes and boots)    Sofft Mynor (women) Sarah&Yannick (men), clarks, croalexx, aerosoles, naturalizers, SAS, ecco, born, jessica ruiz, rockports (dress shoes)    Vionic, burkenstocks, fitflops, propet, taos, baretraps (sandals)    HokaOne sandals, crocs, propet (house shoes)      Nail Home remedy:  Vicks Vapor rub or Emuaid to nails for easier manageability    Occasional soaks for 15-20 mins in luke warm water with 1/2 cup of listerine and 1 cup of apple cider vinegar are ok You may add several drops of oil of oregano or tea tree oil as well      What Is Arthritis in the Foot?  Degenerative arthritis is a condition that slowly wears away joints, the area where bones meet and move. In the beginning, you may notice that the affected joint seems stiff. It may even ache. As the joint lining (cartilage) breaks down, the bones rub against each other, causing pain and swelling. Over time, small pieces of rough or splintered bone (bone spurs) develop,  and the joints range of motion becomes limited. But movement doesnt have to cause pain. The effects of arthritis can be reduced.    The big-toe joint  When arthritis affects your big toe, your foot hurts when it pushes off the ground. Arthritis often appears in the big-toe joint along with a bunion (a bony bump at the side of the joint) or a bone spur on top of the joint.    Other joints  When arthritis affects the rear or midfoot joints, you feel pain when you put weight on your foot. Arthritis may affect the joint where the ankle and foot meet. It may also affect other joints nearby.  Date Last Reviewed: 7/1/2016 © 2000-2017 [employer]. [address] [place] [address], [place] [address]. All rights reserved. This information is not intended as a substitute for professional medical care. Always follow your healthcare professional's instructions.        Treating Arthritis in the Foot  If your symptoms are mild, medications may be enough to reduce pain and swelling. For more severe arthritis, surgery may be needed to improve the condition of the joint.    Medicine  Your doctor may prescribe medicine--pills or injections--to limit pain and swelling. Ice, aspirin, acetaminophen, or ibuprofen may help relieve mild symptoms that occur after activity.  Surgery and bone trimming  To ease movement and reduce pain, your doctor may trim damaged bone. If arthritis is severe, the joint may be fused or removed. If the bone is not damaged too badly, your doctor may simply shave away bone spurs. Any excess bone growth related to a bunion may also be trimmed.  Fusing joints  If damage is more severe, your doctor may fuse the joint to prevent the bones from rubbing. Afterward, staples, plates, or screws may hold the bones in place so they heal properly. In some cases, the joint may be removed and replaced with an implant.  After surgery  During the early stages of recovery, your foot is likely to be bandaged and immobilized  for a while. For best results, follow up with your doctor as scheduled. These visits help ensure that your foot heals properly.  As you heal  After surgery, youll be told how to care for your incision and how soon to begin walking on the foot. Until the foot can bear weight, you may need to walk with crutches or a cane.  For surgery on the big toe, your foot may be splinted to limit movement for several weeks. Despite this, you should be able to walk soon after surgery.  For surgery on rear or midfoot joints, you may need to wear a cast or surgical shoe. These joints are fairly large, so full recovery may take a few months. Once the bone has healed, any staples, plates, or screws may be removed.  Date Last Reviewed: 7/1/2016 © 2000-2017 [employer]. [address] [place] [address]. All rights reserved. This information is not intended as a substitute for professional medical care. Always follow your healthcare professional's instructions.        Foot Surgery: Degenerative Joint Disease    Degenerative joint disease (arthritis) often happens in the joint of a big toe. This bone growth may cause pain and stiffness in the joint. Left untreated, arthritis can break down the cartilage and destroy the joint. Your treatment choices depend on how damaged your joint is. There are many nonsurgical treatments, but if these are not helpful, surgery may be considered.    Cheilectomy  This is done when the arthritic joint and cartilage can be saved. A bone spur caused by arthritis may be symptomatic on the top of the big toe joint. The procedure involves removing this bone spur, usually with a small part of the top of the joint itself.  You will need to wear a surgical shoe for several weeks. Once the foot heals, joint movement is restored.    Fusion  In fusion, the cartilage and some bone on both sides of the joint are removed. Then, the big toe and metatarsal bones are held together with staples,  screws, or a plate and screws. Your foot may be placed in a cast. While you heal, you will be asked not to bear weight on this foot. You may also need crutches for several weeks. Because the joint has been removed, your toe will be less flexible.    Arthroplasty  During surgery, bone growth caused by the arthritis is trimmed, and part of the joint is removed. A pin can be used to align the bones and to keep them from touching. The pin is removed after several weeks. In some cases, the entire joint may be replaced with an implant. You may have to wear a splint or a surgical shoe for several weeks. When healed, the bones become connected with scar tissue.  Date Last Reviewed: 10/15/2015  © 2568-2077 [employer]. [address] [place] [address], [place] [address]. All rights reserved. This information is not intended as a substitute for professional medical care. Always follow your healthcare professional's instructions.

## 2025-01-13 NOTE — PROGRESS NOTES
Subjective:      Patient ID: Cherie Richards is a 64 y.o. female.    Chief Complaint: Foot Pain (Left foot pain feels burning when walking)    Cherie Richards is a 64 y.o. female who presents to the podiatry clinic  with complaint of  left ankle pain, especially with palpation and ambulation. Description: moderate to severe. Nature: aching, burning, sharp, and throbbing Location: lateral Onset of the symptoms was several months ago. Precipitating event: none known.  History of injury: no.  She does relates left sided sciatica as well.  Current symptoms include: ability to bear weight, but with some pain, stiffness, swelling, worsening symptoms after a period of activity, and worsening symptoms after a period of inactivity. Alleviating factors: acetaminophen Symptoms have progressed to a point and plateaued.  Patients rates pain 10/10 on pain scale.    01/13/2025 patient returns to clinic with her  for evaluation and treatment of left ankle pain.  She relates that she has been compliant with use of the cam boot with every step and per both her patient and her  she has only been taking steps as needed and has been primarily resting.  Patient relates that she feels as if the ankle feels more painful and stiff when she is resting versus when she does some moving around.  She states that the pain has remained stagnant with some instances where pain feels as if it is improved and other instances where it feels as if it is regressed.  She purchased tubigrips and has been using them as compression socks.  She denies nausea, vomiting, fever, chills.  No new pedal complaints.      Patient Active Problem List   Diagnosis    GERD (gastroesophageal reflux disease), repair of [place] fundoplication 08-.    Gastric bypass status for obesity, 08-.    Sleep apnea, resolved following gastric bypass surgery. CPAP intolerant.    Colon polyps    Helicobacter pylori gastritis, 2008.    Hemangioma of liver,  stable 2010, 2012, right lobe; resolved     Essential hypertension, started in her mid 30's    Vitamin D deficiency disease    Right sided sciatica    Urinary incontinence, mixed    Chronic constipation    Major depressive disorder    Nodule of lower lobe of right lung, spiculated 1.4 cm by CT scan 5/4/2021, resolved 12/2021    Colon wall thickening, distal transverse colon to mid descending colon by CT 5/4/2021    Atherosclerosis of native coronary artery of native heart without angina pectoris by CT scan 5/4/2021    Aortic calcification, by CT scan 5/4/2021    SOB (shortness of breath)    Other chest pain    Calcified granuloma of lung    Moderate episode of recurrent major depressive disorder    Weakness of trunk musculature    Decreased range of motion of lumbar spine    Numbness    Osteoporosis without current pathological fracture    Class 1 obesity due to excess calories without serious comorbidity with body mass index (BMI) of 30.0 to 30.9 in adult    Nausea and vomiting    Acute cystitis with hematuria       Current Outpatient Medications on File Prior to Visit   Medication Sig Dispense Refill    acetaminophen (TYLENOL) 500 MG tablet Take 1 tablet (500 mg total) by mouth every 6 (six) hours as needed for Pain (and fever). 30 tablet 0    albuterol (PROVENTIL/VENTOLIN HFA) 90 mcg/actuation inhaler Inhale 1-2 puffs into the lungs every 4 (four) hours as needed for Shortness of Breath (coughing). Rescue 25.5 g 3    ALPRAZolam (XANAX) 0.5 MG tablet Take 1 tablet (0.5 mg total) by mouth 3 (three) times daily as needed for Anxiety. TAKE 1 TABLET(0.5 MG) BY MOUTH EVERY NIGHT AS NEEDED FOR INSOMNIA OR ANXIETY 60 tablet 5    amLODIPine (NORVASC) 5 MG tablet Take 1 tablet (5 mg total) by mouth once daily. 90 tablet 3    baclofen (LIORESAL) 10 MG tablet Take 1 tablet (10 mg total) by mouth 3 (three) times daily as needed (muscle spasm). 90 tablet 11    buPROPion (WELLBUTRIN SR) 100 MG TBSR 12 hr tablet Take 1 tablet  (100 mg total) by mouth 2 (two) times daily. 180 tablet 3    cholecalciferol, vitamin D3, 5,000 unit Tab Take by mouth. 1 Tablet Oral Every day      cyanocobalamin 1,000 mcg/mL injection INJECT 1 ML (CC) INTRAMUSCULARLY EVERY TWO WEEKS 2 mL 11    cyclobenzaprine (FLEXERIL) 10 MG tablet Take 1 tablet (10 mg total) by mouth every evening. 30 tablet 6    cycloSPORINE (RESTASIS) 0.05 % ophthalmic emulsion Instill 1 drop into both eyes twice daily 180 each 3    D-MANNOSE ORAL Take by mouth.      diclofenac sodium (VOLTAREN) 1 % Gel Apply 2 g topically 4 (four) times daily as needed (pain). 400 g 3    DULoxetine (CYMBALTA) 60 MG capsule Take 1 capsule (60 mg total) by mouth once daily. 30 capsule 11    fluconazole (DIFLUCAN) 150 MG Tab Take 1 tablet (150 mg total) by mouth daily as needed. 3 tablet 3    gabapentin (NEURONTIN) 300 MG capsule Take 2 capsules (600 mg total) by mouth 3 (three) times daily. 180 capsule 11    methylPREDNISolone (MEDROL DOSEPACK) 4 mg tablet follow package directions 21 tablet 0    metoprolol succinate (TOPROL-XL) 25 MG 24 hr tablet Take 1 tablet (25 mg total) by mouth once daily. 90 tablet 3    metroNIDAZOLE (METROGEL) 0.75 % gel Apply topically 2 (two) times daily. 45 g 3    nitrofurantoin, macrocrystal-monohydrate, (MACROBID) 100 MG capsule Take 1 capsule (100 mg total) by mouth once daily. 90 capsule 0    olopatadine (PATANOL) 0.1 % ophthalmic solution Place 1 drop into both eyes 2 (two) times daily. 5 mL 6    ondansetron (ZOFRAN-ODT) 4 MG TbDL Dissolve 1 tablet (4 mg total) on the tongue every 6 (six) hours as needed (nausea). 30 tablet 12    pantoprazole (PROTONIX) 40 MG tablet Take 1 tablet (40 mg total) by mouth 2 (two) times daily. 180 tablet 3    predniSONE (DELTASONE) 20 MG tablet Take 2 tablets at once on days 1&2, then 1 tablet daily on days 3&4 6 tablet 0    promethazine (PHENERGAN) 25 MG suppository Place 1 suppository (25 mg total) rectally every 6 (six) hours as needed for  "Nausea. 10 suppository 0    syringe with needle (BD LUER-CLARIBEL SYRINGE) 3 mL 25 x 1 1/2 " Syrg Use as directed with Cyanocobalamin 10 Syringe 3    tirzepatide 7.5 mg/0.5 mL PnIj Inject 7.5 mg into the skin every 7 days. 12 Pen 4    traMADoL (ULTRAM) 50 mg tablet Take 1 tablet (50 mg total) by mouth every 6 (six) hours as needed for Pain. 29 tablet 0    tretinoin (RETIN-A) 0.1 % cream Apply to affected area Topical Every day 45 g 11    valsartan (DIOVAN) 160 MG tablet Take 1 tablet (160 mg total) by mouth once daily. 90 tablet 3     No current facility-administered medications on file prior to visit.       Review of patient's allergies indicates:   Allergen Reactions    Adhesive tape-silicones Other (See Comments)     Other reaction(s): BLISTERS    Penicillins Hives and Itching    Sulfamethoxazole-trimethoprim Hives and Itching    Prosed ec [meth-hyos-atrp-m blue-ba-phsal] Hives, Itching and Swelling    Pyridium [phenazopyridine]     Cephalexin Nausea And Vomiting and Other (See Comments)     Dehydration       Past Surgical History:   Procedure Laterality Date    Abdominoplasty with Liposcuction      APPENDECTOMY      CERVICAL BIOPSY  W/ LOOP ELECTRODE EXCISION      Prior to hysterectomy    CHOLECYSTECTOMY      Laparoscopic    COLONOSCOPY N/A 2/13/2020    Procedure: COLONOSCOPY;  Surgeon: Pb Smith MD;  Location: [address]);  Service: Endoscopy;  Laterality: N/A;    EPIDURAL STEROID INJECTION INTO LUMBAR SPINE Bilateral 3/15/2024    Procedure: B/L L5-S1 TFESI;  Surgeon: Glendy Banks MD;  Location: [facility];  Service: Pain Management;  Laterality: Bilateral;  20 mins    ESOPHAGOGASTRODUODENOSCOPY N/A 11/2/2021    Procedure: EGD (ESOPHAGOGASTRODUODENOSCOPY);  Surgeon: Clayton Martínez MD;  [facility]  Service: Endoscopy;  Laterality: N/A;  fully vac. 3/15/21 / instr portal -ml    GASTRIC BYPASS      HERNIA REPAIR      HYSTERECTOMY  1980     TVH  both ovaries remain "       Family History   Problem Relation Name Age of Onset    Heart disease Mother      Stroke Mother      Coronary artery disease Mother      Heart failure Mother      Obesity Mother      Cancer Father          pancreatic cancer    Diabetes Father      Obesity Father      No Known Problems Sister      No Known Problems Brother      Hearing loss Maternal Grandmother      No Known Problems Maternal Grandfather      No Known Problems Paternal Grandmother      No Known Problems Paternal Grandfather      Breast cancer Maternal Aunt      Colon cancer Maternal Uncle      No Known Problems Paternal Aunt      No Known Problems Paternal Uncle      Ovarian cancer Neg Hx      Anesthesia problems Neg Hx      Amblyopia Neg Hx      Blindness Neg Hx      Cataracts Neg Hx      Glaucoma Neg Hx      Hypertension Neg Hx      Macular degeneration Neg Hx      Retinal detachment Neg Hx      Strabismus Neg Hx      Thyroid disease Neg Hx         Social History     Socioeconomic History    Marital status:    Occupational History    Occupation:    Tobacco Use    Smoking status: Never    Smokeless tobacco: Never    Tobacco comments:     .  .   Occup:  .     Substance and Sexual Activity    Alcohol use: No    Drug use: No    Sexual activity: Yes     Partners: Male     Birth control/protection: Surgical   Social History Narrative    Patient is     She and her  own business together.    [place] [employer] [place]    Her son is also involved in the business        Walks the "" for physical activity.         Feels safe in her home and with spouse.      Social Drivers of Health     Financial Resource Strain: Patient Declined (2023)    Overall Financial Resource Strain (CARDIA)     Difficulty of Paying Living Expenses: Patient declined   Food Insecurity: Patient Declined (2023)    Hunger Vital Sign     Worried About Running Out of Food in the Last Year: Patient  "declined     Ran Out of Food in the Last Year: Patient declined   Transportation Needs: Patient Declined (12/18/2023)    PRAPARE - Transportation     Lack of Transportation (Medical): Patient declined     Lack of Transportation (Non-Medical): Patient declined   Physical Activity: Sufficiently Active (12/18/2023)    Exercise Vital Sign     Days of Exercise per Week: 5 days     Minutes of Exercise per Session: 40 min   Stress: Stress Concern Present (12/18/2023)    [employer] - Occupational Stress Questionnaire     Feeling of Stress : Very much   Housing Stability: Patient Declined (12/18/2023)    Housing Stability Vital Sign     Unable to Pay for Housing in the Last Year: Patient declined     Number of Places Lived in the Last Year: 1     Unstable Housing in the Last Year: Patient declined       Review of Systems   Constitutional: Negative for chills and fever.   Cardiovascular:  Negative for claudication and leg swelling.   Respiratory:  Negative for cough and shortness of breath.    Skin:  Negative for itching and rash.   Musculoskeletal:  Positive for back pain, joint pain, myalgias and stiffness. Negative for falls, joint swelling and muscle weakness.   Gastrointestinal:  Negative for diarrhea, nausea and vomiting.   Neurological:  Positive for numbness and paresthesias. Negative for tremors and weakness.   Psychiatric/Behavioral:  Negative for altered mental status and hallucinations.            Objective:      Vitals:    01/13/25 0855   BP: 117/76   Pulse: 79   Height: 5' 2" (1.575 m)   PainSc:   7   PainLoc: Foot         Physical Exam  Nursing note reviewed.   Constitutional:       General: She is not in acute distress.     Appearance: She is not toxic-appearing or diaphoretic.   Cardiovascular:      Pulses:           Dorsalis pedis pulses are 2+ on the right side and 2+ on the left side.        Posterior tibial pulses are 2+ on the right side and 2+ on the left side.   Pulmonary: "      Effort: No respiratory distress.   Musculoskeletal:      Right ankle: No swelling. No tenderness. No lateral malleolus, medial malleolus, AITF ligament, CF ligament or posterior TF ligament tenderness. Decreased range of motion.      Right Achilles Tendon: No defects. Walker's test negative.      Left ankle: Swelling present. Tenderness (Medial and lateral ankle ligaments as well as to the anterior ankle joint) present over the ATF ligament, AITF ligament and CF ligament. No lateral malleolus, medial malleolus, posterior TF ligament, base of 5th metatarsal or proximal fibula tenderness. Decreased range of motion.      Left Achilles Tendon: No defects. Walker's test negative.      Right foot: No bony tenderness.      Left foot: No tenderness (1st MTPJ) or bony tenderness.      Comments: There is equinus deformity bilateral with decreased dorsiflexion at the ankle joint bilateral.    Skin:     General: Skin is warm and dry.      Coloration: Skin is not pale.      Findings: No bruising, burn, laceration, lesion or rash.      Nails: There is no clubbing.   Neurological:      Sensory: No sensory deficit.      Motor: No tremor, atrophy or abnormal muscle tone.      Deep Tendon Reflexes: Reflexes are normal and symmetric.      Comments: Paresthesias, and hyperesthesia left foot with no clearly identified trigger or source.      Psychiatric:         Attention and Perception: She is attentive.         Mood and Affect: Mood is not anxious. Affect is not inappropriate.         Speech: She is communicative. Speech is not slurred.         Behavior: Behavior is not combative.         EXAMINATION:  XR ANKLE COMPLETE 3 VIEW LEFT     CLINICAL HISTORY:  Pain in left ankle and joints of left foot     TECHNIQUE:  AP, lateral and oblique views of the left ankle were performed.     COMPARISON:  None     FINDINGS:  No bone, joint, or soft tissue abnormality is seen.     Impression:     Unremarkable examination.         Electronically signed by:Luan Dickens  Date:                                            03/27/2024  Time:                                           09:01        Assessment:       Encounter Diagnoses   Name Primary?    Chronic pain of left ankle Yes    Inversion sprain of ankle, left, initial encounter     Arthrosis of ankle, left            Plan:     Problem List Items Addressed This Visit    None  Visit Diagnoses       Chronic pain of left ankle    -  Primary    Relevant Orders    MRI Hindfoot WO Contrast LT    Ambulatory Referral/Consult to Physical Therapy    Inversion sprain of ankle, left, initial encounter        Relevant Orders    MRI [place] WO Contrast LT    Ambulatory Referral/Consult to Physical Therapy    Arthrosis of ankle, left        Relevant Orders    MRI [place]foot WO Contrast LT    Ambulatory Referral/Consult to Physical Therapy               I counseled the patient on her conditions, their implications and medical management.      Patient has diffuse left foot and ankle pain, likely compensatory in nature following continued excess weightbearing on initial injury.     Discussed ankle sprains and importance of immobilization for healing as well as the lengthy course of treatment for complete resolution.  Long discussion with patient on her options with pain despite being immobilized for several weeks.  One is a surgical consult to discuss fusion or ligamentous repair to the ankle versus initiation of physical therapy to see if increasing range of motion will help with the pain since she states that her pain is improved with motion.  Patient would like to try to treat actively with physical therapy.  Referral placed to PT to aid in increasing ROM ad breaking up of scar tissue.     Continue in CAM Boot for immobilization until she is able to acquire recommended shoe gear  Patient instructed to rest affected limb, avoid excessive WB and use crutch or walker assistance if needed.  Instructions on  elevation to reduce pain and swelling. When sleeping, place a pillow under the injured leg. When sitting, support the injured leg so it is level with your waist.   Patient instructed on adequate icing techniques. Patient should ice the affected area at least once per day x 10 minutes for 10 days . I advised the  patient that extra icing would also be beneficial to ensure adequate anti inflammatory effect   Patient would like injection today. Counseled the patient on the potential complications of local injection of corticosteroid including, but not limited to:  Discoloration of skin, erosion of soft tissue, and increased likelihood of rupture of a soft tissue structure (ie. Plantar fascia, muscle, tendon, ligament, or capsule in the area of injection).  Patient indicates understanding of my explanation, that any questions have been answered to patient satisfaction, and patient gives verbal consent for injection of affected area. Skin was prepped with alcohol and anesthetized with ethyl chloride.  The following mixture was injected into left ankle, anterior medial approach : 3ccs of mixture of (1cc 2% plain Lidocaine : 1cc 0.5% Marcaine plain:  0.5cc kenalog-40 : 0.5cc dexamethasone) directly into problematic areas.  Patient  tolerated the injection well. Related nearly 100% relief following injection.   MRI ordered to be obtained in 6 weeks      RTC in  7-10 weeks if no improvement with 1 of my colleagues who can provide a surgical consult if needed. Patient is amenable to plan.

## 2025-01-28 ENCOUNTER — OFFICE VISIT (OUTPATIENT)
Dept: VASCULAR SURGERY | Facility: CLINIC | Age: 65
End: 2025-01-28
Payer: MEDICARE

## 2025-01-28 VITALS
HEIGHT: 62 IN | HEART RATE: 77 BPM | DIASTOLIC BLOOD PRESSURE: 75 MMHG | BODY MASS INDEX: 29.08 KG/M2 | SYSTOLIC BLOOD PRESSURE: 113 MMHG | WEIGHT: 158.06 LBS

## 2025-01-28 DIAGNOSIS — M79.605 LEFT LEG PAIN: ICD-10-CM

## 2025-01-28 DIAGNOSIS — G62.9 NEUROPATHY: Primary | ICD-10-CM

## 2025-01-28 DIAGNOSIS — M79.605 LEFT LEG PAIN: Primary | ICD-10-CM

## 2025-01-28 DIAGNOSIS — I87.2 VENOUS INSUFFICIENCY: ICD-10-CM

## 2025-01-28 PROCEDURE — 3074F SYST BP LT 130 MM HG: CPT | Mod: CPTII,S$GLB,, | Performed by: SURGERY

## 2025-01-28 PROCEDURE — 1101F PT FALLS ASSESS-DOCD LE1/YR: CPT | Mod: CPTII,S$GLB,, | Performed by: SURGERY

## 2025-01-28 PROCEDURE — 99204 OFFICE O/P NEW MOD 45 MIN: CPT | Mod: S$GLB,,, | Performed by: SURGERY

## 2025-01-28 PROCEDURE — 3078F DIAST BP <80 MM HG: CPT | Mod: CPTII,S$GLB,, | Performed by: SURGERY

## 2025-01-28 PROCEDURE — 99999 PR PBB SHADOW E&M-EST. PATIENT-LVL III: CPT | Mod: PBBFAC,,, | Performed by: SURGERY

## 2025-01-28 PROCEDURE — 3288F FALL RISK ASSESSMENT DOCD: CPT | Mod: CPTII,S$GLB,, | Performed by: SURGERY

## 2025-01-28 PROCEDURE — 3008F BODY MASS INDEX DOCD: CPT | Mod: CPTII,S$GLB,, | Performed by: SURGERY

## 2025-01-28 PROCEDURE — 1159F MED LIST DOCD IN RCRD: CPT | Mod: CPTII,S$GLB,, | Performed by: SURGERY

## 2025-01-28 PROCEDURE — 1125F AMNT PAIN NOTED PAIN PRSNT: CPT | Mod: CPTII,S$GLB,, | Performed by: SURGERY

## 2025-01-28 NOTE — PROGRESS NOTES
HPI:  Patient is a 56 y.o. female with HTN, s/p gastric bypass, GERD, who is here today for f/u - I initially met her for persistent leg discomfort secondary to significant superficial venous insufficiency - R > L with some tingling, that progresses to crampy calf pain  R ankle edema; now had L edema at ankle and discomfort  No claudication symptoms     Has done compression thigh high 30-40 mm pressure but pain and edema R > L     s/p 8/17/16 R GSV EVLT (47 cm): Doing well      No MI  ?'mini-stroke' ~ 2008 / 2009 (due to HTN ---  reports she was ~ 180/110)     Tobacco use: none  History of DVT/PE: none     Works in office      PMD is Dr SANDOR Caruso     Pain interfers with daily activities; she has attempted compression stockings, elevation and analgesics with minimal relief and pain persists.      1/2025: c/o burning/shooting pain from toes up lateral lower left leg.  No claudication or significant edema.           Past Medical History   Diagnosis Date    Abnormal Pap smear      Anemia         history    Anxiety      Cancer         uterine-cancer cells     Dry eyes      Gastric bypass status for obesity 8/21/2012    GERD (gastroesophageal reflux disease)      Hematuria      HTN (hypertension) 8/21/2012       120s-130s/80s    Kidney stone      Renal calculi      Sinus infection         history    Sleep apnea         patient does not use the C-PAP mask-cannot tolerate    Sleep disturbance      Urinary incontinence      Urinary retention      UTI (lower urinary tract infection)         on antibx at present-4/4/14    Vertigo         mild             Past Surgical History   Procedure Laterality Date    Appendectomy        Abdominoplasty with liposcuction        Cholecystectomy           Laparoscopic    Hysterectomy   1980        TVH  both ovaries remain    Cervical biopsy  w/ loop electrode excision           Prior to hysterectomy    Gastric bypass        Hernia repair                Family History   Problem  Relation Age of Onset    Heart disease Mother      Stroke Mother      Coronary artery disease Mother      Heart failure Mother      Diabetes Father      Breast cancer Maternal Aunt      Colon cancer Maternal Uncle      Hearing loss Maternal Grandmother      No Known Problems Sister      No Known Problems Brother      No Known Problems Paternal Aunt      No Known Problems Paternal Uncle      No Known Problems Maternal Grandfather      No Known Problems Paternal Grandmother      No Known Problems Paternal Grandfather      Ovarian cancer Neg Hx      Anesthesia problems Neg Hx      Amblyopia Neg Hx      Blindness Neg Hx      Cancer Neg Hx      Cataracts Neg Hx      Glaucoma Neg Hx      Hypertension Neg Hx      Macular degeneration Neg Hx      Retinal detachment Neg Hx      Strabismus Neg Hx      Thyroid disease Neg Hx        Social History            Social History    Marital status:        Spouse name: N/A    Number of children: N/A    Years of education: N/A          Occupational History    Not on file.             Social History Main Topics    Smoking status: Never Smoker    Smokeless tobacco: Never Used         Comment: .  .   Occup:  .      Alcohol use No    Drug use: No    Sexual activity: Yes       Partners: Male       Birth control/ protection: Surgical           Other Topics Concern    Not on file      Social History Narrative           Current Outpatient Prescriptions on File Prior to Visit   Medication Sig    alprazolam (XANAX) 0.5 MG tablet Take 1 tablet (0.5 mg total) by mouth every 8 (eight) hours as needed.    cholecalciferol, vitamin D3, 5,000 unit Tab Take by mouth. 1 Tablet Oral Every day    cyanocobalamin 1,000 mcg/mL injection INJECT 1 ML (1,000 MCG TOTAL) INTO THE MUSCLE EVERY 14 (FOURTEEN) DAYS.    estradiol (ESTRACE) 0.5 MG tablet Take 1 tablet (0.5 mg total) by mouth once daily.    sertraline (ZOLOFT) 25 MG tablet Take 1 tablet (25 mg total) by mouth every morning.  "   syringe with needle, disp, (BD LUER-CLARIBEL SYRINGE) 3 mL 25 x 1 1/2 " Syrg Use as directed with Cyanocobalamin    tretinoin (RETIN-A) 0.1 % cream 1 Cream Topical Every day    cycloSPORINE (RESTASIS) 0.05 % ophthalmic emulsion Place 0.05 mLs (1 drop total) into both eyes 2 (two) times daily.    [DISCONTINUED] losartan (COZAAR) 100 MG tablet Take 1 tablet (100 mg total) by mouth once daily.          Current Facility-Administered Medications on File Prior to Visit   Medication    lidocaine-EPINEPHrine 1%-1:100,000 30 mL, lidocaine HCL 10 mg/ml (1%) 20 mL, sodium bicarbonate 1 mEq/mL (8.4 %) 10 mL in sodium chloride 0.9% 500 mL solution         REVIEW OF SYSTEMS:  General: negative; ENT: negative; Allergy and Immunology: negative; Hematological and Lymphatic: Negative; Endocrine: negative; Respiratory: no cough, shortness of breath, or wheezing; Cardiovascular: no chest pain or dyspnea on exertion; Gastrointestinal: no abdominal pain/back, change in bowel habits, or bloody stools; Genito-Urinary: no dysuria, trouble voiding, or hematuria; Musculoskeletal: Leg discomfort secondary to chronic venous insufficiency; Neurological: no TIA or stroke symptoms     PHYSICAL EXAM:   Right Arm BP - Sitting: 160/81 (16 1511)  Left Arm BP - Sittin/83 (16 1511)  Pulse: 70  Temp: 98 °F (36.7 °C)    General appearance:      Alert, well-appearing, and in no distress.  Oriented to person, place, and time                 Neurological:      Normal speech, no focal findings noted; CN II - XII grossly intact           Musculoskeletal:      Digits/nail without cyanosis/clubbing.  Normal muscle strength/tone.                               Neck:     Supple, no significant adenopathy; thyroid is not enlarged                                               Carotid bruits cannot be auscultated                              Chest:     Clear to auscultation, no wheezes, rales or rhonchi, symmetric air entry                              "                  No use of accessory muscles                           Cardiac:     Normal rate and regular rhythm, S1 and S2 normal; PMI non-displaced                        Abdomen:     Soft, nontender, nondistended, no masses or organomegaly                                               No rebound tenderness noted; bowel sounds normal                                               Pulsatile aortic mass is not palpable.                    Extremities:      2+ femoral pulses bilaterally                                               2+ Popliteal pulses; 2+ pedal pulses palpable.                                               No pedal edema                                               1+ edema                                               Hyperpigmentation: R > L foot.     LAB RESULTS:        Lab Results   Component Value Date     K 4.7 03/18/2015     K 3.8 08/29/2014     K 3.8 06/15/2014     CREATININE 0.8 03/18/2015     CREATININE 0.8 08/29/2014     CREATININE 0.9 06/15/2014            Lab Results   Component Value Date     WBC 7.22 03/18/2015     WBC 6.51 08/29/2014     WBC 11.09 06/15/2014     HCT 42.3 03/18/2015     HCT 39.0 08/29/2014     HCT 45.1 06/15/2014      03/18/2015      08/29/2014      06/15/2014            Lab Results   Component Value Date     HGBA1C 5.5 08/29/2014      IMAGING:  U/S: R GSV is closed  No DVT     Previous:  Venous U/S:  R GSV:7.6 mm  L GSV: 7.1 mm    R, L SSV: no relfux  No DVT     IMP/PLAN:  56 y.o. female with Chronic venous insufficiency - CEAP C4a  Has undergone trial of thigh-high 30-40 mm Hg compression  Doing well s/p 8/17/16 R GSV EVLT (47 cm)  LLE neurogenic pain    -venous insuff US  -GEOVANNI  -Neuro referral for neuropathy    I spent 11 minutes evaluating this patient and greater than 50% of the time was spent counseling, coordinator care and discussing the plan of care.  All questions were answered and patient stated understanding with agreement with the  above treatment plan.    Murali Stone M.D., R.P.VJONATHAN [facility]

## 2025-01-31 DIAGNOSIS — H10.13 ALLERGIC CONJUNCTIVITIS OF BOTH EYES: ICD-10-CM

## 2025-02-02 NOTE — TELEPHONE ENCOUNTER
Refill Routing Note   Medication(s) are not appropriate for processing by [facility] for the following reason(s):        Outside of protocol    [facility] action(s):  Route             Appointments  past 12m or future 3m with PCP    Date Provider   Last Visit   12/6/2024 Luan Cueva MD   Next Visit   Visit date not found Luan Cueva MD   ED visits in past 90 days: 0        Note composed:3:23 PM 02/02/2025

## 2025-02-03 RX ORDER — OLOPATADINE HYDROCHLORIDE 1 MG/ML
1 SOLUTION/ DROPS OPHTHALMIC 2 TIMES DAILY
Qty: 5 ML | Refills: 6 | Status: SHIPPED | OUTPATIENT
Start: 2025-02-03

## 2025-02-14 ENCOUNTER — TELEPHONE (OUTPATIENT)
Dept: PODIATRY | Facility: CLINIC | Age: 65
End: 2025-02-14
Payer: MEDICARE

## 2025-02-14 NOTE — TELEPHONE ENCOUNTER
Patient has verbally confirmed new rescheduled appointment date and time w/Dr. Arias(Podiatry) due to provider being out of the office(vacation)

## 2025-02-21 ENCOUNTER — CLINICAL SUPPORT (OUTPATIENT)
Dept: REHABILITATION | Facility: HOSPITAL | Age: 65
End: 2025-02-21
Attending: PODIATRIST
Payer: MEDICARE

## 2025-02-21 DIAGNOSIS — G89.29 CHRONIC PAIN OF LEFT ANKLE: Primary | ICD-10-CM

## 2025-02-21 DIAGNOSIS — M25.672 IMPAIRED RANGE OF MOTION OF LEFT ANKLE: ICD-10-CM

## 2025-02-21 DIAGNOSIS — S93.402A INVERSION SPRAIN OF ANKLE, LEFT, INITIAL ENCOUNTER: ICD-10-CM

## 2025-02-21 DIAGNOSIS — R29.898 ANKLE WEAKNESS: ICD-10-CM

## 2025-02-21 DIAGNOSIS — M19.072 ARTHROSIS OF ANKLE, LEFT: ICD-10-CM

## 2025-02-21 DIAGNOSIS — M25.572 CHRONIC PAIN OF LEFT ANKLE: Primary | ICD-10-CM

## 2025-02-21 PROCEDURE — 97140 MANUAL THERAPY 1/> REGIONS: CPT | Mod: PN

## 2025-02-21 PROCEDURE — 97161 PT EVAL LOW COMPLEX 20 MIN: CPT | Mod: PN

## 2025-02-21 PROCEDURE — 97110 THERAPEUTIC EXERCISES: CPT | Mod: PN

## 2025-02-21 NOTE — PROGRESS NOTES
[facility]  Physical Therapy Initial Evaluation    Name: Cherie Richards  Clinic Number: [id number]    Therapy Diagnosis:   Encounter Diagnoses   Name Primary?    Chronic pain of left ankle Yes    Inversion sprain of ankle, left, initial encounter     Arthrosis of ankle, left     Ankle weakness     Impaired range of motion of left ankle      Physician: Maria Elena Kidd DPM    Physician Orders: PT Eval and Treat   Medical Diagnosis from Referral:   Diagnosis   M25.572,G89.29 (ICD-10-CM) - Chronic pain of left ankle   S93.402A (ICD-10-CM) - Inversion sprain of ankle, left, initial encounter   M19.072 (ICD-10-CM) - Arthrosis of ankle, left     Evaluation Date: 2/21/2025  Plan of Care Expiration: 5/31/25    Authorization Period Expiration: 12/31/25  Visit # / Visits authorized: 1/ 1      Time In: 1000  Time Out: 1100    Total Billable Time: 60 minutes    Precautions: Standard    Subjective   Date of onset: 3 months    History of current condition:   Cherie  is a 64 year old female presenting with c/o left ankle pain. She states her pain is in the front of the ankle. She states the ankle pain refers up through her knee and upper leg.  She reports an insidious onset 3 months ago.  She states she was in a boot for 10 weeks and states it had helped. She states she was dc'd from boot 2 weeks ago.  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 The patient denies a history of falls or use of an assistive device.  Her goal is to back to normal. She states she does not have a scheduled follow up with Dr. Kidd.      Medical History:   Past Medical History:   Diagnosis Date    Abnormal Pap smear     Anemia     history    Anxiety     Cancer     uterine-cancer cells     Colon polyps, next C-scope 2019. 4/22/2014    Dry eyes     Dyspareunia, female 6/24/2020    Essential hypertension, started in her mid 30's 1/18/2017    Family history of malignant neoplasm of pancreas  6/1/2018    Gastric bypass status for obesity 8/21/2012    GERD (gastroesophageal reflux disease)     Helicobacter pylori gastritis, 2008. 8/20/2014    Hemangioma of liver, stable 2010, 2012, right lobe 8/20/2014    Hematuria     History of cosmetic surgeries, tummy tuck 2011. 4/5/2013    History of frquent UTI 6/24/2020    HTN (hypertension) 8/21/2012    120s-130s/80s    Kidney stone     Right sided sciatica 6/1/2018    Sleep apnea     patient does not use the C-PAP mask-cannot tolerate    Ureteral stone 4/8/2014    Urinary incontinence     Urinary retention     Varicose veins of lower extremity with inflammation 4/8/2015    Vertigo     mild    Vitamin D deficiency disease 6/1/2018       Surgical History:   Cherie Richards  has a past surgical history that includes Appendectomy; Abdominoplasty with Liposcuction; Hysterectomy (1980); Cervical biopsy w/ loop electrode excision; Gastric bypass; Hernia repair; Colonoscopy (N/A, 2/13/2020); Esophagogastroduodenoscopy (N/A, 11/2/2021); Cholecystectomy; and Epidural steroid injection into lumbar spine (Bilateral, 3/15/2024).    Medications:   Cherie has a current medication list which includes the following prescription(s): acetaminophen, albuterol, alprazolam, amlodipine, baclofen, bupropion, cholecalciferol (vitamin d3), cyanocobalamin, cyclobenzaprine, cyclosporine, d-mannose, diclofenac sodium, duloxetine, fluconazole, gabapentin, methylprednisolone, metoprolol succinate, metronidazole, nitrofurantoin (macrocrystal-monohydrate), olopatadine, ondansetron, pantoprazole, prednisone, promethazine, syringe with needle, tirzepatide, tramadol, tretinoin, and valsartan.    Allergies:   Review of patient's allergies indicates:   Allergen Reactions    Adhesive tape-silicones Other (See Comments)     Other reaction(s): BLISTERS    Penicillins Hives and Itching    Sulfamethoxazole-trimethoprim Hives and Itching    Prosed ec [meth-hyos-atrp-m blue-ba-phsal] Hives, Itching and  Swelling    Pyridium [phenazopyridine]     Cephalexin Nausea And Vomiting and Other (See Comments)     Dehydration        Imaging: negative. MRI scheduled for 3/1    Prior Therapy: back  Social History:  lives with spouse  Occupation: works from home  Prior Level of Function: independent  Current Level of Function: independent    Pain:  Current 3/10, worst 5/10, best 2/10   Location: left anterior ankle     Aggravating Factors: prolonged walking, squatting, housework  Easing Factors: rest    Objective     Gait: pt presents ambulating with a slight antalgic gait, decreased stance on left LE.   Observation: no sign of swelling  Palpation: mild tenderness anterior tib    Range of Motion/Strength:      Ankle  Right  LEFT     AROM MMT AROM MMT   Dorsiflexion 12 4 10 4-   Plantar Flexion 45 4 40 4-   Inversion 45 4 45 4-   Eversion 10 4 10 4-     AROM: Bilateral LE: Grossly WFL  MMT:  Right LE: 4   Left LE: 4-    Bed Mobility:Independent  Transfers: Independent    FOTO: in media    TREATMENT     Treatment Time In: 1030  Treatment Time Out: 1100    Total Treatment time separate from Evaluation: 30 minutes    Cherie received therapeutic exercises to develop strength, endurance, ROM, flexibility, posture and core stabilization for 15 minutes includin way ankle RTB  Gastroc stretch vs wall 30 sec x 4  Towel stretch 30 sec x 4    Cherie received the following manual therapy techniques:  were applied to the: left ankle for 8 minutes, including:  PROM, plantar flexion stretching    Education provided:   - HEP compliance    Written Home Exercises Provided: yes.    Exercises were reviewed and Cherie was able to demonstrate them prior to the end of the session.  Cherie demonstrated good  understanding of the education provided.     See EMR under Patient Instructions for exercises provided 2025.    Assessment     Pt presents with signs and symptoms consistent with referring diagnosis. Evaluation has determined a  decrease in functional status and subjective and objective deficits that can be addressed by physical therapy intervention. Pt demonstrates pain limiting functional activities. Decreased flexibility and strength limiting normal movement patterns. Decreased segmental motion. Decreased postural strength and awareness. Positive special testing. Decreased participation in functional and recreational activities. Subjective and objective measures are addressed by goals in the plan of care.  Patient/family are involved in the development of these goals. Patient/family are educated about current injury and treatment.       Plan of care was dicussed with patient. Pt will benefit from skilled outpatient Physical Therapy to address the deficits stated above and in the chart below, provide pt/family education, and to maximize pt's level of independence. Pt's spiritual, cultural and educational needs considered and patient is agreeable to the plan of care and goals as stated below:     Pt prognosis is Good.  Anticipated Barriers for therapy: none    Medical Necessity is demonstrated by the following  History  Co-morbidities and personal factors that may impact the plan of care Co-morbidities:   Abnormal Pap smear    Anemia    history   Anxiety    Cancer    uterine-cancer cells    Colon polyps, next C-scope 2019.    Dry eyes    Dyspareunia, female    Essential hypertension, started in her mid 30's    Family history of malignant neoplasm of pancreas    Gastric bypass status for obesity    GERD (gastroesophageal reflux disease)    Helicobacter pylori gastritis, 2008.    Hemangioma of liver, stable 2010, 2012, right lobe    Hematuria    History of cosmetic surgeries, tummy tuck 2011.    History of frquent UTI    HTN (hypertension)    120s-130s/80s   Kidney stone    Right sided sciatica    Sleep apnea    patient does not use the C-PAP mask-cannot tolerate   Ureteral stone    Urinary incontinence    Urinary retention    Varicose veins  of lower extremity with inflammation    Vertigo    mild   Vitamin D deficiency disease        Personal Factors:   no deficits     low   Examination  Body Structures and Functions, activity limitations and participation restrictions that may impact the plan of care Body Regions:   lower extremities    Body Systems:    gross symmetry  ROM  strength  balance  gait    Participation Restrictions:   work    Activity limitations:   Learning and applying knowledge  no deficits    General Tasks and Commands  no deficits    Communication  no deficits    Mobility  walking    Self care  no deficits    Domestic Life  shopping  cooking  doing house work (cleaning house, washing dishes, laundry)    Interactions/Relationships  no deficits    Life Areas  no deficits    Community and Social Life  no deficits         low   Clinical Presentation stable and uncomplicated low   Decision Making/ Complexity Score: low     Goals:    Short Term Goals (4 Weeks):     1.Pt to increase strength by a 1/2 grade of muscles test to allow for improvement in functional activities such as performing chores.  2.Pt to improve range of motion by 25% to allow for improved functional mobility to allow for improvement in IADLs.   3.Pt to report compliance with HEP and demonstrate proper exercise technique to PT to show competence with self management of condition.  4.Decrease pain by 25% during functional activities.    Long Term Goals (12 Weeks):     1. Increase ROM to allow improved joint biomechanics during functional activities.   2.Increase trunk and lower extremity strength to within normal limits during functional activities.   3. Independent with home exercise program.   4. Full return to functional activities with manageable complaints.  5. Patient to demonstrate improved posture and body mechanics.  6. Decrease pain by 75% during functional activities.    Plan     Plan of care Certification: 2/21/2025 to 5/21/25.    Recommended Treatment Plan: 2  times per week for 12 weeks with treatments to consist of:  Neuromuscular and postural re-education,  training, therapeutic exercise, therapeutic activities,balance training, gait training, manual therapy, soft tissue mobilization, ROM exercises, Cardiovascular,  Postural stabilization, manual traction, spinal mobilization, moist heat, cryotherapy, electrical stimulation, ultrasound, home exercise education and planning.    Momo Javed, PT

## 2025-02-24 ENCOUNTER — HOSPITAL ENCOUNTER (OUTPATIENT)
Dept: RADIOLOGY | Facility: HOSPITAL | Age: 65
Discharge: HOME | End: 2025-02-24
Attending: PODIATRIST
Payer: MEDICARE

## 2025-02-24 DIAGNOSIS — M19.072 ARTHROSIS OF ANKLE, LEFT: ICD-10-CM

## 2025-02-24 DIAGNOSIS — G89.29 CHRONIC PAIN OF LEFT ANKLE: ICD-10-CM

## 2025-02-24 DIAGNOSIS — S93.402A INVERSION SPRAIN OF ANKLE, LEFT, INITIAL ENCOUNTER: ICD-10-CM

## 2025-02-24 DIAGNOSIS — M25.572 CHRONIC PAIN OF LEFT ANKLE: ICD-10-CM

## 2025-02-24 PROCEDURE — 73718 MRI LOWER EXTREMITY W/O DYE: CPT | Mod: TC,LT

## 2025-02-24 PROCEDURE — 73718 MRI LOWER EXTREMITY W/O DYE: CPT | Mod: 26,LT,, | Performed by: RADIOLOGY

## 2025-02-26 ENCOUNTER — RESULTS FOLLOW-UP (OUTPATIENT)
Dept: PODIATRY | Facility: CLINIC | Age: 65
End: 2025-02-26

## 2025-03-04 DIAGNOSIS — M48.061 SPINAL STENOSIS OF LUMBAR REGION WITHOUT NEUROGENIC CLAUDICATION: ICD-10-CM

## 2025-03-04 RX ORDER — BACLOFEN 10 MG/1
10 TABLET ORAL 3 TIMES DAILY PRN
Qty: 90 TABLET | Refills: 11 | Status: CANCELLED | OUTPATIENT
Start: 2025-03-04 | End: 2026-03-04

## 2025-03-05 DIAGNOSIS — M48.061 SPINAL STENOSIS OF LUMBAR REGION WITHOUT NEUROGENIC CLAUDICATION: ICD-10-CM

## 2025-03-05 RX ORDER — BACLOFEN 10 MG/1
10 TABLET ORAL 3 TIMES DAILY PRN
Qty: 90 TABLET | Refills: 11 | Status: SHIPPED | OUTPATIENT
Start: 2025-03-05 | End: 2026-03-05

## 2025-03-14 ENCOUNTER — CLINICAL SUPPORT (OUTPATIENT)
Dept: REHABILITATION | Facility: HOSPITAL | Age: 65
End: 2025-03-14
Payer: MEDICARE

## 2025-03-14 DIAGNOSIS — M25.672 IMPAIRED RANGE OF MOTION OF LEFT ANKLE: ICD-10-CM

## 2025-03-14 DIAGNOSIS — R29.898 ANKLE WEAKNESS: Primary | ICD-10-CM

## 2025-03-14 PROCEDURE — 97140 MANUAL THERAPY 1/> REGIONS: CPT | Mod: PN

## 2025-03-14 PROCEDURE — 97110 THERAPEUTIC EXERCISES: CPT | Mod: PN

## 2025-03-14 NOTE — PROGRESS NOTES
Physical Therapy Daily Treatment Note     Name: Cherie Richards  Clinic Number: [id number]    Therapy Diagnosis:   Encounter Diagnoses   Name Primary?    Ankle weakness Yes    Impaired range of motion of left ankle      Physician: Maria Elena Kidd DPM    Visit Date: 3/14/2025    Physician Orders: PT Eval and Treat   Medical Diagnosis from Referral:   Diagnosis   M25.572,G89.29 (ICD-10-CM) - Chronic pain of left ankle   S93.402A (ICD-10-CM) - Inversion sprain of ankle, left, initial encounter   M19.072 (ICD-10-CM) - Arthrosis of ankle, left      Evaluation Date: 2/21/2025  Plan of Care Expiration: 5/31/25     Authorization Period Expiration: 12/31/25  Visit # / Visits authorized: 1/ 1       Time In: 1200  Time Out: 1255    Total Billable Time: 55 minutes    Precautions: Standard    Subjective     Pt reports: ankle is doing a little better.  She was compliant with home exercise program.  Response to previous treatment: good  Functional change: none    Pain: 4/10  Location: left ankle      Objective     Cherie received therapeutic exercises to develop strength, endurance, ROM, flexibility, posture and core stabilization for 45 minutes including:     Nu STep x 8 min  4 way ankle RTB  Gastroc stretch vs wall 30 sec x 4  Towel stretch 30 sec x 4  Plantar fascia stretch with towel 30 sec x 4  Towel curls x 30  Tennis ball roll x 30  Arch doming x 30  Gastroc stretch on wedge 30 sec x 4     Cherie received the following manual therapy techniques:  were applied to the: left ankle for 10 minutes, including:  PROM, plantar flexion stretching    Education provided:   - HEP compliance    Written Home Exercises Provided: Patient instructed to cont prior HEP.  Exercises were reviewed and Cherie was able to demonstrate them prior to the end of the session.  Cherie demonstrated good  understanding of the education provided.     See EMR under Patient Instructions for exercises provided 3/14/2025.    Assessment     Pt presents  ambulating with a slight antalgic gait, decreased stance on left LE. Progressed ankle mobility activities with good performance. No c/o increased discomfort with prescribed activities.  Good response to exercise progression.  Cherie is progressing well towards her goals.     Pt prognosis is Good.     Pt will continue to benefit from skilled outpatient physical therapy to address the deficits listed in the problem list box on initial evaluation, provide pt/family education and to maximize pt's level of independence in the home and community environment.     Pt's spiritual, cultural and educational needs considered and pt agreeable to plan of care and goals.     Anticipated barriers to physical therapy: none    Short Term Goals (4 Weeks):     1.Pt to increase strength by a 1/2 grade of muscles test to allow for improvement in functional activities such as performing chores.  2.Pt to improve range of motion by 25% to allow for improved functional mobility to allow for improvement in IADLs.   3.Pt to report compliance with HEP and demonstrate proper exercise technique to PT to show competence with self management of condition.  4.Decrease pain by 25% during functional activities.    Long Term Goals (12 Weeks):     1. Increase ROM to allow improved joint biomechanics during functional activities.   2.Increase trunk and lower extremity strength to within normal limits during functional activities.   3. Independent with home exercise program.   4. Full return to functional activities with manageable complaints.  5. Patient to demonstrate improved posture and body mechanics.  6. Decrease pain by 75% during functional activities.       Plan     Continue with established  PT Plan of Care towards patient goals.      Momo Javed, PT

## 2025-03-18 ENCOUNTER — CLINICAL SUPPORT (OUTPATIENT)
Dept: REHABILITATION | Facility: HOSPITAL | Age: 65
End: 2025-03-18
Payer: MEDICARE

## 2025-03-18 DIAGNOSIS — R29.898 ANKLE WEAKNESS: Primary | ICD-10-CM

## 2025-03-18 DIAGNOSIS — M25.672 IMPAIRED RANGE OF MOTION OF LEFT ANKLE: ICD-10-CM

## 2025-03-18 PROCEDURE — 97530 THERAPEUTIC ACTIVITIES: CPT | Mod: PN,CQ

## 2025-03-18 PROCEDURE — 97110 THERAPEUTIC EXERCISES: CPT | Mod: PN,CQ

## 2025-03-18 NOTE — PROGRESS NOTES
Physical Therapy Daily Treatment Note     Name: Cherie Richards  Clinic Number: [id number]    Therapy Diagnosis:   Encounter Diagnoses   Name Primary?    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  She was compliant with home exercise program.  Response to previous treatment: good  Functional change: none    Pain: 10  Location: left ankle      Objective     Cherie received therapeutic activities to improve functional performance for 14 minutes, including:  Nu STep x 8 min  +Recumbent bike 10 min level 2  +[place] pick ups 1 cup    Cherie received therapeutic exercises to develop strength, endurance, ROM, flexibility, posture and core stabilization for 31 minutes includin way ankle RTB 3x10  Gastroc stretch vs wall 30 sec x 4  Towel stretch 30 sec x 4  Plantar fascia stretch with towel 30 sec x 4  Towel curls x 30  Tennis ball roll x 30  Arch doming x 30  Gastroc stretch on wedge 30 sec x 4     Cherie received the following manual therapy techniques:  were applied to the: left ankle for 10 minutes, including:  PROM, plantar flexion stretching    Education provided:   - HEP compliance    Written Home Exercises Provided: Patient instructed to cont prior HEP.  Exercises were reviewed and Cherie was  able to demonstrate them prior to the end of the session.  Cherie demonstrated good  understanding of the education provided.     See EMR under Patient Instructions for exercises provided 3/18/2025.    Assessment     Pt presents ambulating with a slight antalgic gait, decreased stance on left LE, reporting pain to dorsal of Left ankle/foot. Progressed ankle mobility activities with good performance. Cont with the same exercises from last visit, she tolerated well. No c/o increased discomfort with prescribed activities.  Good response to exercise progression. Instructed her to cont to work on HEP, she verbalized understanding. Will cont to progress per patient tolerance. Cherie is progressing well towards her goals.     Pt prognosis is Good.     Pt will continue to benefit from skilled outpatient physical therapy to address the deficits listed in the problem list box on initial evaluation, provide pt/family education and to maximize pt's level of independence in the home and community environment.     Pt's spiritual, cultural and educational needs considered and pt agreeable to plan of care and goals.     Anticipated barriers to physical therapy: none    Short Term Goals (4 Weeks):     1.Pt to increase strength by a 1/2 grade of muscles test to allow for improvement in functional activities such as performing chores.  2.Pt to improve range of motion by 25% to allow for improved functional mobility to allow for improvement in IADLs.   3.Pt to report compliance with HEP and demonstrate proper exercise technique to PT to show competence with self management of condition.  4.Decrease pain by 25% during functional activities.    Long Term Goals (12 Weeks):     1. Increase ROM to allow improved joint biomechanics during functional activities.   2.Increase trunk and lower extremity strength to within normal limits during functional activities.   3. Independent with home exercise program.   4. Full return to functional  activities with manageable complaints.  5. Patient to demonstrate improved posture and body mechanics.  6. Decrease pain by 75% during functional activities.       Plan     Continue with established  PT Plan of Care towards patient goals.      Onesimo To, PTA    3/18/2025

## 2025-03-26 ENCOUNTER — TELEPHONE (OUTPATIENT)
Dept: INTERNAL MEDICINE | Facility: CLINIC | Age: 65
End: 2025-03-26
Payer: MEDICARE

## 2025-03-27 ENCOUNTER — CLINICAL SUPPORT (OUTPATIENT)
Dept: REHABILITATION | Facility: HOSPITAL | Age: 65
End: 2025-03-27
Payer: MEDICARE

## 2025-03-27 DIAGNOSIS — R29.898 ANKLE WEAKNESS: Primary | ICD-10-CM

## 2025-03-27 DIAGNOSIS — M25.672 IMPAIRED RANGE OF MOTION OF LEFT ANKLE: ICD-10-CM

## 2025-03-27 PROCEDURE — 97112 NEUROMUSCULAR REEDUCATION: CPT | Mod: PN,CQ

## 2025-03-27 PROCEDURE — 97530 THERAPEUTIC ACTIVITIES: CPT | Mod: PN,CQ

## 2025-03-27 NOTE — PROGRESS NOTES
"  Physical Therapy Daily Treatment Note     Name: Cherie Richards  Clinic Number: [id number]    Therapy Diagnosis:   Encounter Diagnoses   Name Primary?    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  She was compliant with home exercise program.  Response to previous treatment: good  Functional change: none    Pain: 6/10  Location: left ankle      Objective     Cherie received therapeutic activities to improve functional performance for 12 minutes, including:  Nu STep x 8 min  Recumbent bike 10 min level 2  +Step Ups 6" 2x10 each  [place] pick ups 1 cup    Cherie neuromuscular re-education activities to improve: Proprioception and muscles motor control  for 25 minutes. The following activities were included:   4 way ankle RTB 3x10 LLE  +Standing Black baps board ankle DF/PF 2 min  +Standing Black baps board ankle EV/IV 2 min  +Marching RTB around forefoot 2x10  +Seated round Baps board [place] CW/CCW 2 min each LLE  Arch doming x 30, 5# weight on lap KAYLA Crespo received therapeutic exercises to develop strength, endurance, ROM, flexibility, posture and core stabilization for 8 minutes including:   Standing Gastroc stretch on wedge 30 sec x 4  +Heel raise " 3x10    Patient perform at home:  Towel stretch 30 sec x 4  Plantar fascia stretch with towel 30 sec x 4  Towel curls x 30  Tennis ball roll x 30     Cherie received the following manual therapy techniques:  were applied to the: left ankle for 10 minutes, including:  PROM, plantar flexion stretching, A/P glides at distal tibiofibular joint    Education provided:   - HEP compliance    Written Home Exercises Provided: Patient instructed to cont prior HEP.  Exercises were reviewed and Cherie was able to demonstrate them prior to the end of the session.  Cherie demonstrated good  understanding of the education provided.     See EMR under Patient Instructions for exercises provided 3/27/2025.    Assessment     Pt presents ambulating with a slight antalgic gait, decreased stance on left LE, reporting pain to dorsal of Left ankle/foot. Progressed ankle mobility activities with good performance. Added standing stretching and strengthening exercises this visit to improve standing tolerance with activities, she tolerated well. No c/o increased discomfort with prescribed activities.  Good response to exercise progression. Instructed her to cont to work on HEP, she verbalized understanding. Will cont to progress per patient tolerance. Cherie is progressing well towards her goals.     Pt prognosis is Good.     Pt will continue to benefit from skilled outpatient physical therapy to address the deficits listed in the problem list box on initial evaluation, provide pt/family education and to maximize pt's level of independence in the home and community environment.     Pt's spiritual, cultural and educational needs considered and pt agreeable to plan of care and goals.     Anticipated barriers to physical therapy: none    Short Term Goals (4 Weeks):     1.Pt to increase strength by a 1/2 grade of muscles test to allow for improvement in functional activities such as performing chores.  2.Pt to improve range of motion by 25% to  allow for improved functional mobility to allow for improvement in IADLs.   3.Pt to report compliance with HEP and demonstrate proper exercise technique to PT to show competence with self management of condition.  4.Decrease pain by 25% during functional activities.    Long Term Goals (12 Weeks):     1. Increase ROM to allow improved joint biomechanics during functional activities.   2.Increase trunk and lower extremity strength to within normal limits during functional activities.   3. Independent with home exercise program.   4. Full return to functional activities with manageable complaints.  5. Patient to demonstrate improved posture and body mechanics.  6. Decrease pain by 75% during functional activities.       Plan     Continue with established  PT Plan of Care towards patient goals.      Onesimo To, PTA    3/27/2025

## 2025-03-28 ENCOUNTER — TELEPHONE (OUTPATIENT)
Dept: VASCULAR SURGERY | Facility: CLINIC | Age: 65
End: 2025-03-28
Payer: MEDICARE

## 2025-03-28 NOTE — TELEPHONE ENCOUNTER
----- Message from Herlinda sent at 3/28/2025  2:07 PM CDT -----  Regarding: self  Who Called: Shelly Left Message for Patient: Kimberly the patient know what this is regarding?: apptWould the patient rather a call back or a response via [facility]?  Call [facility] Call Back Number: [phone] Additional Information:Thank you.

## 2025-03-28 NOTE — TELEPHONE ENCOUNTER
Called and spoke with pt. Appt. With provider on 4/8/2025 rescheduled due to provider unavailable. Pt.keeping appt.for test as scheduled and can see her appts. on pt.portal.

## 2025-04-01 ENCOUNTER — OFFICE VISIT (OUTPATIENT)
Dept: PODIATRY | Facility: CLINIC | Age: 65
End: 2025-04-01
Payer: MEDICARE

## 2025-04-01 VITALS
HEART RATE: 70 BPM | HEIGHT: 62 IN | BODY MASS INDEX: 30.1 KG/M2 | WEIGHT: 163.56 LBS | DIASTOLIC BLOOD PRESSURE: 76 MMHG | SYSTOLIC BLOOD PRESSURE: 126 MMHG

## 2025-04-01 DIAGNOSIS — G89.29 CHRONIC PAIN OF BOTH FEET: Primary | ICD-10-CM

## 2025-04-01 DIAGNOSIS — M79.672 CHRONIC PAIN OF BOTH FEET: Primary | ICD-10-CM

## 2025-04-01 DIAGNOSIS — G60.9 IDIOPATHIC PERIPHERAL NEUROPATHY: ICD-10-CM

## 2025-04-01 DIAGNOSIS — M77.52 TENDINITIS OF LEFT FOOT: ICD-10-CM

## 2025-04-01 DIAGNOSIS — M79.671 CHRONIC PAIN OF BOTH FEET: Primary | ICD-10-CM

## 2025-04-01 DIAGNOSIS — G89.29 CENTRAL SENSITIZATION TO PAIN: ICD-10-CM

## 2025-04-01 PROCEDURE — 1125F AMNT PAIN NOTED PAIN PRSNT: CPT | Mod: CPTII,S$GLB,, | Performed by: STUDENT IN AN ORGANIZED HEALTH CARE EDUCATION/TRAINING PROGRAM

## 2025-04-01 PROCEDURE — 4010F ACE/ARB THERAPY RXD/TAKEN: CPT | Mod: CPTII,S$GLB,, | Performed by: STUDENT IN AN ORGANIZED HEALTH CARE EDUCATION/TRAINING PROGRAM

## 2025-04-01 PROCEDURE — 99214 OFFICE O/P EST MOD 30 MIN: CPT | Mod: S$GLB,,, | Performed by: STUDENT IN AN ORGANIZED HEALTH CARE EDUCATION/TRAINING PROGRAM

## 2025-04-01 PROCEDURE — 3078F DIAST BP <80 MM HG: CPT | Mod: CPTII,S$GLB,, | Performed by: STUDENT IN AN ORGANIZED HEALTH CARE EDUCATION/TRAINING PROGRAM

## 2025-04-01 PROCEDURE — 3008F BODY MASS INDEX DOCD: CPT | Mod: CPTII,S$GLB,, | Performed by: STUDENT IN AN ORGANIZED HEALTH CARE EDUCATION/TRAINING PROGRAM

## 2025-04-01 PROCEDURE — 99999 PR PBB SHADOW E&M-EST. PATIENT-LVL IV: CPT | Mod: PBBFAC,,, | Performed by: STUDENT IN AN ORGANIZED HEALTH CARE EDUCATION/TRAINING PROGRAM

## 2025-04-01 PROCEDURE — 1159F MED LIST DOCD IN RCRD: CPT | Mod: CPTII,S$GLB,, | Performed by: STUDENT IN AN ORGANIZED HEALTH CARE EDUCATION/TRAINING PROGRAM

## 2025-04-01 PROCEDURE — 3074F SYST BP LT 130 MM HG: CPT | Mod: CPTII,S$GLB,, | Performed by: STUDENT IN AN ORGANIZED HEALTH CARE EDUCATION/TRAINING PROGRAM

## 2025-04-01 RX ORDER — PREGABALIN 75 MG/1
75 CAPSULE ORAL 3 TIMES DAILY
Qty: 90 CAPSULE | Refills: 5 | Status: SHIPPED | OUTPATIENT
Start: 2025-04-01 | End: 2025-09-30

## 2025-04-01 RX ORDER — DULOXETIN HYDROCHLORIDE 60 MG/1
60 CAPSULE, DELAYED RELEASE ORAL 2 TIMES DAILY
Qty: 60 CAPSULE | Refills: 11 | Status: SHIPPED | OUTPATIENT
Start: 2025-04-01 | End: 2026-04-01

## 2025-04-01 NOTE — PROGRESS NOTES
Subjective:     Patient    Cherie Richards is a 64 y.o. female.    Problem    Presents for chronic bilateral foot pain, left ankle pain. Referred by Dr Kidd. MRI + for left FHL tenosynovitis at midfoot. Has attempted boot, PT, injections, other treatments without significant improvement. Has restlessness, burning, cramping in both feet and legs left worse than right. On duloxetine without issues, does help some with the pain. On gabapentin which may help some but she is unsure, does make her sleepy.      History    History obtained from patient and review of medical records.     Past Medical History:   Diagnosis Date    Abnormal Pap smear     Anemia     history    Anxiety     Cancer     uterine-cancer cells     Colon polyps, next C-scope 2019. 4/22/2014    Dry eyes     Dyspareunia, female 6/24/2020    Essential hypertension, started in her mid 30's 1/18/2017    Family history of malignant neoplasm of pancreas 6/1/2018    Gastric bypass status for obesity 8/21/2012    GERD (gastroesophageal reflux disease)     Helicobacter pylori gastritis, 2008. 8/20/2014    Hemangioma of liver, stable 2010, 2012, right lobe 8/20/2014    Hematuria     History of cosmetic surgeries, tummy tuck 2011. 4/5/2013    History of frquent UTI 6/24/2020    HTN (hypertension) 8/21/2012    120s-130s/80s    Kidney stone     Right sided sciatica 6/1/2018    Sleep apnea     patient does not use the C-PAP mask-cannot tolerate    Ureteral stone 4/8/2014    Urinary incontinence     Urinary retention     Varicose veins of lower extremity with inflammation 4/8/2015    Vertigo     mild    Vitamin D deficiency disease 6/1/2018       Past Surgical History:   Procedure Laterality Date    Abdominoplasty with Liposcuction      APPENDECTOMY      CERVICAL BIOPSY  W/ LOOP ELECTRODE EXCISION      Prior to hysterectomy    CHOLECYSTECTOMY      Laparoscopic    COLONOSCOPY N/A 2/13/2020    Procedure: COLONOSCOPY;  Surgeon: Pb Smith MD;  Location:  [facility] ENDO (4TH FLR);  Service: Endoscopy;  Laterality: N/A;    EPIDURAL STEROID INJECTION INTO LUMBAR SPINE Bilateral 3/15/2024    Procedure: B/L L5-S1 TFESI;  Surgeon: Glendy Banks MD;  Location: [facility]IN [facility];  Service: Pain Management;  Laterality: Bilateral;  20 mins    ESOPHAGOGASTRODUODENOSCOPY N/A 2021    Procedure: EGD (ESOPHAGOGASTRODUODENOSCOPY);  Surgeon: Clayton Martínez MD;  [facility]  Service: Endoscopy;  Laterality: N/A;  fully vac. 3/15/21 / instr portal -ml    GASTRIC BYPASS      HERNIA REPAIR      HYSTERECTOMY       TVH  both ovaries remain        Objective:     Vitals  Wt Readings from Last 1 Encounters:   25 74.2 kg (163 lb 9.3 oz)     Temp Readings from Last 1 Encounters:   24 98.6 °F (37 °C) (Oral)     BP Readings from Last 1 Encounters:   25 126/76     Pulse Readings from Last 1 Encounters:   25 70       Dermatological Exam    Skin:  Pedal hair growth diminished and Pedal skin thin and shiny on right  Pedal hair growth diminished and Pedal skin thin and shiny on left    Nails:  All nails normal in length, thickness, color    Vascular Exam    Arteries:  Posterior tibial artery palpable on right  Dorsalis pedis artery palpable on right  Posterior tibial artery palpable on left  Dorsalis pedis artery palpable on left    Veins:  Superficial veins unremarkable on right  Superficial veins unremarkable on left    Swellin+ nonpitting on right  1+ nonpitting on left    Neurological Exam    Hardy touch test:  6/6 sites sensed, light touch intact    Provocation Sign:  + at all major ankle and foot nerves bilaterally left worse than right     Musculoskeletal Exam    Footwear:  Casual on right  Casual on left    Gait Exam:   Ambulatory Status: Ambulatory  Gait: Antalgic  Assistive Devices: None    Foot Progression Angle:  Normal on right  Normal on left    Right Lower Extremity Additional Findings:  Right foot and ankle function,  strength, and range of motion unremarkable except as noted above.     Left Lower Extremity Additional Findings:  Left foot and ankle function, strength, and range of motion unremarkable except as noted above.    Imaging and Other Tests    Imaging:  Independently reviewed and interpreted imaging, findings are as follows:     02/24/25 left hindfoot MRI: FHL tenosynovitis at midfoot level     Assessment:     Encounter Diagnoses   Name Primary?    Chronic pain of both feet Yes    Central sensitization to pain     Idiopathic peripheral neuropathy     Tendinitis of left foot         Plan:     I counseled the patient on her conditions, their implications and medical management.    Central sensitization, neuropathy, bilateral foot pain: chronic exacerbated  -Increase duloxetine to 120 mg/day.   -Stop gabapentin. Start pregabalin, self titrate 1-3 pills/day.     Left foot tendinopathy: chronic stable  -MRI reviewed and interpreted as above.   -Suspect primarily nervous pain, will manage MSK pain after managing nerves.     Return to clinic in 1-2 months, call sooner PRN.

## 2025-04-02 ENCOUNTER — PATIENT MESSAGE (OUTPATIENT)
Dept: FAMILY MEDICINE | Facility: CLINIC | Age: 65
End: 2025-04-02
Payer: MEDICARE

## 2025-04-02 ENCOUNTER — OFFICE VISIT (OUTPATIENT)
Dept: UROGYNECOLOGY | Facility: CLINIC | Age: 65
End: 2025-04-02
Payer: MEDICARE

## 2025-04-02 VITALS
HEART RATE: 72 BPM | SYSTOLIC BLOOD PRESSURE: 143 MMHG | DIASTOLIC BLOOD PRESSURE: 69 MMHG | WEIGHT: 164.88 LBS | BODY MASS INDEX: 30.34 KG/M2 | HEIGHT: 62 IN

## 2025-04-02 DIAGNOSIS — I10 ESSENTIAL HYPERTENSION: ICD-10-CM

## 2025-04-02 DIAGNOSIS — R73.9 HYPERGLYCEMIA: ICD-10-CM

## 2025-04-02 DIAGNOSIS — E87.1 HYPONATREMIA: Primary | ICD-10-CM

## 2025-04-02 DIAGNOSIS — N39.0 RECURRENT URINARY TRACT INFECTION: Primary | ICD-10-CM

## 2025-04-02 LAB
BACTERIA #/AREA URNS AUTO: ABNORMAL /HPF
BILIRUB SERPL-MCNC: NORMAL MG/DL
BLOOD URINE, POC: NORMAL
CLARITY, POC UA: NORMAL
COLOR, POC UA: NORMAL
GLUCOSE UR QL STRIP: NORMAL
KETONES UR QL STRIP: NORMAL
LEUKOCYTE ESTERASE URINE, POC: NORMAL
MICROSCOPIC COMMENT: ABNORMAL
NITRITE, POC UA: NORMAL
NON-SQ EPI CELLS #/AREA URNS AUTO: 2 /HPF
PH, POC UA: 6
PROTEIN, POC: NORMAL
RBC #/AREA URNS AUTO: 1 /HPF (ref 0–4)
SPECIFIC GRAVITY, POC UA: 1.01
SQUAMOUS #/AREA URNS AUTO: 7 /HPF
UROBILINOGEN, POC UA: NORMAL
WBC #/AREA URNS AUTO: 20 /HPF (ref 0–5)

## 2025-04-02 PROCEDURE — 3288F FALL RISK ASSESSMENT DOCD: CPT | Mod: CPTII,S$GLB,, | Performed by: OBSTETRICS & GYNECOLOGY

## 2025-04-02 PROCEDURE — 4010F ACE/ARB THERAPY RXD/TAKEN: CPT | Mod: CPTII,S$GLB,, | Performed by: OBSTETRICS & GYNECOLOGY

## 2025-04-02 PROCEDURE — 1101F PT FALLS ASSESS-DOCD LE1/YR: CPT | Mod: CPTII,S$GLB,, | Performed by: OBSTETRICS & GYNECOLOGY

## 2025-04-02 PROCEDURE — 1126F AMNT PAIN NOTED NONE PRSNT: CPT | Mod: CPTII,S$GLB,, | Performed by: OBSTETRICS & GYNECOLOGY

## 2025-04-02 PROCEDURE — 81002 URINALYSIS NONAUTO W/O SCOPE: CPT | Mod: S$GLB,,, | Performed by: OBSTETRICS & GYNECOLOGY

## 2025-04-02 PROCEDURE — 1160F RVW MEDS BY RX/DR IN RCRD: CPT | Mod: CPTII,S$GLB,, | Performed by: OBSTETRICS & GYNECOLOGY

## 2025-04-02 PROCEDURE — 1159F MED LIST DOCD IN RCRD: CPT | Mod: CPTII,S$GLB,, | Performed by: OBSTETRICS & GYNECOLOGY

## 2025-04-02 PROCEDURE — 3078F DIAST BP <80 MM HG: CPT | Mod: CPTII,S$GLB,, | Performed by: OBSTETRICS & GYNECOLOGY

## 2025-04-02 PROCEDURE — 81001 URINALYSIS AUTO W/SCOPE: CPT | Performed by: OBSTETRICS & GYNECOLOGY

## 2025-04-02 PROCEDURE — 99213 OFFICE O/P EST LOW 20 MIN: CPT | Mod: S$GLB,,, | Performed by: OBSTETRICS & GYNECOLOGY

## 2025-04-02 PROCEDURE — 3008F BODY MASS INDEX DOCD: CPT | Mod: CPTII,S$GLB,, | Performed by: OBSTETRICS & GYNECOLOGY

## 2025-04-02 PROCEDURE — 99999 PR PBB SHADOW E&M-EST. PATIENT-LVL III: CPT | Mod: PBBFAC,,, | Performed by: OBSTETRICS & GYNECOLOGY

## 2025-04-02 PROCEDURE — 87186 SC STD MICRODIL/AGAR DIL: CPT | Performed by: OBSTETRICS & GYNECOLOGY

## 2025-04-02 PROCEDURE — 3077F SYST BP >= 140 MM HG: CPT | Mod: CPTII,S$GLB,, | Performed by: OBSTETRICS & GYNECOLOGY

## 2025-04-02 RX ORDER — CEPHALEXIN 250 MG/1
250 CAPSULE ORAL DAILY PRN
Qty: 90 CAPSULE | Refills: 1 | Status: SHIPPED | OUTPATIENT
Start: 2025-04-02 | End: 2025-09-29

## 2025-04-02 NOTE — PROGRESS NOTES
Chief Complaint   Patient presents with    Follow-up        HPI: Patient is a 64 y.o. female  presents today for a follow up visit. Last seen 10/29/24. Last positive culture was noted on 24 for Coag negative Staph and was not treated as minimal bacteria noted on Urine microscopy.      Patient states that she has some burning with urination last week. She states that she was not taking the antibiotics all the time as she ran low and was only using them when she started to develop symptoms. States that anytime after intercourse she develops burning. Reports that when this occurs she increases water intake and then starts the antibiotic pills. Taking the D-Mannose and proanthocyanidins and probiotics daily.   Not having any symptoms today.     Currently has low sodium and so is limited in her amount fluid intake. Drinking about 2 bottles of water per day.     Unable to use any vaginal estrogen cream as she has had an adverse reaction to commercial and compounding preparations of estrogen. Has tried Replens in the past but still develops burning. Has not ever tried Imvexxy or IntraRosa/DHEA.     REVIEW OF SYSTEMS:  A full 14-point ROS was performed and was significant for those  mentioned in the HPI.     The following portions of the patient's history were reviewed and updated as appropriate: allergies, current medications, past medical history, past surgical history and problem list.    PHYSICAL EXAMINATION    Vitals:    25 0908   BP: (!) 143/69   Pulse: 72       General: Healthy in appearance, Well nourished, Affect Normal, NAD.    No visits with results within 1 Day(s) from this visit.   Latest known visit with results is:   Lab Visit on 2025   Component Date Value Ref Range Status    Sodium 2025 129 (L)  136 - 145 mmol/L Final    Potassium 2025 5.0  3.5 - 5.1 mmol/L Final    Chloride 2025 98  95 - 110 mmol/L Final    CO2 2025 22 (L)  23 - 29 mmol/L Final    Glucose  01/02/2025 86  70 - 110 mg/dL Final    BUN 01/02/2025 16  8 - 23 mg/dL Final    Creatinine 01/02/2025 0.8  0.5 - 1.4 mg/dL Final    Calcium 01/02/2025 9.8  8.7 - 10.5 mg/dL Final    Anion Gap 01/02/2025 9  8 - 16 mmol/L Final    eGFR 01/02/2025 >60.0  >60 mL/min/1.73 m^2 Final        ASSESSMENT & PLAN:    Recurrent urinary tract infection  -     cephALEXin (KEFLEX) 250 MG capsule; Take 1 capsule (250 mg total) by mouth daily as needed (after intercourse). Take after sexual activity to prevent infection.  Dispense: 90 capsule; Refill: 1  -     Urinalysis Microscopic  -     Urine Culture High Risk        65 yo with a h/o recurrent UTI's and urge incontinence presents today for a follow up visit. Her last positive urine culture was on 6/11/24.    Will switch the patient to Keflex. Patient states that she is able to take it. Will take one tablet immediately post coital to help prevent the UTI's. May consider Intrarosa in the future if this is not effective. Has not been able to tolerate commercial preparations of vaginal estrogen in the past.   Will send her urine today for a UA microscopic and urine culture.     Patient will continue Cranberry extract with D-Mannose and Probiotics.     She will return in 3 months for re-evaluation. The patient was encouraged to contact the office as needed with any additional questions or concerns.       All questions were answered today. The patient was encouraged to contact the office as needed with any additional questions or concerns.     Total time spent on visit was 20 minutes.  This includes face to face time and non-face to face time preparing to see the patient (eg, review of tests), Obtaining and/or reviewing separately obtained history, Documenting clinical information in the electronic or other health record, Independently interpreting resultsand communicating results to the patient/family/caregiver, or Care coordination.    Adriana Waller MD

## 2025-04-02 NOTE — TELEPHONE ENCOUNTER
Requesting labs.      Kyle has a appointment  in April Cherie is trying to get one the same day before we start trawling  Kyle needs his orders for blood work before he sees you and Cherie  needs sodium  level checked again thanks before the end of April Thank you Kyle

## 2025-04-04 LAB — BACTERIA UR CULT: NORMAL

## 2025-04-05 LAB
BACTERIA UR CULT: ABNORMAL
BACTERIA UR CULT: ABNORMAL

## 2025-04-07 ENCOUNTER — RESULTS FOLLOW-UP (OUTPATIENT)
Dept: UROGYNECOLOGY | Facility: CLINIC | Age: 65
End: 2025-04-07

## 2025-04-07 DIAGNOSIS — N39.0 ACUTE UTI: Primary | ICD-10-CM

## 2025-04-07 RX ORDER — CIPROFLOXACIN 500 MG/1
500 TABLET, FILM COATED ORAL 2 TIMES DAILY
Qty: 10 TABLET | Refills: 0 | Status: SHIPPED | OUTPATIENT
Start: 2025-04-07 | End: 2025-04-14

## 2025-04-16 ENCOUNTER — HOSPITAL ENCOUNTER (OUTPATIENT)
Dept: CARDIOLOGY | Facility: HOSPITAL | Age: 65
Discharge: HOME | End: 2025-04-16
Attending: SURGERY
Payer: MEDICARE

## 2025-04-16 ENCOUNTER — RESULTS FOLLOW-UP (OUTPATIENT)
Dept: FAMILY MEDICINE | Facility: CLINIC | Age: 65
End: 2025-04-16

## 2025-04-16 DIAGNOSIS — M79.605 LEFT LEG PAIN: ICD-10-CM

## 2025-04-16 LAB
LEFT ABI: 1.17
LEFT ARM BP: 136 MMHG
LEFT DORSALIS PEDIS: 178 MMHG
LEFT POSTERIOR TIBIAL: 169 MMHG
RIGHT ABI: 1.08
RIGHT ARM BP: 152 MMHG
RIGHT DORSALIS PEDIS: 162 MMHG
RIGHT POSTERIOR TIBIAL: 164 MMHG

## 2025-04-16 PROCEDURE — 93922 UPR/L XTREMITY ART 2 LEVELS: CPT

## 2025-04-16 PROCEDURE — 93922 UPR/L XTREMITY ART 2 LEVELS: CPT | Mod: 26,,, | Performed by: INTERNAL MEDICINE

## 2025-04-16 PROCEDURE — 93970 EXTREMITY STUDY: CPT | Mod: 26,,, | Performed by: INTERNAL MEDICINE

## 2025-04-16 PROCEDURE — 93970 EXTREMITY STUDY: CPT | Mod: TC

## 2025-04-17 LAB
LEFT GREAT SAPHENOUS KNEE DIA: 0.1 CM
LEFT GREAT SAPHENOUS PROXIMAL CALF DIA: 0.16 CM
LEFT SMALL SAPHENOUS KNEE DIA: 0.11 CM

## 2025-04-21 ENCOUNTER — OFFICE VISIT (OUTPATIENT)
Dept: FAMILY MEDICINE | Facility: CLINIC | Age: 65
End: 2025-04-21
Payer: MEDICARE

## 2025-04-21 VITALS
OXYGEN SATURATION: 95 % | TEMPERATURE: 98 F | BODY MASS INDEX: 30.1 KG/M2 | WEIGHT: 163.56 LBS | HEART RATE: 69 BPM | SYSTOLIC BLOOD PRESSURE: 130 MMHG | DIASTOLIC BLOOD PRESSURE: 82 MMHG | HEIGHT: 62 IN

## 2025-04-21 DIAGNOSIS — Z86.0100 HISTORY OF COLONIC POLYPS: ICD-10-CM

## 2025-04-21 DIAGNOSIS — Z98.84 GASTRIC BYPASS STATUS FOR OBESITY: ICD-10-CM

## 2025-04-21 DIAGNOSIS — G47.00 INSOMNIA, UNSPECIFIED TYPE: ICD-10-CM

## 2025-04-21 DIAGNOSIS — E87.1 HYPONATREMIA: ICD-10-CM

## 2025-04-21 DIAGNOSIS — M48.061 SPINAL STENOSIS OF LUMBAR REGION WITHOUT NEUROGENIC CLAUDICATION: ICD-10-CM

## 2025-04-21 DIAGNOSIS — I87.2 VENOUS INSUFFICIENCY: ICD-10-CM

## 2025-04-21 DIAGNOSIS — R25.1 TREMOR: ICD-10-CM

## 2025-04-21 DIAGNOSIS — F33.1 MODERATE EPISODE OF RECURRENT MAJOR DEPRESSIVE DISORDER: ICD-10-CM

## 2025-04-21 DIAGNOSIS — M79.7 FIBROMYALGIA: ICD-10-CM

## 2025-04-21 DIAGNOSIS — G95.9 CERVICAL MYELOPATHY: ICD-10-CM

## 2025-04-21 DIAGNOSIS — R73.9 HYPERGLYCEMIA: ICD-10-CM

## 2025-04-21 DIAGNOSIS — F41.1 GAD (GENERALIZED ANXIETY DISORDER): ICD-10-CM

## 2025-04-21 DIAGNOSIS — E67.3 HIGH VITAMIN D LEVEL: ICD-10-CM

## 2025-04-21 DIAGNOSIS — H10.13 ALLERGIC CONJUNCTIVITIS OF BOTH EYES: ICD-10-CM

## 2025-04-21 DIAGNOSIS — M79.10 MYALGIA DUE TO STATIN: ICD-10-CM

## 2025-04-21 DIAGNOSIS — G89.29 CHRONIC PAIN OF BOTH FEET: ICD-10-CM

## 2025-04-21 DIAGNOSIS — E78.2 MIXED HYPERLIPIDEMIA: ICD-10-CM

## 2025-04-21 DIAGNOSIS — M79.671 CHRONIC PAIN OF BOTH FEET: ICD-10-CM

## 2025-04-21 DIAGNOSIS — N39.0 RECURRENT UTI: ICD-10-CM

## 2025-04-21 DIAGNOSIS — K21.9 GASTROESOPHAGEAL REFLUX DISEASE WITHOUT ESOPHAGITIS: ICD-10-CM

## 2025-04-21 DIAGNOSIS — I10 ESSENTIAL HYPERTENSION: ICD-10-CM

## 2025-04-21 DIAGNOSIS — M79.672 CHRONIC PAIN OF BOTH FEET: ICD-10-CM

## 2025-04-21 DIAGNOSIS — F39 MOOD DISORDER: Primary | ICD-10-CM

## 2025-04-21 DIAGNOSIS — M43.06 LUMBAR SPONDYLOLYSIS: ICD-10-CM

## 2025-04-21 DIAGNOSIS — T46.6X5A MYALGIA DUE TO STATIN: ICD-10-CM

## 2025-04-21 PROCEDURE — 99999 PR PBB SHADOW E&M-EST. PATIENT-LVL IV: CPT | Mod: PBBFAC,,, | Performed by: INTERNAL MEDICINE

## 2025-04-21 RX ORDER — GABAPENTIN 300 MG/1
300 CAPSULE ORAL 3 TIMES DAILY
COMMUNITY
End: 2025-04-21 | Stop reason: SDUPTHER

## 2025-04-21 RX ORDER — BACLOFEN 10 MG/1
10 TABLET ORAL 3 TIMES DAILY PRN
Qty: 90 TABLET | Refills: 11 | Status: SHIPPED | OUTPATIENT
Start: 2025-04-21 | End: 2026-04-21

## 2025-04-21 RX ORDER — HYDROCODONE BITARTRATE AND ACETAMINOPHEN 7.5; 325 MG/1; MG/1
1 TABLET ORAL EVERY 6 HOURS PRN
COMMUNITY
End: 2025-04-24

## 2025-04-21 RX ORDER — OLOPATADINE HYDROCHLORIDE 1 MG/ML
1 SOLUTION OPHTHALMIC 2 TIMES DAILY
Qty: 5 ML | Refills: 6 | Status: SHIPPED | OUTPATIENT
Start: 2025-04-21

## 2025-04-21 RX ORDER — ALPRAZOLAM 0.5 MG/1
TABLET ORAL
Qty: 60 TABLET | Refills: 5 | Status: SHIPPED | OUTPATIENT
Start: 2025-04-21

## 2025-04-21 RX ORDER — METOPROLOL SUCCINATE 25 MG/1
25 TABLET, EXTENDED RELEASE ORAL DAILY
Qty: 90 TABLET | Refills: 3 | Status: SHIPPED | OUTPATIENT
Start: 2025-04-21

## 2025-04-21 RX ORDER — AMLODIPINE BESYLATE 5 MG/1
5 TABLET ORAL DAILY
Qty: 90 TABLET | Refills: 3 | Status: SHIPPED | OUTPATIENT
Start: 2025-04-21

## 2025-04-21 RX ORDER — PANTOPRAZOLE SODIUM 40 MG/1
40 TABLET, DELAYED RELEASE ORAL 2 TIMES DAILY
Qty: 180 TABLET | Refills: 3 | Status: SHIPPED | OUTPATIENT
Start: 2025-04-21

## 2025-04-21 RX ORDER — VALSARTAN 320 MG/1
320 TABLET ORAL DAILY
Qty: 90 TABLET | Refills: 3 | Status: SHIPPED | OUTPATIENT
Start: 2025-04-21 | End: 2026-04-21

## 2025-04-21 RX ORDER — BUPROPION HYDROCHLORIDE 100 MG/1
100 TABLET, EXTENDED RELEASE ORAL 2 TIMES DAILY
Qty: 180 TABLET | Refills: 3 | Status: SHIPPED | OUTPATIENT
Start: 2025-04-21 | End: 2026-04-21

## 2025-04-21 RX ORDER — ALPRAZOLAM 0.5 MG/1
0.5 TABLET ORAL 3 TIMES DAILY PRN
Qty: 60 TABLET | Refills: 5 | Status: SHIPPED | OUTPATIENT
Start: 2025-04-21 | End: 2025-04-21 | Stop reason: SDUPTHER

## 2025-04-21 RX ORDER — DULOXETIN HYDROCHLORIDE 60 MG/1
60 CAPSULE, DELAYED RELEASE ORAL 2 TIMES DAILY
Qty: 60 CAPSULE | Refills: 11 | Status: SHIPPED | OUTPATIENT
Start: 2025-04-21 | End: 2026-04-21

## 2025-04-21 RX ORDER — GABAPENTIN 300 MG/1
600 CAPSULE ORAL 3 TIMES DAILY
Qty: 540 CAPSULE | Refills: 4 | Status: SHIPPED | OUTPATIENT
Start: 2025-04-21

## 2025-04-21 NOTE — TELEPHONE ENCOUNTER
No care due was identified.  [facility] Embedded Care Due Messages. Reference number: [id number].   4/21/2025 4:35:45 PM CDT

## 2025-04-21 NOTE — PROGRESS NOTES
Chief complaint:  Review labs, discuss medications      Physical exam and reestablished care 10/24    Prior seen me 2019    65-year-old white female essentially new to me 6/18.  Reviewed interval events. Last Na in Sept 130 and hctz held. Na 129 to 131.  Blood pressure looks normal but at home she says it is sometimes running high 130s.  She is on the valsartan 160 and we discussed going to 320 as she does not have any problem taking big pills.  She is off the HCTZ.  She eats a lot of all loves but discussed she might need to check the salt content to make sure it is adequate not drink too much water in regards to the hyponatremia.      .  She is taking the Toprol-XL 25 mg in the morning.  She was told by her prior physicians only to take the Norvasc 5 mg PRN if her blood pressure was over 140.  Given her blood pressure elevations 12/24 we  started the Norvasc 5 mg.  Her thyroid function was normal in September 2024.  She states her blood pressure is going up and down.    LDL 140s- statin intol?  Previous her LDL was okay and probably at goal.  She has been eating lowe every morning for about two months in his off of that is so we might recheck her LDL in the future with a better diet since it was okay more so in the past.  Zetia it could be an option.    Other main complaint is general increased anxiety and she has a lot of stress going on in her life.  She sees outside pain management in his an some constant pain.  When she extends her hands they are both shaky but she can extinguish it and she does feel shaky on the inside for the past three months.  She only takes her Xanax at night which relaxes her anxiety and allows her to go to sleep but she can not sleep without it.  She only gets a supply of 30.  We discussed trying a half pill or a whole pill of Xanax when feeling shaky inside to assess if it improves that would help confirm this is anxiety. 12/24  I increased her supply of medications to allow  her to do so.      she is on Wellbutrin  mg twice a day.  She was given this by me she says when there was a death in the family and she was depressed.  I prior gave her Wellbutrin XL and her interval PCP may have switched her to the SR.  One thought would be to increase her back to the Wellbutrin  but given the level of her anxiety would not want to make that worse. 12/24  Given her chronic pain we started Cymbalta 30 mg an in two weeks increase to 60 mg and I sent a separate 60 mg prescription to follow.  Discussed how this could potentially help with the anxiety/depression as well as chronic pain.  She is now on the Cymbalta 120 taking it in the morning so it is not contributing to her insomnia    She has switching her pharmacy to a local [facility].  We went through her medication list extensively and sent most of her regular medications but not necessarily all the PRN meds.  She does take Xanax at night and otherwise is anxious and just can not sleep.  She does take the gabapentin 600 3 times per day and does not have any daytime somnolence.        she also has general pains in the left lower extremity and everything is sensitive.  We discussed this could be her venous insufficiency.  She seen outside vascular and internal vascular in the past.   Get her back in 2018 and [facility] prior.  Prior vascular ultrasound shows she has had greater saphenous vein ablation on the right at least.  She is not quite sure if she remembers having it on the left but probably not which may explain her ongoing progressive left lower extremity symptoms could be due to vascular issues and she is open to seeing vascular again.       Patient apparently has taken a Macrobid after sex.  She has not taken any in quite sometime.  Apparently we do not have that medication on the medication list and she did see a [facility]rologist at [facility] we discussed that is likely who prescribed it.  She would sometimes take a five day  course when she would get a UTI.      Seen  ---Reviewed with patient the long term risks of remaining on Macrodantin, including impact on liver and pulmonary systems as well as bacterial resistance. Offered to switch her to Monurol or Keflex but patient would like to wait until her next visit. LFT's from 9/2024 were normal and she has not upper respiratory complaints.    Unable to use vaginal estrogen and is allergic to Prosed EC so unable to take Methenamine due to cross reactivity.   Patient will continue Cranberry extract with D-Mannose and Probiotics.    She will return in 3 months for re-evaluation. The patient was encouraged to contact the office as needed with any additional questions or concerns.    Plan 10/24  Recurrent UTI, plan reviewed, currently on Macrobid daily  Essential hypertension, started in her mid 30's, make adjustments as above due to hyponatremia will stop HCTZ completely, start ARB.  Intermittent swelling will only be worsened if we increase Norvasc so will hold that  Mood disorder, use his Xanax at night  Left leg pain, some of her pain appears to be going down the front of the leg to the big toe and better when she stretches it downward and that could well be some recurrent strain of the muscles and tendons due to laxity of the ankle for which I will have her see Podiatry for that component but the general tenderness to the lower extremity could well be venous insufficiency that responded to vein treatment in the past possibly on the other leg.  She needs to revisit with vascular  -     Ambulatory referral/consult to Vascular Surgery; Future  Venous insufficiency  -     Ambulatory referral/consult to Vascular Surgery; Future  Hemorrhoids, unspecified hemorrhoid type  -     Ambulatory referral/consult to Colorectal Surgery; Future  Chronic pain of left ankle  -     Ambulatory referral/consult to Podiatry; Future  Fibromyalgia, reviewed that she did see rheumatology.  If indeed she has  true fibromyalgia we discussed switching her gabapentin to Lyrica and titrating and or starting Cymbalta possibly in place of Wellbutrin and so forth          She was going to a weight loss surgeon for stimulant appetite suppressant and now currently on Mounjaro and had gastric surgery in the past as well.    Reviewed her long history of hyponatremia.  Asymptomatic and recently other doctor reduced her HCTZ from 25 mg down to 12.5.  Norvasc 2.5 increase to 5 mg so she does occasionally gets some edema but has none today.  We discussed eliminating the HCTZ due to the low sodium and she also drinks a lot of water which may need to be restricted.  I see no contraindication to ARB and prior  started valsartan 80 and titrate accordingly.  Explained all this.    She also takes her Xanax which she takes to help her sleep.  It does sound like she has some anxiety and thinks about things and that is what is keeping her from going to sleep so that is probably why the Xanax is the only thing that works to help her go to sleep.  Her use sounds appropriate.  Other sleeping pills unspecified she said gave her sedation the next day as would be expected.              Prior history reviewed:  High vit D- reduced in march from 10k to 1000 and vitamin-D improved      Liver Hemangioma last CT scan in 2021 showed no liver lesions        Obesity: Gastric Bypass in in 2010. Lost 70 Ibs lowest weight was 130. Gained 50 Ibs back. Now on mounjaro 10 mg weekly and doing well. Has  lost about 15 Ibs.      Hx H Pylori      GERD / Hiatal hernia. On PPI. nack on pantoprazole BID      ANDREW      HLD Stopped taking statin due to myalgias.      abnormal uterine cancer cells s/p hysterectomy     Mixed urinary incontinence follows with urogyn gets frequent UTIs. She is on pre/probiotic, D mannose and cranberry pills daily.  Recent recurrent UTIs again as above     Depression/ Anxiety: on xanax and Wellbutrin.      Pulm nodule. Was having annual CT  chest. Las CT in 2021 showed nodule had resolved.      Bubba ---DDD/Lumbar spondylosis/ lumbar radiculopathy / chronic pain syndrome:  Seen by pain management in April 20, 2024. Recently underwent bilateral L5-S1 TF MARIANNA in March 2024 without significant relief.  She does not wish to do any further injections at this time.  She can not take anti-inflammatories due to her gastric bypass.  Gabapentin does not help.  She has tried PT in the past as well.  She is now on Norco 5-325 mg tablets daily as needed     OP- seen Hart 3/2024-  Plan  - Discuss treatment option, Guidelines for osteoporosis recommend initiation of bisphosphonate as first-line therapy  - History of gastric bypass, start her on IV Reclast yearly with [facility] infusion  - Duration of therapy:  Plan to treat IV Reclast 2 times, then repeat bone density in 2/2026 for re-evaluation     Health Maintenance:  Colon Cancer Screening: colonoscopy done in 2020 with one polyp removed. Due again in 2025   Mammogram: normal December 2023  DEXA: showed osteoporosis done 12/21/23    HIV: negative 2021   Hep C: negative 2021   Lipids: order today   Vaccines: needs PNA     ROS:   CONST: weight stable. EYES: no vision change. ENT: no sore throat. CV: no chest pain w/ exertion. RESP: no shortness of breath. GI: no nausea, vomiting, diarrhea. No dysphagia. : no other urinary issues. MUSCULOSKELETAL: no new myalgias or arthralgias. SKIN: no new changes. NEURO: no other focal deficits. PSYCH: no other new issues. ENDOCRINE: no polyuria. HEME: no lymph nodes. ALLERGY: no general pruritis.          Past Medical History:   Diagnosis Date    Abnormal Pap smear     Anemia     history    Anxiety     Cancer     uterine-cancer cells     Colon polyps, next C-scope 2019. 4/22/2014    Dry eyes     Dyspareunia, female 6/24/2020    Essential hypertension, started in her mid 30's 1/18/2017    Family history of malignant neoplasm of pancreas 6/1/2018    Gastric bypass status  for obesity 2012    GERD (gastroesophageal reflux disease)     Helicobacter pylori gastritis, 2008. 2014    Hemangioma of liver, stable 2012, right lobe 2014    Hematuria     History of cosmetic surgeries, tummy tuck . 2013    History of frquent UTI 2020    HTN (hypertension) 2012    120s-130s/80s    Kidney stone     Right sided sciatica 2018    Sleep apnea     patient does not use the C-PAP mask-cannot tolerate    Ureteral stone 2014    Urinary incontinence     Urinary retention     Varicose veins of lower extremity with inflammation 2015    Vertigo     mild    Vitamin D deficiency disease 2018       Past Surgical History:   Procedure Laterality Date    Abdominoplasty with Liposcuction      APPENDECTOMY      CERVICAL BIOPSY  W/ LOOP ELECTRODE EXCISION      Prior to hysterectomy    CHOLECYSTECTOMY      Laparoscopic    COLONOSCOPY N/A 2020    Procedure: COLONOSCOPY;  Surgeon: Pb Smith MD;  [facility] ([address] [place] [address]);  Service: Endoscopy;  Laterality: N/A;    EPIDURAL STEROID INJECTION INTO LUMBAR SPINE Bilateral 3/15/2024    Procedure: B/L L5-S1 TFESI;  Surgeon: Glendy Banks MD;  Location: [facility];  Service: Pain Management;  Laterality: Bilateral;  20 mins    ESOPHAGOGASTRODUODENOSCOPY N/A 2021    Procedure: EGD (ESOPHAGOGASTRODUODENOSCOPY);  Surgeon: Clayton Martínez MD;  Location: [address]);  Service: Endoscopy;  Laterality: N/A;  fully vac. 3/15/21 / instr portal -ml    GASTRIC BYPASS      HERNIA REPAIR      HYSTERECTOMY       TVH  both ovaries remain     Social History     Socioeconomic History    Marital status:    Occupational History    Occupation:    Tobacco Use    Smoking status: Never    Smokeless tobacco: Never    Tobacco comments:     .  .   Occup:  .     Substance and Sexual Activity    Alcohol use: No    Drug use: No    Sexual activity: Yes      Partners: Male     Birth control/protection: Surgical   Social History Narrative    Patient is     She and her  own business together.    [place]    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         Feels safe in her home and with spouse.      Social Drivers of Health     Financial Resource Strain: Patient Declined (2025)    Overall Financial Resource Strain (CARDIA)     Difficulty of Paying Living Expenses: Patient declined   Food Insecurity: Patient Declined (2025)    Hunger Vital Sign     Worried About Running Out of Food in the Last Year: Patient declined     Ran Out of Food in the Last Year: Patient declined   Transportation Needs: Patient Declined (2023)    PRAPARE - Transportation     Lack of Transportation (Medical): Patient declined     Lack of Transportation (Non-Medical): Patient declined   Physical Activity: Insufficiently Active (2025)    Exercise Vital Sign     Days of Exercise per Week: 4 days     Minutes of Exercise per Session: 30 min   Stress: Stress Concern Present (2025)    [employer] - Occupational Stress Questionnaire     Feeling of Stress : To some extent   Housing Stability: Patient Declined (2023)    Housing Stability Vital Sign     Unable to Pay for Housing in the Last Year: Patient declined     Number of Places Lived in the Last Year: 1     Unstable Housing in the Last Year: Patient declined     family history includes Breast cancer in her maternal aunt; Colon cancer in her maternal uncle; Coronary artery disease in her mother; Diabetes in her father,  of pancreatic cancer; Hearing loss in her maternal grandmother; Heart disease in her mother; Heart failure in her mother; No Known Problems in her brother, maternal grandfather, paternal aunt, paternal grandfather, paternal grandmother, paternal uncle, and sister; Stroke in her  mother.    Gen: no distress, anxious appearing.  Exam otherwise deferred       Over 70 min  minutes of total time spent on the encounter, which includes face to face time and non-face to face time preparing to see the patient (eg, review of tests), Obtaining and/or reviewing separately obtained history, Documenting clinical information in the electronic or other health record, Independently interpreting results (not separately reported) and communicating results to the patient/family/caregiver, or Care coordination (not separately reported).        Diagnoses and all orders for this visit:    Mood disorder, anxiety under control on the Cymbalta 120 in the morning and seems to be doing okay with the Wellbutrin SR that she was on previously.  She does have anxiety and insomnia night that has chronically responded to the Xanax so she really only takes at night so will send in refills    KAILEE (generalized anxiety disorder)  -     buPROPion (WELLBUTRIN SR) 100 MG TBSR 12 hr tablet; Take 1 tablet (100 mg total) by mouth 2 (two) times daily.  -     ALPRAZolam (XANAX) 0.5 MG tablet; Take 1 tablet (0.5 mg total) by mouth 3 (three) times daily as needed for Anxiety. TAKE 1 TABLET(0.5 MG) BY MOUTH EVERY NIGHT AS NEEDED FOR INSOMNIA OR ANXIETY    Fibromyalgia, hopefully will improve on the Cymbalta 120 and gabapentin    Hyponatremia, improving, continue plan as above    Essential hypertension, started in her mid 30's, incomplete control, increase valsartan to 320  -     metoprolol succinate (TOPROL-XL) 25 MG 24 hr tablet; Take 1 tablet (25 mg total) by mouth once daily.    Hyperglycemia, normal A1c    Insomnia, unspecified type, chronic and stable    Recurrent UTI, on plan by Urology    Gastric bypass status for obesity, 08-.    High vitamin D level, improved with lower dose    Tremor, intermittent and stable    History of colonic polyps, chronic and stable    Lumbar spondylolysis, chronic and stable    Venous  insufficiency, chronic and stable, no edema on amlodipine 5 mg    Cervical myelopathy, chronic and stable    Moderate episode of recurrent major depressive disorder    Mixed hyperlipidemia, increase in LDL maybe dietary, apparently had some statin intolerance with some leg pain but that may or may not have really been statin issue especially given her spinal stenosis diagnosis and fibromyalgia    Myalgia due to statin    Spinal stenosis of lumbar region without neurogenic claudication  -     baclofen (LIORESAL) 10 MG tablet; Take 1 tablet (10 mg total) by mouth 3 (three) times daily as needed (muscle spasm).    Chronic pain of both feet  -     DULoxetine (CYMBALTA) 60 MG capsule; Take 1 capsule (60 mg total) by mouth 2 (two) times daily.    Gastroesophageal reflux disease without esophagitis  -     pantoprazole (PROTONIX) 40 MG tablet; Take 1 tablet (40 mg total) by mouth 2 (two) times daily.    Allergic conjunctivitis of both eyes  -     olopatadine (PATANOL) 0.1 % ophthalmic solution; Place 1 drop into both eyes 2 (two) times daily.    Other orders  -     valsartan (DIOVAN) 320 MG tablet; Take 1 tablet (320 mg total) by mouth once daily.  -     amLODIPine (NORVASC) 5 MG tablet; Take 1 tablet (5 mg total) by mouth once daily.  -     gabapentin (NEURONTIN) 300 MG capsule; Take 2 capsules (600 mg total) by mouth 3 (three) times daily.     This is a patient with chronic and complex diagnoses whose overall, ongoing care is being managed and monitored by me and our primary care clinic. As such,   is the appropriate add-on code to accompany the other E/M billing for this visit.

## 2025-04-21 NOTE — TELEPHONE ENCOUNTER
----- Message from Akhil sent at 4/21/2025  2:30 PM CDT -----  Regarding: Kelsi with [facility]  Name of Caller: Kelsi Pharmacy Name: [facility] Prescription Name: ALPRAZolam (XANAX) 0.5 MG tabletWhat do they need to clarify? Need to clarify direction for the medication Can you be contacted via [facility]? No Best Call Back Number: .[facility] [place]Y [place] [address] [place] [address]Ryfdd: [phone] Fax: [phone] Information

## 2025-04-23 ENCOUNTER — PATIENT MESSAGE (OUTPATIENT)
Dept: FAMILY MEDICINE | Facility: CLINIC | Age: 65
End: 2025-04-23
Payer: MEDICARE

## 2025-04-23 DIAGNOSIS — G95.9 CERVICAL MYELOPATHY: Primary | ICD-10-CM

## 2025-04-24 RX ORDER — HYDROCODONE BITARTRATE AND ACETAMINOPHEN 7.5; 325 MG/1; MG/1
1 TABLET ORAL EVERY 6 HOURS PRN
Qty: 120 TABLET | Refills: 2 | Status: CANCELLED | OUTPATIENT
Start: 2025-04-24

## 2025-04-24 NOTE — TELEPHONE ENCOUNTER
No care due was identified.  [facility] Embedded Care Due Messages. Reference number: [id number].   4/24/2025 9:15:45 AM CDT

## 2025-04-25 RX ORDER — HYDROCODONE BITARTRATE AND ACETAMINOPHEN 5; 325 MG/1; MG/1
1 TABLET ORAL EVERY 6 HOURS PRN
Qty: 15 TABLET | Refills: 0 | Status: SHIPPED | OUTPATIENT
Start: 2025-04-25

## 2025-04-30 ENCOUNTER — TELEPHONE (OUTPATIENT)
Dept: OPTOMETRY | Facility: CLINIC | Age: 65
End: 2025-04-30
Payer: MEDICARE

## 2025-04-30 NOTE — TELEPHONE ENCOUNTER
----- Message from Tech Maria E sent at 4/29/2025  1:39 PM CDT -----  Regarding: Extend  Please extend Spec rx

## 2025-04-30 NOTE — TELEPHONE ENCOUNTER
Extended  E[facility]lass Final Rx       [facility] Final Rx         Sphere Cylinder Axis Add    Right +0.25 +0.50 005 +2.50    Left +0.75 +0.50 005 +2.50      Type: PAL    Expiration Date: 4/30/2026

## 2025-05-05 DIAGNOSIS — H16.223 KERATOCONJUNCTIVITIS SICCA, NOT SPECIFIED AS SJÖGREN'S, BILATERAL: ICD-10-CM

## 2025-05-05 RX ORDER — CYCLOSPORINE 0.5 MG/ML
EMULSION OPHTHALMIC
Qty: 180 EACH | Refills: 3 | Status: SHIPPED | OUTPATIENT
Start: 2025-05-05

## 2025-05-18 DIAGNOSIS — F41.1 GAD (GENERALIZED ANXIETY DISORDER): ICD-10-CM

## 2025-05-18 DIAGNOSIS — M79.672 CHRONIC PAIN OF BOTH FEET: ICD-10-CM

## 2025-05-18 DIAGNOSIS — M48.061 SPINAL STENOSIS OF LUMBAR REGION WITHOUT NEUROGENIC CLAUDICATION: ICD-10-CM

## 2025-05-18 DIAGNOSIS — G89.29 CHRONIC PAIN OF BOTH FEET: ICD-10-CM

## 2025-05-18 DIAGNOSIS — M79.671 CHRONIC PAIN OF BOTH FEET: ICD-10-CM

## 2025-05-18 DIAGNOSIS — I10 ESSENTIAL HYPERTENSION: ICD-10-CM

## 2025-05-18 DIAGNOSIS — K21.9 GASTROESOPHAGEAL REFLUX DISEASE WITHOUT ESOPHAGITIS: ICD-10-CM

## 2025-05-19 NOTE — TELEPHONE ENCOUNTER
No care due was identified.  [facility] Embedded Care Due Messages. Reference number: [id number].   5/18/2025 8:58:58 PM CDT

## 2025-05-20 RX ORDER — VALSARTAN 320 MG/1
320 TABLET ORAL DAILY
Qty: 90 TABLET | Refills: 3 | Status: SHIPPED | OUTPATIENT
Start: 2025-05-20 | End: 2026-05-20

## 2025-05-20 RX ORDER — METOPROLOL SUCCINATE 25 MG/1
25 TABLET, EXTENDED RELEASE ORAL DAILY
Qty: 90 TABLET | Refills: 3 | Status: SHIPPED | OUTPATIENT
Start: 2025-05-20

## 2025-05-20 RX ORDER — BACLOFEN 10 MG/1
10 TABLET ORAL 3 TIMES DAILY PRN
Qty: 90 TABLET | Refills: 11 | Status: SHIPPED | OUTPATIENT
Start: 2025-05-20 | End: 2026-05-20

## 2025-05-20 RX ORDER — AMLODIPINE BESYLATE 5 MG/1
5 TABLET ORAL DAILY
Qty: 90 TABLET | Refills: 3 | Status: SHIPPED | OUTPATIENT
Start: 2025-05-20

## 2025-05-20 RX ORDER — BUPROPION HYDROCHLORIDE 100 MG/1
100 TABLET, EXTENDED RELEASE ORAL 2 TIMES DAILY
Qty: 180 TABLET | Refills: 3 | Status: SHIPPED | OUTPATIENT
Start: 2025-05-20 | End: 2026-05-20

## 2025-05-20 RX ORDER — GABAPENTIN 300 MG/1
600 CAPSULE ORAL 3 TIMES DAILY
Qty: 540 CAPSULE | Refills: 4 | Status: SHIPPED | OUTPATIENT
Start: 2025-05-20

## 2025-05-20 RX ORDER — DULOXETIN HYDROCHLORIDE 60 MG/1
60 CAPSULE, DELAYED RELEASE ORAL 2 TIMES DAILY
Qty: 60 CAPSULE | Refills: 11 | Status: SHIPPED | OUTPATIENT
Start: 2025-05-20 | End: 2026-05-20

## 2025-05-20 RX ORDER — PANTOPRAZOLE SODIUM 40 MG/1
40 TABLET, DELAYED RELEASE ORAL 2 TIMES DAILY
Qty: 180 TABLET | Refills: 3 | Status: SHIPPED | OUTPATIENT
Start: 2025-05-20

## 2025-05-23 DIAGNOSIS — F41.1 GAD (GENERALIZED ANXIETY DISORDER): ICD-10-CM

## 2025-05-23 NOTE — TELEPHONE ENCOUNTER
No care due was identified.  [facility] Embedded Care Due Messages. Reference number: [id number].   5/23/2025 10:08:03 AM CDT

## 2025-05-23 NOTE — TELEPHONE ENCOUNTER
No care due was identified.  [facility] Embedded Care Due Messages. Reference number: [id number].   5/23/2025 10:23:11 AM CDT

## 2025-05-26 RX ORDER — ALPRAZOLAM 0.5 MG/1
TABLET ORAL
Qty: 60 TABLET | Refills: 5 | Status: SHIPPED | OUTPATIENT
Start: 2025-05-26

## 2025-05-26 RX ORDER — ALPRAZOLAM 0.5 MG/1
TABLET ORAL
Qty: 60 TABLET | Refills: 5 | OUTPATIENT
Start: 2025-05-26

## 2025-06-11 ENCOUNTER — TELEPHONE (OUTPATIENT)
Dept: FAMILY MEDICINE | Facility: CLINIC | Age: 65
End: 2025-06-11
Payer: MEDICARE

## 2025-06-11 NOTE — TELEPHONE ENCOUNTER
I left two voice mails that I was calling to obtain refill information from the patient. The patient's spouse, Kyle, informed me that the patient needs refills.

## 2025-06-12 ENCOUNTER — TELEPHONE (OUTPATIENT)
Dept: FAMILY MEDICINE | Facility: CLINIC | Age: 65
End: 2025-06-12
Payer: MEDICARE

## 2025-06-12 DIAGNOSIS — F41.1 GAD (GENERALIZED ANXIETY DISORDER): ICD-10-CM

## 2025-06-12 DIAGNOSIS — G95.9 CERVICAL MYELOPATHY: ICD-10-CM

## 2025-06-12 RX ORDER — ALPRAZOLAM 0.5 MG/1
TABLET ORAL
Qty: 60 TABLET | Refills: 5 | Status: SHIPPED | OUTPATIENT
Start: 2025-06-12

## 2025-06-12 RX ORDER — HYDROCODONE BITARTRATE AND ACETAMINOPHEN 5; 325 MG/1; MG/1
1 TABLET ORAL EVERY 6 HOURS PRN
Qty: 15 TABLET | Refills: 0 | Status: SHIPPED | OUTPATIENT
Start: 2025-06-12

## 2025-06-12 NOTE — TELEPHONE ENCOUNTER
No care due was identified.  [facility] Embedded Care Due Messages. Reference number: [id number].   6/12/2025 11:48:21 AM CDT

## 2025-06-16 ENCOUNTER — OFFICE VISIT (OUTPATIENT)
Dept: NEUROLOGY | Facility: CLINIC | Age: 65
End: 2025-06-16
Payer: MEDICARE

## 2025-06-16 ENCOUNTER — LAB VISIT (OUTPATIENT)
Dept: LAB | Facility: HOSPITAL | Age: 65
End: 2025-06-16
Attending: STUDENT IN AN ORGANIZED HEALTH CARE EDUCATION/TRAINING PROGRAM
Payer: MEDICARE

## 2025-06-16 VITALS
BODY MASS INDEX: 31.05 KG/M2 | SYSTOLIC BLOOD PRESSURE: 120 MMHG | HEART RATE: 82 BPM | WEIGHT: 169.75 LBS | DIASTOLIC BLOOD PRESSURE: 80 MMHG

## 2025-06-16 DIAGNOSIS — R41.3 MEMORY LOSS: ICD-10-CM

## 2025-06-16 DIAGNOSIS — G25.0 ESSENTIAL TREMOR: Primary | ICD-10-CM

## 2025-06-16 DIAGNOSIS — G62.9 NEUROPATHY: ICD-10-CM

## 2025-06-16 PROCEDURE — 82233 BETA-AMYLOID 1-40 (ABETA 40): CPT

## 2025-06-16 PROCEDURE — 1101F PT FALLS ASSESS-DOCD LE1/YR: CPT | Mod: CPTII,S$GLB,, | Performed by: STUDENT IN AN ORGANIZED HEALTH CARE EDUCATION/TRAINING PROGRAM

## 2025-06-16 PROCEDURE — 4010F ACE/ARB THERAPY RXD/TAKEN: CPT | Mod: CPTII,S$GLB,, | Performed by: STUDENT IN AN ORGANIZED HEALTH CARE EDUCATION/TRAINING PROGRAM

## 2025-06-16 PROCEDURE — 3074F SYST BP LT 130 MM HG: CPT | Mod: CPTII,S$GLB,, | Performed by: STUDENT IN AN ORGANIZED HEALTH CARE EDUCATION/TRAINING PROGRAM

## 2025-06-16 PROCEDURE — 1159F MED LIST DOCD IN RCRD: CPT | Mod: CPTII,S$GLB,, | Performed by: STUDENT IN AN ORGANIZED HEALTH CARE EDUCATION/TRAINING PROGRAM

## 2025-06-16 PROCEDURE — 84393 TAU PHOSPHORYLATED EA: CPT

## 2025-06-16 PROCEDURE — 3008F BODY MASS INDEX DOCD: CPT | Mod: CPTII,S$GLB,, | Performed by: STUDENT IN AN ORGANIZED HEALTH CARE EDUCATION/TRAINING PROGRAM

## 2025-06-16 PROCEDURE — 99214 OFFICE O/P EST MOD 30 MIN: CPT | Mod: S$GLB,,, | Performed by: STUDENT IN AN ORGANIZED HEALTH CARE EDUCATION/TRAINING PROGRAM

## 2025-06-16 PROCEDURE — 36415 COLL VENOUS BLD VENIPUNCTURE: CPT

## 2025-06-16 PROCEDURE — 99999 PR PBB SHADOW E&M-EST. PATIENT-LVL IV: CPT | Mod: PBBFAC,,, | Performed by: STUDENT IN AN ORGANIZED HEALTH CARE EDUCATION/TRAINING PROGRAM

## 2025-06-16 PROCEDURE — 3288F FALL RISK ASSESSMENT DOCD: CPT | Mod: CPTII,S$GLB,, | Performed by: STUDENT IN AN ORGANIZED HEALTH CARE EDUCATION/TRAINING PROGRAM

## 2025-06-16 PROCEDURE — 83520 IMMUNOASSAY QUANT NOS NONAB: CPT

## 2025-06-16 PROCEDURE — 1125F AMNT PAIN NOTED PAIN PRSNT: CPT | Mod: CPTII,S$GLB,, | Performed by: STUDENT IN AN ORGANIZED HEALTH CARE EDUCATION/TRAINING PROGRAM

## 2025-06-16 PROCEDURE — 3079F DIAST BP 80-89 MM HG: CPT | Mod: CPTII,S$GLB,, | Performed by: STUDENT IN AN ORGANIZED HEALTH CARE EDUCATION/TRAINING PROGRAM

## 2025-06-16 PROCEDURE — 3044F HG A1C LEVEL LT 7.0%: CPT | Mod: CPTII,S$GLB,, | Performed by: STUDENT IN AN ORGANIZED HEALTH CARE EDUCATION/TRAINING PROGRAM

## 2025-06-16 RX ORDER — PROPRANOLOL HYDROCHLORIDE 60 MG/1
60 CAPSULE, EXTENDED RELEASE ORAL DAILY
Qty: 30 CAPSULE | Refills: 11 | Status: SHIPPED | OUTPATIENT
Start: 2025-06-16 | End: 2025-06-16 | Stop reason: SDUPTHER

## 2025-06-16 RX ORDER — PROPRANOLOL HYDROCHLORIDE 60 MG/1
60 CAPSULE, EXTENDED RELEASE ORAL DAILY
Qty: 30 CAPSULE | Refills: 11 | Status: SHIPPED | OUTPATIENT
Start: 2025-06-16 | End: 2026-06-16

## 2025-06-16 NOTE — PROGRESS NOTES
[facility]  Clinic Note    Date of Service: 6/16/2025  Patient seen at the request of: Murali Stone MD    Reason for Consultation  tremor    Assessment:  Cherie Richards is a 65 y.o. female who presents with essential tremor.    Patient reports a one year history of progressive bilateral hand tremor.  She also notes some less prevalent tremor in her bilateral lower extremities.  Tremor is an action tremor without rest tremor.  No dysmetria on exam.  She reports that her mother had a tremor as well in her bilateral upper extremities and lower extremities.  Small doses of Xanax is reportedly helpful for the tremor.  This is consistent with essential tremor.    Towards the end of the visit, the patient reports short-term memory loss.  She reports that she can be repetitive.  She is currently not getting lost in his able to handle all activities of daily living.  Her father reportedly had short-term memory loss.  We will start with biomarkers.      Plan:    - propranolol 60mg XR 24hr daily    - stop metoprolol (will ask primary if ok the switch)  - ptau-181  - ptau-217  - AD Detect Beta-amyloid 42/40 ratio    Transfer to more permanent neurologist    A dictation device was used to produce this document. Use of such devices sometimes results in grammatical errors or replacement of words that sound similarly.     Signed:    Nito Arizmendi MD  Neurology/Epilepsy  06/16/2025 8:38 PM      HPI:  Cherie Richards is a 65 y.o. female with   Past Medical History:   Diagnosis Date    Abnormal Pap smear     Anemia     history    Anxiety     Cancer     uterine-cancer cells     Colon polyps, next C-scope 2019. 4/22/2014    Dry eyes     Dyspareunia, female 6/24/2020    Essential hypertension, started in her mid 30's 1/18/2017    Family history of malignant neoplasm of pancreas 6/1/2018    Gastric bypass status for obesity 8/21/2012    GERD (gastroesophageal reflux disease)     Helicobacter pylori gastritis, 2008.  8/20/2014    Hemangioma of liver, stable 2010, 2012, right lobe 8/20/2014    Hematuria     History of cosmetic surgeries, tummy tuck 2011. 4/5/2013    History of frquent UTI 6/24/2020    HTN (hypertension) 8/21/2012    120s-130s/80s    Kidney stone     Right sided sciatica 6/1/2018    Sleep apnea     patient does not use the C-PAP mask-cannot tolerate    Ureteral stone 4/8/2014    Urinary incontinence     Urinary retention     Varicose veins of lower extremity with inflammation 4/8/2015    Vertigo     mild    Vitamin D deficiency disease 6/1/2018     Patient reports bilateral hand shaking that is occurring daily.  This tremor occurs were with action (eating, typing).  The tremor has been going on for a year with some progressive.  Some shaking in the lower extremities, less than in the upper extremities.  Xanax seems to improve the tremor.      Mother had a tremor as well in her hands and her legs.    Patient reports some progressive forgetfulness.  Not getting lost.  They report repetitiveness.  Father had short-term memory loss.      This is the extent of the patient's complaints at this time.    TSH   Date Value Ref Range Status   04/16/2025 0.787 0.400 - 4.000 uIU/mL Final   09/25/2024 1.005 0.400 - 4.000 uIU/mL Final   03/19/2024 1.221 0.400 - 4.000 uIU/mL Final     Vitamin B-12   Date Value Ref Range Status   12/07/2023 909 210 - 950 pg/mL Final   04/13/2023 930 210 - 950 pg/mL Final     Hemoglobin A1C   Date Value Ref Range Status   09/25/2024 5.1 4.0 - 5.6 % Final     Comment:     ADA Screening Guidelines:  5.7-6.4%  Consistent with prediabetes  >or=6.5%  Consistent with diabetes    High levels of fetal hemoglobin interfere with the HbA1C  assay. Heterozygous hemoglobin variants (HbS, HgC, etc)do  not significantly interfere with this assay.   However, presence of multiple variants may affect accuracy.     04/13/2023 5.7 (H) 4.0 - 5.6 % Final     Comment:     ADA Screening Guidelines:  5.7-6.4%  Consistent  with prediabetes  >or=6.5%  Consistent with diabetes    High levels of fetal hemoglobin interfere with the HbA1C  assay. Heterozygous hemoglobin variants (HbS, HgC, etc)do  not significantly interfere with this assay.   However, presence of multiple variants may affect accuracy.       Hemoglobin A1c   Date Value Ref Range Status   04/16/2025 5.0 4.0 - 5.6 % Final     Comment:     ADA Screening Guidelines:  5.7-6.4%  Consistent with prediabetes  >=6.5%  Consistent with diabetes    High levels of fetal hemoglobin interfere with the HbA1C  assay. Heterozygous hemoglobin variants (HbS, HgC, etc)do  not significantly interfere with this assay.   However, presence of multiple variants may affect accuracy.         Review of Systems:  ROS negative unless noted in HPI    Past Surgical History:  Past Surgical History:   Procedure Laterality Date    Abdominoplasty with Liposcuction      APPENDECTOMY      CERVICAL BIOPSY  W/ LOOP ELECTRODE EXCISION      Prior to hysterectomy    CHOLECYSTECTOMY      Laparoscopic    COLONOSCOPY N/A 2/13/2020    Procedure: COLONOSCOPY;  Surgeon: Pb Smith MD;  [facility]  Service: Endoscopy;  Laterality: N/A;    EPIDURAL STEROID INJECTION INTO LUMBAR SPINE Bilateral 3/15/2024    Procedure: B/L L5-S1 TFESI;  Surgeon: Glendy Banks MD;  Location: [facility];  Service: Pain Management;  Laterality: Bilateral;  20 mins    ESOPHAGOGASTRODUODENOSCOPY N/A 11/2/2021    Procedure: EGD (ESOPHAGOGASTRODUODENOSCOPY);  Surgeon: Clayton Martínez MD;  [address]  Service: Endoscopy;  Laterality: N/A;  fully vac. 3/15/21 / instr portal -ml    GASTRIC BYPASS      HERNIA REPAIR      HYSTERECTOMY  1980     TVH  both ovaries remain       Family History:  Family History   Problem Relation Name Age of Onset    Heart disease Mother      Stroke Mother      Coronary artery disease Mother      Heart failure Mother      Obesity Mother      Cancer Father           pancreatic cancer    Diabetes Father      Obesity Father      No Known Problems Sister      No Known Problems Brother      Hearing loss Maternal Grandmother      No Known Problems Maternal Grandfather      No Known Problems Paternal Grandmother      No Known Problems Paternal Grandfather      Breast cancer Maternal Aunt      Colon cancer Maternal Uncle      No Known Problems Paternal Aunt      No Known Problems Paternal Uncle      Ovarian cancer Neg Hx      Anesthesia problems Neg Hx      Amblyopia Neg Hx      Blindness Neg Hx      Cataracts Neg Hx      Glaucoma Neg Hx      Hypertension Neg Hx      Macular degeneration Neg Hx      Retinal detachment Neg Hx      Strabismus Neg Hx      Thyroid disease Neg Hx         Social History:  Social History[1]    Allergies:  Adhesive tape-silicones, Penicillins, Sulfamethoxazole-trimethoprim, Prosed ec [meth-hyos-atrp-m blue-ba-phsal], Pyridium [phenazopyridine], and Cephalexin    Outpatient Medications:  Prior to Admission medications    Medication Sig Start Date End Date Taking? Authorizing Provider   acetaminophen (TYLENOL) 500 MG tablet Take 1 tablet (500 mg total) by mouth every 6 (six) hours as needed for Pain (and fever). 6/11/24   Josie Benson,    albuterol (PROVENTIL/VENTOLIN HFA) 90 mcg/actuation inhaler Inhale 1-2 puffs into the lungs every 4 (four) hours as needed for Shortness of Breath (coughing). Rescue 8/13/24 8/13/25  Tracy Chand MD   ALPRAZolam (XANAX) 0.5 MG tablet TAKE 1-2 TABLET(0.5 MG) BY MOUTH EVERY NIGHT AS NEEDED FOR INSOMNIA OR ANXIETY 6/12/25   Luan Cueva MD   amLODIPine (NORVASC) 5 MG tablet Take 1 tablet (5 mg total) by mouth once daily. 5/20/25   Luan Cueva MD   baclofen (LIORESAL) 10 MG tablet Take 1 tablet (10 mg total) by mouth 3 (three) times daily as needed (muscle spasm). 5/20/25 5/20/26  Luan Cueva MD   buPROPion (WELLBUTRIN SR) 100 MG TBSR 12 hr tablet Take 1 tablet (100 mg total) by mouth 2 (two)  times daily. 5/20/25 5/20/26  Luan Cueva MD   cephALEXin (KEFLEX) 250 MG capsule Take 1 capsule (250 mg total) by mouth daily as needed (after intercourse). Take after sexual activity to prevent infection. 4/2/25 9/29/25  Adriana Waller MD   cholecalciferol, vitamin D3, 5,000 unit Tab Take by mouth. 1 Tablet Oral Every day    Provider, Historical   cyanocobalamin 1,000 mcg/mL injection INJECT 1 ML (CC) INTRAMUSCULARLY EVERY TWO WEEKS 8/26/24   Tracy Chand MD   cycloSPORINE (RESTASIS) 0.05 % ophthalmic emulsion Instill 1 drop into both eyes twice daily 5/5/25   Oscar Doshi, FANTASMA   D-MANNOSE ORAL Take by mouth.    Provider, Historical   diclofenac sodium (VOLTAREN) 1 % Gel Apply 2 g topically 4 (four) times daily as needed (pain). 1/5/23   Mariah Jones MD   DULoxetine (CYMBALTA) 60 MG capsule Take 1 capsule (60 mg total) by mouth 2 (two) times daily. 5/20/25 5/20/26  Luan Cueva MD   fluconazole (DIFLUCAN) 150 MG Tab Take 1 tablet (150 mg total) by mouth daily as needed. 10/30/24   Luan Cueva MD   gabapentin (NEURONTIN) 300 MG capsule Take 2 capsules (600 mg total) by mouth 3 (three) times daily. 5/20/25   Luan Cueva MD   HYDROcodone-acetaminophen (NORCO) 5-325 mg per tablet Take 1 tablet by mouth every 6 (six) hours as needed for Pain. 6/12/25   Luan Cueva MD   methylPREDNISolone (MEDROL DOSEPACK) 4 mg tablet follow package directions 11/25/24   Maria Elena Kidd DPM   metoprolol succinate (TOPROL-XL) 25 MG 24 hr tablet Take 1 tablet (25 mg total) by mouth once daily. 5/20/25   Luan Cueva MD   metroNIDAZOLE (METROGEL) 0.75 % gel Apply topically 2 (two) times daily. 6/4/24 6/4/25  Tracy Chand MD   olopatadine (PATANOL) 0.1 % ophthalmic solution Place 1 drop into both eyes 2 (two) times daily. 4/21/25   Luan Cueva MD   ondansetron (ZOFRAN-ODT) 4 MG TbDL Dissolve 1 tablet (4 mg total) on the tongue every 6 (six) hours as needed  "(nausea). 12/6/24   Luan Cueva MD   pantoprazole (PROTONIX) 40 MG tablet Take 1 tablet (40 mg total) by mouth 2 (two) times daily. 5/20/25   Luan Cueva MD   predniSONE (DELTASONE) 20 MG tablet Take 2 tablets at once on days 1&2, then 1 tablet daily on days 3&4 3/27/24   Kaity Mohamud MD   promethazine (PHENERGAN) 25 MG suppository Place 1 suppository (25 mg total) rectally every 6 (six) hours as needed for Nausea. 6/11/24   Josie Benson,    syringe with needle (BD LUER-CLARIBEL SYRINGE) 3 mL 25 x 1 1/2 " Syrg Use as directed with Cyanocobalamin 6/19/19   Luan Cueva MD   traMADoL (ULTRAM) 50 mg tablet Take 1 tablet (50 mg total) by mouth every 6 (six) hours as needed for Pain. 12/6/24   Maria Elena Kidd DPM   tretinoin (RETIN-A) 0.1 % cream Apply to affected area Topical Every day 12/22/22   Mariah Jones MD   valsartan (DIOVAN) 320 MG tablet Take 1 tablet (320 mg total) by mouth once daily. 5/20/25 5/20/26  Luan Cueva MD       Physical exam:    Vitals: /80 (BP Location: Left arm, Patient Position: Sitting)   Pulse 82   Wt 77 kg (169 lb 12.1 oz)   BMI 31.05 kg/m²     General:   Sitting in chair, in no distress, well-nourished, well-developed, appears stated age.  Head/Neck:   Normocephalic,atraumatic  Pulm:  Non-labored breathing     Mental Status: Alert and oriented to person, time, place, situation. Speech spontaneous and fluent without paraphasias; no dysarthria  CN:  II: visual fields full  III, IV, VI: EOM intact without nystagmus or diplopia.   V: Sensation to light touch full and symmetric in V1-3. Masseter contraction full bilaterally.   VII: Facial movement full and symmetric.   VIII: Hearing grossly normal to conversation.  IX, X: Palate midline with symmetric elevation.    XI: SCM and trapezius: 5/5 bilaterally.   XII: Tongue midline without fasciculations.  Motor: Normal bulk and tone throughout all four extremities.   LUE: D: 5/5; B: 5/5; T:  5/5; WF: " 5/5; WE:  5/5; IO: 5/5   RUE: D: 5/5; B: 5/5; T:  5/5; WF:5/5; WE:  5/5; IO: 5/5   LLE: HF: 5/5, KE: 5/5, KF: 5/5, DF: 5/5, PF: 5/5  RLE: HF: 5/5, KE: 5/5, KF: 5/5, DF: 5/5, PF: 5/5  Action and postural tremor.  No resting tremor.  Finger tapping normal bilaterally  Fist open and close normal bilaterally  Toe tapping normal bilaterally  Heel tapping normal bilaterally  Sensory: Intact and symmetric to light touch throughout.  Reflexes: LUE: Biceps 2+ brachioradialis 2+  RUE: Biceps 2+, brachioradialis 2+  LLE: Knee 2+, ankle 2+  RLE: Knee 2+, ankle 2+  Coordination:  Intact and symmetric to finger-to-nose and heel-to-shin.  Gait:  Intact to casual gait.    Imaging:  All pertinent imaging was personally reviewed.    Results for orders placed during the hospital encounter of 03/19/24    MRI Brain Without Contrast    Narrative  EXAMINATION:  MRI BRAIN WITHOUT CONTRAST    CLINICAL HISTORY:  Transient ischemic attack (TIA);    TECHNIQUE:  Multiplanar multisequence MR imaging of the brain was performed without intravenous contrast.    COMPARISON:  CTA 03/19/2024    FINDINGS:  Intracranial Compartment:    Ventricles are stable in size, without evidence of hydrocephalus.    Modest patchy T2/FLAIR hyperintensity in the supratentorial white matter, nonspecific but most likely reflecting chronic small vessel ischemic changes. No diffusion restriction to suggest an acute infarction.  No remote major vascular distribution infarct. No evidence of recent or remote hemorrhage.  No significant intracranial mass effect or midline shift.    No extra-axial blood or fluid collections.    Normal vascular flow voids are preserved.    Skull/Extracranial Contents (limited evaluation):    Bone marrow signal intensity is unremarkable.    Impression  No evidence of acute hemorrhage or infarct.    Modest chronic small-vessel ischemic change in the supratentorial white matter.      Electronically signed by: Wood Mera  MD  Date:    03/19/2024  Time:    13:32      Results for orders placed during the hospital encounter of 05/22/21    MRI Brain Without Contrast    Narrative  EXAMINATION:  MRI BRAIN WITHOUT CONTRAST; MRI CERVICAL SPINE WITHOUT CONTRAST    CLINICAL HISTORY:  Dizziness, persistent/recurrent, cardiac or vascular cause suspected;; Weakness, sob;  Other fatigue; Anesthesia of skin    TECHNIQUE:  Sagittal and axial T1, axial T2, axial FLAIR, axial gradient and axial diffusion imaging of the whole brain without contrast.    In addition with separate acquisition MRI of the cervical spine was performed with sagittal T1, T2, stir and axial T2 imaging without contrast.    COMPARISON:  MRI brain 07/20/2020    FINDINGS:  Brain parenchyma is normal in contour.  Few scattered small to punctate foci of T2 FLAIR signal hyperintensity in supratentorial white matter similar to prior which are nonspecific.  Sequela of mild chronic ischemic change to be included in differential.  There is no restricted diffusion to suggest acute infarction.  There is no abnormal parenchymal susceptibility to suggest parenchymal hemorrhage.  Study distorted by patient motion.    Major intracranial T2 flow voids are present.    MRI cervical spine: Trace grade 1 anterolisthesis of C 3 on C4 and C4 on C5.  Degenerative disc disease with slight disc desiccation and height loss at all levels most pronounced at C5/C6 with mild moderate height loss.  Allowing for degenerative changes cervical vertebral body heights contour and bone marrow signal within normal is without evidence for acute fracture or subluxation.  Craniocervical junction within normal limits.  Cerebellar tonsils appropriately located.    Cervical spinal cord normal in signal and contour no cord signal abnormality to suggest edema.    Few punctate foci of T2 stir signal hyperintensity in the right paramedian aspect of the mari    C2/C3: No significant disc bulge, central canal or neural foraminal  stenosis.    C3/C4: No significant disc bulge central canal or neural foraminal stenosis with left facet joint arthropathy    C4/C5: Small bulging disc with uncovertebral joint hypertrophy and facet joint arthropathy without significant central canal stenosis with mild right greater than left neural foraminal stenosis bilaterally    C5/C6: Posterior disc osteophyte with uncovertebral joint hypertrophy and facet joint arthropathy mild moderate central canal and bilateral neural foraminal stenosis.    C6/C7: Small disc bulge without significant central canal or neural foraminal stenosis    C7/T1: No significant disc bulge, central canal or neural foraminal stenosis.    Impression  MRI brain: Few small to punctate scattered foci of T2 FLAIR signal abnormality supratentorial white matter and mari which are nonspecific and may represent mild chronic ischemic change.  Otherwise unremarkable noncontrast MRI brain specifically without evidence for acute infarction    MRI cervical spine: Degenerative change cervical spine most pronounced at C5/C6 level as detailed above with posterior disc osteophyte, uncovertebral joint hypertrophy and facet joint arthropathy mild moderate central canal or neural foraminal stenosis bilaterally.    No cord signal abnormality to suggest edema    Please see above for additional details.      Electronically signed by: Gino Rankin DO  Date:    05/22/2021  Time:    12:03      Results for orders placed during the hospital encounter of 03/02/24    MRI Cervical Spine Without Contrast    Impression  Spondylo degenerative change cervical spine similar to prior overall most pronounced at C5/C6 with posterior disc osteophyte, uncovertebral joint hypertrophy, facet joint arthropathy and ligamentum flavum hypertrophy with mild moderate central canal stenosis and probable moderate bilateral bony neural foraminal stenosis    No cervical spinal cord signal abnormality to suggest edema.      Electronically  signed by: Gino Rankin DO  Date:    2024  Time:    16:17      Results for orders placed during the hospital encounter of 21    MRI Cervical Spine Without Contrast    Impression  MRI brain: Few small to punctate scattered foci of T2 FLAIR signal abnormality supratentorial white matter and mari which are nonspecific and may represent mild chronic ischemic change.  Otherwise unremarkable noncontrast MRI brain specifically without evidence for acute infarction    MRI cervical spine: Degenerative change cervical spine most pronounced at C5/C6 level as detailed above with posterior disc osteophyte, uncovertebral joint hypertrophy and facet joint arthropathy mild moderate central canal or neural foraminal stenosis bilaterally.    No cord signal abnormality to suggest edema    Please see above for additional details.      Electronically signed by: Gino Rankin DO  Date:    2021  Time:    12:03      Results for orders placed during the hospital encounter of 24    MRI Lumbar Spine Without Contrast    Impression  Degenerative changes lumbar spine predominantly L5-S1 with facet hypertrophy and moderate LEFT neural foraminal encroachment.  Level by level details as above.      Electronically signed by: Darrell Silva MD  Date:    2024  Time:    05:20         [1]   Social History  Tobacco Use    Smoking status: Never    Smokeless tobacco: Never    Tobacco comments:     .  .   Occup:  .     Substance Use Topics    Alcohol use: No    Drug use: No

## 2025-06-18 ENCOUNTER — TELEPHONE (OUTPATIENT)
Dept: FAMILY MEDICINE | Facility: CLINIC | Age: 65
End: 2025-06-18
Payer: MEDICARE

## 2025-06-18 LAB
IMMUNOLOGIST REVIEW: NORMAL
M PHOSPHO-TAU 217: 0.07 PG/ML

## 2025-06-18 NOTE — TELEPHONE ENCOUNTER
RE: Switching metoprolol to propranolol  Received: Yesterday  Nito Arizmendi MD sent to Luan Cueva MD  Caller: Unspecified (2 days ago,  3:07 PM)  I told her I recommend the switch from metoprolol to propranolol for essential tremor, but that it would have to be cleared by you.  She knows not to start propranolol unless you clear the switch.          Previous Messages       ----- Message -----  From: Luan Cueva MD  Sent: 6/17/2025   8:04 AM CDT  To: Nito Arizmendi MD  Subject: RE: Switching metoprolol to propranolol          Ok but, yes, you would need to switch one beta blocker for another. Have her record her pulse on toprol then monitor BP and pulse on the propranolol      thanks  ----- Message -----  From: Nito Arizmendi MD  Sent: 6/16/2025   3:08 PM CDT  To: Luan Cueva MD  Subject: Switching metoprolol to propranolol              I just saw this mutual patient for a tremor.      It appears that she has essential tremor and I would like to start propranolol.  Would be okay from urine to start propranolol 60 mg extended release?  Should we stop the metoprolol if starting the propranolol?    ThanksNito

## 2025-06-19 LAB — LC PHOSPHO-TAU (181P): 0.64 PG/ML (ref 0–0.97)

## 2025-06-20 ENCOUNTER — PATIENT MESSAGE (OUTPATIENT)
Dept: FAMILY MEDICINE | Facility: CLINIC | Age: 65
End: 2025-06-20
Payer: MEDICARE

## 2025-07-02 ENCOUNTER — LAB VISIT (OUTPATIENT)
Dept: LAB | Facility: HOSPITAL | Age: 65
End: 2025-07-02
Attending: OBSTETRICS & GYNECOLOGY
Payer: MEDICARE

## 2025-07-02 ENCOUNTER — OFFICE VISIT (OUTPATIENT)
Dept: UROGYNECOLOGY | Facility: CLINIC | Age: 65
End: 2025-07-02
Payer: MEDICARE

## 2025-07-02 DIAGNOSIS — N95.8 GENITOURINARY SYNDROME OF MENOPAUSE: ICD-10-CM

## 2025-07-02 DIAGNOSIS — N39.0 ACUTE UTI: ICD-10-CM

## 2025-07-02 DIAGNOSIS — N39.0 RECURRENT URINARY TRACT INFECTION: ICD-10-CM

## 2025-07-02 DIAGNOSIS — N39.0 ACUTE UTI: Primary | ICD-10-CM

## 2025-07-02 DIAGNOSIS — N95.2 VAGINAL ATROPHY: ICD-10-CM

## 2025-07-02 LAB
BACTERIA #/AREA URNS AUTO: ABNORMAL /HPF
MICROSCOPIC COMMENT: ABNORMAL
RBC #/AREA URNS AUTO: 1 /HPF (ref 0–4)
SQUAMOUS #/AREA URNS AUTO: 3 /HPF
WBC #/AREA URNS AUTO: 23 /HPF (ref 0–5)

## 2025-07-02 PROCEDURE — 81001 URINALYSIS AUTO W/SCOPE: CPT

## 2025-07-02 PROCEDURE — 87186 SC STD MICRODIL/AGAR DIL: CPT

## 2025-07-02 PROCEDURE — 3044F HG A1C LEVEL LT 7.0%: CPT | Mod: CPTII,95,, | Performed by: OBSTETRICS & GYNECOLOGY

## 2025-07-02 PROCEDURE — 1160F RVW MEDS BY RX/DR IN RCRD: CPT | Mod: CPTII,95,, | Performed by: OBSTETRICS & GYNECOLOGY

## 2025-07-02 PROCEDURE — 1159F MED LIST DOCD IN RCRD: CPT | Mod: CPTII,95,, | Performed by: OBSTETRICS & GYNECOLOGY

## 2025-07-02 PROCEDURE — 98006 SYNCH AUDIO-VIDEO EST MOD 30: CPT | Mod: 95,,, | Performed by: OBSTETRICS & GYNECOLOGY

## 2025-07-02 PROCEDURE — 4010F ACE/ARB THERAPY RXD/TAKEN: CPT | Mod: CPTII,95,, | Performed by: OBSTETRICS & GYNECOLOGY

## 2025-07-02 RX ORDER — VIBEGRON 75 MG/1
75 TABLET, FILM COATED ORAL DAILY
Qty: 90 TABLET | Refills: 3 | Status: SHIPPED | OUTPATIENT
Start: 2025-07-02 | End: 2026-07-02

## 2025-07-02 NOTE — PROGRESS NOTES
The patient location is: [place]  The chief complaint leading to consultation is: Follow up recurrent UTI's    Visit type: audiovisual    Face to Face time with patient: 15  30 minutes of total time spent on the encounter, which includes face to face time and non-face to face time preparing to see the patient (eg, review of tests), Obtaining and/or reviewing separately obtained history, Documenting clinical information in the electronic or other health record, Independently interpreting results (not separately reported) and communicating results to the patient/family/caregiver, or Care coordination (not separately reported).     Each patient to whom he or she provides medical services by telemedicine is:  (1) informed of the relationship between the physician and patient and the respective role of any other health care provider with respect to management of the patient; and (2) notified that he or she may decline to receive medical services by telemedicine and may withdraw from such care at any time.    Notes:   66 yo with a h/o recurrent UTI's and urge incontinence presents today for a follow up visit. Last visit 4/2/25.   She reports that she has been taking the antibiotics either before or after intercourse. She reports that symptoms mainly occurring after sexual activity or when traveling. When she has symptoms she will self treat for 5-7 days with two days. Reports that these symptoms are occurring about every 6 week.     Drinks about 4 cups of water per day. Has low sodium so she needs to watch her water intake.     Has urinary urgency and urgency urinary incontinence.     Review of urine cultures indicates a positive culture on:   4/30/24 Enterococcus >100,000 cfu/ml  6/11/24 Pseudomonas aeruginosa >100,000 cfu/ml  7/30/24 Coagulase negative staph >100,000 cfu/ml  4/2/25 E.coli and Enterobacter cloacae >100,000 cfu/ml    Patient switched to Keflex prophylaxis at her last visit. Patient stated that she is  able to take it. Using it to self treat at this time.     Will send her urine today for a UA microscopic and urine culture.      Patient will continue Cranberry extract with D-Mannose and Probiotics.     Will start Intrarosa, DHEA insert/ pre-cursor to estrogen as she has not been able to tolerate commercial preparations of vaginal estrogen in the past.     For her urgency urinary incontinence she was offered PFPT and medication. Opts to start with medication. Gemtesa prescribed. Side effects reviewed.       Aware to contact the office with any issues in obtaining either medication or the cost or if she develops side effects.     She will return in 3 months for re-evaluation. The patient was encouraged to contact the office as needed with any additional questions or concerns.

## 2025-07-05 ENCOUNTER — RESULTS FOLLOW-UP (OUTPATIENT)
Dept: UROGYNECOLOGY | Facility: CLINIC | Age: 65
End: 2025-07-05

## 2025-07-05 DIAGNOSIS — N39.0 ACUTE UTI: Primary | ICD-10-CM

## 2025-07-05 LAB — BACTERIA UR CULT: ABNORMAL

## 2025-07-05 RX ORDER — NITROFURANTOIN 25; 75 MG/1; MG/1
100 CAPSULE ORAL 2 TIMES DAILY
Qty: 14 CAPSULE | Refills: 0 | Status: SHIPPED | OUTPATIENT
Start: 2025-07-05 | End: 2025-07-14

## 2025-07-18 ENCOUNTER — OFFICE VISIT (OUTPATIENT)
Dept: VASCULAR SURGERY | Facility: CLINIC | Age: 65
End: 2025-07-18
Payer: MEDICARE

## 2025-07-18 VITALS
HEIGHT: 62 IN | BODY MASS INDEX: 30.51 KG/M2 | SYSTOLIC BLOOD PRESSURE: 127 MMHG | DIASTOLIC BLOOD PRESSURE: 85 MMHG | WEIGHT: 165.81 LBS | HEART RATE: 70 BPM

## 2025-07-18 DIAGNOSIS — G62.9 NEUROPATHY: Primary | ICD-10-CM

## 2025-07-18 PROCEDURE — 99999 PR PBB SHADOW E&M-EST. PATIENT-LVL IV: CPT | Mod: PBBFAC,,, | Performed by: SURGERY

## 2025-07-18 NOTE — PROGRESS NOTES
HPI:  Patient is a 56 y.o. female with HTN, s/p gastric bypass, GERD, who is here today for f/u - I initially met her for persistent leg discomfort secondary to significant superficial venous insufficiency - R > L with some tingling, that progresses to crampy calf pain  R ankle edema; now had L edema at ankle and discomfort  No claudication symptoms     Has done compression thigh high 30-40 mm pressure but pain and edema R > L     s/p 8/17/16 R GSV EVLT (47 cm): Doing well      No MI  ?'mini-stroke' ~ 2008 / 2009 (due to HTN ---  reports she was ~ 180/110)     Tobacco use: none  History of DVT/PE: none     Works in office      PMD is Dr SANDOR Caruso     Pain interfers with daily activities; she has attempted compression stockings, elevation and analgesics with minimal relief and pain persists.      1/2025: c/o burning/shooting pain from toes up lateral lower left leg.  No claudication or significant edema.    7/2025: doing better.             Past Medical History   Diagnosis Date    Abnormal Pap smear      Anemia         history    Anxiety      Cancer         uterine-cancer cells     Dry eyes      Gastric bypass status for obesity 8/21/2012    GERD (gastroesophageal reflux disease)      Hematuria      HTN (hypertension) 8/21/2012       120s-130s/80s    Kidney stone      Renal calculi      Sinus infection         history    Sleep apnea         patient does not use the C-PAP mask-cannot tolerate    Sleep disturbance      Urinary incontinence      Urinary retention      UTI (lower urinary tract infection)         on antibx at present-4/4/14    Vertigo         mild             Past Surgical History   Procedure Laterality Date    Appendectomy        Abdominoplasty with liposcuction        Cholecystectomy           Laparoscopic    Hysterectomy   1980        TVH  both ovaries remain    Cervical biopsy  w/ loop electrode excision           Prior to hysterectomy    Gastric bypass        Hernia repair                 Family History   Problem Relation Age of Onset    Heart disease Mother      Stroke Mother      Coronary artery disease Mother      Heart failure Mother      Diabetes Father      Breast cancer Maternal Aunt      Colon cancer Maternal Uncle      Hearing loss Maternal Grandmother      No Known Problems Sister      No Known Problems Brother      No Known Problems Paternal Aunt      No Known Problems Paternal Uncle      No Known Problems Maternal Grandfather      No Known Problems Paternal Grandmother      No Known Problems Paternal Grandfather      Ovarian cancer Neg Hx      Anesthesia problems Neg Hx      Amblyopia Neg Hx      Blindness Neg Hx      Cancer Neg Hx      Cataracts Neg Hx      Glaucoma Neg Hx      Hypertension Neg Hx      Macular degeneration Neg Hx      Retinal detachment Neg Hx      Strabismus Neg Hx      Thyroid disease Neg Hx        Social History            Social History    Marital status:        Spouse name: N/A    Number of children: N/A    Years of education: N/A          Occupational History    Not on file.             Social History Main Topics    Smoking status: Never Smoker    Smokeless tobacco: Never Used         Comment: .  .   Occup:  .      Alcohol use No    Drug use: No    Sexual activity: Yes       Partners: Male       Birth control/ protection: Surgical           Other Topics Concern    Not on file      Social History Narrative           Current Outpatient Prescriptions on File Prior to Visit   Medication Sig    alprazolam (XANAX) 0.5 MG tablet Take 1 tablet (0.5 mg total) by mouth every 8 (eight) hours as needed.    cholecalciferol, vitamin D3, 5,000 unit Tab Take by mouth. 1 Tablet Oral Every day    cyanocobalamin 1,000 mcg/mL injection INJECT 1 ML (1,000 MCG TOTAL) INTO THE MUSCLE EVERY 14 (FOURTEEN) DAYS.    estradiol (ESTRACE) 0.5 MG tablet Take 1 tablet (0.5 mg total) by mouth once daily.    sertraline (ZOLOFT) 25 MG tablet Take 1 tablet (25 mg  "total) by mouth every morning.    syringe with needle, disp, (BD LUER-CLARIBEL SYRINGE) 3 mL 25 x 1 1/2 " Syrg Use as directed with Cyanocobalamin    tretinoin (RETIN-A) 0.1 % cream 1 Cream Topical Every day    cycloSPORINE (RESTASIS) 0.05 % ophthalmic emulsion Place 0.05 mLs (1 drop total) into both eyes 2 (two) times daily.    [DISCONTINUED] losartan (COZAAR) 100 MG tablet Take 1 tablet (100 mg total) by mouth once daily.          Current Facility-Administered Medications on File Prior to Visit   Medication    lidocaine-EPINEPHrine 1%-1:100,000 30 mL, lidocaine HCL 10 mg/ml (1%) 20 mL, sodium bicarbonate 1 mEq/mL (8.4 %) 10 mL in sodium chloride 0.9% 500 mL solution         REVIEW OF SYSTEMS:  General: negative; ENT: negative; Allergy and Immunology: negative; Hematological and Lymphatic: Negative; Endocrine: negative; Respiratory: no cough, shortness of breath, or wheezing; Cardiovascular: no chest pain or dyspnea on exertion; Gastrointestinal: no abdominal pain/back, change in bowel habits, or bloody stools; Genito-Urinary: no dysuria, trouble voiding, or hematuria; Musculoskeletal: Leg discomfort secondary to chronic venous insufficiency; Neurological: no TIA or stroke symptoms     PHYSICAL EXAM:   Right Arm BP - Sitting: 160/81 (16 1511)  Left Arm BP - Sittin/83 (16 1511)  Pulse: 70  Temp: 98 °F (36.7 °C)    General appearance:      Alert, well-appearing, and in no distress.  Oriented to person, place, and time                 Neurological:      Normal speech, no focal findings noted; CN II - XII grossly intact           Musculoskeletal:      Digits/nail without cyanosis/clubbing.  Normal muscle strength/tone.                               Neck:     Supple, no significant adenopathy; thyroid is not enlarged                                               Carotid bruits cannot be auscultated                              Chest:     Clear to auscultation, no wheezes, rales or rhonchi, symmetric air " entry                                               No use of accessory muscles                           Cardiac:     Normal rate and regular rhythm, S1 and S2 normal; PMI non-displaced                        Abdomen:     Soft, nontender, nondistended, no masses or organomegaly                                               No rebound tenderness noted; bowel sounds normal                                               Pulsatile aortic mass is not palpable.                    Extremities:      2+ femoral pulses bilaterally                                               2+ Popliteal pulses; 2+ pedal pulses palpable.                                               No pedal edema                                               1+ edema                                               Hyperpigmentation: R > L foot.     LAB RESULTS:        Lab Results   Component Value Date     K 4.7 03/18/2015     K 3.8 08/29/2014     K 3.8 06/15/2014     CREATININE 0.8 03/18/2015     CREATININE 0.8 08/29/2014     CREATININE 0.9 06/15/2014            Lab Results   Component Value Date     WBC 7.22 03/18/2015     WBC 6.51 08/29/2014     WBC 11.09 06/15/2014     HCT 42.3 03/18/2015     HCT 39.0 08/29/2014     HCT 45.1 06/15/2014      03/18/2015      08/29/2014      06/15/2014            Lab Results   Component Value Date     HGBA1C 5.5 08/29/2014      IMAGING:  U/S: R GSV is closed  No DVT     Previous:  Venous U/S:  R GSV:7.6 mm  L GSV: 7.1 mm    R, L SSV: no relfux  No DVT     IMP/PLAN:  56 y.o. female with Chronic venous insufficiency - CEAP C4a  Has undergone trial of thigh-high 30-40 mm Hg compression  Doing well s/p 8/17/16 R GSV EVLT (47 cm)  LLE neurogenic pain    -venous insuff US normal 4/2025  -GEOVANNI normal 4/2025  -Neuro res for neuropathy  -RTC prn    I spent 11 minutes evaluating this patient and greater than 50% of the time was spent counseling, coordinator care and discussing the plan of care.  All questions were  answered and patient stated understanding with agreement with the above treatment plan.    Murali Stone M.D., R.P.V.I. [facility]

## 2025-07-30 ENCOUNTER — TELEPHONE (OUTPATIENT)
Dept: FAMILY MEDICINE | Facility: CLINIC | Age: 65
End: 2025-07-30
Payer: MEDICARE

## 2025-07-30 NOTE — TELEPHONE ENCOUNTER
Copied from CRM #[id number]. Topic: General Inquiry - Return Call  >> Jul 30, 2025  8:24 AM Med Assistant Lina wrote:  Type: Patient Call Back    Who called: Self    What is the request in detail: she is asking if the provider is willing to accept her grandson (18yr) as a new pt.     Can the clinic reply by JIMMYCHSSARANYA?NO    Would the patient rather a call back or a response via [facility]? Yes, call     Best call back number: [phone] (home)  >> Jul 30, 2025  2:26 PM Luan Cueva MD wrote:  Yes, we can get the information on her grandson and he can be seen as long as his insurance is not Medicaid since we can not accept new patients with Medicaid  ----- Message -----  From: Lina Perla MA  Sent: 7/30/2025   8:25 AM CDT  To: Anay Villa

## 2025-08-28 ENCOUNTER — OFFICE VISIT (OUTPATIENT)
Dept: NEUROLOGY | Facility: CLINIC | Age: 65
End: 2025-08-28
Payer: MEDICARE

## 2025-08-28 ENCOUNTER — LAB VISIT (OUTPATIENT)
Dept: LAB | Facility: HOSPITAL | Age: 65
End: 2025-08-28
Payer: MEDICARE

## 2025-08-28 VITALS
HEART RATE: 75 BPM | SYSTOLIC BLOOD PRESSURE: 141 MMHG | WEIGHT: 163.13 LBS | DIASTOLIC BLOOD PRESSURE: 75 MMHG | BODY MASS INDEX: 29.84 KG/M2

## 2025-08-28 DIAGNOSIS — M54.31 RIGHT SIDED SCIATICA: ICD-10-CM

## 2025-08-28 DIAGNOSIS — F33.1 MODERATE EPISODE OF RECURRENT MAJOR DEPRESSIVE DISORDER: ICD-10-CM

## 2025-08-28 DIAGNOSIS — R41.9 UNSPECIFIED SYMPTOMS AND SIGNS INVOLVING COGNITIVE FUNCTIONS AND AWARENESS: ICD-10-CM

## 2025-08-28 DIAGNOSIS — F09 COGNITIVE DYSFUNCTION: ICD-10-CM

## 2025-08-28 DIAGNOSIS — F09 COGNITIVE DYSFUNCTION: Primary | ICD-10-CM

## 2025-08-28 DIAGNOSIS — G95.9 CERVICAL MYELOPATHY: ICD-10-CM

## 2025-08-28 DIAGNOSIS — E87.1 HYPONATREMIA: ICD-10-CM

## 2025-08-28 LAB
ANION GAP (OHS): 10 MMOL/L (ref 8–16)
BUN SERPL-MCNC: 15 MG/DL (ref 8–23)
CALCIUM SERPL-MCNC: 9.3 MG/DL (ref 8.7–10.5)
CHLORIDE SERPL-SCNC: 103 MMOL/L (ref 95–110)
CO2 SERPL-SCNC: 22 MMOL/L (ref 23–29)
CREAT SERPL-MCNC: 0.9 MG/DL (ref 0.5–1.4)
FOLATE SERPL-MCNC: 5.7 NG/ML (ref 4–24)
GFR SERPLBLD CREATININE-BSD FMLA CKD-EPI: >60 ML/MIN/1.73/M2
GLUCOSE SERPL-MCNC: 127 MG/DL (ref 70–110)
POTASSIUM SERPL-SCNC: 4.5 MMOL/L (ref 3.5–5.1)
SODIUM SERPL-SCNC: 135 MMOL/L (ref 136–145)
VIT B12 SERPL-MCNC: 591 PG/ML (ref 210–950)

## 2025-08-28 PROCEDURE — 3044F HG A1C LEVEL LT 7.0%: CPT | Mod: CPTII,S$GLB,, | Performed by: STUDENT IN AN ORGANIZED HEALTH CARE EDUCATION/TRAINING PROGRAM

## 2025-08-28 PROCEDURE — 82607 VITAMIN B-12: CPT

## 2025-08-28 PROCEDURE — 99999 PR PBB SHADOW E&M-EST. PATIENT-LVL V: CPT | Mod: PBBFAC,,, | Performed by: STUDENT IN AN ORGANIZED HEALTH CARE EDUCATION/TRAINING PROGRAM

## 2025-08-28 PROCEDURE — 1125F AMNT PAIN NOTED PAIN PRSNT: CPT | Mod: CPTII,S$GLB,, | Performed by: STUDENT IN AN ORGANIZED HEALTH CARE EDUCATION/TRAINING PROGRAM

## 2025-08-28 PROCEDURE — 80048 BASIC METABOLIC PNL TOTAL CA: CPT

## 2025-08-28 PROCEDURE — 1101F PT FALLS ASSESS-DOCD LE1/YR: CPT | Mod: CPTII,S$GLB,, | Performed by: STUDENT IN AN ORGANIZED HEALTH CARE EDUCATION/TRAINING PROGRAM

## 2025-08-28 PROCEDURE — 84425 ASSAY OF VITAMIN B-1: CPT

## 2025-08-28 PROCEDURE — 1159F MED LIST DOCD IN RCRD: CPT | Mod: CPTII,S$GLB,, | Performed by: STUDENT IN AN ORGANIZED HEALTH CARE EDUCATION/TRAINING PROGRAM

## 2025-08-28 PROCEDURE — 83921 ORGANIC ACID SINGLE QUANT: CPT

## 2025-08-28 PROCEDURE — 3288F FALL RISK ASSESSMENT DOCD: CPT | Mod: CPTII,S$GLB,, | Performed by: STUDENT IN AN ORGANIZED HEALTH CARE EDUCATION/TRAINING PROGRAM

## 2025-08-28 PROCEDURE — 99215 OFFICE O/P EST HI 40 MIN: CPT | Mod: S$GLB,,, | Performed by: STUDENT IN AN ORGANIZED HEALTH CARE EDUCATION/TRAINING PROGRAM

## 2025-08-28 PROCEDURE — 84252 ASSAY OF VITAMIN B-2: CPT

## 2025-08-28 PROCEDURE — 82746 ASSAY OF FOLIC ACID SERUM: CPT

## 2025-08-28 PROCEDURE — 3078F DIAST BP <80 MM HG: CPT | Mod: CPTII,S$GLB,, | Performed by: STUDENT IN AN ORGANIZED HEALTH CARE EDUCATION/TRAINING PROGRAM

## 2025-08-28 PROCEDURE — 3077F SYST BP >= 140 MM HG: CPT | Mod: CPTII,S$GLB,, | Performed by: STUDENT IN AN ORGANIZED HEALTH CARE EDUCATION/TRAINING PROGRAM

## 2025-08-28 PROCEDURE — 4010F ACE/ARB THERAPY RXD/TAKEN: CPT | Mod: CPTII,S$GLB,, | Performed by: STUDENT IN AN ORGANIZED HEALTH CARE EDUCATION/TRAINING PROGRAM

## 2025-08-28 PROCEDURE — 36415 COLL VENOUS BLD VENIPUNCTURE: CPT | Mod: PO

## 2025-08-28 PROCEDURE — 3008F BODY MASS INDEX DOCD: CPT | Mod: CPTII,S$GLB,, | Performed by: STUDENT IN AN ORGANIZED HEALTH CARE EDUCATION/TRAINING PROGRAM

## 2025-09-02 LAB — VIT B2 SERPL-MCNC: 6 MCG/L (ref 1–19)

## 2025-09-03 LAB
W METHYLMALONIC ACID: 0.11 UMOL/L
W VITAMIN B1: 52 UG/L